# Patient Record
Sex: MALE | Race: ASIAN | NOT HISPANIC OR LATINO | Employment: OTHER | URBAN - METROPOLITAN AREA
[De-identification: names, ages, dates, MRNs, and addresses within clinical notes are randomized per-mention and may not be internally consistent; named-entity substitution may affect disease eponyms.]

---

## 2017-01-10 ENCOUNTER — OFFICE VISIT (OUTPATIENT)
Dept: INTERNAL MEDICINE | Facility: CLINIC | Age: 67
End: 2017-01-10

## 2017-01-10 VITALS
HEART RATE: 79 BPM | RESPIRATION RATE: 16 BRPM | BODY MASS INDEX: 25.7 KG/M2 | WEIGHT: 140.9 LBS | DIASTOLIC BLOOD PRESSURE: 76 MMHG | SYSTOLIC BLOOD PRESSURE: 134 MMHG

## 2017-01-10 DIAGNOSIS — I48.91 ATRIAL FIBRILLATION, UNSPECIFIED TYPE (H): ICD-10-CM

## 2017-01-10 DIAGNOSIS — Z11.59 NEED FOR HEPATITIS C SCREENING TEST: ICD-10-CM

## 2017-01-10 DIAGNOSIS — Z71.84 TRAVEL ADVICE ENCOUNTER: Primary | ICD-10-CM

## 2017-01-10 DIAGNOSIS — Z29.89 NEED FOR MALARIA PROPHYLAXIS: ICD-10-CM

## 2017-01-10 RX ORDER — DOXYCYCLINE HYCLATE 100 MG/1
100 TABLET, DELAYED RELEASE ORAL DAILY
Qty: 68 TABLET | Refills: 0 | Status: SHIPPED | OUTPATIENT
Start: 2017-01-10 | End: 2017-02-28

## 2017-01-10 RX ORDER — WARFARIN SODIUM 2 MG/1
TABLET ORAL
Qty: 100 TABLET | Refills: 3 | COMMUNITY
Start: 2017-01-10 | End: 2017-11-28 | Stop reason: DRUGHIGH

## 2017-01-10 RX ORDER — DOXYCYCLINE HYCLATE 100 MG/1
100 TABLET, DELAYED RELEASE ORAL DAILY
Qty: 50 TABLET | Refills: 0 | Status: SHIPPED | OUTPATIENT
Start: 2017-01-10 | End: 2017-01-10

## 2017-01-10 RX ORDER — AZITHROMYCIN 500 MG/1
1000 TABLET, FILM COATED ORAL ONCE
Qty: 2 TABLET | Refills: 0 | Status: SHIPPED | OUTPATIENT
Start: 2017-01-10 | End: 2017-01-10

## 2017-01-10 ASSESSMENT — PAIN SCALES - GENERAL: PAINLEVEL: NO PAIN (0)

## 2017-01-10 NOTE — MR AVS SNAPSHOT
After Visit Summary   1/10/2017    Ju Edwards    MRN: [id number]           Patient Information     Date Of Birth          1950        Visit Information        Provider Department      1/10/2017 1:55 PM Lilibeth Ramírez MD; [facility]        Today's Diagnoses     Travel advice encounter    -  1     Need for hepatitis C screening test         Need for malaria prophylaxis           Care Instructions    1 Received 3 vaccines in clinic today: Hepatitis A, Polio booster,Typhoid  2. Start taking Doxycyline 100mg daily (for malaria prophylaxis) today; will continue taking this every day through your whole trip for 7 days after you get back, take it until all the pills are gone no matter how you feel  3. I ordered you a one time pill of Azithromycin; take this if you develop severe diarrhea while on your trip  4. As we discussed in clinic, these antibiotics can affect your Warfarin dose. Starting today, take 1 pill of Warfarin every single day (do not take the 1/2 pill on Tues, Thurs, Sat, Sun like normal, just take 1 pill every day).   5. Go to your Warfarin clinic on Monday and get your INR checked before you leave                  Primary Care Center Medication Refill Request Information:  * Please contact your pharmacy regarding ANY request for medication refills.  ** [facility] Prescription Fax = [phone]  * Please allow 3 business days for routine medication refills.  * Please allow 5 business days for controlled substance medication refills.     Primary Care Center Test Result notification information:  *You will be notified with in 7-10 days of your appointment day regarding the results of your test.  If you are on MyChart you will be notified as soon as the provider has reviewed the results and signed off on them.          Follow-ups after your visit        Future tests that were ordered for you today     Open Future Orders         Priority Expected Expires Ordered    INR Routine 2017 1/10/2018 1/10/2017            Who to contact     Please call your clinic at [phone] to:    Ask questions about your health    Make or cancel appointments    Discuss your medicines    Learn about your test results    Speak to your doctor   If you have compliments or concerns about an experience at your clinic, or if you wish to file a complaint, please contact [facility] Patient Relations at [phone] or email us at [email or web address]         Additional Information About Your Visit        [employer] Information     [employer] is an electronic gateway that provides easy, online access to your medical records. With scenios, you can request a clinic appointment, read your test results, renew a prescription or communicate with your care team.     To sign up for scenios visit the website at [email or web address]   You will be asked to enter the access code listed below, as well as some personal information. Please follow the directions to create your username and password.     Your access code is: [email or web address]  Expires: 2017  6:30 AM     Your access code will  in 90 days. If you need help or a new code, please contact your [facility] or call [phone] for assistance.        Care EveryWhere ID     This is your Care EveryWhere ID. This could be used by other organizations to access your [place] medical records  [id number]        Your Vitals Were     Pulse Respirations                79 16           Blood Pressure from Last 3 Encounters:   01/10/17 134/76   16 142/78   16 155/84    Weight from Last 3 Encounters:   01/10/17 63.912 kg (140 lb 14.4 oz)   16 65.772 kg (145 lb)   16 63.05 kg (139 lb)              We Performed the Following     POLIOVIRUS VACC INACTIVATED SUBQ/IM     TYPHOID VACCINE, IM          Today's Medication Changes          These  changes are accurate as of: 1/10/17  3:52 PM.  If you have any questions, ask your nurse or doctor.               Start taking these medicines.        Dose/Directions    azithromycin 500 MG tablet   Commonly known as:  ZITHROMAX   Used for:  Travel advice encounter   Started by:  Lilibeth Ramírez MD        Dose:  1000 mg   Take 2 tablets (1,000 mg) by mouth once for 1 dose   Quantity:  2 tablet   Refills:  0       Doxycycline Hyclate 100 MG Tbec   Used for:  Need for malaria prophylaxis   Started by:  Lilibeth Ramírez MD        Dose:  100 mg   Take 100 mg by mouth daily   Quantity:  50 tablet   Refills:  0         These medicines have changed or have updated prescriptions.        Dose/Directions    warfarin 2 MG tablet   Commonly known as:  COUMADIN   This may have changed:  Another medication with the same name was removed. Continue taking this medication, and follow the directions you see here.   Used for:  Atrial fibrillation (H)   Changed by:  Rayo Christiansen MD        Take 1 tablet (2 mgs) on Mon, Wed, Fri and  1 1/2 tabs( 3 mgs)  all other days or as directed by the Anticoagulation Clinic   Quantity:  100 tablet   Refills:  3            Where to get your medicines      These medications were sent to [facility]/pharmacy #[id number] - [place], [place] - [address] [place] [address]     Phone:  [phone]    - azithromycin 500 MG tablet  - Doxycycline Hyclate 100 MG Tbec             Primary Care Provider Office Phone # Fax #    Rayo Christiansen [phone]        [facility] [address]  [facility] [address]        Thank you!     Thank you for choosing [facility]  for your care. Our goal is always to provide you with excellent care. Hearing back from our patients is one way we can continue to improve our services. Please take a few minutes to complete the written survey that you may receive in the mail after your  visit with us. Thank you!             Your Updated Medication List - Protect others around you: Learn how to safely use, store and throw away your medicines at www.disposemymeds.org.          This list is accurate as of: 1/10/17  3:52 PM.  Always use your most recent med list.                   Brand Name Dispense Instructions for use    atenolol 25 MG tablet    TENORMIN    45 tablet    Take 0.5 tablets (12.5 mg) by mouth daily       atorvastatin 80 MG tablet    LIPITOR    90 tablet    Take 1 tablet (80 mg) by mouth daily       azithromycin 500 MG tablet    ZITHROMAX    2 tablet    Take 2 tablets (1,000 mg) by mouth once for 1 dose       cetirizine 10 MG tablet    zyrTEC    90 tablet    Take 1 tablet (10 mg) by mouth every evening       citalopram 10 MG tablet    celeXA    180 tablet    Take 2 tablets (20 mg) by mouth daily       cycloSPORINE 0.05 % ophthalmic emulsion    RESTASIS    180 each    Place 1 drop into both eyes 2 times daily       Doxycycline Hyclate 100 MG Tbec     50 tablet    Take 100 mg by mouth daily       emollient cream     25 g    Apply topically as needed for other       flecainide acetate 150 MG Tabs     180 tablet    Take 1 tab (150mg) by mouth 2 times daily as needed.       ibuprofen 600 MG tablet    ADVIL/MOTRIN     Take 600 mg by mouth       lisinopril 10 MG tablet    PRINIVIL/ZESTRIL    100 tablet    Take 1 tablet (10 mg) by mouth daily       NAPROXEN PO      Take 375 mg by mouth 2 times daily (with meals)       ONDANSETRON PO      Take 4 mg by mouth every 8 hours as needed for nausea       oxyCODONE-acetaminophen 5-325 MG per tablet    PERCOCET     Take 1 tablet by mouth every 4 hours as needed for moderate to severe pain       pantoprazole 40 MG EC tablet    PROTONIX    90 tablet    Take 1 tablet (40 mg) by mouth daily       SENNA LAX PO      Take by mouth 2 times daily as needed       STOOL SOFTENER 100 MG capsule   Generic drug:  docusate sodium          vitamin D3 2000 UNITS Caps      90 capsule    Take 1 tablet by mouth daily       warfarin 2 MG tablet    COUMADIN    100 tablet    Take 1 tablet (2 mgs) on Mon, Wed, Fri and  1 1/2 tabs( 3 mgs)  all other days or as directed by the Anticoagulation Clinic

## 2017-01-10 NOTE — PATIENT INSTRUCTIONS
1 Received 3 vaccines in clinic today: Hepatitis A, Polio booster,Typhoid  2. Start taking Doxycyline 100mg daily (for malaria prophylaxis) today; will continue taking this every day through your whole trip for 7 days after you get back, take it until all the pills are gone no matter how you feel  3. I ordered you a one time pill of Azithromycin; take this if you develop severe diarrhea while on your trip  4. As we discussed in clinic, these antibiotics can affect your Warfarin dose. Starting today, take 1 pill of Warfarin every single day (do not take the 1/2 pill on Tues, Thurs, Sat, Sun like normal, just take 1 pill every day).   5. Go to your Warfarin clinic on Monday and get your INR checked before you leave                  Primary Care Center Medication Refill Request Information:  * Please contact your pharmacy regarding ANY request for medication refills.  ** [facility] Prescription Fax = [phone]  * Please allow 3 business days for routine medication refills.  * Please allow 5 business days for controlled substance medication refills.     Primary Care Center Test Result notification information:  *You will be notified with in 7-10 days of your appointment day regarding the results of your test.  If you are on MyChart you will be notified as soon as the provider has reviewed the results and signed off on them.

## 2017-01-10 NOTE — NURSING NOTE
Chief Complaint   Patient presents with     Travel Clinic     patient is leaving for [facility] on 01/17/2017 and returning on 02/21/2017     SHAISTA MALCOLM at 2:47 PM on 1/10/2017.

## 2017-01-10 NOTE — PROGRESS NOTES
Ju Edwards MRN# [id number]   YOB: 1950 Age: 66 year old     Date: January 10, 2017     Reason for visit: travel to [facility]    HPI:   Mr. Edwards is a 66 year old male with pmhx HTN, HLD, CVA with residual left sided weakness, Persistent AFib on Coumadin who is planning to travel to [facility] [facility] on 1/17 and presents for counseling/medication/vaccinations in anticipation of his trip.    The patient was originally born in [place] and immigrated to the [place] ~[place] ago.  He last visited [place] in 2013 for 3 weeks.  He is planning to go back from 1/17-2/21 to visit his 95 year old mother.  He states that he plans to fly into the [place] ([place]) and then travel by car to his mother's home, which is located in [place] (notably within an area at high risk for malaria).  This is a larger city. Is not planning on having any medical procedures while in [place]. Is traveling alone, his wife is planning to stay in [place].     Of note, his MMR, tdap, Influenza vaccinations are all up to date.  He has never been vaccinated for HBV. He has received 1 of 2 doses of HAV vaccine in 2013. Received typhoid vaccine in 2013. Has not received polio vaccine since childhood per his report.     Patient Active Problem List   Diagnosis     Hypertension     Hyperlipidemia     Stroke (H)     Atrial fibrillation (H)     Long-term (current) use of anticoagulants [Z79.01]       ROS: Complete 10 point review of systems negative unless mentioned in HPI    Exam:  /76 mmHg  Pulse 79  Resp 16  Wt 63.912 kg (140 lb 14.4 oz)  General: NAD, pleasant and cooperative with interview and exam  HEENT: NCAT, moist oral mucosa, oropharynx clear, anicteric sclera  Neck: no cervical lymphadenopathy or thyromegaly appreciated  CV: RRR, Normal S1, S2, no murmurs, rubs, thrills  Resp: non-labored respirations, CTAB, no rhonchi, crackles, wheezes appreciated  Abdomen: soft, nontender, nondistended, normoactive bs, no HSM  Extremities:  WWP, no LE edema bilaterally  Skin: Warm and dry, no jaundice, rash, or concerning lesions    Labs:   None recent    Imaging:   None recent    A&P:   Mr. Edwards is a 66 year old male with pmhx HTN, HLD, CVA with residual left sided weakness, Persistent AFib on Coumadin who is planning to travel to [facility] on 1/17 and presents for counseling/medication/vaccinations in anticipation of his trip.    # Travel Prophylaxis:  Vaccinations  -- Hepatitis A, IPV (polio), Typhoid vaccinations given   -- of note, pt ultimately also require HBV vaccine, but would only have time for one shot prior to leaving for [facility] which would not provide any immunity, pt was counseled regarding this and expressed understanding, will order HBV panel to assess immunity  -- MMR, Tdap, Influenza, up to date  -- Japanese Encephalitis immunization was considered, but after review of season, location of travel, and risk of JE as well as cost of vaccination, felt that JE immunization not indicated at this time    Malaria Prophylaxis:   -- Area that patient will be staying is endemic for malaria, chemoprophylaxis indicated. Cost is an issue for this patient  -- Doxycyline 100mg qdaily starting now, to be continued for 1 month following return (1/10-3/21)  -- Doxycyline can interact with Warfarin, plan to adjust patient's Warfarin dose as follows   = Decrease Warfarin to 2mg daily every day (as opposed to 2mg M, W, F; 3mg Tues/Thurs/Sat/Sun   = INR check 1/16 prior to departure   = INR check x2 while in [facility] [facility] if possible    Traveler's Diarrhea:  -- Patient provided with Azithromycin 1000mg x1 dose, to be utilized if he develops traveler's diarrhea    Return to clinic: as needed     Patient care plan discussed with attending physician, Dr. Dent, who agreed with above.    Lilibeth Ramírez MD  Internal Medicine, PGY-2    [facility] today for travel clinic and musculoskeletal problem.    Diagnoses and all orders for this visit:    Travel advice  encounter  -     Discontinue: hepatitis A-hepatitis B (TWINRIX) 720-20 ELU-MCG/ML injection; Inject 1 mL into the muscle once for 1 dose  -     POLIOVIRUS VACC INACTIVATED SUBQ/IM  -     TYPHOID VACCINE, IM  -     azithromycin (ZITHROMAX) 500 MG tablet; Take 2 tablets (1,000 mg) by mouth once for 1 dose  -     HEPA VACCINE ADULT IM    Need for hepatitis C screening test    Need for malaria prophylaxis  -     Discontinue: Doxycycline Hyclate 100 MG TBEC; Take 100 mg by mouth daily  -     INR; Future  -     Doxycycline Hyclate 100 MG TBEC; Take 100 mg by mouth daily    Atrial fibrillation, unspecified type (H)    Other orders  -     Cancel: **Hepatitis C Screen Reflex to HCV RNA Quant and Genotype (2 weeks); Future      Attending Addendum:  Patient seen and examined with resident in clinic today.  Pertinent portions of history and exam were independently verified by myself.  I agree with the exam and plan as outlined above with the following modifications: none.  All instructions explained to patient in detail with aid of .  Pt voiced understanding.  Danyelle Dent MD  Internal Medicine

## 2017-01-16 ENCOUNTER — ANTICOAGULATION THERAPY VISIT (OUTPATIENT)
Dept: ANTICOAGULATION | Facility: CLINIC | Age: 67
End: 2017-01-16

## 2017-01-16 DIAGNOSIS — Z79.01 LONG-TERM (CURRENT) USE OF ANTICOAGULANTS: Primary | ICD-10-CM

## 2017-01-16 DIAGNOSIS — Z29.89 NEED FOR MALARIA PROPHYLAXIS: ICD-10-CM

## 2017-01-16 DIAGNOSIS — I10 ESSENTIAL HYPERTENSION: Primary | ICD-10-CM

## 2017-01-16 DIAGNOSIS — I48.91 ATRIAL FIBRILLATION, UNSPECIFIED TYPE (H): ICD-10-CM

## 2017-01-16 LAB — INR PPP: 2.13 (ref 0.86–1.14)

## 2017-01-16 RX ORDER — ATENOLOL 25 MG/1
12.5 TABLET ORAL DAILY
Qty: 45 TABLET | Refills: 3 | Status: SHIPPED | OUTPATIENT
Start: 2017-01-16 | End: 2017-12-20

## 2017-01-16 NOTE — PROGRESS NOTES
ANTICOAGULATION FOLLOW-UP CLINIC VISIT    Patient Name:  Ju Edwards  Date:  1/16/2017  Contact Type:  Telephone    SUBJECTIVE:     Patient Findings     Positives No Problem Findings    Comments Leslie reports that Latanya will be traveling to [facility] and won't return until the week of 2/20.           OBJECTIVE    INR   Date Value Ref Range Status   01/16/2017 2.13* 0.86 - 1.14 Final       ASSESSMENT / PLAN  INR assessment THER    Recheck INR In: 5 WEEKS    INR Location Clinic      Anticoagulation Summary as of 1/16/2017     INR goal 2.0-3.0   Selected INR 2.13 (1/16/2017)   Maintenance plan 2 mg (2 mg x 1) on Mon, Wed, Fri; 3 mg (2 mg x 1.5) all other days   Full instructions 2 mg on Mon, Wed, Fri; 3 mg all other days   Weekly total 18 mg   No change documented Hien Curry, RN   Plan last modified Alice Carranza RN (6/2/2016)   Next INR check 2/13/2017   Priority INR   Target end date Indefinite    Indications   Long-term (current) use of anticoagulants [Z79.01] [Z79.01]  Atrial fibrillation (H) [I48.91]         Anticoagulation Episode Summary     INR check location Clinic Lab    Preferred lab     Send INR reminders to [facility]    Comments Speak witlauren Wife Lety [phone]      Anticoagulation Care Providers     Provider Role Specialty Phone number    Rayo Christiansen MD [facility] Internal Medicine [phone]            See the Encounter Report to view Anticoagulation Flowsheet and Dosing Calendar (Go to Encounters tab in chart review, and find the Anticoagulation Therapy Visit)    Spoke with Leslie.    Hien Curry, RN

## 2017-02-22 ENCOUNTER — ANTICOAGULATION THERAPY VISIT (OUTPATIENT)
Dept: ANTICOAGULATION | Facility: CLINIC | Age: 67
End: 2017-02-22

## 2017-02-22 DIAGNOSIS — I48.91 ATRIAL FIBRILLATION, UNSPECIFIED TYPE (H): ICD-10-CM

## 2017-02-22 DIAGNOSIS — Z79.01 LONG-TERM (CURRENT) USE OF ANTICOAGULANTS: ICD-10-CM

## 2017-02-22 LAB — INR PPP: 4.22 (ref 0.86–1.14)

## 2017-02-22 NOTE — PROGRESS NOTES
ANTICOAGULATION FOLLOW-UP CLINIC VISIT    Patient Name:  Ju Edwards  Date:  2/22/2017  Contact Type:  Telephone    SUBJECTIVE:     Patient Findings     Positives Nose bleeds    Comments Latanya just returned from [facility].  Lety reported that while he was there he had a lot of diarrhea.  He had a nose bleed earlier today that lasted about 10 mintues.  He also is just starting to get a nose bleed again while we are on the phone.  Instructed Lety to take him to the ER if they are unable to get bleeding to stop.  He already took warfarin dose today.           OBJECTIVE    INR   Date Value Ref Range Status   02/22/2017 4.22 (H) 0.86 - 1.14 Final       ASSESSMENT / PLAN  INR assessment SUPRA    Recheck INR In: 1 WEEK    INR Location Clinic      Anticoagulation Summary as of 2/22/2017     INR goal 2.0-3.0   Today's INR 4.22!   Maintenance plan 2 mg (2 mg x 1) on Mon, Wed, Fri; 3 mg (2 mg x 1.5) all other days   Full instructions 2/23: Hold; Otherwise 2 mg on Mon, Wed, Fri; 3 mg all other days   Weekly total 18 mg   Plan last modified Alice Carranza RN (6/2/2016)   Next INR check 3/1/2017   Priority INR   Target end date Indefinite    Indications   Long-term (current) use of anticoagulants [Z79.01] [Z79.01]  Atrial fibrillation (H) [I48.91]         Anticoagulation Episode Summary     INR check location Clinic Lab    Preferred lab     Send INR reminders to [facility]    Comments Speak witrh Wife Lety [phone]      Anticoagulation Care Providers     Provider Role Specialty Phone number    Rayo Christiansen MD [facility] Internal Medicine [phone]            See the Encounter Report to view Anticoagulation Flowsheet and Dosing Calendar (Go to Encounters tab in chart review, and find the Anticoagulation Therapy Visit)    Spoke with Lety.    Hien Curry, CONCEPCIÓN

## 2017-02-22 NOTE — MR AVS SNAPSHOT
Ju Edwards   2/22/2017   Anticoagulation Therapy Visit    Description:  66 year old male   Provider:  Hien Curry, RN   Department:  [facility]           INR as of 2/22/2017     Today's INR 4.22!      Anticoagulation Summary as of 2/22/2017     INR goal 2.0-3.0   Today's INR 4.22!   Full instructions 2/23: Hold; Otherwise 2 mg on Mon, Wed, Fri; 3 mg all other days   Next INR check 3/1/2017    Indications   Long-term (current) use of anticoagulants [Z79.01] [Z79.01]  Atrial fibrillation (H) [I48.91]         February 2017 Details    Sun Mon Tue Wed Thu Fri Sat        1               2               3               4                 5               6               7               8               9               10               11                 12               13               14               15               16               17               18                 19               20               21               22      2 mg   See details      23      Hold         24      2 mg         25      3 mg           26      3 mg         27      2 mg         28      3 mg              Date Details   02/22 This INR check               How to take your warfarin dose     To take:  2 mg Take 1 of the 2 mg tablets.    To take:  3 mg Take 1.5 of the 2 mg tablets.    Hold Do not take your warfarin dose. See the Details table to the right for additional instructions.                March 2017 Details    Sun Mon Tue Wed Thu Fri Sat        1            2               3               4                 5               6               7               8               9               10               11                 12               13               14               15               16               17               18                 19               20               21               22               23               24               25                 26               27               28                29               30               31                 Date Details   No additional details    Date of next INR:  3/1/2017         How to take your warfarin dose     To take:  2 mg Take 1 of the 2 mg tablets.

## 2017-02-28 ENCOUNTER — OFFICE VISIT (OUTPATIENT)
Dept: INTERNAL MEDICINE | Facility: CLINIC | Age: 67
End: 2017-02-28

## 2017-02-28 ENCOUNTER — ANTICOAGULATION THERAPY VISIT (OUTPATIENT)
Dept: ANTICOAGULATION | Facility: CLINIC | Age: 67
End: 2017-02-28

## 2017-02-28 VITALS
RESPIRATION RATE: 16 BRPM | BODY MASS INDEX: 25.05 KG/M2 | HEART RATE: 55 BPM | SYSTOLIC BLOOD PRESSURE: 140 MMHG | WEIGHT: 137.4 LBS | OXYGEN SATURATION: 97 % | DIASTOLIC BLOOD PRESSURE: 74 MMHG

## 2017-02-28 DIAGNOSIS — Z79.01 LONG-TERM (CURRENT) USE OF ANTICOAGULANTS: ICD-10-CM

## 2017-02-28 DIAGNOSIS — E78.2 MIXED HYPERLIPIDEMIA: ICD-10-CM

## 2017-02-28 DIAGNOSIS — F32.A DEPRESSION, UNSPECIFIED DEPRESSION TYPE: ICD-10-CM

## 2017-02-28 DIAGNOSIS — Z11.59 NEED FOR HEPATITIS C SCREENING TEST: ICD-10-CM

## 2017-02-28 DIAGNOSIS — I48.91 ATRIAL FIBRILLATION, UNSPECIFIED TYPE (H): ICD-10-CM

## 2017-02-28 DIAGNOSIS — K21.00 GASTROESOPHAGEAL REFLUX DISEASE WITH ESOPHAGITIS: ICD-10-CM

## 2017-02-28 DIAGNOSIS — Z11.59 NEED FOR HEPATITIS C SCREENING TEST: Primary | ICD-10-CM

## 2017-02-28 LAB
HBV SURFACE AB SERPL IA-ACNC: 232.41 M[IU]/ML
HBV SURFACE AG SERPL QL IA: NONREACTIVE
HCV AB SERPL QL IA: NORMAL
INR PPP: 1.42 (ref 0.86–1.14)

## 2017-02-28 PROCEDURE — 86704 HEP B CORE ANTIBODY TOTAL: CPT | Performed by: INTERNAL MEDICINE

## 2017-02-28 PROCEDURE — G0472 HEP C SCREEN HIGH RISK/OTHER: HCPCS | Performed by: NURSE PRACTITIONER

## 2017-02-28 PROCEDURE — 86803 HEPATITIS C AB TEST: CPT | Performed by: NURSE PRACTITIONER

## 2017-02-28 PROCEDURE — 87340 HEPATITIS B SURFACE AG IA: CPT | Performed by: INTERNAL MEDICINE

## 2017-02-28 PROCEDURE — 86706 HEP B SURFACE ANTIBODY: CPT | Performed by: INTERNAL MEDICINE

## 2017-02-28 RX ORDER — ATORVASTATIN CALCIUM 80 MG/1
80 TABLET, FILM COATED ORAL DAILY
Qty: 90 TABLET | Refills: 2 | Status: SHIPPED | OUTPATIENT
Start: 2017-02-28 | End: 2017-12-17

## 2017-02-28 RX ORDER — CITALOPRAM HYDROBROMIDE 10 MG/1
20 TABLET ORAL DAILY
Qty: 180 TABLET | Refills: 0 | Status: SHIPPED | OUTPATIENT
Start: 2017-02-28 | End: 2017-04-24

## 2017-02-28 RX ORDER — PANTOPRAZOLE SODIUM 40 MG/1
40 TABLET, DELAYED RELEASE ORAL DAILY
Qty: 90 TABLET | Refills: 2 | Status: SHIPPED | OUTPATIENT
Start: 2017-02-28 | End: 2017-12-17

## 2017-02-28 ASSESSMENT — PAIN SCALES - GENERAL: PAINLEVEL: NO PAIN (0)

## 2017-02-28 NOTE — PROGRESS NOTES
HPI: Ju Edwards is a 66 year old male who comes in with [facility]  for physical and to have forms completed for [facility].  He recently returned from a trip in [facility] where he travelled with a friend and friend's wife.  He was ill with traveler's diarrhea while there.  He has recovered from the diarrhea. His brother who he was visiting was killed in a car accident one week after he arrived for the visit. He stopped his doxycycline when he came home and did not take it for 30 days after leaving the malarial area.  He had a nosebleed which he could not control on 2/22/17.  He had an INR of > 4.  He was advised to hold his coumadin and has held it for 2 days.  He was instructed to re-check INR today.   He has forms from the [facility] which he attends after he had a CVA.     He feels fine at this time.     Patient Active Problem List   Diagnosis     Hypertension     Hyperlipidemia     Stroke (H)     Atrial fibrillation (H)     Long-term (current) use of anticoagulants [Z79.01]       He has a medical hx of  does not have any pertinent problems on file.    Current Outpatient Prescriptions   Medication Sig Dispense Refill     atenolol (TENORMIN) 25 MG tablet Take 0.5 tablets (12.5 mg) by mouth daily 45 tablet 3     Doxycycline Hyclate 100 MG TBEC Take 100 mg by mouth daily 68 tablet 0     warfarin (COUMADIN) 2 MG tablet Pt instructed to take 1 tab (2 mg) every day after starting malaria prophylaxis with doxycycline 100 tablet 3     citalopram (CELEXA) 10 MG tablet Take 2 tablets (20 mg) by mouth daily 180 tablet 0     pantoprazole (PROTONIX) 40 MG enteric coated tablet Take 1 tablet (40 mg) by mouth daily 90 tablet 2     Cholecalciferol (VITAMIN D3) 2000 UNITS CAPS Take 1 tablet by mouth daily 90 capsule 2     atorvastatin (LIPITOR) 80 MG tablet Take 1 tablet (80 mg) by mouth daily 90 tablet 2     flecainide acetate 150 MG TABS Take 1 tab (150mg) by mouth 2 times daily  as needed. 180 tablet 2     cycloSPORINE (RESTASIS) 0.05 % ophthalmic emulsion Place 1 drop into both eyes 2 times daily 180 each 3     lisinopril (PRINIVIL,ZESTRIL) 10 MG tablet Take 1 tablet (10 mg) by mouth daily 100 tablet 3     docusate sodium (STOOL SOFTENER) 100 MG capsule        ibuprofen (ADVIL,MOTRIN) 600 MG tablet Take 600 mg by mouth       cetirizine (ZYRTEC) 10 MG tablet Take 1 tablet (10 mg) by mouth every evening 90 tablet 3     NAPROXEN PO Take 375 mg by mouth 2 times daily (with meals)       oxyCODONE-acetaminophen (PERCOCET) 5-325 MG per tablet Take 1 tablet by mouth every 4 hours as needed for moderate to severe pain       emollient (VANICREAM) cream Apply topically as needed for other 25 g 3     ONDANSETRON PO Take 4 mg by mouth every 8 hours as needed for nausea       Sennosides (SENNA LAX PO) Take by mouth 2 times daily as needed           ALLERGIES: Review of patient's allergies indicates no known allergies.    PAST MEDICAL HX:   Past Medical History   Diagnosis Date     Atrial fibrillation (H)      paroxysmal     Delirium      associated with hospitalization for stroke     Depression      Gastric erosions 1/2014     associated with gi bleed     Hyperlipidemia      Hypertension      Stroke (H) 1/2014     ischemic CVA with hemorrhagic transformation       PAST SURGICAL HX:   Past Surgical History   Procedure Laterality Date     Cardiac surgery  2000     mitral valve repair     Repair atrial septal defect         IMMUNIZATION HX:   Immunization History   Administered Date(s) Administered     DTAP (<7y) 01/01/2001     Hepatitis A (Adult) 01/10/2017     Hepatitis A Vac Ped/Adol-2 Dose 02/21/2013     IPV 01/10/2017     Influenza (IIV3) 09/21/2011, 11/01/2013, 10/01/2015     Influenza Vaccine, 3 YRS +, IM (QUADRIVALENT W/PRESERVATIVES) 09/15/2016     Pneumococcal (PCV 13) 12/04/2014     Tdap (Adacel,Boostrix) 03/10/2010     Typhoid IM 02/21/2013, 01/10/2017       SOCIAL HX:   Social History      Social History Narrative       ROS:   CONSTITUTIONAL: no fatigue, no unexpected change in weight  SKIN: no worrisome rashes, no worrisome moles, no worrisome lesions  EYES: no acute vision problems or changes  ENT: no ear problems, no mouth problems, no throat problems  RESP: no significant cough, no shortness of breath  CV: no chest pain, no palpitations, no new or worsening peripheral edema  GI: no nausea, no vomiting, no constipation, no diarrhea  : no frequency, no dysuria, no hematuria  MS: no claudication, no myalgias, no joint aches  NEURO: no weakness, no dizziness, no syncope, no headaches  ENDOCRINE: no temperature intolerance, no skin/hair changes  HEME: nosebleeds.   PSYCHIATRIC: NEGATIVE for changes in mood or affect    OBJECTIVE:  /74  Pulse 55  Resp 16  Wt 62.3 kg (137 lb 6.4 oz)  SpO2 97%  BMI 25.05 kg/m2   Wt Readings from Last 1 Encounters:   02/28/17 62.3 kg (137 lb 6.4 oz)     Constitutional: no distress, comfortable, pleasant   Eyes: anicteric,conjunctiva pink.  Ears, Nose and Throat: tympanic membranes clear, nose clear and free of lesions, throat clear.   Neck: supple with full range of motion, no thyromegaly.   Cardiovascular: regular rate and rhythm, normal S1 and S2, no murmurs, rubs or gallops, peripheral pulses full and symmetric   Respiratory: clear to auscultation, no wheezes or crackles, normal breath sounds   Gastrointestinal: positive bowel sounds, nontender, no hepatosplenomegaly, no masses   Musculoskeletal: full range of motion, no edema   Skin: no concerning lesions, no jaundice, temp normal   Neurological:  normal gait,normal speech, no tremor   Psychological: appropriate mood   LYMPH: no  cervical, supraclavicular, infraclavicular nodes.    ASSESSMENT/PLAN:  Ju was seen today for physical.    Diagnoses and all orders for this visit:    Need for hepatitis C screening test  -     INR; Future  -     Hepatitis C Screen Reflex to HCV RNA Quant and Genotype  -      Hepatitis B Surface Antibody; Future  -     Hepatitis B surface antigen; Future  -     Hepatitis B core antibody; Future    Mixed hyperlipidemia  -     atorvastatin (LIPITOR) 80 MG tablet; Take 1 tablet (80 mg) by mouth daily    Depression, unspecified depression type  -     citalopram (CELEXA) 10 MG tablet; Take 2 tablets (20 mg) by mouth daily    Gastroesophageal reflux disease with esophagitis  -     pantoprazole (PROTONIX) 40 MG EC tablet; Take 1 tablet (40 mg) by mouth daily     RTC 3 months with Dr. Christiansen.     Total time spent 40 minutes. Forms were completed after reviewing records as he is new to me.   More than 50% of the time spent with Mr. Edwards on counseling / coordinating his care.  He will be screened for  Hepatitis B immunity which was discussed before his trip.     Maricarmen PEACOCK, CNP

## 2017-02-28 NOTE — PROGRESS NOTES
ANTICOAGULATION FOLLOW-UP CLINIC VISIT    Patient Name:  Ju Edwards  Date:  2/28/2017  Contact Type:  Telephone    SUBJECTIVE:     Patient Findings     Positives Intentional hold of therapy (held coumadin 2/23/17)    Comments Feeling better since getting home from [facility].  He doesn't have diarrhea anymore and he is eating better.           OBJECTIVE    INR   Date Value Ref Range Status   02/28/2017 1.42 (H) 0.86 - 1.14 Final       ASSESSMENT / PLAN  INR assessment SUB    Recheck INR In: 1 WEEK    INR Location Clinic      Anticoagulation Summary as of 2/28/2017     INR goal 2.0-3.0   Today's INR 1.42!   Maintenance plan 2 mg (2 mg x 1) on Mon, Wed, Fri; 3 mg (2 mg x 1.5) all other days   Full instructions 2/28: 4 mg; Otherwise 2 mg on Mon, Wed, Fri; 3 mg all other days   Weekly total 18 mg   Plan last modified Alice Carranza RN (6/2/2016)   Next INR check 3/7/2017   Priority INR   Target end date Indefinite    Indications   Long-term (current) use of anticoagulants [Z79.01] [Z79.01]  Atrial fibrillation (H) [I48.91]         Anticoagulation Episode Summary     INR check location Clinic Lab    Preferred lab     Send INR reminders to [facility]    Comments Speak witrh Wife Lety [phone]      Anticoagulation Care Providers     Provider Role Specialty Phone number    Rayo Christiansen MD [facility] Internal Medicine [phone]            See the Encounter Report to view Anticoagulation Flowsheet and Dosing Calendar (Go to Encounters tab in chart review, and find the Anticoagulation Therapy Visit)    Spoke with Lety.      Alice Fish RN

## 2017-02-28 NOTE — NURSING NOTE
"Chief Complaint   Patient presents with     Physical     pt states he is here for a general physical after overseas trip. \"Toenails were black but seem to be getting better\"       Rekha aRngel CMA at 1:28 PM on 2/28/2017  "

## 2017-02-28 NOTE — MR AVS SNAPSHOT
After Visit Summary   2/28/2017    Ju Edwards    MRN: [id number]           Patient Information     Date Of Birth          1950        Visit Information        Provider Department      2/28/2017 1:00 PM Sharee Lazo; Maricarmen Haines APRN CNP M [facility]        Today's Diagnoses     Need for hepatitis C screening test    -  1    Mixed hyperlipidemia        Depression, unspecified depression type        Gastroesophageal reflux disease with esophagitis          Care Instructions    Primary Care Center Phone Number [phone]  Primary Care Center Medication Refill Request Information:  * Please contact your pharmacy regarding ANY request for medication refills.  ** [facility] Prescription Fax = [phone]  * Please allow 3 business days for routine medication refills.  * Please allow 5 business days for controlled substance medication refills.     Primary Care Center Test Result notification information:  *You will be notified with in 7-10 days of your appointment day regarding the results of your test.  If you are on MyChart you will be notified as soon as the provider has reviewed the results and signed off on them.                Follow-ups after your visit        Follow-up notes from your care team     Return in about 3 months (around 5/28/2017).      Your next 10 appointments already scheduled     May 30, 2017  1:40 PM CDT   (Arrive by 1:25 PM)   Return Visit with AYUSH Merlos CNP [facility] ([facility])    [address] [place] [address] [id number] [phone]              Future tests that were ordered for you today     Open Future Orders        Priority Expected Expires Ordered    INR Routine 2/28/2017 2/28/2018 2/28/2017    Hepatitis B Surface Antibody Routine 2/28/2017 2/28/2018 2/28/2017    Hepatitis B surface antigen Routine 2/28/2017 2/28/2018 2/28/2017    Hepatitis B core antibody  Routine 2017            Who to contact     Please call your clinic at [phone] to:    Ask questions about your health    Make or cancel appointments    Discuss your medicines    Learn about your test results    Speak to your doctor   If you have compliments or concerns about an experience at your clinic, or if you wish to file a complaint, please contact [facility] Patient Relations at [phone] or email us at [email or web address]         Additional Information About Your Visit        [employer] Information     [employer] is an electronic gateway that provides easy, online access to your medical records. With [employer], you can request a clinic appointment, read your test results, renew a prescription or communicate with your care team.     To sign up for [employer] visit the website at [email or web address]   You will be asked to enter the access code listed below, as well as some personal information. Please follow the directions to create your username and password.     Your access code is: [email or web address]  Expires: 2017  6:31 AM     Your access code will  in 90 days. If you need help or a new code, please contact your [facility] Clinic or call [phone] for assistance.        Care EveryWhere ID     This is your Care EveryWhere ID. This could be used by other organizations to access your [facility] medical records  [id number]        Your Vitals Were     Pulse Respirations Pulse Oximetry BMI (Body Mass Index)          55 16 97% 25.05 kg/m2         Blood Pressure from Last 3 Encounters:   17 140/74   01/10/17 134/76   16 142/78    Weight from Last 3 Encounters:   17 62.3 kg (137 lb 6.4 oz)   01/10/17 63.9 kg (140 lb 14.4 oz)   16 65.8 kg (145 lb)              We Performed the Following     Hepatitis C Screen Reflex to HCV RNA Quant and Genotype          Where to get your medicines      These  medications were sent to [facility]/pharmacy #[id number] - [place], [place] - [address]  [address] [address]     Phone:  [phone]     atorvastatin 80 MG tablet    citalopram 10 MG tablet    pantoprazole 40 MG EC tablet          Primary Care Provider Office Phone # Fax #    Rayo Shawn Christiansen [phone]        [facility] [address]  [facility] [address]        Thank you!     Thank you for choosing [facility]  for your care. Our goal is always to provide you with excellent care. Hearing back from our patients is one way we can continue to improve our services. Please take a few minutes to complete the written survey that you may receive in the mail after your visit with us. Thank you!             Your Updated Medication List - Protect others around you: Learn how to safely use, store and throw away your medicines at www.disposemymeds.org.          This list is accurate as of: 2/28/17  2:21 PM.  Always use your most recent med list.                   Brand Name Dispense Instructions for use    atenolol 25 MG tablet    TENORMIN    45 tablet    Take 0.5 tablets (12.5 mg) by mouth daily       atorvastatin 80 MG tablet    LIPITOR    90 tablet    Take 1 tablet (80 mg) by mouth daily       cetirizine 10 MG tablet    zyrTEC    90 tablet    Take 1 tablet (10 mg) by mouth every evening       citalopram 10 MG tablet    celeXA    180 tablet    Take 2 tablets (20 mg) by mouth daily       cycloSPORINE 0.05 % ophthalmic emulsion    RESTASIS    180 each    Place 1 drop into both eyes 2 times daily       emollient cream     25 g    Apply topically as needed for other       flecainide acetate 150 MG Tabs     180 tablet    Take 1 tab (150mg) by mouth 2 times daily as needed.       ibuprofen 600 MG tablet    ADVIL/MOTRIN     Take 600 mg by mouth       lisinopril 10 MG tablet    PRINIVIL/ZESTRIL    100 tablet    Take 1 tablet (10 mg) by mouth daily        NAPROXEN PO      Take 375 mg by mouth 2 times daily (with meals)       ONDANSETRON PO      Take 4 mg by mouth every 8 hours as needed for nausea       oxyCODONE-acetaminophen 5-325 MG per tablet    PERCOCET     Take 1 tablet by mouth every 4 hours as needed for moderate to severe pain       pantoprazole 40 MG EC tablet    PROTONIX    90 tablet    Take 1 tablet (40 mg) by mouth daily       SENNA LAX PO      Take by mouth 2 times daily as needed       STOOL SOFTENER 100 MG capsule   Generic drug:  docusate sodium          vitamin D3 2000 UNITS Caps     90 capsule    Take 1 tablet by mouth daily       warfarin 2 MG tablet    COUMADIN    100 tablet    Pt instructed to take 1 tab (2 mg) every day after starting malaria prophylaxis with doxycycline

## 2017-02-28 NOTE — LETTER
Patient:  Ju Edwards  :   1950  MRN:     [id number]        Mr. Ju Edwards  [address]  [address]  [place] [address]        March 3, 2017    Dear Mr. Edwards,    Thank you for choosing the [facility] for your healthcare needs.  We appreciate the opportunity to serve you.    The following are your recent test results.     Results for orders placed or performed in visit on 17   Hepatitis C Screen Reflex to HCV RNA Quant and Genotype   Result Value Ref Range    Hepatitis C Antibody  NR     Nonreactive   Assay performance characteristics have not been established for newborns,   infants, and children         You do not have Hepatitis C.    Please contact us if you have any questions or concerns.  We look forward to serving your needs in the future.      Sincerely,    AYUSH Coleman CNP/ky

## 2017-02-28 NOTE — MR AVS SNAPSHOT
Ju Edwards   2/28/2017   Anticoagulation Therapy Visit    Description:  66 year old male   Provider:  Alice Fish, RN   Department:  [facility]           INR as of 2/28/2017     Today's INR 1.42!      Anticoagulation Summary as of 2/28/2017     INR goal 2.0-3.0   Today's INR 1.42!   Full instructions 2/28: 4 mg; Otherwise 2 mg on Mon, Wed, Fri; 3 mg all other days   Next INR check 3/7/2017    Indications   Long-term (current) use of anticoagulants [Z79.01] [Z79.01]  Atrial fibrillation (H) [I48.91]         February 2017 Details    Sun Mon Tue Wed Thu Fri Sat        1               2               3               4                 5               6               7               8               9               10               11                 12               13               14               15               16               17               18                 19               20               21               22               23               24               25                 26               27               28      4 mg   See details           Date Details   02/28 This INR check               How to take your warfarin dose     To take:  4 mg Take 2 of the 2 mg tablets.           March 2017 Details    Sun Mon Tue Wed Thu Fri Sat        1      2 mg         2      3 mg         3      2 mg         4      3 mg           5      3 mg         6      2 mg         7            8               9               10               11                 12               13               14               15               16               17               18                 19               20               21               22               23               24               25                 26               27               28               29               30               31                 Date Details   No additional details    Date of next INR:  3/7/2017         How to take your warfarin  dose     To take:  2 mg Take 1 of the 2 mg tablets.    To take:  3 mg Take 1.5 of the 2 mg tablets.

## 2017-02-28 NOTE — PATIENT INSTRUCTIONS
Primary Care Center Phone Number [phone]  Primary Care Center Medication Refill Request Information:  * Please contact your pharmacy regarding ANY request for medication refills.  ** [facility] Prescription Fax = [phone]  * Please allow 3 business days for routine medication refills.  * Please allow 5 business days for controlled substance medication refills.     Primary Care Center Test Result notification information:  *You will be notified with in 7-10 days of your appointment day regarding the results of your test.  If you are on MyChart you will be notified as soon as the provider has reviewed the results and signed off on them.

## 2017-03-01 LAB — HBV CORE AB SERPL QL IA: ABNORMAL

## 2017-03-27 ENCOUNTER — ANTICOAGULATION THERAPY VISIT (OUTPATIENT)
Dept: ANTICOAGULATION | Facility: CLINIC | Age: 67
End: 2017-03-27

## 2017-03-27 DIAGNOSIS — Z79.01 LONG-TERM (CURRENT) USE OF ANTICOAGULANTS: ICD-10-CM

## 2017-03-27 DIAGNOSIS — I48.91 ATRIAL FIBRILLATION, UNSPECIFIED TYPE (H): ICD-10-CM

## 2017-03-27 LAB — INR PPP: 2.63 (ref 0.86–1.14)

## 2017-03-27 NOTE — PROGRESS NOTES
ANTICOAGULATION FOLLOW-UP CLINIC VISIT    Patient Name:  Ju Edwards  Date:  3/27/2017  Contact Type:  Telephone    SUBJECTIVE:     Patient Findings     Positives No Problem Findings           OBJECTIVE    INR   Date Value Ref Range Status   03/27/2017 2.63 (H) 0.86 - 1.14 Final       ASSESSMENT / PLAN  INR assessment THER    Recheck INR In: 4 WEEKS    INR Location Clinic      Anticoagulation Summary as of 3/27/2017     INR goal 2.0-3.0   Today's INR 2.63   Maintenance plan 2 mg (2 mg x 1) on Mon, Wed, Fri; 3 mg (2 mg x 1.5) all other days   Full instructions 2 mg on Mon, Wed, Fri; 3 mg all other days   Weekly total 18 mg   No change documented Hien Curry, RN   Plan last modified Alice Carranza RN (6/2/2016)   Next INR check 4/24/2017   Priority INR   Target end date Indefinite    Indications   Long-term (current) use of anticoagulants [Z79.01] [Z79.01]  Atrial fibrillation (H) [I48.91]         Anticoagulation Episode Summary     INR check location Clinic Lab    Preferred lab     Send INR reminders to [facility]    Comments Speak witrh Wife Lety [phone]      Anticoagulation Care Providers     Provider Role Specialty Phone number    Rayo Christiansen MD [facility] Internal Medicine [phone]            See the Encounter Report to view Anticoagulation Flowsheet and Dosing Calendar (Go to Encounters tab in chart review, and find the Anticoagulation Therapy Visit)    Left message for patient with results and dosing recommendations. Asked patient to call back to report any missed doses, falls, signs and symptoms of bleeding or clotting, any changes in health, medication, or diet. Asked patient to call back with any questions or concerns.     Hien Curry, RN

## 2017-03-27 NOTE — MR AVS SNAPSHOT
Ju Edwards   3/27/2017   Anticoagulation Therapy Visit    Description:  66 year old male   Provider:  Hien Curry, RN   Department:  [facility]           INR as of 3/27/2017     Today's INR 2.63      Anticoagulation Summary as of 3/27/2017     INR goal 2.0-3.0   Today's INR 2.63   Full instructions 2 mg on Mon, Wed, Fri; 3 mg all other days   Next INR check 4/24/2017    Indications   Long-term (current) use of anticoagulants [Z79.01] [Z79.01]  Atrial fibrillation (H) [I48.91]         March 2017 Details    Sun Mon Tue Wed Thu Fri Sat        1               2               3               4                 5               6               7               8               9               10               11                 12               13               14               15               16               17               18                 19               20               21               22               23               24               25                 26               27      2 mg   See details      28      3 mg         29      2 mg         30      3 mg         31      2 mg           Date Details   03/27 This INR check               How to take your warfarin dose     To take:  2 mg Take 1 of the 2 mg tablets.    To take:  3 mg Take 1.5 of the 2 mg tablets.           April 2017 Details    Sun Mon Tue Wed Thu Fri Sat           1      3 mg           2      3 mg         3      2 mg         4      3 mg         5      2 mg         6      3 mg         7      2 mg         8      3 mg           9      3 mg         10      2 mg         11      3 mg         12      2 mg         13      3 mg         14      2 mg         15      3 mg           16      3 mg         17      2 mg         18      3 mg         19      2 mg         20      3 mg         21      2 mg         22      3 mg           23      3 mg         24            25               26               27               28                29 30                      Date Details   No additional details    Date of next INR:  4/24/2017         How to take your warfarin dose     To take:  2 mg Take 1 of the 2 mg tablets.    To take:  3 mg Take 1.5 of the 2 mg tablets.

## 2017-04-08 DIAGNOSIS — I48.91 ATRIAL FIBRILLATION, UNSPECIFIED TYPE (H): Primary | ICD-10-CM

## 2017-04-08 DIAGNOSIS — I63.039: ICD-10-CM

## 2017-04-08 DIAGNOSIS — Z00.00 ROUTINE HEALTH MAINTENANCE: ICD-10-CM

## 2017-04-08 RX ORDER — ACETAMINOPHEN 160 MG
1 TABLET,DISINTEGRATING ORAL DAILY
Qty: 90 CAPSULE | Refills: 2 | Status: SHIPPED | OUTPATIENT
Start: 2017-04-08 | End: 2018-02-26

## 2017-04-08 NOTE — TELEPHONE ENCOUNTER
Cholecalciferol (VITAMIN D3) 2000 UNITS CAPS  Last Written Prescription Date:  9/28/16  Last Fill Quantity: 90,   # refills: 2  Last Office Visit with G, P or [facility] prescribing provider: 2/28/17  Future Office visit:   5/30/17      flecainide acetate 150 MG TABS    Last Written Prescription Date:  8/11/16  Last Fill Quantity: 180,   # refills: 2    Routing refill request to provider for review/approval because:   flecainide acetate 150 MG TABS.  Not on SO protocol

## 2017-04-10 RX ORDER — FLECAINIDE ACETATE 150 MG/1
TABLET ORAL
Qty: 180 TABLET | Refills: 0 | Status: SHIPPED | OUTPATIENT
Start: 2017-04-10 | End: 2017-06-09

## 2017-04-24 DIAGNOSIS — F32.A DEPRESSION, UNSPECIFIED DEPRESSION TYPE: ICD-10-CM

## 2017-04-24 NOTE — TELEPHONE ENCOUNTER
citalopram (CELEXA) 10 MG tablet      Last Written Prescription Date:  2/28/2017  Last Fill Quantity: 180,   # refills: 0  Last Office Visit : 2/28/2017  Future Office visit:  5/30/2017    Routing to provider because:   1. Drug- Drug interaction warning:  Significance: Major   *Warning: Additive QT interval prolongation may occur during coadministration of citalopram and flecainide acetate. Coadministration of citalopram and flecainide acetate is not recommended.    2. Drug - Drug interaction warning: Significance: Moderate   *Warning: Toxic effects may be increased with concurrent administration of ibuprofen, naproxen, NAPROXEN PO and citalopram. The risk of upper gastrointestinal bleeding may be increased. Patients taking both drugs concurrently should be educated about the signs and symptoms of GI bleeding.

## 2017-05-01 RX ORDER — CITALOPRAM HYDROBROMIDE 10 MG/1
20 TABLET ORAL DAILY
Qty: 180 TABLET | Refills: 2 | Status: SHIPPED | OUTPATIENT
Start: 2017-05-01 | End: 2018-05-31

## 2017-05-10 ENCOUNTER — ANTICOAGULATION THERAPY VISIT (OUTPATIENT)
Dept: ANTICOAGULATION | Facility: CLINIC | Age: 67
End: 2017-05-10

## 2017-05-10 DIAGNOSIS — I48.91 ATRIAL FIBRILLATION, UNSPECIFIED TYPE (H): ICD-10-CM

## 2017-05-10 DIAGNOSIS — Z79.01 LONG-TERM (CURRENT) USE OF ANTICOAGULANTS: ICD-10-CM

## 2017-05-10 LAB — INR PPP: 2.65 (ref 0.86–1.14)

## 2017-05-10 NOTE — MR AVS SNAPSHOT
Ju Edwards   5/10/2017   Anticoagulation Therapy Visit    Description:  66 year old male   Provider:  Alice Carranza, RN   Department:  [facility]           INR as of 5/10/2017     Today's INR 2.65      Anticoagulation Summary as of 5/10/2017     INR goal 2.0-3.0   Today's INR 2.65   Full instructions 2 mg on Mon, Wed, Fri; 3 mg all other days   Next INR check 6/7/2017    Indications   Long-term (current) use of anticoagulants [Z79.01] [Z79.01]  Atrial fibrillation (H) [I48.91]         May 2017 Details    Sun Mon Tue Wed Thu Fri Sat      1               2               3               4               5               6                 7               8               9               10      2 mg   See details      11      3 mg         12      2 mg         13      3 mg           14      3 mg         15      2 mg         16      3 mg         17      2 mg         18      3 mg         19      2 mg         20      3 mg           21      3 mg         22      2 mg         23      3 mg         24      2 mg         25      3 mg         26      2 mg         27      3 mg           28      3 mg         29      2 mg         30      3 mg         31      2 mg             Date Details   05/10 This INR check               How to take your warfarin dose     To take:  2 mg Take 1 of the 2 mg tablets.    To take:  3 mg Take 1.5 of the 2 mg tablets.           June 2017 Details    Sun Mon Tue Wed Thu Fri Sat         1      3 mg         2      2 mg         3      3 mg           4      3 mg         5      2 mg         6      3 mg         7            8               9               10                 11               12               13               14               15               16               17                 18               19               20               21               22               23               24                 25               26               27               28               29                30                 Date Details   No additional details    Date of next INR:  6/7/2017         How to take your warfarin dose     To take:  2 mg Take 1 of the 2 mg tablets.    To take:  3 mg Take 1.5 of the 2 mg tablets.

## 2017-05-10 NOTE — PROGRESS NOTES
ANTICOAGULATION FOLLOW-UP CLINIC VISIT    Patient Name:  Ju Edwards  Date:  5/10/2017  Contact Type:  Telephone    SUBJECTIVE:        OBJECTIVE    INR   Date Value Ref Range Status   05/10/2017 2.65 (H) 0.86 - 1.14 Final       ASSESSMENT / PLAN  INR assessment THER    Recheck INR In: 4 WEEKS    INR Location Clinic      Anticoagulation Summary as of 5/10/2017     INR goal 2.0-3.0   Today's INR 2.65   Maintenance plan 2 mg (2 mg x 1) on Mon, Wed, Fri; 3 mg (2 mg x 1.5) all other days   Full instructions 2 mg on Mon, Wed, Fri; 3 mg all other days   Weekly total 18 mg   No change documented Alice Carranza RN   Plan last modified Alice Carranza RN (6/2/2016)   Next INR check 6/7/2017   Priority INR   Target end date Indefinite    Indications   Long-term (current) use of anticoagulants [Z79.01] [Z79.01]  Atrial fibrillation (H) [I48.91]         Anticoagulation Episode Summary     INR check location Clinic Lab    Preferred lab     Send INR reminders to [facility]    Comments Speak wit Wife Lety [phone]      Anticoagulation Care Providers     Provider Role Specialty Phone number    Rayo Christiansen MD Responsible Internal Medicine [phone]            See the Encounter Report to view Anticoagulation Flowsheet and Dosing Calendar (Go to Encounters tab in chart review, and find the Anticoagulation Therapy Visit)    Left message with results and dosing recommendations. Asked patient to call back to report any missed doses, falls, signs and symptoms of bleeding or clotting, or any changes to health or diet.     Alice Carranza RN

## 2017-06-09 DIAGNOSIS — Z00.00 ROUTINE HEALTH MAINTENANCE: ICD-10-CM

## 2017-06-09 RX ORDER — FLECAINIDE ACETATE 150 MG/1
TABLET ORAL
Qty: 180 TABLET | Refills: 0 | Status: SHIPPED | OUTPATIENT
Start: 2017-06-09 | End: 2017-08-16

## 2017-06-09 NOTE — TELEPHONE ENCOUNTER
flecainide acetate 150 MG TABS  Last Written Prescription Date:  4/10/17  Last Fill Quantity: 180,   # refills: 0  Last Office Visit with G, P or [facility] prescribing provider: 2/28/17  Future Office visit:   none    Routing refill request to provider for review/approval because:  flecainide acetate 150 MG TABS.. Not on SO Protocol

## 2017-06-20 ENCOUNTER — OFFICE VISIT (OUTPATIENT)
Dept: INTERNAL MEDICINE | Facility: CLINIC | Age: 67
End: 2017-06-20

## 2017-06-20 VITALS
OXYGEN SATURATION: 98 % | HEART RATE: 59 BPM | WEIGHT: 141 LBS | SYSTOLIC BLOOD PRESSURE: 163 MMHG | RESPIRATION RATE: 20 BRPM | DIASTOLIC BLOOD PRESSURE: 83 MMHG | BODY MASS INDEX: 25.71 KG/M2

## 2017-06-20 DIAGNOSIS — M79.10 MUSCLE PAIN: Primary | ICD-10-CM

## 2017-06-20 RX ORDER — NAPROXEN 500 MG/1
500 TABLET ORAL 2 TIMES DAILY WITH MEALS
Qty: 60 TABLET | Refills: 0 | Status: SHIPPED | OUTPATIENT
Start: 2017-06-20 | End: 2017-08-10

## 2017-06-20 RX ORDER — CYCLOBENZAPRINE HCL 5 MG
5 TABLET ORAL 3 TIMES DAILY PRN
Qty: 30 TABLET | Refills: 0 | Status: SHIPPED | OUTPATIENT
Start: 2017-06-20 | End: 2017-06-20

## 2017-06-20 ASSESSMENT — PAIN SCALES - GENERAL: PAINLEVEL: EXTREME PAIN (9)

## 2017-06-20 NOTE — PROGRESS NOTES
"                     PRIMARY CARE CENTER      Ju Edwards MRN# [id number]   YOB: 1950 Age: 66 year old     Date of Visit: 06/20/2017  Reason for Visit: Pain on right side under his arm for the last 7 days.    SUBJECTIVE:  History of Present Illness:   Ju Edwards is a 66 year old Tajik-speaking man with history of A.fib (on long-term anticoagulation), depression, HTN, HLD, and ischemic stroke (w/hemorrhagic conversion in 2014, w/residual left hemiparesis). He presents to clinic today for evaluation of pain on the right side of his chest under his arm. This has been present for about 7 days. He woke up with it last Tuesday morning. His wife thought he may have slept on it wrong, though he does not think that is the issue. It has gradually gotten worse. He describes the pain as a \"burning\" and stretching sensation. Seems to be localized to the muscles, though he describes it as initiating \"in the bones\". There is no swelling, or overlying skin changes, and there are no lumps/bumps. There is no pain at rest, but when he stretches, like bending over to put on socks or shoes, the pain is intense (8 or 9 out of 10). No weakness, numbness or tingling. The pain does not radiate. He has full mobility of the right arm. He is using tylenol and Tiger Balm for the pain, with limited improvement. Thought about going to the ER for this but felt like he would wait less here.    Review of Systems:   A four point review of systems was performed with the patient and was found to be negative or non-contributory with the exception of that noted in the HPI above.    PAST MEDICAL HISTORY:  Past Medical History:   Diagnosis Date     Atrial fibrillation (H)     paroxysmal     Delirium     associated with hospitalization for stroke     Depression      Gastric erosions 1/2014    associated with gi bleed     Hyperlipidemia      Hypertension      Stroke (H) 1/2014    ischemic CVA with hemorrhagic " transformation     Past Surgical History:   Procedure Laterality Date     CARDIAC SURGERY  2000    mitral valve repair     REPAIR ATRIAL SEPTAL DEFECT       Family History   Problem Relation Age of Onset     Eye Surgery Mother      Social History     Social History     Marital status:      Spouse name: N/A     Number of children: N/A     Years of education: N/A     Occupational History     Not on file.     Social History Main Topics     Smoking status: Never Smoker     Smokeless tobacco: Never Used     Alcohol use No     Drug use: No     Sexual activity: Yes     Partners: Female     Other Topics Concern     Special Diet No     Exercise Yes     rehab     Social History Narrative       CURRENT MEDICATIONS & ALLERGIES:  Current Outpatient Prescriptions   Medication Sig Dispense Refill     flecainide acetate 150 MG TABS Take 1 tab (150mg) by mouth 2 times daily as needed. 180 tablet 0     citalopram (CELEXA) 10 MG tablet Take 2 tablets (20 mg) by mouth daily 180 tablet 2     Cholecalciferol (VITAMIN D3) 2000 UNITS CAPS Take 1 tablet by mouth daily 90 capsule 2     atorvastatin (LIPITOR) 80 MG tablet Take 1 tablet (80 mg) by mouth daily 90 tablet 2     pantoprazole (PROTONIX) 40 MG EC tablet Take 1 tablet (40 mg) by mouth daily 90 tablet 2     atenolol (TENORMIN) 25 MG tablet Take 0.5 tablets (12.5 mg) by mouth daily 45 tablet 3     warfarin (COUMADIN) 2 MG tablet Pt instructed to take 1 tab (2 mg) every day after starting malaria prophylaxis with doxycycline 100 tablet 3     cycloSPORINE (RESTASIS) 0.05 % ophthalmic emulsion Place 1 drop into both eyes 2 times daily 180 each 3     lisinopril (PRINIVIL,ZESTRIL) 10 MG tablet Take 1 tablet (10 mg) by mouth daily 100 tablet 3     docusate sodium (STOOL SOFTENER) 100 MG capsule        ibuprofen (ADVIL,MOTRIN) 600 MG tablet Take 600 mg by mouth       cetirizine (ZYRTEC) 10 MG tablet Take 1 tablet (10 mg) by mouth every evening 90 tablet 3     NAPROXEN PO Take 375 mg by  mouth 2 times daily (with meals)       oxyCODONE-acetaminophen (PERCOCET) 5-325 MG per tablet Take 1 tablet by mouth every 4 hours as needed for moderate to severe pain       emollient (VANICREAM) cream Apply topically as needed for other 25 g 3     ONDANSETRON PO Take 4 mg by mouth every 8 hours as needed for nausea       Sennosides (SENNA LAX PO) Take by mouth 2 times daily as needed        No Known Allergies    OBJECTIVE:  /83 (BP Location: Left arm, Patient Position: Chair, Cuff Size: Adult Regular)  Pulse 59  Resp 20  Wt 64 kg (141 lb)  SpO2 98%  BMI 25.71 kg/m2     Physical Exam   Constitutional: He is oriented to person, place, and time and well-developed, well-nourished, and in no distress.   HENT:   Head: Normocephalic and atraumatic.   Nose: Nose normal.   Eyes: EOM are normal. No scleral icterus.   Neck: Normal range of motion. Neck supple. No thyromegaly present.   Cardiovascular: Normal rate, regular rhythm and normal heart sounds.    No murmur heard.  Pulmonary/Chest: Effort normal and breath sounds normal. No respiratory distress. He has no wheezes.   Abdominal: Soft. Bowel sounds are normal. He exhibits no distension. There is no tenderness.   Musculoskeletal: Normal range of motion. He exhibits no edema or tenderness.   Pain localized to an area on the right lateral chest under the right arm. Normal passive/active range of motion. Not reproduced with palpation, though is reproduced with stretching.    Lymphadenopathy:     He has no cervical adenopathy.   Neurological: He is alert and oriented to person, place, and time. He has normal reflexes. Gait normal.   Skin: Skin is warm and dry. No rash noted. No erythema.   Psychiatric: Mood and affect normal.   Vitals reviewed.      ASSESSMENT/PLAN:  Ju Ewdards is a 66 year old Yakut-speaking man with history of A.fib (on long-term anticoagulation), depression, HTN, HLD, and ischemic stroke (w/hemorrhagic conversion in 2014,  w/residual left hemiparesis). He presents to clinic today for evaluation of pain on the right side of his chest under his arm that has been bothersome for one week.    Acute musculoskeletal pain of the right chest wall.  Pain localized to the chest wall and possibly also right trapezius muscle. Suspect either muscle strain injury, or may have slept on it wrong last week. No benefit to imaging in this setting. Will have him continue acetaminophen, and add a short-course of NSAIDs with muscle relaxants at night to see if this helps. He has no history of GI bleeding or renal issues, though would not continue NSAIDs long-term in this patient. Follow-up if not improving in the next week or two.  -     naproxen (NAPROSYN) 500 MG tablet; Take 1 tablet (500 mg) by mouth 2 times daily (with meals)  -     cyclobenzaprine (FLEXERIL) 5 MG tablet; Take 1 tablet (5 mg) by mouth 3 times daily as needed for muscle spasms      Return to clinic as needed.      Plan was discussed with Dr. Jasso.      Molly Oshea MD/PhD  Internal Medicine Resident, PGY3  Mr Edwards was see by dr Oshea and then presented to me to review the diagnosis,symptoms and her plan of care , have reviewed the note and plan outlined and I agree.  Calvin Jasso MD

## 2017-06-20 NOTE — PATIENT INSTRUCTIONS
[facility] Medication Refill Request Information:  * Please contact your pharmacy regarding ANY request for medication refills.  ** [facility] Prescription Fax = [phone]  * Please allow 3 business days for routine medication refills.  * Please allow 5 business days for controlled substance medication refills.     [facility] Test Result notification information:  *You will be notified with in 7-10 days of your appointment day regarding the results of your test.  If you are on MyChart you will be notified as soon as the provider has reviewed the results and signed off on them.    [facility] [phone]

## 2017-06-20 NOTE — NURSING NOTE
Chief Complaint   Patient presents with     Pain     Pain under right arm -has had 7 days     Allie Mak LPN 8:27 AM on 6/20/2017    All instructions,information and questions were done with the assistance of an interpretor,.Allie Mak LPN 8:31 AM on 6/20/2017

## 2017-06-20 NOTE — MR AVS SNAPSHOT
After Visit Summary   6/20/2017    Ju Edwards    MRN: [id number]           Patient Information     Date Of Birth          1950        Visit Information        Provider Department      6/20/2017 8:00 AM Molly Oshea MD; [facility]        Today's Diagnoses     Muscle pain    -  1      Care Instructions    Primary Care Center Medication Refill Request Information:  * Please contact your pharmacy regarding ANY request for medication refills.  ** [facility] Prescription Fax = [phone]  * Please allow 3 business days for routine medication refills.  * Please allow 5 business days for controlled substance medication refills.     Primary Care Center Test Result notification information:  *You will be notified with in 7-10 days of your appointment day regarding the results of your test.  If you are on MyChart you will be notified as soon as the provider has reviewed the results and signed off on them.    [facility] [phone]           Follow-ups after your visit        Who to contact     Please call your clinic at [phone] to:    Ask questions about your health    Make or cancel appointments    Discuss your medicines    Learn about your test results    Speak to your doctor   If you have compliments or concerns about an experience at your clinic, or if you wish to file a complaint, please contact [facility] Patient Relations at [phone] or email us at [email or web address]         Additional Information About Your Visit        MyChart Information     [employer] is an electronic gateway that provides easy, online access to your medical records. With [employer], you can request a clinic appointment, read your test results, renew a prescription or communicate with your care team.     To sign up for [employer] visit the website at [email or web address]   You will be asked to enter the  access code listed below, as well as some personal information. Please follow the directions to create your username and password.     Your access code is: [email or web address]  Expires: 2017  6:31 AM     Your access code will  in 90 days. If you need help or a new code, please contact your [facility] or call [phone] for assistance.        Care EveryWhere ID     This is your Care EveryWhere ID. This could be used by other organizations to access your [place] medical records  [id number]        Your Vitals Were     Pulse Respirations Pulse Oximetry BMI (Body Mass Index)          59 20 98% 25.71 kg/m2         Blood Pressure from Last 3 Encounters:   17 163/83   17 140/74   01/10/17 134/76    Weight from Last 3 Encounters:   17 64 kg (141 lb)   17 62.3 kg (137 lb 6.4 oz)   01/10/17 63.9 kg (140 lb 14.4 oz)              Today, you had the following     No orders found for display         Today's Medication Changes          These changes are accurate as of: 17  8:59 AM.  If you have any questions, ask your nurse or doctor.               Start taking these medicines.        Dose/Directions    cyclobenzaprine 5 MG tablet   Commonly known as:  FLEXERIL   Used for:  Muscle pain   Started by:  Molly Oshea MD        Dose:  5 mg   Take 1 tablet (5 mg) by mouth 3 times daily as needed for muscle spasms   Quantity:  30 tablet   Refills:  0       naproxen 500 MG tablet   Commonly known as:  NAPROSYN   Used for:  Muscle pain   Started by:  Molly Oshea MD        Dose:  500 mg   Take 1 tablet (500 mg) by mouth 2 times daily (with meals)   Quantity:  60 tablet   Refills:  0            Where to get your medicines      These medications were sent to [facility]/pharmacy #[id number] [address] [address]     Phone:  [phone]     cyclobenzaprine 5 MG tablet    naproxen 500 MG tablet                 Primary Care Provider Office Phone # Fax #    Rayo Christiansen [phone]        [facility] [address]  [facility] [address]        Thank you!     Thank you for choosing [facility]  for your care. Our goal is always to provide you with excellent care. Hearing back from our patients is one way we can continue to improve our services. Please take a few minutes to complete the written survey that you may receive in the mail after your visit with us. Thank you!             Your Updated Medication List - Protect others around you: Learn how to safely use, store and throw away your medicines at www.disposemymeds.org.          This list is accurate as of: 6/20/17  8:59 AM.  Always use your most recent med list.                   Brand Name Dispense Instructions for use    atenolol 25 MG tablet    TENORMIN    45 tablet    Take 0.5 tablets (12.5 mg) by mouth daily       atorvastatin 80 MG tablet    LIPITOR    90 tablet    Take 1 tablet (80 mg) by mouth daily       cetirizine 10 MG tablet    zyrTEC    90 tablet    Take 1 tablet (10 mg) by mouth every evening       citalopram 10 MG tablet    celeXA    180 tablet    Take 2 tablets (20 mg) by mouth daily       cyclobenzaprine 5 MG tablet    FLEXERIL    30 tablet    Take 1 tablet (5 mg) by mouth 3 times daily as needed for muscle spasms       cycloSPORINE 0.05 % ophthalmic emulsion    RESTASIS    180 each    Place 1 drop into both eyes 2 times daily       emollient cream     25 g    Apply topically as needed for other       flecainide acetate 150 MG Tabs     180 tablet    Take 1 tab (150mg) by mouth 2 times daily as needed.       lisinopril 10 MG tablet    PRINIVIL/ZESTRIL    100 tablet    Take 1 tablet (10 mg) by mouth daily       naproxen 500 MG tablet    NAPROSYN    60 tablet    Take 1 tablet (500 mg) by mouth 2 times daily (with meals)       ONDANSETRON PO      Take 4 mg by mouth every 8 hours as needed for nausea        oxyCODONE-acetaminophen 5-325 MG per tablet    PERCOCET     Take 1 tablet by mouth every 4 hours as needed for moderate to severe pain       pantoprazole 40 MG EC tablet    PROTONIX    90 tablet    Take 1 tablet (40 mg) by mouth daily       SENNA LAX PO      Take by mouth 2 times daily as needed       STOOL SOFTENER 100 MG capsule   Generic drug:  docusate sodium          vitamin D3 2000 UNITS Caps     90 capsule    Take 1 tablet by mouth daily       warfarin 2 MG tablet    COUMADIN    100 tablet    Pt instructed to take 1 tab (2 mg) every day after starting malaria prophylaxis with doxycycline

## 2017-06-27 ENCOUNTER — ANTICOAGULATION THERAPY VISIT (OUTPATIENT)
Dept: ANTICOAGULATION | Facility: CLINIC | Age: 67
End: 2017-06-27

## 2017-06-27 DIAGNOSIS — I48.91 ATRIAL FIBRILLATION, UNSPECIFIED TYPE (H): ICD-10-CM

## 2017-06-27 DIAGNOSIS — Z79.01 LONG-TERM (CURRENT) USE OF ANTICOAGULANTS: ICD-10-CM

## 2017-06-27 LAB — INR PPP: 3.32 (ref 0.86–1.14)

## 2017-06-27 NOTE — PROGRESS NOTES
ANTICOAGULATION FOLLOW-UP CLINIC VISIT    Patient Name:  Ju Edwards  Date:  6/27/2017  Contact Type:  Telephone    SUBJECTIVE:     Patient Findings     Positives Change in medications (is taking naproxen for muscle pain--this can increase risk of bleeding)           OBJECTIVE    INR   Date Value Ref Range Status   06/27/2017 3.32 (H) 0.86 - 1.14 Final       ASSESSMENT / PLAN  INR assessment SUPRA    Recheck INR In: 3 DAYS    INR Location Clinic      Anticoagulation Summary as of 6/27/2017     INR goal 2.0-3.0   Today's INR 3.32!   Maintenance plan 2 mg (2 mg x 1) on Mon, Wed, Fri; 3 mg (2 mg x 1.5) all other days   Full instructions 6/27: 1 mg; 6/29: 2 mg; Otherwise 2 mg on Mon, Wed, Fri; 3 mg all other days   Weekly total 18 mg   Plan last modified Alice Carranza RN (6/2/2016)   Next INR check 6/30/2017   Priority INR   Target end date Indefinite    Indications   Long-term (current) use of anticoagulants [Z79.01] [Z79.01]  Atrial fibrillation (H) [I48.91]         Anticoagulation Episode Summary     INR check location Clinic Lab    Preferred lab     Send INR reminders to [facility]    Comments Speak witrh Wife Lety [phone]      Anticoagulation Care Providers     Provider Role Specialty Phone number    Rayo Christiansen MD [facility] Internal Medicine [phone]            See the Encounter Report to view Anticoagulation Flowsheet and Dosing Calendar (Go to Encounters tab in chart review, and find the Anticoagulation Therapy Visit)    Spoke with Lety.  Ju is taking naproxen for muscle pain.  Pain seems to be improving.  Will decrease warfarin dose and recheck INR 6/30/17.      Alice Fish, CONCEPCIÓN

## 2017-06-27 NOTE — MR AVS SNAPSHOT
Ju Edwards   6/27/2017   Anticoagulation Therapy Visit    Description:  66 year old male   Provider:  Alice Fish, RN   Department:  [facility]           INR as of 6/27/2017     Today's INR 3.32!      Anticoagulation Summary as of 6/27/2017     INR goal 2.0-3.0   Today's INR 3.32!   Full instructions 6/27: 1 mg; 6/29: 2 mg; Otherwise 2 mg on Mon, Wed, Fri; 3 mg all other days   Next INR check 6/30/2017    Indications   Long-term (current) use of anticoagulants [Z79.01] [Z79.01]  Atrial fibrillation (H) [I48.91]         June 2017 Details    Sun Mon Tue Wed Thu Fri Sat         1               2               3                 4               5               6               7               8               9               10                 11               12               13               14               15               16               17                 18               19               20               21               22               23               24                 25               26               27      1 mg   See details      28      2 mg         29      2 mg         30              Date Details   06/27 This INR check       Date of next INR:  6/30/2017         How to take your warfarin dose     To take:  1 mg Take 0.5 of a 2 mg tablet.    To take:  2 mg Take 1 of the 2 mg tablets.

## 2017-07-03 ENCOUNTER — ANTICOAGULATION THERAPY VISIT (OUTPATIENT)
Dept: ANTICOAGULATION | Facility: CLINIC | Age: 67
End: 2017-07-03

## 2017-07-03 DIAGNOSIS — I48.91 ATRIAL FIBRILLATION, UNSPECIFIED TYPE (H): ICD-10-CM

## 2017-07-03 DIAGNOSIS — Z79.01 LONG-TERM (CURRENT) USE OF ANTICOAGULANTS: ICD-10-CM

## 2017-07-03 LAB — INR PPP: 2.52 (ref 0.86–1.14)

## 2017-07-03 NOTE — MR AVS SNAPSHOT
Ju Edwards   7/3/2017   Anticoagulation Therapy Visit    Description:  66 year old male   Provider:  Hien Curry RN   Department:  [facility]           INR as of 7/3/2017     Today's INR 2.52      Anticoagulation Summary as of 7/3/2017     INR goal 2.0-3.0   Today's INR 2.52   Full instructions 2 mg on Mon, Wed, Fri; 3 mg all other days   Next INR check 7/31/2017    Indications   Long-term (current) use of anticoagulants [Z79.01] [Z79.01]  Atrial fibrillation (H) [I48.91]         July 2017 Details    Sun Mon Tue Wed Thu Fri Sat           1                 2               3      2 mg   See details      4      3 mg         5      2 mg         6      3 mg         7      2 mg         8      3 mg           9      3 mg         10      2 mg         11      3 mg         12      2 mg         13      3 mg         14      2 mg         15      3 mg           16      3 mg         17      2 mg         18      3 mg         19      2 mg         20      3 mg         21      2 mg         22      3 mg           23      3 mg         24      2 mg         25      3 mg         26      2 mg         27      3 mg         28      2 mg         29      3 mg           30      3 mg         31                  Date Details   07/03 This INR check       Date of next INR:  7/31/2017         How to take your warfarin dose     To take:  2 mg Take 1 of the 2 mg tablets.    To take:  3 mg Take 1.5 of the 2 mg tablets.

## 2017-07-03 NOTE — PROGRESS NOTES
ANTICOAGULATION FOLLOW-UP CLINIC VISIT    Patient Name:  Ju Edwards  Date:  7/3/2017  Contact Type:  Telephone    SUBJECTIVE:        OBJECTIVE    INR   Date Value Ref Range Status   07/03/2017 2.52 (H) 0.86 - 1.14 Final       ASSESSMENT / PLAN  INR assessment THER    Recheck INR In: 4 WEEKS    INR Location Clinic      Anticoagulation Summary as of 7/3/2017     INR goal 2.0-3.0   Today's INR 2.52   Maintenance plan 2 mg (2 mg x 1) on Mon, Wed, Fri; 3 mg (2 mg x 1.5) all other days   Full instructions 2 mg on Mon, Wed, Fri; 3 mg all other days   Weekly total 18 mg   No change documented Hien Curry RN   Plan last modified Alice Carranza RN (6/2/2016)   Next INR check 7/31/2017   Priority INR   Target end date Indefinite    Indications   Long-term (current) use of anticoagulants [Z79.01] [Z79.01]  Atrial fibrillation (H) [I48.91]         Anticoagulation Episode Summary     INR check location Clinic Lab    Preferred lab     Send INR reminders to [facility]    Comments Speak wit Wife Lety [phone]      Anticoagulation Care Providers     Provider Role Specialty Phone number    Rayo Christiansen MD [facility] Internal Medicine [phone]            See the Encounter Report to view Anticoagulation Flowsheet and Dosing Calendar (Go to Encounters tab in chart review, and find the Anticoagulation Therapy Visit)    Left message for patient with results and dosing recommendations. Asked patient to call back to report any missed doses, falls, signs and symptoms of bleeding or clotting, any changes in health, medication, or diet. Asked patient to call back with any questions or concerns.     Hien Curry, CONCEPCIÓN

## 2017-08-10 DIAGNOSIS — M79.10 MUSCLE PAIN: ICD-10-CM

## 2017-08-10 RX ORDER — NAPROXEN 500 MG/1
500 TABLET ORAL 2 TIMES DAILY WITH MEALS
Qty: 60 TABLET | Refills: 0 | Status: SHIPPED | OUTPATIENT
Start: 2017-08-10 | End: 2017-08-28

## 2017-08-10 NOTE — TELEPHONE ENCOUNTER
naproxen (NAPROSYN) 500 MG tablet  Last Written Prescription Date:  6/20/17  Last Fill Quantity: 60,   # refills: 0  Last Office Visit with G, P or [facility] prescribing provider: 6/20/17  Future Office visit:   8/22/17    BP Readings from Last 3 Encounters:   06/20/17 163/83   02/28/17 140/74   01/10/17 134/76     Creatinine   Date Value Ref Range Status   08/29/2016 1.00 0.66 - 1.25 mg/dL Final   ]      Routing refill request to provider for review/approval because:   naproxen (NAPROSYN) 500 MG tablet.     bp > 140/90    rf?

## 2017-08-16 ENCOUNTER — ANTICOAGULATION THERAPY VISIT (OUTPATIENT)
Dept: ANTICOAGULATION | Facility: CLINIC | Age: 67
End: 2017-08-16

## 2017-08-16 DIAGNOSIS — Z79.01 LONG-TERM (CURRENT) USE OF ANTICOAGULANTS: ICD-10-CM

## 2017-08-16 DIAGNOSIS — I48.91 ATRIAL FIBRILLATION, UNSPECIFIED TYPE (H): ICD-10-CM

## 2017-08-16 DIAGNOSIS — Z00.00 ROUTINE HEALTH MAINTENANCE: ICD-10-CM

## 2017-08-16 LAB — INR PPP: 3.13 (ref 0.86–1.14)

## 2017-08-16 NOTE — MR AVS SNAPSHOT
Ju Edwards   8/16/2017   Anticoagulation Therapy Visit    Description:  67 year old male   Provider:  Alexander Becerril, RN   Department:  [facility]           INR as of 8/16/2017     Today's INR 3.13!      Anticoagulation Summary as of 8/16/2017     INR goal 2.0-3.0   Today's INR 3.13!   Full instructions 2 mg on Mon, Wed, Fri; 3 mg all other days   Next INR check 8/30/2017    Indications   Long-term (current) use of anticoagulants [Z79.01] [Z79.01]  Atrial fibrillation (H) [I48.91]         August 2017 Details    Sun Mon Tue Wed Thu Fri Sat       1               2               3               4               5                 6               7               8               9               10               11               12                 13               14               15               16      2 mg   See details      17      3 mg         18      2 mg         19      3 mg           20      3 mg         21      2 mg         22      3 mg         23      2 mg         24      3 mg         25      2 mg         26      3 mg           27      3 mg         28      2 mg         29      3 mg         30            31                  Date Details   08/16 This INR check       Date of next INR:  8/30/2017         How to take your warfarin dose     To take:  2 mg Take 1 of the 2 mg tablets.    To take:  3 mg Take 1.5 of the 2 mg tablets.

## 2017-08-18 NOTE — TELEPHONE ENCOUNTER
flecainide acetate 150 MG      Last Written Prescription Date:  6/9/17  Last Fill Quantity: 180,   # refills: 0  Last Office Visit : 6/20/17  Future Office visit:  none  Routing refill request to provider for review/approval because:  Drug not on refill protocol or controlled substance

## 2017-08-21 RX ORDER — FLECAINIDE ACETATE 150 MG/1
150 TABLET ORAL 2 TIMES DAILY PRN
Qty: 180 TABLET | Refills: 0 | Status: SHIPPED | OUTPATIENT
Start: 2017-08-21 | End: 2017-11-24

## 2017-08-22 ENCOUNTER — OFFICE VISIT (OUTPATIENT)
Dept: INTERNAL MEDICINE | Facility: CLINIC | Age: 67
End: 2017-08-22

## 2017-08-22 VITALS
WEIGHT: 139.3 LBS | BODY MASS INDEX: 25.4 KG/M2 | DIASTOLIC BLOOD PRESSURE: 75 MMHG | HEART RATE: 58 BPM | SYSTOLIC BLOOD PRESSURE: 133 MMHG | RESPIRATION RATE: 16 BRPM

## 2017-08-22 DIAGNOSIS — R07.89 CHEST WALL PAIN: Primary | ICD-10-CM

## 2017-08-22 DIAGNOSIS — E78.5 HYPERLIPIDEMIA, UNSPECIFIED HYPERLIPIDEMIA TYPE: ICD-10-CM

## 2017-08-22 DIAGNOSIS — M94.0 COSTOCHONDRITIS: ICD-10-CM

## 2017-08-22 DIAGNOSIS — S22.31XA CLOSED FRACTURE OF ONE RIB OF RIGHT SIDE, INITIAL ENCOUNTER: ICD-10-CM

## 2017-08-22 DIAGNOSIS — R07.89 CHEST WALL PAIN: ICD-10-CM

## 2017-08-22 LAB
ALBUMIN SERPL-MCNC: 3.9 G/DL (ref 3.4–5)
ALP SERPL-CCNC: 90 U/L (ref 40–150)
ALT SERPL W P-5'-P-CCNC: 36 U/L (ref 0–70)
ANION GAP SERPL CALCULATED.3IONS-SCNC: 7 MMOL/L (ref 3–14)
AST SERPL W P-5'-P-CCNC: 25 U/L (ref 0–45)
BASOPHILS # BLD AUTO: 0 10E9/L (ref 0–0.2)
BASOPHILS NFR BLD AUTO: 0.7 %
BILIRUB SERPL-MCNC: 0.7 MG/DL (ref 0.2–1.3)
BUN SERPL-MCNC: 17 MG/DL (ref 7–30)
CALCIUM SERPL-MCNC: 8.8 MG/DL (ref 8.5–10.1)
CHLORIDE SERPL-SCNC: 104 MMOL/L (ref 94–109)
CO2 SERPL-SCNC: 27 MMOL/L (ref 20–32)
CREAT SERPL-MCNC: 0.93 MG/DL (ref 0.66–1.25)
DIFFERENTIAL METHOD BLD: NORMAL
EOSINOPHIL # BLD AUTO: 0 10E9/L (ref 0–0.7)
EOSINOPHIL NFR BLD AUTO: 0.5 %
ERYTHROCYTE [DISTWIDTH] IN BLOOD BY AUTOMATED COUNT: 13 % (ref 10–15)
ERYTHROCYTE [SEDIMENTATION RATE] IN BLOOD BY WESTERGREN METHOD: 9 MM/H (ref 0–20)
GFR SERPL CREATININE-BSD FRML MDRD: 81 ML/MIN/1.7M2
GLUCOSE SERPL-MCNC: 91 MG/DL (ref 70–99)
HCT VFR BLD AUTO: 42.1 % (ref 40–53)
HGB BLD-MCNC: 13.8 G/DL (ref 13.3–17.7)
IMM GRANULOCYTES # BLD: 0 10E9/L (ref 0–0.4)
IMM GRANULOCYTES NFR BLD: 0.2 %
LYMPHOCYTES # BLD AUTO: 1.7 10E9/L (ref 0.8–5.3)
LYMPHOCYTES NFR BLD AUTO: 28.6 %
MCH RBC QN AUTO: 29.1 PG (ref 26.5–33)
MCHC RBC AUTO-ENTMCNC: 32.8 G/DL (ref 31.5–36.5)
MCV RBC AUTO: 89 FL (ref 78–100)
MONOCYTES # BLD AUTO: 0.6 10E9/L (ref 0–1.3)
MONOCYTES NFR BLD AUTO: 10.7 %
NEUTROPHILS # BLD AUTO: 3.6 10E9/L (ref 1.6–8.3)
NEUTROPHILS NFR BLD AUTO: 59.3 %
NRBC # BLD AUTO: 0 10*3/UL
NRBC BLD AUTO-RTO: 0 /100
PLATELET # BLD AUTO: 210 10E9/L (ref 150–450)
POTASSIUM SERPL-SCNC: 3.7 MMOL/L (ref 3.4–5.3)
PROT SERPL-MCNC: 7.9 G/DL (ref 6.8–8.8)
RBC # BLD AUTO: 4.75 10E12/L (ref 4.4–5.9)
SODIUM SERPL-SCNC: 138 MMOL/L (ref 133–144)
WBC # BLD AUTO: 6 10E9/L (ref 4–11)

## 2017-08-22 ASSESSMENT — PAIN SCALES - GENERAL: PAINLEVEL: NO PAIN (0)

## 2017-08-22 NOTE — PATIENT INSTRUCTIONS
[facility] Medication Refill Request Information:  * Please contact your pharmacy regarding ANY request for medication refills.  ** [facility] Prescription Fax = [phone]  * Please allow 3 business days for routine medication refills.  * Please allow 5 business days for controlled substance medication refills.     [facility] Test Result notification information:  *You will be notified with in 7-10 days of your appointment day regarding the results of your test.  If you are on MyChart you will be notified as soon as the provider has reviewed the results and signed off on them.    [facility] [phone]

## 2017-08-22 NOTE — NURSING NOTE
Patient received Pneumonia 23 vaccine.  See immunization list for administration details.  Patient tolerated injection well.     SHAISTA MALCOLM at 8:56 AM on 8/22/2017.    EKG was completed and given to provider. SHAISTA MALCOLM at 8:56 AM on 8/22/2017.

## 2017-08-22 NOTE — LETTER
Patient:  Ju Edwards  :   1950  MRN:     [id number]        Mr. Ju Edwards  [address]  [address]  [place] [address]        2017    Dear Mr. Edwards,    Thank you for choosing the [facility] for your healthcare needs.  We appreciate the opportunity to serve you.    The following are your recent test results.     The radiologist felt your chest x-ray was abnormal and showed a fracture of the right 5th rib.  This is also contributing to your symptoms.  In light of this fracture as well as you additional rib fractures in the past we need to check your bone density.  You should call [phone] to schedule a dexa scan.   If you have any questions regarding this don't hesitate to contact us.     Results for orders placed or performed in visit on 17   XR Chest 2 Views    Narrative    XR CHEST 2 VW  2017 8:08 AM      HISTORY: Other chest pain    COMPARISON: 2016, correlation is made with same-day rib x-rays    FINDINGS: PA and lateral views of the chest upright. Intact median  sternotomy wires. The cardiomediastinal silhouette and pulmonary  vasculature are stable and within normal limits. There is no focal  airspace opacity, pleural effusion, or pneumothorax..  Right 5th rib fracture.  Rib fractures are better demonstrated on the  dedicated rib x-rays. Old left 7th and 8th rib fractures.      Impression    IMPRESSION: Clear lungs. Right 5th rib fracture.    I have personally reviewed the examination and initial interpretation  and I agree with the findings.    TALIA JUAN MD   EKG Performed in Clinic w/ Provider Reading Fee   Result Value Ref Range    Interpretation ECG Click View Image link to view waveform and result        Please contact your provider if you have any questions or concerns.  We look forward to serving your needs in the future.      Sincerely,    Dr. Christiansen/ruth

## 2017-08-22 NOTE — MR AVS SNAPSHOT
After Visit Summary   8/22/2017    Ju Edwards    MRN: [id number]           Patient Information     Date Of Birth          1950        Visit Information        Provider Department      8/22/2017 7:00 AM Naida Barajas; Rayo Christiansen MD [facility]        Today's Diagnoses     Chest wall pain    -  1    Hyperlipidemia, unspecified hyperlipidemia type        Costochondritis          Care Instructions    Primary [facility] Center Medication Refill Request Information:  * Please contact your pharmacy regarding ANY request for medication refills.  ** [facility] Prescription Fax = [phone]  * Please allow 3 business days for routine medication refills.  * Please allow 5 business days for controlled substance medication refills.     [facility] Test Result notification information:  *You will be notified with in 7-10 days of your appointment day regarding the results of your test.  If you are on MyChart you will be notified as soon as the provider has reviewed the results and signed off on them.    Primary Care Center [phone]             Follow-ups after your visit        Your next 10 appointments already scheduled     Aug 22, 2017  9:15 AM CDT   LAB with  LAB   [facility] ([facility])    [address] [place] [id number] [phone]           Patient must bring picture ID. Patient should be prepared to give a urine specimen  Please do not eat 10-12 hours before your appointment if you are coming in fasting for labs on lipids, cholesterol, or glucose (sugar). Pregnant women should follow their Care Team instructions. Water with medications is okay. Do not drink coffee or other fluids. If you have concerns about taking  your medications, please ask at office or if scheduling via mktg, send a message by clicking on Secure Messaging, Message Your Care Team.            Aug 22, 2017 10:30 AM CDT    (Arrive by 10:15 AM)   XR CHEST 2 VIEWS with [email or web address]   [facility] Xray ([facility])    [address] [id number] [phone]           Please bring a list of your current medicines to your exam. (Include vitamins, minerals and over-thecounter medicines.) Leave your valuables at home.  Tell your doctor if there is a chance you may be pregnant.  You do not need to do anything special for this exam.            Aug 22, 2017 10:45 AM CDT   (Arrive by 10:30 AM)   XR RIBS RIGHT 2 VIEWS with UCXR1   [facility] ([facility])    [address] [id number] [phone]           Please bring a list of your current medicines to your exam. (Include vitamins, minerals and over-thecounter medicines.) Leave your valuables at home.  Tell your doctor if there is a chance you may be pregnant.  You do not need to do anything special for this exam.              Who to contact     Please call your clinic at [phone] to:    Ask questions about your health    Make or cancel appointments    Discuss your medicines    Learn about your test results    Speak to your doctor   If you have compliments or concerns about an experience at your clinic, or if you wish to file a complaint, please contact [facility] Patient Relations at [phone] or email us at [email or web address]         Additional Information About Your Visit        Social Shopping Network Â®hart Information     GreenLancer is an electronic gateway that provides easy, online access to your medical records. With GreenLancer, you can request a clinic appointment, read your test results, renew a prescription or communicate with your care team.     To sign up for GreenLancer visit the website at [email or web address]   You will be asked to enter the access code listed below, as well as some personal information. Please  follow the directions to create your username and password.     Your access code is: [email or web address]  Expires: 2017  6:31 AM     Your access code will  in 90 days. If you need help or a new code, please contact your [facility] or call [phone] for assistance.        Care EveryWhere ID     This is your Care EveryWhere ID. This could be used by other organizations to access your [facility] medical records  [id number]        Your Vitals Were     Pulse Respirations BMI (Body Mass Index)             58 16 25.4 kg/m2          Blood Pressure from Last 3 Encounters:   17 133/75   17 163/83   17 140/74    Weight from Last 3 Encounters:   17 63.2 kg (139 lb 4.8 oz)   17 64 kg (141 lb)   17 62.3 kg (137 lb 6.4 oz)              We Performed the Following     EKG Performed in Clinic w/ Provider Reading Fee     Pneumococcal vaccine 23 valent PPSV23  (Pneumovax) [64798]     XR Chest 2 Views        Primary Care Provider Office Phone # Fax #    Rayo Christiansen [phone]       [address] [place] [address]        Equal Access to Services     MARK MENDES : Hadii saba ku hadasho Soomaali, waaxda luqadaha, qaybta kaalmada adeegyada, waxay joiein hayrahatn agnes maria. So [facility] [phone].    ATENCIÓN: Si habla español, tiene a carmona disposición servicios gratuitos de asistencia lingüística. ronit al [phone].    We comply with applicable federal civil rights laws and [place] laws. We do not discriminate on the basis of race, color, national origin, age, disability sex, sexual orientation or gender identity.            Thank you!     Thank you for choosing [facility]  for your care. Our goal is always to provide you with excellent care. Hearing back from our patients is one way we can continue to improve our services. Please take a few minutes to complete the written survey that you may  receive in the mail after your visit with us. Thank you!             Your Updated Medication List - Protect others around you: Learn how to safely use, store and throw away your medicines at www.disposemymeds.org.          This list is accurate as of: 8/22/17  9:11 AM.  Always use your most recent med list.                   Brand Name Dispense Instructions for use Diagnosis    atenolol 25 MG tablet    TENORMIN    45 tablet    Take 0.5 tablets (12.5 mg) by mouth daily    Essential hypertension       atorvastatin 80 MG tablet    LIPITOR    90 tablet    Take 1 tablet (80 mg) by mouth daily    Mixed hyperlipidemia       cetirizine 10 MG tablet    zyrTEC    90 tablet    Take 1 tablet (10 mg) by mouth every evening    Itching       citalopram 10 MG tablet    celeXA    180 tablet    Take 2 tablets (20 mg) by mouth daily    Depression, unspecified depression type       cycloSPORINE 0.05 % ophthalmic emulsion    RESTASIS    180 each    Place 1 drop into both eyes 2 times daily    Dry eye       emollient cream     25 g    Apply topically as needed for other    Itching       flecainide acetate 150 MG Tabs     180 tablet    Take 1 tablet (150 mg) by mouth 2 times daily as needed    Routine health maintenance       lisinopril 10 MG tablet    PRINIVIL/ZESTRIL    100 tablet    Take 1 tablet (10 mg) by mouth daily    Essential hypertension, hypertension with unspecified goal       naproxen 500 MG tablet    NAPROSYN    60 tablet    Take 1 tablet (500 mg) by mouth 2 times daily (with meals)    Muscle pain       ONDANSETRON PO      Take 4 mg by mouth every 8 hours as needed for nausea    Hypertension, Hyperlipidemia, Stroke (H)       oxyCODONE-acetaminophen 5-325 MG per tablet    PERCOCET     Take 1 tablet by mouth every 4 hours as needed for moderate to severe pain        pantoprazole 40 MG EC tablet    PROTONIX    90 tablet    Take 1 tablet (40 mg) by mouth daily    Gastroesophageal reflux disease with esophagitis       SENNA LAX  PO      Take by mouth 2 times daily as needed    Hypertension, Hyperlipidemia, Stroke (H), Atrial fibrillation (H)       STOOL SOFTENER 100 MG capsule   Generic drug:  docusate sodium           vitamin D3 2000 UNITS Caps     90 capsule    Take 1 tablet by mouth daily    Cerebrovascular accident (CVA) due to thrombosis of carotid artery (H)       warfarin 2 MG tablet    COUMADIN    100 tablet    Pt instructed to take 1 tab (2 mg) every day after starting malaria prophylaxis with doxycycline    Atrial fibrillation, unspecified type (H)

## 2017-08-22 NOTE — NURSING NOTE
Chief Complaint   Patient presents with     Establish Care     patient states he has been having right sided muscle pain     SHAISTA MALCOLM at 7:11 AM on 8/22/2017.

## 2017-08-22 NOTE — PROGRESS NOTES
HPI  -year-old presents today for evaluation of chest pain aided by a professional . The patient reports insidious onset of pain in the right chest about 2-1/2 months ago. There was no preceding injury or trauma. The pain is well localized to the anterior ribs and described as knifelike. It is aggravated by coughing but not a deep breath. It's also aggravated by flexion and abduction of the right shoulder. He's been seen in clinic previously given naproxen which she's been taking once a day with mild relief. Untreated the pain is described as 9 out of 10. With the naproxen it's been 2-3 out of 10. He's concerned about the cause of the pain in implications regarding possible heart or other serious condition. There is been no fever chills sweats exercise intolerance or nocturnal pain. Otherwise he's been feeling well.  Past Medical History:   Diagnosis Date     Atrial fibrillation (H)     paroxysmal     Delirium     associated with hospitalization for stroke     Depression      Gastric erosions 1/2014    associated with gi bleed     Hyperlipidemia      Hypertension      Stroke (H) 1/2014    ischemic CVA with hemorrhagic transformation     Past Surgical History:   Procedure Laterality Date     CARDIAC SURGERY  2000    mitral valve repair     REPAIR ATRIAL SEPTAL DEFECT       Family History   Problem Relation Age of Onset     Eye Surgery Mother      Social History     Social History     Marital status:      Spouse name: N/A     Number of children: N/A     Years of education: N/A     Social History Main Topics     Smoking status: Never Smoker     Smokeless tobacco: Never Used     Alcohol use No     Drug use: No     Sexual activity: Yes     Partners: Female     Other Topics Concern     Special Diet No     Exercise Yes     rehab     Social History Narrative     Complete review of symptoms negative except as noted above.    Exam:  /75  Pulse 58  Resp 16  Wt 63.2 kg (139 lb 4.8 oz)  BMI 25.4  kg/m2  139 lbs 4.8 oz  Physical Exam   The patient is alert, oriented with a clear sensorium.   Skin shows no lesions or rashes and good turgor.   Head is normocephalic and atraumatic.   Eyes show PERRLA with benign optic fundi.   Ears show normal TMs bilaterally.   Mouth shows clear oral mucosa.   Neck shows no nodes, thyromegaly or bruits.   Back is non tender.  Chest wall tender over the rt anterior costochondral junctions  Lungs are clear to percussion and auscultation.   Heart shows normal S1 and S2 without murmur or gallop.   Abdomen is soft and nontender without masses or organomegaly.   Extremities show no edema and no evidence of active synovitis.   Neurologic examination shows cranial nerves II-XII intact. Motor shows 5/5 strength. Reflexes are symmetric.   EKG reviewed with him showing RBBB unchanged from last year  CXR and rib detail reviewed with him and was normal  Results for ROSARIO RAI (MRN [id number]) as of 8/22/2017 09:17   Ref. Range 8/22/2017 08:15   Sodium Latest Ref Range: 133 - 144 mmol/L 138   Potassium Latest Ref Range: 3.4 - 5.3 mmol/L 3.7   Chloride Latest Ref Range: 94 - 109 mmol/L 104   Carbon Dioxide Latest Ref Range: 20 - 32 mmol/L 27   Urea Nitrogen Latest Ref Range: 7 - 30 mg/dL 17   Creatinine Latest Ref Range: 0.66 - 1.25 mg/dL 0.93   GFR Estimate Latest Ref Range: >60 mL/min/1.7m2 81   GFR Estimate If Black Latest Ref Range: >60 mL/min/1.7m2 >90   Calcium Latest Ref Range: 8.5 - 10.1 mg/dL 8.8   Anion Gap Latest Ref Range: 3 - 14 mmol/L 7   Albumin Latest Ref Range: 3.4 - 5.0 g/dL 3.9   Protein Total Latest Ref Range: 6.8 - 8.8 g/dL 7.9   Bilirubin Total Latest Ref Range: 0.2 - 1.3 mg/dL 0.7   Alkaline Phosphatase Latest Ref Range: 40 - 150 U/L 90   ALT Latest Ref Range: 0 - 70 U/L 36   AST Latest Ref Range: 0 - 45 U/L 25   Glucose Latest Ref Range: 70 - 99 mg/dL 91   WBC Latest Ref Range: 4.0 - 11.0 10e9/L 6.0   Hemoglobin Latest Ref Range: 13.3 - 17.7 g/dL 13.8    Hematocrit Latest Ref Range: 40.0 - 53.0 % 42.1   Platelet Count Latest Ref Range: 150 - 450 10e9/L 210   RBC Count Latest Ref Range: 4.4 - 5.9 10e12/L 4.75   MCV Latest Ref Range: 78 - 100 fl 89   MCH Latest Ref Range: 26.5 - 33.0 pg 29.1   MCHC Latest Ref Range: 31.5 - 36.5 g/dL 32.8   RDW Latest Ref Range: 10.0 - 15.0 % 13.0   Diff Method Unknown Automated Method   % Neutrophils Latest Units: % 59.3   % Lymphocytes Latest Units: % 28.6   % Monocytes Latest Units: % 10.7   % Eosinophils Latest Units: % 0.5     ASSESSMENT  1 costochondritis and rt 5th rib fracture  2 hyperlipidemia  3 hypertension well-controlled  4 history of atrial fibrillation on Coumadin  5 history stroke  6 status post mitral valve repair    Plan  We will have him use heat increase the naproxen to 500 b.i.d. anticipate another month until this resolves. He has increasing or different pain or discomfort he will follow up at that time.  Over 40 minutes spent with patient the majority in counseling and coordinating care.    Rayo Christiansen MD  General Internal Medicine  [facility]  [phone]

## 2017-08-23 LAB — INTERPRETATION ECG - MUSE: NORMAL

## 2017-08-28 DIAGNOSIS — M79.10 MUSCLE PAIN: ICD-10-CM

## 2017-08-28 RX ORDER — NAPROXEN 500 MG/1
500 TABLET ORAL 2 TIMES DAILY WITH MEALS
Qty: 60 TABLET | Refills: 0 | Status: SHIPPED | OUTPATIENT
Start: 2017-08-28 | End: 2018-02-01

## 2017-08-28 NOTE — TELEPHONE ENCOUNTER
Per pharmacy, they were [facility]  that [facility] should not be filled. Per Dr. Christiansen, it is OK for one month supply of medication. New Rx sent to pharmacy.    Sherry Garcia RN

## 2017-08-31 ENCOUNTER — ANTICOAGULATION THERAPY VISIT (OUTPATIENT)
Dept: ANTICOAGULATION | Facility: CLINIC | Age: 67
End: 2017-08-31

## 2017-08-31 DIAGNOSIS — I48.91 ATRIAL FIBRILLATION, UNSPECIFIED TYPE (H): ICD-10-CM

## 2017-08-31 DIAGNOSIS — Z79.01 LONG-TERM (CURRENT) USE OF ANTICOAGULANTS: ICD-10-CM

## 2017-08-31 LAB — INR PPP: 2.13 (ref 0.86–1.14)

## 2017-08-31 NOTE — MR AVS SNAPSHOT
Ju Edwards   8/31/2017   Anticoagulation Therapy Visit    Description:  67 year old male   Provider:  Hien Curry RN   Department:  [facility]           INR as of 8/31/2017     Today's INR 2.13      Anticoagulation Summary as of 8/31/2017     INR goal 2.0-3.0   Today's INR 2.13   Full instructions 2 mg on Mon, Wed, Fri; 3 mg all other days   Next INR check 9/28/2017    Indications   Long-term (current) use of anticoagulants [Z79.01] [Z79.01]  Atrial fibrillation (H) [I48.91]         August 2017 Details    Sun Mon Tue Wed Thu Fri Sat       1               2               3               4               5                 6               7               8               9               10               11               12                 13               14               15               16               17               18               19                 20               21               22               23               24               25               26                 27               28               29               30               31      3 mg   See details         Date Details   08/31 This INR check               How to take your warfarin dose     To take:  3 mg Take 1.5 of the 2 mg tablets.           September 2017 Details    Sun Mon Tue Wed Thu Fri Sat          1      2 mg         2      3 mg           3      3 mg         4      2 mg         5      3 mg         6      2 mg         7      3 mg         8      2 mg         9      3 mg           10      3 mg         11      2 mg         12      3 mg         13      2 mg         14      3 mg         15      2 mg         16      3 mg           17      3 mg         18      2 mg         19      3 mg         20      2 mg         21      3 mg         22      2 mg         23      3 mg           24      3 mg         25      2 mg         26      3 mg         27      2 mg         28            29               30                Date  Details   No additional details    Date of next INR:  9/28/2017         How to take your warfarin dose     To take:  2 mg Take 1 of the 2 mg tablets.    To take:  3 mg Take 1.5 of the 2 mg tablets.

## 2017-08-31 NOTE — PROGRESS NOTES
ANTICOAGULATION FOLLOW-UP CLINIC VISIT    Patient Name:  Ju Edwards  Date:  8/31/2017  Contact Type:  Telephone    SUBJECTIVE:        OBJECTIVE    INR   Date Value Ref Range Status   08/31/2017 2.13 (H) 0.86 - 1.14 Final       ASSESSMENT / PLAN  INR assessment THER    Recheck INR In: 4 WEEKS    INR Location Clinic      Anticoagulation Summary as of 8/31/2017     INR goal 2.0-3.0   Today's INR 2.13   Maintenance plan 2 mg (2 mg x 1) on Mon, Wed, Fri; 3 mg (2 mg x 1.5) all other days   Full instructions 2 mg on Mon, Wed, Fri; 3 mg all other days   Weekly total 18 mg   No change documented Hien Curry RN   Plan last modified Alice Carranza RN (6/2/2016)   Next INR check 9/28/2017   Priority INR   Target end date Indefinite    Indications   Long-term (current) use of anticoagulants [Z79.01] [Z79.01]  Atrial fibrillation (H) [I48.91]         Anticoagulation Episode Summary     INR check location Clinic Lab    Preferred lab     Send INR reminders to [facility]    Comments Speak wit Wife Lety [phone]      Anticoagulation Care Providers     Provider Role Specialty Phone number    Rayo Christiansen MD [facility] Internal Medicine [phone]            See the Encounter Report to view Anticoagulation Flowsheet and Dosing Calendar (Go to Encounters tab in chart review, and find the Anticoagulation Therapy Visit)    Left message for patient with results and dosing recommendations. Asked patient to call back to report any missed doses, falls, signs and symptoms of bleeding or clotting, any changes in health, medication, or diet. Asked patient to call back with any questions or concerns.     Hien Curry, CONCEPCI[employer]N

## 2017-09-05 ENCOUNTER — DOCUMENTATION ONLY (OUTPATIENT)
Dept: VASCULAR SURGERY | Facility: CLINIC | Age: 67
End: 2017-09-05

## 2017-09-05 DIAGNOSIS — Z13.6 ENCOUNTER FOR ABDOMINAL AORTIC ANEURYSM (AAA) SCREENING: Primary | ICD-10-CM

## 2017-09-20 RX ORDER — CITALOPRAM HYDROBROMIDE 20 MG/1
20 TABLET ORAL DAILY
Qty: 180 TABLET | Refills: 0 | OUTPATIENT
Start: 2017-09-20

## 2017-09-27 ENCOUNTER — ANTICOAGULATION THERAPY VISIT (OUTPATIENT)
Dept: ANTICOAGULATION | Facility: CLINIC | Age: 67
End: 2017-09-27

## 2017-09-27 DIAGNOSIS — I48.91 ATRIAL FIBRILLATION, UNSPECIFIED TYPE (H): ICD-10-CM

## 2017-09-27 DIAGNOSIS — Z79.01 LONG-TERM (CURRENT) USE OF ANTICOAGULANTS: ICD-10-CM

## 2017-09-27 LAB — INR PPP: 1.74 (ref 0.86–1.14)

## 2017-09-27 NOTE — MR AVS SNAPSHOT
Ju Edwards   9/27/2017   Anticoagulation Therapy Visit    Description:  67 year old male   Provider:  Hien Curry, RN   Department:  [facility]           INR as of 9/27/2017     Today's INR 1.74!      Anticoagulation Summary as of 9/27/2017     INR goal 2.0-3.0   Today's INR 1.74!   Full instructions 9/27: 3 mg; Otherwise 2 mg on Mon, Wed, Fri; 3 mg all other days   Next INR check 10/11/2017    Indications   Long-term (current) use of anticoagulants [Z79.01] [Z79.01]  Atrial fibrillation (H) [I48.91]         September 2017 Details    Sun Mon Tue Wed Thu Fri Sat          1               2                 3               4               5               6               7               8               9                 10               11               12               13               14               15               16                 17               18               19               20               21               22               23                 24               25               26               27      3 mg   See details      28      3 mg         29      2 mg         30      3 mg          Date Details   09/27 This INR check               How to take your warfarin dose     To take:  2 mg Take 1 of the 2 mg tablets.    To take:  3 mg Take 1.5 of the 2 mg tablets.           October 2017 Details    Sun Mon Tue Wed Thu Fri Sat     1      3 mg         2      2 mg         3      3 mg         4      2 mg         5      3 mg         6      2 mg         7      3 mg           8      3 mg         9      2 mg         10      3 mg         11            12               13               14                 15               16               17               18               19               20               21                 22               23               24               25               26               27               28                 29               30               31                     Date Details   No additional details    Date of next INR:  10/11/2017         How to take your warfarin dose     To take:  2 mg Take 1 of the 2 mg tablets.    To take:  3 mg Take 1.5 of the 2 mg tablets.

## 2017-09-27 NOTE — PROGRESS NOTES
ANTICOAGULATION FOLLOW-UP CLINIC VISIT    Patient Name:  Ju Edwards  Date:  9/27/2017  Contact Type:  Telephone    SUBJECTIVE:        OBJECTIVE    INR   Date Value Ref Range Status   09/27/2017 1.74 (H) 0.86 - 1.14 Final       ASSESSMENT / PLAN  INR assessment SUB    Recheck INR In: 2 WEEKS    INR Location Clinic      Anticoagulation Summary as of 9/27/2017     INR goal 2.0-3.0   Today's INR 1.74!   Maintenance plan 2 mg (2 mg x 1) on Mon, Wed, Fri; 3 mg (2 mg x 1.5) all other days   Full instructions 9/27: 3 mg; Otherwise 2 mg on Mon, Wed, Fri; 3 mg all other days   Weekly total 18 mg   Plan last modified Alice Carranza RN (6/2/2016)   Next INR check 10/11/2017   Priority INR   Target end date Indefinite    Indications   Long-term (current) use of anticoagulants [Z79.01] [Z79.01]  Atrial fibrillation (H) [I48.91]         Anticoagulation Episode Summary     INR check location Clinic Lab    Preferred lab     Send INR reminders to [facility]    Comments Speak [facility] Wife Lety [phone]      Anticoagulation Care Providers     Provider Role Specialty Phone number    Rajikinza Rayo Escobar MD [facility] Internal Medicine [phone]            See the Encounter Report to view Anticoagulation Flowsheet and Dosing Calendar (Go to Encounters tab in chart review, and find the Anticoagulation Therapy Visit)    Left message for patient with results and dosing recommendations. Asked patient to call back to report any missed doses, falls, signs and symptoms of bleeding or clotting, any changes in health, medication, or diet. Asked patient to call back with any questions or concerns.     Hien Curry RN

## 2017-10-05 RX ORDER — CITALOPRAM HYDROBROMIDE 20 MG/1
TABLET ORAL
Qty: 30 TABLET | Refills: 0 | OUTPATIENT
Start: 2017-10-05

## 2017-10-16 ENCOUNTER — ANTICOAGULATION THERAPY VISIT (OUTPATIENT)
Dept: ANTICOAGULATION | Facility: CLINIC | Age: 67
End: 2017-10-16

## 2017-10-16 DIAGNOSIS — I48.91 ATRIAL FIBRILLATION, UNSPECIFIED TYPE (H): ICD-10-CM

## 2017-10-16 DIAGNOSIS — Z79.01 LONG-TERM (CURRENT) USE OF ANTICOAGULANTS: ICD-10-CM

## 2017-10-16 LAB — INR PPP: 2.09 (ref 0.86–1.14)

## 2017-10-16 NOTE — PROGRESS NOTES
ANTICOAGULATION FOLLOW-UP CLINIC VISIT    Patient Name:  Ju Edwards  Date:  10/16/2017  Contact Type:  Telephone    SUBJECTIVE:        OBJECTIVE    INR   Date Value Ref Range Status   10/16/2017 2.09 (H) 0.86 - 1.14 Final       ASSESSMENT / PLAN  INR assessment THER    Recheck INR In: 3 WEEKS    INR Location Clinic      Anticoagulation Summary as of 10/16/2017     INR goal 2.0-3.0   Today's INR 2.09   Maintenance plan 2 mg (2 mg x 1) on Mon, Wed, Fri; 3 mg (2 mg x 1.5) all other days   Full instructions 2 mg on Mon, Wed, Fri; 3 mg all other days   Weekly total 18 mg   Plan last modified Alice Carranza RN (6/2/2016)   Next INR check 11/6/2017   Priority INR   Target end date Indefinite    Indications   Long-term (current) use of anticoagulants [Z79.01] [Z79.01]  Atrial fibrillation (H) [I48.91]         Anticoagulation Episode Summary     INR check location Clinic Lab    Preferred lab     Send INR reminders to [facility]    Comments Speak witrh Wife Lety [phone]      Anticoagulation Care Providers     Provider Role Specialty Phone number    Rayo Christiansen MD [facility] Internal Medicine [phone]            See the Encounter Report to view Anticoagulation Flowsheet and Dosing Calendar (Go to Encounters tab in chart review, and find the Anticoagulation Therapy Visit)    Left message for spouse Leslie with results and dosing recommendations. Asked Leslie to call back to report any missed doses, falls, signs and symptoms of bleeding or clotting, any changes in health, medication, or diet. Asked Leslie to call back with any questions or concerns.     Yanni Up RN

## 2017-10-16 NOTE — MR AVS SNAPSHOT
Ju Edwards   10/16/2017   Anticoagulation Therapy Visit    Description:  67 year old male   Provider:  Yanni Up, RN   Department:  [facility]           INR as of 10/16/2017     Today's INR 2.09      Anticoagulation Summary as of 10/16/2017     INR goal 2.0-3.0   Today's INR 2.09   Full instructions 2 mg on Mon, Wed, Fri; 3 mg all other days   Next INR check 11/6/2017    Indications   Long-term (current) use of anticoagulants [Z79.01] [Z79.01]  Atrial fibrillation (H) [I48.91]         October 2017 Details    Sun Mon Tue Wed Thu Fri Sat     1               2               3               4               5               6               7                 8               9               10               11               12               13               14                 15               16      2 mg   See details      17      3 mg         18      2 mg         19      3 mg         20      2 mg         21      3 mg           22      3 mg         23      2 mg         24      3 mg         25      2 mg         26      3 mg         27      2 mg         28      3 mg           29      3 mg         30      2 mg         31      3 mg              Date Details   10/16 This INR check               How to take your warfarin dose     To take:  2 mg Take 1 of the 2 mg tablets.    To take:  3 mg Take 1.5 of the 2 mg tablets.           November 2017 Details    Sun Mon Tue Wed Thu Fri Sat        1      2 mg         2      3 mg         3      2 mg         4      3 mg           5      3 mg         6            7               8               9               10               11                 12               13               14               15               16               17               18                 19               20               21               22               23               24               25                 26               27               28               29               30                   Date Details   No additional details    Date of next INR:  11/6/2017         How to take your warfarin dose     To take:  2 mg Take 1 of the 2 mg tablets.    To take:  3 mg Take 1.5 of the 2 mg tablets.

## 2017-11-06 DIAGNOSIS — I48.91 ATRIAL FIBRILLATION, UNSPECIFIED TYPE (H): ICD-10-CM

## 2017-11-06 DIAGNOSIS — Z79.01 LONG-TERM (CURRENT) USE OF ANTICOAGULANTS: ICD-10-CM

## 2017-11-06 LAB — INR PPP: 1.25 (ref 0.86–1.14)

## 2017-11-07 ENCOUNTER — ANTICOAGULATION THERAPY VISIT (OUTPATIENT)
Dept: ANTICOAGULATION | Facility: CLINIC | Age: 67
End: 2017-11-07

## 2017-11-07 DIAGNOSIS — Z79.01 LONG-TERM (CURRENT) USE OF ANTICOAGULANTS: ICD-10-CM

## 2017-11-07 DIAGNOSIS — I48.91 ATRIAL FIBRILLATION, UNSPECIFIED TYPE (H): ICD-10-CM

## 2017-11-07 NOTE — MR AVS SNAPSHOT
Ju Edwards   11/7/2017   Anticoagulation Therapy Visit    Description:  67 year old male   Provider:  Hien Curry RN   Department:  [facility]           INR as of 11/7/2017     Today's INR 1.25! (11/6/2017)      Anticoagulation Summary as of 11/7/2017     INR goal 2.0-3.0   Today's INR 1.25! (11/6/2017)   Full instructions 11/7: 4 mg; 11/8: 4 mg; 11/9: 4 mg; Otherwise 2 mg on Mon, Wed, Fri; 3 mg all other days   Next INR check 11/14/2017    Indications   Long-term (current) use of anticoagulants [Z79.01] [Z79.01]  Atrial fibrillation (H) [I48.91]         November 2017 Details    Sun Mon Tue Wed Thu Fri Sat        1               2               3               4                 5               6               7      4 mg   See details      8      4 mg         9      4 mg         10      2 mg         11      3 mg           12      3 mg         13      2 mg         14            15               16               17               18                 19               20               21               22               23               24               25                 26               27               28               29               30                  Date Details   11/07 This INR check       Date of next INR:  11/14/2017         How to take your warfarin dose     To take:  2 mg Take 1 of the 2 mg tablets.    To take:  3 mg Take 1.5 of the 2 mg tablets.    To take:  4 mg Take 2 of the 2 mg tablets.

## 2017-11-07 NOTE — PROGRESS NOTES
ANTICOAGULATION FOLLOW-UP CLINIC VISIT    Patient Name:  Ju Edwards  Date:  11/7/2017  Contact Type:  Telephone    SUBJECTIVE:     Patient Findings     Positives Missed doses    Comments Leslie reports that patient missed at least 1 dose but could have been more.           OBJECTIVE    INR   Date Value Ref Range Status   11/06/2017 1.25 (H) 0.86 - 1.14 Final       ASSESSMENT / PLAN  INR assessment SUB    Recheck INR In: 1 WEEK    INR Location Clinic      Anticoagulation Summary as of 11/7/2017     INR goal 2.0-3.0   Today's INR 1.25! (11/6/2017)   Maintenance plan 2 mg (2 mg x 1) on Mon, Wed, Fri; 3 mg (2 mg x 1.5) all other days   Full instructions 11/7: 4 mg; 11/8: 4 mg; 11/9: 4 mg; Otherwise 2 mg on Mon, Wed, Fri; 3 mg all other days   Weekly total 18 mg   Plan last modified Alice Carranza RN (6/2/2016)   Next INR check 11/14/2017   Priority INR   Target end date Indefinite    Indications   Long-term (current) use of anticoagulants [Z79.01] [Z79.01]  Atrial fibrillation (H) [I48.91]         Anticoagulation Episode Summary     INR check location Clinic Lab    Preferred lab     Send INR reminders to [facility]    Comments Speak witrh Wife Lety [phone]      Anticoagulation Care Providers     Provider Role Specialty Phone number    Rayo Christiansen MD [facility] Internal Medicine [phone]            See the Encounter Report to view Anticoagulation Flowsheet and Dosing Calendar (Go to Encounters tab in chart review, and find the Anticoagulation Therapy Visit)    Spoke with Leslie Curry, CONCE[employer]N

## 2017-11-24 DIAGNOSIS — I48.91 ATRIAL FIBRILLATION (H): ICD-10-CM

## 2017-11-24 DIAGNOSIS — Z00.00 ROUTINE HEALTH MAINTENANCE: ICD-10-CM

## 2017-11-28 DIAGNOSIS — I48.91 ATRIAL FIBRILLATION, UNSPECIFIED TYPE (H): ICD-10-CM

## 2017-11-28 RX ORDER — WARFARIN SODIUM 2 MG/1
TABLET ORAL
Qty: 100 TABLET | Refills: 1 | Status: SHIPPED | OUTPATIENT
Start: 2017-11-28 | End: 2018-04-13

## 2017-11-28 RX ORDER — WARFARIN SODIUM 2 MG/1
TABLET ORAL
Qty: 100 TABLET | Refills: 2 | OUTPATIENT
Start: 2017-11-28

## 2017-11-28 RX ORDER — WARFARIN SODIUM 2 MG/1
TABLET ORAL
Qty: 100 TABLET | Refills: 3 | OUTPATIENT
Start: 2017-11-28

## 2017-11-28 NOTE — TELEPHONE ENCOUNTER
flecainide acetate 150 MG TABS  Last Written Prescription Date:  8/21/17  Last Fill Quantity: 180,   # refills: 0  Last Office Visit : 8/22/17  Future Office visit:  No future appt  Lab Results   Component Value Date    CHOL 188 10/10/2016     Lab Results   Component Value Date    HDL 40 10/10/2016     Lab Results   Component Value Date     10/10/2016     Lab Results   Component Value Date    TRIG 203 10/10/2016     Lab Results   Component Value Date    CHOLHDLRATIO 5.2 04/02/2015         Routing refill request to clinic nurse for review/approval because:  Failed protocol, labs overdue

## 2017-11-29 RX ORDER — FLECAINIDE ACETATE 150 MG/1
TABLET ORAL
Qty: 180 TABLET | Refills: 3 | Status: SHIPPED | OUTPATIENT
Start: 2017-11-29 | End: 2019-01-07

## 2017-12-17 DIAGNOSIS — K21.00 GASTROESOPHAGEAL REFLUX DISEASE WITH ESOPHAGITIS: ICD-10-CM

## 2017-12-17 DIAGNOSIS — I10 ESSENTIAL HYPERTENSION: ICD-10-CM

## 2017-12-17 DIAGNOSIS — E78.2 MIXED HYPERLIPIDEMIA: ICD-10-CM

## 2017-12-20 RX ORDER — PANTOPRAZOLE SODIUM 40 MG/1
40 TABLET, DELAYED RELEASE ORAL DAILY
Qty: 90 TABLET | Refills: 2 | Status: SHIPPED | OUTPATIENT
Start: 2017-12-20 | End: 2018-09-17

## 2017-12-20 RX ORDER — ATENOLOL 25 MG/1
12.5 TABLET ORAL DAILY
Qty: 45 TABLET | Refills: 2 | Status: SHIPPED | OUTPATIENT
Start: 2017-12-20 | End: 2018-09-17

## 2017-12-20 RX ORDER — ATORVASTATIN CALCIUM 80 MG/1
80 TABLET, FILM COATED ORAL DAILY
Qty: 90 TABLET | Refills: 2 | Status: SHIPPED | OUTPATIENT
Start: 2017-12-20 | End: 2018-09-17

## 2017-12-20 NOTE — TELEPHONE ENCOUNTER
atorvastatin (LIPITOR) 80 MG tablet    Last Written Prescription Date:  2/28/17  Last Fill Quantity: 90,   # refills: 2  Last Office Visit : 8/22/17  Future Office visit:  None    Routing because: LDL past due

## 2018-01-08 ENCOUNTER — ANTICOAGULATION THERAPY VISIT (OUTPATIENT)
Dept: ANTICOAGULATION | Facility: CLINIC | Age: 68
End: 2018-01-08

## 2018-01-08 DIAGNOSIS — I48.91 ATRIAL FIBRILLATION (H): ICD-10-CM

## 2018-01-08 DIAGNOSIS — Z79.01 LONG-TERM (CURRENT) USE OF ANTICOAGULANTS: ICD-10-CM

## 2018-01-08 NOTE — PROGRESS NOTES
Spoke with patient's wife today. She said Ju has been taking care of her following her orthopedic surgery. Patient will be in this week for an INR

## 2018-01-08 NOTE — MR AVS SNAPSHOT
Ju Edwards   1/8/2018   Anticoagulation Therapy Visit    Description:  67 year old male   Provider:  Melvin Powell, [facility]   Department:  [facility]           INR as of 1/8/2018     Today's INR No new INR was available at the time of this encounter.      Anticoagulation Summary as of 1/8/2018     INR goal 2.0-3.0   Today's INR No new INR was available at the time of this encounter.   Full instructions 2 mg on Mon, Wed, Fri; 3 mg all other days   Next INR check 1/8/2018    Indications   Long-term (current) use of anticoagulants [Z79.01] [Z79.01]  Atrial fibrillation (H) [I48.91]         January 2018 Details    Sun Mon Tue Wed Thu Fri Sat      1               2               3               4               5               6                 7               8      See details      9               10               11               12               13                 14               15               16               17               18               19               20                 21               22               23               24               25               26               27                 28               29               30               31                   Date Details   01/08 This INR check       Date of next INR:  1/8/2018         How to take your warfarin dose     To take:  2 mg Take 1 of the 2 mg tablets.

## 2018-01-16 ENCOUNTER — ANTICOAGULATION THERAPY VISIT (OUTPATIENT)
Dept: ANTICOAGULATION | Facility: CLINIC | Age: 68
End: 2018-01-16

## 2018-01-16 DIAGNOSIS — I48.91 ATRIAL FIBRILLATION (H): ICD-10-CM

## 2018-01-16 DIAGNOSIS — Z79.01 LONG-TERM (CURRENT) USE OF ANTICOAGULANTS: ICD-10-CM

## 2018-01-16 NOTE — PROGRESS NOTES
Left message for patient that they have not been compliant with having the necessary lab work for their anticoagulation therapy. Patient  has been sent two letters stating that they have missed their lab appointments and that it is very important to have labs done to make sure that they are in their therapeutic range to minimize the risks of bleeding and clotting. I asked them to call us or come in for their lab work as soon as possible. Patient will be inactivated from our anticoagulation monitoring service if they do not respond within one week.  (1/23/18)

## 2018-01-16 NOTE — MR AVS SNAPSHOT
Ju Edwards   1/16/2018   Anticoagulation Therapy Visit    Description:  67 year old male   Provider:  Melvin Powell, [facility]   Department:  [facility]           INR as of 1/16/2018     Today's INR       Anticoagulation Summary as of 1/16/2018     INR goal 2.0-3.0   Today's INR    Next INR check     Indications   Long-term (current) use of anticoagulants [Z79.01] [Z79.01]  Atrial fibrillation (H) [I48.91]         January 2018 Details    Sun Mon Tue Wed Thu Fri Sat      1               2               3               4               5               6                 7               8      See details      9               10               11               12               13                 14               15               16               17               18               19               20                 21               22               23               24               25               26               27                 28               29               30               31                   Date Details   01/08 This INR check       Date of next INR:  1/8/2018         How to take your warfarin dose     To take:  2 mg Take 1 of the 2 mg tablets.

## 2018-01-17 ENCOUNTER — ANTICOAGULATION THERAPY VISIT (OUTPATIENT)
Dept: ANTICOAGULATION | Facility: CLINIC | Age: 68
End: 2018-01-17

## 2018-01-17 DIAGNOSIS — I48.91 ATRIAL FIBRILLATION, UNSPECIFIED TYPE (H): ICD-10-CM

## 2018-01-17 DIAGNOSIS — Z79.01 LONG-TERM (CURRENT) USE OF ANTICOAGULANTS: ICD-10-CM

## 2018-01-17 LAB — INR PPP: 1.62 (ref 0.86–1.14)

## 2018-01-17 NOTE — MR AVS SNAPSHOT
Ju Edwards   1/17/2018   Anticoagulation Therapy Visit    Description:  67 year old male   Provider:  Lilibeth Conti RN   Department:  [facility]           INR as of 1/17/2018     Today's INR 1.62!      Anticoagulation Summary as of 1/17/2018     INR goal 2.0-3.0   Today's INR 1.62!   Full instructions 1/17: 4 mg; 1/18: 4 mg; Otherwise 2 mg on Mon, Wed, Fri; 3 mg all other days   Next INR check 1/22/2018    Indications   Long-term (current) use of anticoagulants [Z79.01] [Z79.01]  Atrial fibrillation (H) [I48.91]         January 2018 Details    Sun Mon Tue Wed Thu Fri Sat      1               2               3               4               5               6                 7               8               9               10               11               12               13                 14               15               16               17      4 mg   See details      18      4 mg         19      2 mg         20      3 mg           21      3 mg         22            23               24               25               26               27                 28               29               30               31                   Date Details   01/17 This INR check       Date of next INR:  1/22/2018         How to take your warfarin dose     To take:  2 mg Take 1 of the 2 mg tablets.    To take:  3 mg Take 1.5 of the 2 mg tablets.    To take:  4 mg Take 2 of the 2 mg tablets.

## 2018-01-17 NOTE — PROGRESS NOTES
ANTICOAGULATION FOLLOW-UP CLINIC VISIT    Patient Name:  Ju Edwards  Date:  1/17/2018  Contact Type:  Telephone    SUBJECTIVE:        OBJECTIVE    INR   Date Value Ref Range Status   01/17/2018 1.62 (H) 0.86 - 1.14 Final       ASSESSMENT / PLAN  INR assessment SUB    Recheck INR In: 5 DAYS    INR Location Clinic      Anticoagulation Summary as of 1/17/2018     INR goal 2.0-3.0   Today's INR 1.62!   Maintenance plan 2 mg (2 mg x 1) on Mon, Wed, Fri; 3 mg (2 mg x 1.5) all other days   Full instructions 1/17: 4 mg; 1/18: 4 mg; Otherwise 2 mg on Mon, Wed, Fri; 3 mg all other days   Weekly total 18 mg   Plan last modified Alice Carranza RN (6/2/2016)   Next INR check 1/22/2018   Priority INR   Target end date Indefinite    Indications   Long-term (current) use of anticoagulants [Z79.01] [Z79.01]  Atrial fibrillation (H) [I48.91]         Anticoagulation Episode Summary     INR check location Clinic Lab    Preferred lab     Send INR reminders to [facility]    Comments Speak [facility] Wife Lety [phone]      Anticoagulation Care Providers     Provider Role Specialty Phone number    Rayo Christiansen MD [facility] Internal Medicine [phone]            See the Encounter Report to view Anticoagulation Flowsheet and Dosing Calendar (Go to Encounters tab in chart review, and find the Anticoagulation Therapy Visit)    Left message for patient with results and dosing recommendations. Asked patient to call back to report any missed doses, falls, signs and symptoms of bleeding or clotting, any changes in health, medication, or diet. Asked patient to call back with any questions or concerns.      Lilibeth Conti RN

## 2018-02-01 ENCOUNTER — RADIANT APPOINTMENT (OUTPATIENT)
Dept: GENERAL RADIOLOGY | Facility: CLINIC | Age: 68
End: 2018-02-01
Attending: INTERNAL MEDICINE
Payer: COMMERCIAL

## 2018-02-01 ENCOUNTER — OFFICE VISIT (OUTPATIENT)
Dept: INTERNAL MEDICINE | Facility: CLINIC | Age: 68
End: 2018-02-01
Payer: COMMERCIAL

## 2018-02-01 VITALS
DIASTOLIC BLOOD PRESSURE: 73 MMHG | HEART RATE: 56 BPM | BODY MASS INDEX: 26.58 KG/M2 | SYSTOLIC BLOOD PRESSURE: 152 MMHG | WEIGHT: 145.8 LBS

## 2018-02-01 DIAGNOSIS — R07.81 RIB PAIN ON LEFT SIDE: ICD-10-CM

## 2018-02-01 DIAGNOSIS — G47.52 DREAM ENACTMENT BEHAVIOR: ICD-10-CM

## 2018-02-01 DIAGNOSIS — R68.89 VIVID DREAM: ICD-10-CM

## 2018-02-01 DIAGNOSIS — M79.10 MUSCLE PAIN: ICD-10-CM

## 2018-02-01 DIAGNOSIS — Z23 NEED FOR PROPHYLACTIC VACCINATION AND INOCULATION AGAINST INFLUENZA: Primary | ICD-10-CM

## 2018-02-01 RX ORDER — NAPROXEN 500 MG/1
500 TABLET ORAL 2 TIMES DAILY WITH MEALS
Qty: 60 TABLET | Refills: 0 | Status: SHIPPED | OUTPATIENT
Start: 2018-02-01 | End: 2018-03-02

## 2018-02-01 ASSESSMENT — PAIN SCALES - GENERAL: PAINLEVEL: EXTREME PAIN (9)

## 2018-02-01 NOTE — PATIENT INSTRUCTIONS
[facility]: [phone]     [facility] Medication Refill Request Information:  * Please contact your pharmacy regarding ANY request for medication refills.  ** [facility] Prescription Fax = [phone]  * Please allow 3 business days for routine medication refills.  * Please allow 5 business days for controlled substance medication refills.     [facility] Test Result notification information:  *You will be notified with in 7-10 days of your appointment day regarding the results of your test.  If you are on MyChart you will be notified as soon as the provider has reviewed the results and signed off on them.

## 2018-02-01 NOTE — PROGRESS NOTES
Chief complaint:  Ju Edwards is a 67 year old male who presents for rib pain   Chief Complaint   Patient presents with     Rib Pain     Patient here for rib pain after falling from bed.        SUBJECTIVE:  Ju Edwards is a 67 year old male who presents for left rib pain after recent episodes of falling out of bed. He reports falling out of bed several weeks ago, and again more recently 3 days ago. Both times he was having very vivid dreams and acting them out, and woke up on the floor after having landed on his left side. He has had pain over his left side since then. Does not have any other injuries and no bruising. He does not think that he hit his head, denies any headaches. The pain is worse with movement, coughing, and especially sneezing. Has been trying to hold in his sneezes due to the pain. He has been taking naproxen 500 mg twice daily, which provides some relief. Has not tried anything else.     Reports that for a while now he has been having very vivid dreams and acting them out. Often has dreams of being attacked, and will act out fighting. He sleeps on a sofa bed and often finds that he kicks everything off of the coffee table. Aside from the 2 falls and the left rib pain, he does not think he has had any other serious injuries from this.     Medications and allergies were reviewed by me today.     SocHx:   History   Smoking Status     Never Smoker   Smokeless Tobacco     Never Used      Past Medical History:   Patient Active Problem List   Diagnosis     Hypertension     Hyperlipidemia     Stroke (H)     Atrial fibrillation (H)     Long-term (current) use of anticoagulants [Z79.01]     Medications:   Current Outpatient Prescriptions   Medication     naproxen (NAPROSYN) 500 MG tablet     atorvastatin (LIPITOR) 80 MG tablet     pantoprazole (PROTONIX) 40 MG EC tablet     atenolol (TENORMIN) 25 MG tablet     flecainide acetate 150 MG TABS     warfarin (COUMADIN) 2 MG tablet      citalopram (CELEXA) 10 MG tablet     Cholecalciferol (VITAMIN D3) 2000 UNITS CAPS     cycloSPORINE (RESTASIS) 0.05 % ophthalmic emulsion     lisinopril (PRINIVIL,ZESTRIL) 10 MG tablet     docusate sodium (STOOL SOFTENER) 100 MG capsule     cetirizine (ZYRTEC) 10 MG tablet     oxyCODONE-acetaminophen (PERCOCET) 5-325 MG per tablet     emollient (VANICREAM) cream     ONDANSETRON PO     Sennosides (SENNA LAX PO)     No current facility-administered medications for this visit.        Review Of Systems   5 point ROS completed and negative except noted above, including Gen, CV, Resp, GI, MS    PE:  /73  Pulse 56  Wt 66.1 kg (145 lb 12.8 oz)  BMI 26.58 kg/m2  Gen: no distress, comfortable, pleasant   Eyes: anicteric, normal extra-ocular movements; mild resolving ecchymosis over L maxilla  Cardiovascular: regular rate and rhythm, normal S1 and S2, no murmurs, rubs or gallops, peripheral pulses full and symmetric   Respiratory: clear to auscultation, no wheezes or crackles, normal breath sounds   Gastrointestinal: positive bowel sounds, nontender, nondistended  MSK: Tender to palpation over left mid-axillary line, no significant bruising to that region   Psychological: appropriate mood       ASSESSMENT/PLAN:  Ju Edwards is a 67 year old male who presents for left rib pain after recent episodes of falling out of bed.     Rib pain on left side, Muscle pain - Concern for rib fracture. Will get Rib XR today. He can continue to use naproxen as needed for pain relief, and explained to him that management will not necessarily change should he have fractures.   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 Given recent injuries due to dream enactment, recommend evaluation in sleep clinic.   - SLEEP EVALUATION & MANAGEMENT REFERRAL - ADULT -[facility] [place] / [facility]  [facility]  [phone] (Age 2 and up)    Need for prophylactic vaccination and inoculation against influenza  - FLU VACCINE, INCREASED ANTIGEN, PRESV FREE, AGE 65+ [13099]    Deepali Clemente, MS4     Scribe Disclosure:   I, Deepali Singh, MS4, am serving as a scribe; to document services personally performed by Deanne Miguel MD  - -based on data collection and the provider's statements to me.     Provider Disclosure:  I agree with above History, Review of Systems, Physical exam and Plan.  I have reviewed the content of the documentation and have edited it as needed. I have personally performed the services documented here and the documentation accurately represents those services and the decisions I have made.      Electronically signed by:  Deanne Miguel MD

## 2018-02-01 NOTE — NURSING NOTE
Chief Complaint   Patient presents with     Rib Pain     Patient here for rib pain after falling from bed.      Chapis Feliciano CMA at 12:41 PM on 2/1/2018.

## 2018-02-01 NOTE — MR AVS SNAPSHOT
After Visit Summary   2/1/2018    Ju Edwards    MRN: [id number]           Patient Information     Date Of Birth          1950        Visit Information        Provider Department      2/1/2018 12:45 PM Deanne Crump MD; [facility] [employer] [facility]        Today's Diagnoses     Need for prophylactic vaccination and inoculation against influenza    -  1    Rib pain on left side        Vivid dream        Dream enactment behavior        Muscle pain          Care Instructions    Primary Care Center: [phone]     Primary Care Center Medication Refill Request Information:  * Please contact your pharmacy regarding ANY request for medication refills.  ** [facility] Prescription Fax = [phone]  * Please allow 3 business days for routine medication refills.  * Please allow 5 business days for controlled substance medication refills.     Primary Care Center Test Result notification information:  *You will be notified with in 7-10 days of your appointment day regarding the results of your test.  If you are on MyChart you will be notified as soon as the provider has reviewed the results and signed off on them.            Follow-ups after your visit        Additional Services     SLEEP EVALUATION & MANAGEMENT REFERRAL - [facility]  [phone] (Age 2 and up)       Please be aware that coverage of these services is subject to the terms and limitations of your health insurance plan.  Call member services at your health plan with any benefit or coverage questions.      Please bring the following to your appointment:    >>   List of current medications   >>   This referral request   >>   Any documents/labs given to you for this referral                      Your next 10 appointments already scheduled     Feb 01, 2018  2:30 PM CST   (Arrive by 2:15 PM)   XR RIBS LEFT 2 VIEWS with UCXR1   [facility]  Xray ([facility])    [address]  1st Floor  [facility] [id number] [phone]           Please bring a list of your current medicines to your exam. (Include vitamins, minerals and over-thecounter medicines.) Leave your valuables at home.  Tell your doctor if there is a chance you may be pregnant.  You do not need to do anything special for this exam.            2018  1:00 PM CST   New Sleep Patient with AYUSH Keene CNP   [facility] [place], Sleep Study ([facility])    [address] [place] [id number] [phone]              Future tests that were ordered for you today     Open Future Orders        Priority Expected Expires Ordered    XR Ribs Left 2 Views Routine 2018            Who to contact     Please call your clinic at [phone] to:    Ask questions about your health    Make or cancel appointments    Discuss your medicines    Learn about your test results    Speak to your doctor   If you have compliments or concerns about an experience at your clinic, or if you wish to file a complaint, please contact [facility] Patient Relations at [phone] or email us at [email or web address]         Additional Information About Your Visit        [employer] Information     TripTouch is an electronic gateway that provides easy, online access to your medical records. With TripTouch, you can request a clinic appointment, read your test results, renew a prescription or communicate with your care team.     To sign up for [employer] visit the website at [email or web address]   You will be asked to enter the access code listed below, as well as some personal information. Please follow the directions to create your username and password.     Your access code is: [email or web address]  Expires: 2018 12:07 PM     Your access code will  in 90 days. If you  need help or a new code, please contact your [facility] or call [phone] for assistance.        Care EveryWhere ID     This is your Care EveryWhere ID. This could be used by other organizations to access your [place] medical records  [id number]        Your Vitals Were     Pulse BMI (Body Mass Index)                56 26.58 kg/m2           Blood Pressure from Last 3 Encounters:   02/01/18 152/73   08/22/17 133/75   06/20/17 163/83    Weight from Last 3 Encounters:   02/01/18 66.1 kg (145 lb 12.8 oz)   08/22/17 63.2 kg (139 lb 4.8 oz)   06/20/17 64 kg (141 lb)              We Performed the Following     FLU VACCINE, INCREASED ANTIGEN, PRESV FREE, AGE 65+ [45033]     SLEEP EVALUATION & MANAGEMENT REFERRAL - ADULT -[facility] / [facility]  [phone] (Age 2 and up)          Where to get your medicines      These medications were sent to [facility]/pharmacy #[id number] [address] [address]     Phone:  [phone]     naproxen 500 MG tablet          Primary Care Provider Office Phone # Fax #    Rayo Christiansen [phone]       [address] [place] [address]        Equal Access to Services     MARK MENDES AH: Hadii saba rubin hadasho Sojoann, waaxda luqadaha, qaybta kaalmada josh, anu maria. So [facility] [phone].    ATENCIÓN: Si habla español, tiene a carmona disposición servicios gratuitos de asistencia lingüística. George al [phone].    We comply with applicable federal civil rights laws and [place] laws. We do not discriminate on the basis of race, color, national origin, age, disability, sex, sexual orientation, or gender identity.            Thank you!     Thank you for choosing [facility]  for your care. Our goal is always to provide you with excellent care. Hearing back from our patients is one  way we can continue to improve our services. Please take a few minutes to complete the written survey that you may receive in the mail after your visit with us. Thank you!             Your Updated Medication List - Protect others around you: Learn how to safely use, store and throw away your medicines at www.disposemymeds.org.          This list is accurate as of 2/1/18  1:42 PM.  Always use your most recent med list.                   Brand Name Dispense Instructions for use Diagnosis    atenolol 25 MG tablet    TENORMIN    45 tablet    Take 0.5 tablets (12.5 mg) by mouth daily    Essential hypertension       atorvastatin 80 MG tablet    LIPITOR    90 tablet    Take 1 tablet (80 mg) by mouth daily    Mixed hyperlipidemia       cetirizine 10 MG tablet    zyrTEC    90 tablet    Take 1 tablet (10 mg) by mouth every evening    Itching       citalopram 10 MG tablet    celeXA    180 tablet    Take 2 tablets (20 mg) by mouth daily    Depression, unspecified depression type       cycloSPORINE 0.05 % ophthalmic emulsion    RESTASIS    180 each    Place 1 drop into both eyes 2 times daily    Dry eye       emollient cream     25 g    Apply topically as needed for other    Itching       flecainide acetate 150 MG Tabs     180 tablet    TAKE 1 TABLET (150 MG) BY MOUTH 2 TIMES DAILY AS NEEDED    Routine health maintenance       lisinopril 10 MG tablet    PRINIVIL/ZESTRIL    100 tablet    Take 1 tablet (10 mg) by mouth daily    Essential hypertension, hypertension with unspecified goal       naproxen 500 MG tablet    NAPROSYN    60 tablet    Take 1 tablet (500 mg) by mouth 2 times daily (with meals)    Muscle pain       ONDANSETRON PO      Take 4 mg by mouth every 8 hours as needed for nausea    Hypertension, Hyperlipidemia, Stroke (H)       oxyCODONE-acetaminophen 5-325 MG per tablet    PERCOCET     Take 1 tablet by mouth every 4 hours as needed for moderate to severe pain        pantoprazole 40 MG EC tablet    PROTONIX    90  tablet    Take 1 tablet (40 mg) by mouth daily    Gastroesophageal reflux disease with esophagitis       SENNA LAX PO      Take by mouth 2 times daily as needed    Hypertension, Hyperlipidemia, Stroke (H), Atrial fibrillation (H)       STOOL SOFTENER 100 MG capsule   Generic drug:  docusate sodium           vitamin D3 2000 UNITS Caps     90 capsule    Take 1 tablet by mouth daily    Cerebrovascular accident (CVA) due to thrombosis of carotid artery (H)       warfarin 2 MG tablet    COUMADIN    100 tablet    Take one to two tablets daily or as directed by the Coumadin clinic    Long-term (current) use of anticoagulants, Atrial fibrillation, unspecified type (H)

## 2018-02-20 ENCOUNTER — ANTICOAGULATION THERAPY VISIT (OUTPATIENT)
Dept: ANTICOAGULATION | Facility: CLINIC | Age: 68
End: 2018-02-20

## 2018-02-20 DIAGNOSIS — Z79.01 LONG-TERM (CURRENT) USE OF ANTICOAGULANTS: ICD-10-CM

## 2018-02-20 DIAGNOSIS — I48.91 ATRIAL FIBRILLATION (H): ICD-10-CM

## 2018-02-20 NOTE — PROGRESS NOTES
Left message for patient that they have not been compliant with having the necessary lab work for their anticoagulation therapy. Patient  has been sent two letters stating that they have missed their lab appointments and that it is very important to have labs done to make sure that they are in their therapeutic range to minimize the risks of bleeding and clotting. I asked them to call us or come in for their lab work as soon as possible. Patient will be inactivated from our anticoagulation monitoring service if they do not respond within one week. (2/27/18)

## 2018-02-20 NOTE — MR AVS SNAPSHOT
Ju Edwards   2/20/2018   Anticoagulation Therapy Visit    Description:  67 year old male   Provider:  Melvin Powell, [facility]   Department:  [facility]           INR as of 2/20/2018     Today's INR No new INR was available at the time of this encounter.      Anticoagulation Summary as of 2/20/2018     INR goal 2.0-3.0   Today's INR No new INR was available at the time of this encounter.   Full instructions 2 mg on Mon, Wed, Fri; 3 mg all other days   Next INR check 2/20/2018    Indications   Long-term (current) use of anticoagulants [Z79.01] [Z79.01]  Atrial fibrillation (H) [I48.91]         February 2018 Details    Sun Mon Tue Wed Thu Fri Sat         1               2               3                 4               5               6               7               8               9               10                 11               12               13               14               15               16               17                 18               19               20      See details      21               22               23               24                 25               26               27               28                   Date Details   02/20 This INR check       Date of next INR:  2/20/2018         How to take your warfarin dose     To take:  3 mg Take 1.5 of the 2 mg tablets.

## 2018-02-23 ENCOUNTER — ANTICOAGULATION THERAPY VISIT (OUTPATIENT)
Dept: ANTICOAGULATION | Facility: CLINIC | Age: 68
End: 2018-02-23

## 2018-02-23 DIAGNOSIS — Z79.01 LONG-TERM (CURRENT) USE OF ANTICOAGULANTS: ICD-10-CM

## 2018-02-23 DIAGNOSIS — I48.91 ATRIAL FIBRILLATION (H): ICD-10-CM

## 2018-02-23 LAB — INR PPP: 2.83 (ref 0.86–1.14)

## 2018-02-23 NOTE — MR AVS SNAPSHOT
Ju Edwards   2/23/2018   Anticoagulation Therapy Visit    Description:  67 year old male   Provider:  Hien Curry, RN   Department:  [facility]           INR as of 2/23/2018     Today's INR 2.83      Anticoagulation Summary as of 2/23/2018     INR goal 2.0-3.0   Today's INR 2.83   Full instructions 2 mg on Mon, Wed, Fri; 3 mg all other days   Next INR check 3/23/2018    Indications   Long-term (current) use of anticoagulants [Z79.01] [Z79.01]  Atrial fibrillation (H) [I48.91]         February 2018 Details    Sun Mon Tue Wed Thu Fri Sat         1               2               3                 4               5               6               7               8               9               10                 11               12               13               14               15               16               17                 18               19               20               21               22               23      2 mg   See details      24      3 mg           25      3 mg         26      2 mg         27      3 mg         28      2 mg             Date Details   02/23 This INR check               How to take your warfarin dose     To take:  2 mg Take 1 of the 2 mg tablets.    To take:  3 mg Take 1.5 of the 2 mg tablets.           March 2018 Details    Sun Mon Tue Wed Thu Fri Sat         1      3 mg         2      2 mg         3      3 mg           4      3 mg         5      2 mg         6      3 mg         7      2 mg         8      3 mg         9      2 mg         10      3 mg           11      3 mg         12      2 mg         13      3 mg         14      2 mg         15      3 mg         16      2 mg         17      3 mg           18      3 mg         19      2 mg         20      3 mg         21      2 mg         22      3 mg         23            24                 25               26               27               28               29               30               31                 Date Details   No additional details    Date of next INR:  3/23/2018         How to take your warfarin dose     To take:  2 mg Take 1 of the 2 mg tablets.    To take:  3 mg Take 1.5 of the 2 mg tablets.

## 2018-02-23 NOTE — PROGRESS NOTES
ANTICOAGULATION FOLLOW-UP CLINIC VISIT    Patient Name:  Ju Edwards  Date:  2/23/2018  Contact Type:  Telephone    SUBJECTIVE:     Patient Findings     Positives No Problem Findings           OBJECTIVE    INR   Date Value Ref Range Status   02/23/2018 2.83 (H) 0.86 - 1.14 Final       ASSESSMENT / PLAN  No question data found.  Anticoagulation Summary as of 2/23/2018     INR goal 2.0-3.0   Today's INR 2.83   Maintenance plan 2 mg (2 mg x 1) on Mon, Wed, Fri; 3 mg (2 mg x 1.5) all other days   Full instructions 2 mg on Mon, Wed, Fri; 3 mg all other days   Weekly total 18 mg   No change documented Hien Curry, RN   Plan last modified Alice Carranza RN (6/2/2016)   Next INR check 3/23/2018   Priority INR   Target end date Indefinite    Indications   Long-term (current) use of anticoagulants [Z79.01] [Z79.01]  Atrial fibrillation (H) [I48.91]         Anticoagulation Episode Summary     INR check location Clinic Lab    Preferred lab     Send INR reminders to [facility]    Comments Speak [facility] Wife Lety [phone]      Anticoagulation Care Providers     Provider Role Specialty Phone number    Rayo Christiansen MD [facility] Internal Medicine [phone]            See the Encounter Report to view Anticoagulation Flowsheet and Dosing Calendar (Go to Encounters tab in chart review, and find the Anticoagulation Therapy Visit)    Spoke with Leslie.    Hien Curry, RN

## 2018-02-26 DIAGNOSIS — I63.039: ICD-10-CM

## 2018-02-26 RX ORDER — ACETAMINOPHEN 160 MG
1 TABLET,DISINTEGRATING ORAL DAILY
Qty: 90 CAPSULE | Refills: 3 | Status: SHIPPED | OUTPATIENT
Start: 2018-02-26 | End: 2019-02-05

## 2018-03-02 DIAGNOSIS — M79.10 MUSCLE PAIN: ICD-10-CM

## 2018-03-05 RX ORDER — NAPROXEN 500 MG/1
500 TABLET ORAL 2 TIMES DAILY WITH MEALS
Qty: 180 TABLET | Refills: 3 | Status: SHIPPED | OUTPATIENT
Start: 2018-03-05 | End: 2018-05-29

## 2018-03-05 NOTE — TELEPHONE ENCOUNTER
naproxen (NAPROSYN) 500 MG tablet    Last Written Prescription Date:  2/1/18  Last Fill Quantity: 30,   # refills: 0  Last Office Visit : 2/1/18  Future Office visit:  none    Routing refill request to provider for review/approval because:  Failed protocol, BP and age > 64  *Pharmacy requesting 90 day refills

## 2018-03-13 DIAGNOSIS — Z79.01 LONG TERM CURRENT USE OF ANTICOAGULANT THERAPY: Primary | ICD-10-CM

## 2018-03-13 RX ORDER — WARFARIN SODIUM 2 MG/1
TABLET ORAL
Qty: 60 TABLET | Refills: 2 | Status: SHIPPED | OUTPATIENT
Start: 2018-03-13 | End: 2018-05-29

## 2018-03-23 ENCOUNTER — ANTICOAGULATION THERAPY VISIT (OUTPATIENT)
Dept: ANTICOAGULATION | Facility: CLINIC | Age: 68
End: 2018-03-23

## 2018-03-23 DIAGNOSIS — Z79.01 LONG-TERM (CURRENT) USE OF ANTICOAGULANTS: ICD-10-CM

## 2018-03-23 DIAGNOSIS — I48.91 ATRIAL FIBRILLATION, UNSPECIFIED TYPE (H): ICD-10-CM

## 2018-03-23 DIAGNOSIS — I48.91 ATRIAL FIBRILLATION (H): ICD-10-CM

## 2018-03-23 LAB — INR PPP: 2.67 (ref 0.86–1.14)

## 2018-03-23 NOTE — MR AVS SNAPSHOT
Ju Edwards   3/23/2018   Anticoagulation Therapy Visit    Description:  67 year old male   Provider:  Yanni Up, RN   Department:  [facility]           INR as of 3/23/2018     Today's INR 2.67      Anticoagulation Summary as of 3/23/2018     INR goal 2.0-3.0   Today's INR 2.67   Full instructions 2 mg on Mon, Wed, Fri; 3 mg all other days   Next INR check 4/20/2018    Indications   Long-term (current) use of anticoagulants [Z79.01] [Z79.01]  Atrial fibrillation (H) [I48.91]         March 2018 Details    Sun Mon Tue Wed Thu Fri Sat         1               2               3                 4               5               6               7               8               9               10                 11               12               13               14               15               16               17                 18               19               20               21               22               23      2 mg   See details      24      3 mg           25      3 mg         26      2 mg         27      3 mg         28      2 mg         29      3 mg         30      2 mg         31      3 mg          Date Details   03/23 This INR check               How to take your warfarin dose     To take:  2 mg Take 1 of the 2 mg tablets.    To take:  3 mg Take 1.5 of the 2 mg tablets.           April 2018 Details    Sun Mon Tue Wed Thu Fri Sat     1      3 mg         2      2 mg         3      3 mg         4      2 mg         5      3 mg         6      2 mg         7      3 mg           8      3 mg         9      2 mg         10      3 mg         11      2 mg         12      3 mg         13      2 mg         14      3 mg           15      3 mg         16      2 mg         17      3 mg         18      2 mg         19      3 mg         20            21                 22               23               24               25               26               27               28                 29                30                     Date Details   No additional details    Date of next INR:  4/20/2018         How to take your warfarin dose     To take:  2 mg Take 1 of the 2 mg tablets.    To take:  3 mg Take 1.5 of the 2 mg tablets.

## 2018-03-23 NOTE — PROGRESS NOTES
ANTICOAGULATION FOLLOW-UP CLINIC VISIT    Patient Name:  Ju Edwards  Date:  3/23/2018  Contact Type:  Telephone    SUBJECTIVE:     Patient Findings     Positives No Problem Findings           OBJECTIVE    INR   Date Value Ref Range Status   03/23/2018 2.67 (H) 0.86 - 1.14 Final       ASSESSMENT / PLAN  INR assessment THER    Recheck INR In: 4 WEEKS    INR Location Clinic      Anticoagulation Summary as of 3/23/2018     INR goal 2.0-3.0   Today's INR 2.67   Maintenance plan 2 mg (2 mg x 1) on Mon, Wed, Fri; 3 mg (2 mg x 1.5) all other days   Full instructions 2 mg on Mon, Wed, Fri; 3 mg all other days   Weekly total 18 mg   Plan last modified Alice Carranza RN (6/2/2016)   Next INR check 4/20/2018   Priority INR   Target end date Indefinite    Indications   Long-term (current) use of anticoagulants [Z79.01] [Z79.01]  Atrial fibrillation (H) [I48.91]         Anticoagulation Episode Summary     INR check location Clinic Lab    Preferred lab     Send INR reminders to [facility]    Comments Speak [facility] Wife Lety [phone]      Anticoagulation Care Providers     Provider Role Specialty Phone number    Kuldip Rayo Escobar MD [facility] Internal Medicine [phone]            See the Encounter Report to view Anticoagulation Flowsheet and Dosing Calendar (Go to Encounters tab in chart review, and find the Anticoagulation Therapy Visit)    Spoke with spouse Leslie. Gave them lab results and new warfarin recommendation.  No changes in health, medication, or diet. No missed doses, no falls. No signs or symptoms of bleed or clotting.     Yanni Up RN             Addendum 4/13/18 Sending refill of Warfarin 2mg tablets to [facility] in [place]. Yanni Up RN        4/26/18 ADDENDUM  Spoke to [facility] staff, [place].  Refilled prescription over the phone.  Received fax to check on prescription.  Filled 2mg tablets 180 count.    Alexander Becerril RN

## 2018-04-13 RX ORDER — WARFARIN SODIUM 2 MG/1
TABLET ORAL
Qty: 180 TABLET | Refills: 1 | Status: SHIPPED | OUTPATIENT
Start: 2018-04-13 | End: 2018-10-31

## 2018-04-18 ENCOUNTER — MEDICAL CORRESPONDENCE (OUTPATIENT)
Dept: HEALTH INFORMATION MANAGEMENT | Facility: CLINIC | Age: 68
End: 2018-04-18

## 2018-05-04 ENCOUNTER — ANTICOAGULATION THERAPY VISIT (OUTPATIENT)
Dept: ANTICOAGULATION | Facility: CLINIC | Age: 68
End: 2018-05-04

## 2018-05-04 DIAGNOSIS — Z79.01 LONG-TERM (CURRENT) USE OF ANTICOAGULANTS: ICD-10-CM

## 2018-05-04 DIAGNOSIS — I48.91 ATRIAL FIBRILLATION (H): ICD-10-CM

## 2018-05-04 LAB — INR PPP: 2.25 (ref 0.86–1.14)

## 2018-05-04 NOTE — MR AVS SNAPSHOT
Ju Edwards   5/4/2018   Anticoagulation Therapy Visit    Description:  67 year old male   Provider:  Alexander Becerril   Department:  [facility]           INR as of 5/4/2018     Today's INR 2.25      Anticoagulation Summary as of 5/4/2018     INR goal 2.0-3.0   Today's INR 2.25   Full instructions 2 mg on Mon, Wed, Fri; 3 mg all other days   Next INR check 5/25/2018    Indications   Long-term (current) use of anticoagulants [Z79.01] [Z79.01]  Atrial fibrillation (H) [I48.91]         May 2018 Details    Sun Mon Tue Wed Thu Fri Sat       1               2               3               4      2 mg   See details      5      3 mg           6      3 mg         7      2 mg         8      3 mg         9      2 mg         10      3 mg         11      2 mg         12      3 mg           13      3 mg         14      2 mg         15      3 mg         16      2 mg         17      3 mg         18      2 mg         19      3 mg           20      3 mg         21      2 mg         22      3 mg         23      2 mg         24      3 mg         25            26                 27               28               29               30               31                  Date Details   05/04 This INR check       Date of next INR:  5/25/2018         How to take your warfarin dose     To take:  2 mg Take 1 of the 2 mg tablets.    To take:  3 mg Take 1.5 of the 2 mg tablets.

## 2018-05-04 NOTE — PROGRESS NOTES
ANTICOAGULATION FOLLOW-UP CLINIC VISIT    Patient Name:  Ju Edwards  Date:  5/4/2018  Contact Type:  Telephone    SUBJECTIVE:     Patient Findings     Positives No Problem Findings    Comments Left message on Lety's voicemail.  Spoke to patient as well.  Did not make any adjustments to Coumadin dosing at this time.  May make a change at next INR appointment if patient continues to have a decrease in INR results.           OBJECTIVE    INR   Date Value Ref Range Status   05/04/2018 2.25 (H) 0.86 - 1.14 Final       ASSESSMENT / PLAN  INR assessment THER    Recheck INR In: 3 WEEKS    INR Location Clinic      Anticoagulation Summary as of 5/4/2018     INR goal 2.0-3.0   Today's INR 2.25   Maintenance plan 2 mg (2 mg x 1) on Mon, Wed, Fri; 3 mg (2 mg x 1.5) all other days   Full instructions 2 mg on Mon, Wed, Fri; 3 mg all other days   Weekly total 18 mg   No change documented Alexander Becerril RN   Plan last modified Alice Carranza RN (6/2/2016)   Next INR check 5/25/2018   Priority INR   Target end date Indefinite    Indications   Long-term (current) use of anticoagulants [Z79.01] [Z79.01]  Atrial fibrillation (H) [I48.91]         Anticoagulation Episode Summary     INR check location Clinic Lab    Preferred lab     Send INR reminders to [facility]    Comments Speak witlauren Wife Lety [phone]      Anticoagulation Care Providers     Provider Role Specialty Phone number    Rayo Christiansen MD [facility] Internal [facility] [phone]            See the Encounter Report to view Anticoagulation Flowsheet and Dosing Calendar (Go to Encounters tab in chart review, and find the Anticoagulation Therapy Visit)      Left message for patient with results and dosing recommendations on spouse's voicemail. Asked patient to call back to report any missed doses, falls, signs and symptoms of bleeding or clotting, any changes in health, medication, or diet. Asked patient to call back with  any questions or concerns.    Spoke with patient. Patient was unable to repeat dose and understanding of instructions.  Left message on Lety's cell.    Alexander Becerril RN

## 2018-05-23 ENCOUNTER — OFFICE VISIT (OUTPATIENT)
Dept: OPHTHALMOLOGY | Facility: CLINIC | Age: 68
End: 2018-05-23
Attending: OPHTHALMOLOGY
Payer: COMMERCIAL

## 2018-05-23 DIAGNOSIS — H02.889 MEIBOMIAN GLAND DISEASE, UNSPECIFIED LATERALITY: ICD-10-CM

## 2018-05-23 DIAGNOSIS — H04.123 INSUFFICIENCY OF TEAR FILM OF BOTH EYES: Primary | ICD-10-CM

## 2018-05-23 DIAGNOSIS — H40.002 GLAUCOMA SUSPECT OF LEFT EYE: ICD-10-CM

## 2018-05-23 PROCEDURE — 92015 DETERMINE REFRACTIVE STATE: CPT | Mod: ZF

## 2018-05-23 PROCEDURE — G0463 HOSPITAL OUTPT CLINIC VISIT: HCPCS | Mod: ZF

## 2018-05-23 PROCEDURE — T1013 SIGN LANG/ORAL INTERPRETER: HCPCS | Mod: U3,ZF

## 2018-05-23 PROCEDURE — 92133 CPTRZD OPH DX IMG PST SGM ON: CPT | Mod: ZF | Performed by: INTERNAL MEDICINE

## 2018-05-23 RX ORDER — MINERAL OIL, PETROLATUM 425; 573 MG/G; MG/G
1 OINTMENT OPHTHALMIC AT BEDTIME
Qty: 1 TUBE | Refills: 11 | Status: SHIPPED | OUTPATIENT
Start: 2018-05-23 | End: 2020-09-30

## 2018-05-23 RX ORDER — LATANOPROST 50 UG/ML
1 SOLUTION/ DROPS OPHTHALMIC AT BEDTIME
Qty: 1 BOTTLE | Refills: 3 | Status: SHIPPED | OUTPATIENT
Start: 2018-05-23 | End: 2018-07-31

## 2018-05-23 ASSESSMENT — REFRACTION_MANIFEST
OS_SPHERE: -0.50
OD_SPHERE: -0.50
OS_CYLINDER: +0.75
OD_AXIS: 170
OS_AXIS: 180
OD_ADD: +2.50
OS_ADD: +2.50
OD_CYLINDER: +1.00

## 2018-05-23 ASSESSMENT — TONOMETRY
IOP_METHOD: TONOPEN
OD_IOP_MMHG: 18
OD_IOP_MMHG: 16
OS_IOP_MMHG: 26
OS_IOP_MMHG: 28
OS_IOP_MMHG: 28
IOP_METHOD: APPLANATION
OD_IOP_MMHG: 16
IOP_METHOD: APPLANATION

## 2018-05-23 ASSESSMENT — VISUAL ACUITY
CORRECTION_TYPE: GLASSES
OS_PH_CC: 20/30
OS_CC+: -3
METHOD: SNELLEN - LINEAR
OD_CC+: -3
OD_CC: 20/25
OS_CC: 20/30

## 2018-05-23 ASSESSMENT — REFRACTION_WEARINGRX
OS_SPHERE: -0.50
SPECS_TYPE: BIFOCAL
OD_CYLINDER: +0.50
OS_ADD: +2.50
OS_CYLINDER: +0.50
OS_AXIS: 097
OD_AXIS: 016
OD_ADD: +2.50
OD_SPHERE: -0.75

## 2018-05-23 ASSESSMENT — CONF VISUAL FIELD
OS_NORMAL: 1
OD_NORMAL: 1

## 2018-05-23 ASSESSMENT — CUP TO DISC RATIO
OD_RATIO: 0.3
OS_RATIO: 0.5

## 2018-05-23 ASSESSMENT — SLIT LAMP EXAM - LIDS
COMMENTS: MILD MGD
COMMENTS: MILD MGD

## 2018-05-23 NOTE — MR AVS SNAPSHOT
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      Your next 10 appointments already scheduled     May 29, 2018  9:35 AM CDT   (Arrive by 9:20 AM)   PHYSICAL with Joe Mcpherson MD   [facility] ([facility])    [address] [id number] [phone]            Aug 20, 2018  9:30 AM CDT   NEW GENERAL with Janae Mcnamara MD   [facility] ([facility])    [address] [id number] [phone]              Who to contact     Please call your clinic at [phone] to:    Ask questions about your health    Make or cancel appointments    Discuss your medicines    Learn about your test results    Speak to your doctor            Additional Information About Your Visit        MyChart Information     [employer] is an electronic gateway that provides easy, online access to your medical records. With [employer], you can request a clinic appointment, read your test results, renew a prescription or communicate with your care team.     To sign up for [employer] visit the website at [email or web address]   You will be asked to enter the access code listed below, as well as some personal information. Please follow the directions to create your username and password.     Your access code is: [email or web address]  Expires: 8/21/2018  6:31 AM      Your access code will  in 90 days. If you need help or a new code, please contact your [facility] or call [phone] for assistance.        Care EveryWhere ID     This is your Care EveryWhere ID. This could be used by other organizations to access your [place] medical records  [id number]         Blood Pressure from Last 3 Encounters:   18 152/73   17 133/75   17 163/83    Weight from Last 3 Encounters:   18 66.1 kg (145 lb 12.8 oz)   17 63.2 kg (139 lb 4.8 oz)   17 64 kg (141 lb)              We Performed the Following     OCT Optic Nerve RNFL Spectralis OU (both eyes)          Today's Medication Changes          These changes are accurate as of 18  4:19 PM.  If you have any questions, ask your nurse or doctor.               Start taking these medicines.        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 Oint   Used for:  Insufficiency of tear film of both eyes   Started by:  Aviva Carbone MD        Dose:  1 each   Apply 3.5 g to eye At Bedtime   Quantity:  1 Tube   Refills:  11            Where to get your medicines      These medications were sent to [facility]/pharmacy #[id number] [address] [address]     Phone:  [phone]     latanoprost 0.005 % ophthalmic solution    REFRESH P.M. Oint                Primary Care Provider Office Phone # Fax #    Rayo Christiansen [phone]       [address] [place] [address]        Equal Access to Services     MARK MENDES AH: Sabra Luo, waaxda luqadaha, qaybta kaalmada agnesyaolegario, anu maria. So [facility]  [phone].    ATENCIÓN: Si florentin mitchell, tiene a carmona disposición servicios gratuitos de asistencia lingüística. George valdez [phone].    We comply with applicable federal civil rights laws and [place] laws. We do not discriminate on the basis of race, color, national origin, age, disability, sex, sexual orientation, or gender identity.            Thank you!     Thank you for choosing [facility]  for your care. Our goal is always to provide you with excellent care. Hearing back from our patients is one way we can continue to improve our services. Please take a few minutes to complete the written survey that you may receive in the mail after your visit with us. Thank you!             Your Updated Medication List - Protect others around you: Learn how to safely use, store and throw away your medicines at www.disposemymeds.org.          This list is accurate as of 5/23/18  4:19 PM.  Always use your most recent med list.                   Brand Name Dispense Instructions for use Diagnosis    atenolol 25 MG tablet    TENORMIN    45 tablet    Take 0.5 tablets (12.5 mg) by mouth daily    Essential hypertension       atorvastatin 80 MG tablet    LIPITOR    90 tablet    Take 1 tablet (80 mg) by mouth daily    Mixed hyperlipidemia       cetirizine 10 MG tablet    zyrTEC    90 tablet    Take 1 tablet (10 mg) by mouth every evening    Itching       citalopram 10 MG tablet    celeXA    180 tablet    Take 2 tablets (20 mg) by mouth daily    Depression, unspecified depression type       cycloSPORINE 0.05 % ophthalmic emulsion    RESTASIS    180 each    Place 1 drop into both eyes 2 times daily    Dry eye       emollient cream     25 g    Apply topically as needed for other    Itching       flecainide acetate 150 MG Tabs     180 tablet    TAKE 1 TABLET (150 MG) BY MOUTH 2 TIMES DAILY AS NEEDED    Routine health maintenance       latanoprost 0.005 % ophthalmic solution    XALATAN    1 Bottle    Place 1 drop into both eyes At  Bedtime    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       lisinopril 10 MG tablet    PRINIVIL/ZESTRIL    100 tablet    Take 1 tablet (10 mg) by mouth daily    Essential hypertension, hypertension with unspecified goal       naproxen 500 MG tablet    NAPROSYN    180 tablet    Take 1 tablet (500 mg) by mouth 2 times daily (with meals)    Muscle pain       ONDANSETRON PO      Take 4 mg by mouth every 8 hours as needed for nausea    Hypertension, Hyperlipidemia, Stroke (H)       oxyCODONE-acetaminophen 5-325 MG per tablet    PERCOCET     Take 1 tablet by mouth every 4 hours as needed for moderate to severe pain        pantoprazole 40 MG EC tablet    PROTONIX    90 tablet    Take 1 tablet (40 mg) by mouth daily    Gastroesophageal reflux disease with esophagitis       REFRESH P.M. Oint     1 Tube    Apply 3.5 g to eye At Bedtime    Insufficiency of tear film of both eyes       SENNA LAX PO      Take by mouth 2 times daily as needed    Hypertension, Hyperlipidemia, Stroke (H), Atrial fibrillation (H)       STOOL SOFTENER 100 MG capsule   Generic drug:  docusate sodium           vitamin D3 2000 units Caps     90 capsule    Take 1 tablet by mouth daily    Cerebrovascular accident (CVA) due to thrombosis of carotid artery (H)       * warfarin 2 MG tablet    COUMADIN    60 tablet    Take 1-2 tablets daily or as directed by coumadin clinic.    Long term current use of anticoagulant therapy       * warfarin 2 MG tablet    COUMADIN    180 tablet    Take one to two tablets daily or as directed by the Coumadin clinic    Long-term (current) use of anticoagulants, Atrial fibrillation, unspecified type (H)       * Notice:  This list has 2 medication(s) that are the same as other medications prescribed for you. Read the directions carefully, and ask your doctor or other care provider to review them with you.

## 2018-05-23 NOTE — PROGRESS NOTES
HPI: patient is a 67 y.o Male who presents today for cee. Patient reports that for the last three months both eyes are itching frequently. He additionally notes epiphora with intermittent redness. He feels that he is rubbing his eyes frequently. He denies seasonal allergies. He denies eye pain.  Vision has been good and stable per patient.       1. Insufficiency of tear film of both eyes    2. Meibomian gland disease, unspecified laterality    3. 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   - Intraocular pressure increased in left eye   - Start latanoprost at bedtime both eyes   - OCT retinal nerve fiber layer both eyes on the way out       Patient disposition:   Return in about 3 months (around 8/23/2018) for Follow Up 3 months with visual field .          Aviva Carbone MD  Ophthalmology PGY3     Teaching statement:  Complete documentation of historical and exam elements from today's encounter can be found in the full encounter summary report (not reduplicated in this progress note). I personally obtained the chief complaint(s) and history of present illness.  I confirmed and edited as necessary the review of systems, past medical/surgical history, family history, social history, and examination findings as documented by others; and I examined the patient myself. I personally reviewed the relevant tests, images, and reports as documented above.     I formulated and edited as necessary the assessment and plan and discussed the findings and management plan with the patient and family.    Janae Mcnamara MD  Comprehensive Ophthalmology & Ocular Pathology  Department of Ophthalmology and Visual Neurosciences  [email or web address]  Pager [phone]

## 2018-05-23 NOTE — NURSING NOTE
Chief Complaints and History of Present Illnesses   Patient presents with     Follow Up For     itching both eyes     HPI    Affected eye(s):  Both   Symptoms:     No floaters   No flashes   No redness   No tearing         Do you have eye pain now?:  No      Comments:  Pt states vision is the same as last visit. No eye pain today. Frequent itching in both eyes, no relief with AT's.    Yaima CAMPBELL May 23, 2018 2:51 PM

## 2018-05-29 ENCOUNTER — ANTICOAGULATION THERAPY VISIT (OUTPATIENT)
Dept: ANTICOAGULATION | Facility: CLINIC | Age: 68
End: 2018-05-29

## 2018-05-29 ENCOUNTER — OFFICE VISIT (OUTPATIENT)
Dept: INTERNAL MEDICINE | Facility: CLINIC | Age: 68
End: 2018-05-29
Payer: COMMERCIAL

## 2018-05-29 VITALS
WEIGHT: 143.2 LBS | SYSTOLIC BLOOD PRESSURE: 138 MMHG | BODY MASS INDEX: 26.11 KG/M2 | HEART RATE: 62 BPM | RESPIRATION RATE: 12 BRPM | DIASTOLIC BLOOD PRESSURE: 77 MMHG

## 2018-05-29 DIAGNOSIS — Z79.01 LONG-TERM (CURRENT) USE OF ANTICOAGULANTS: ICD-10-CM

## 2018-05-29 DIAGNOSIS — I48.91 ATRIAL FIBRILLATION (H): ICD-10-CM

## 2018-05-29 DIAGNOSIS — R04.0 EPISTAXIS: ICD-10-CM

## 2018-05-29 DIAGNOSIS — K64.8 INTERNAL HEMORRHOIDS WITHOUT COMPLICATION: ICD-10-CM

## 2018-05-29 DIAGNOSIS — I48.0 PAROXYSMAL ATRIAL FIBRILLATION (H): ICD-10-CM

## 2018-05-29 DIAGNOSIS — Z13.220 SCREENING CHOLESTEROL LEVEL: ICD-10-CM

## 2018-05-29 DIAGNOSIS — Z13.1 SCREENING FOR DIABETES MELLITUS: ICD-10-CM

## 2018-05-29 DIAGNOSIS — K62.5 RECTAL BLEEDING: ICD-10-CM

## 2018-05-29 DIAGNOSIS — Z00.00 ROUTINE GENERAL MEDICAL EXAMINATION AT A HEALTH CARE FACILITY: Primary | ICD-10-CM

## 2018-05-29 LAB
ANION GAP SERPL CALCULATED.3IONS-SCNC: 6 MMOL/L (ref 3–14)
BUN SERPL-MCNC: 15 MG/DL (ref 7–30)
CALCIUM SERPL-MCNC: 8.8 MG/DL (ref 8.5–10.1)
CHLORIDE SERPL-SCNC: 106 MMOL/L (ref 94–109)
CHOLEST SERPL-MCNC: 191 MG/DL
CO2 SERPL-SCNC: 26 MMOL/L (ref 20–32)
CREAT SERPL-MCNC: 0.97 MG/DL (ref 0.66–1.25)
ERYTHROCYTE [DISTWIDTH] IN BLOOD BY AUTOMATED COUNT: 12.9 % (ref 10–15)
GFR SERPL CREATININE-BSD FRML MDRD: 77 ML/MIN/1.7M2
GLUCOSE SERPL-MCNC: 94 MG/DL (ref 70–99)
HBA1C MFR BLD: 6.1 % (ref 0–5.6)
HCT VFR BLD AUTO: 41 % (ref 40–53)
HDLC SERPL-MCNC: 36 MG/DL
HGB BLD-MCNC: 13.5 G/DL (ref 13.3–17.7)
INR PPP: 2.06 (ref 0.86–1.14)
LDLC SERPL CALC-MCNC: 106 MG/DL
MCH RBC QN AUTO: 28.7 PG (ref 26.5–33)
MCHC RBC AUTO-ENTMCNC: 32.9 G/DL (ref 31.5–36.5)
MCV RBC AUTO: 87 FL (ref 78–100)
NONHDLC SERPL-MCNC: 155 MG/DL
PLATELET # BLD AUTO: 198 10E9/L (ref 150–450)
POTASSIUM SERPL-SCNC: 4 MMOL/L (ref 3.4–5.3)
RBC # BLD AUTO: 4.7 10E12/L (ref 4.4–5.9)
SODIUM SERPL-SCNC: 138 MMOL/L (ref 133–144)
TRIGL SERPL-MCNC: 247 MG/DL
WBC # BLD AUTO: 4.4 10E9/L (ref 4–11)

## 2018-05-29 RX ORDER — ECHINACEA PURPUREA EXTRACT 125 MG
1 TABLET ORAL DAILY
Qty: 15 ML | Refills: 0 | Status: SHIPPED | OUTPATIENT
Start: 2018-05-29 | End: 2018-07-07

## 2018-05-29 ASSESSMENT — PAIN SCALES - GENERAL: PAINLEVEL: NO PAIN (0)

## 2018-05-29 ASSESSMENT — ENCOUNTER SYMPTOMS
BRUISES/BLEEDS EASILY: 0
SWOLLEN GLANDS: 0

## 2018-05-29 NOTE — LETTER
Patient:  Ju Edwards  :   1950  MRN:     [id number]        Mr. Ju Edwards  [address]  [address]  [place] [address]        May 29, 2018    Dear Mr. Edwards,    Thank you for choosing the [facility] for your healthcare needs.  We appreciate the opportunity to serve you.    The following are your recent test results.     Your labs do not have any significant abnormalities. Your diabetes screening shows you are pre-diabetic, I would recommend that you try a low carb diet and if you need assistance with dietary recommendations - we can recommend you see a dietician. Your cholesterol is mildly elevated - I would recommend a low cholesterol and low fat diet at this time, no medications are necessary. Your blood levels are normal and you do not have an anemia - please follow up with us if you have any worsening bleeding symptoms - as discussed they are likely related to your hemorrhoids currently.      Your basic metabolic panel results:  Lab Results       Component                Value               Date                       NA                       138                 2018              (Sodium/Salt)  Lab Results       Component                Value               Date                       POTASSIUM                4.0                 2018            Lab Results       Component                Value               Date                       GLC                      94                  2018              (Glucose/Blood Sugar/Diabetic Screen)  Lab Results       Component                Value               Date                       CARLINE                      8.8                 2018              (Calcium)  Lab Results       Component                Value               Date                       CR                       0.97                2018              (Kidney Function)       Thank you for choosing the  [facility] as your health care provider. Please don't hesitate to call with any questions or concerns [phone].    Results for orders placed or performed in visit on 05/29/18   CBC with platelets   Result Value Ref Range    WBC 4.4 4.0 - 11.0 10e9/L    RBC Count 4.70 4.4 - 5.9 10e12/L    Hemoglobin 13.5 13.3 - 17.7 g/dL    Hematocrit 41.0 40.0 - 53.0 %    MCV 87 78 - 100 fl    MCH 28.7 26.5 - 33.0 pg    MCHC 32.9 31.5 - 36.5 g/dL    RDW 12.9 10.0 - 15.0 %    Platelet Count 198 150 - 450 10e9/L   Basic metabolic panel   Result Value Ref Range    Sodium 138 133 - 144 mmol/L    Potassium 4.0 3.4 - 5.3 mmol/L    Chloride 106 94 - 109 mmol/L    Carbon Dioxide 26 20 - 32 mmol/L    Anion Gap 6 3 - 14 mmol/L    Glucose 94 70 - 99 mg/dL    Urea Nitrogen 15 7 - 30 mg/dL    Creatinine 0.97 0.66 - 1.25 mg/dL    GFR Estimate 77 >60 mL/min/1.7m2    GFR Estimate If Black >90 >60 mL/min/1.7m2    Calcium 8.8 8.5 - 10.1 mg/dL   Lipid panel reflex to direct LDL Fasting   Result Value Ref Range    Cholesterol 191 <200 mg/dL    Triglycerides 247 (H) <150 mg/dL    HDL Cholesterol 36 (L) >39 mg/dL    LDL Cholesterol Calculated 106 (H) <100 mg/dL    Non HDL Cholesterol 155 (H) <130 mg/dL   Hemoglobin A1c   Result Value Ref Range    Hemoglobin A1C 6.1 (H) 0 - 5.6 %         Thank you for choosing the [facility] as your health care provider. Please don't hesitate to call with any questions or concerns [phone].    Sincerely,    Joe Mcpherson

## 2018-05-29 NOTE — PROGRESS NOTES
[facility]   Primary Care Clinic      Resident Clinic Note        Visit Date: May 29, 2018    Ju Edwards  MRN: [id number]  YOB: 1950    Chief Complaint: physical, nose bleed, rectal bleeding    HPI:  Ju Edwards is a 67 year old male who comes in for a routine health maintenance visit and to discuss the below.      Nose bleeds - intermittent, quickly controlled with pressures, typically a few drops of blood. Occurs a few times a month, but denies any significant bleed. No trauma noted. He does not use any current nasal medications.      Rectal bleeding - evaluated in the past, colonoscopy in our system in 2016 for the same issue - noted to have internal hemorrhoids, would like to make sure he does not need any other work up. Continues to have intermittent spotting of blood, a few times a month. Does think this is with spicy food, but unsure exact trigger as he has it intermittently. Denies any acid reflux symptoms or any melena.  Otherwise is tolerating his diet well without any difficulty.      He otherwise feels well. The interview was conducted in the presence of an .      Past medical, surgical, family history were reviewed in the chart.      Past Medical History:   Diagnosis Date     Atrial fibrillation (H)     paroxysmal     Delirium     associated with hospitalization for stroke     Depression      Gastric erosions 1/2014    associated with gi bleed     Hyperlipidemia      Hypertension      Stroke (H) 1/2014    ischemic CVA with hemorrhagic transformation     Past Surgical History:   Procedure Laterality Date     CARDIAC SURGERY  2000    mitral valve repair     REPAIR ATRIAL SEPTAL DEFECT       Family History   Problem Relation Age of Onset     Eye Surgery Mother      Glaucoma No family hx of      Macular Degeneration No family hx of      Social History     Social History     Marital status:      Spouse name: N/A     Number of  children: N/A     Years of education: N/A     Occupational History     Not on file.     Social History Main Topics     Smoking status: Never Smoker     Smokeless tobacco: Never Used     Alcohol use No     Drug use: No     Sexual activity: Yes     Partners: Female     Other Topics Concern     Special Diet No     Exercise Yes     rehab     Social History Narrative     Current Outpatient Prescriptions   Medication Sig Dispense Refill     Artificial Tear Ointment (REFRESH P.M.) OINT Apply 3.5 g to eye At Bedtime 1 Tube 11     atenolol (TENORMIN) 25 MG tablet Take 0.5 tablets (12.5 mg) by mouth daily 45 tablet 2     atorvastatin (LIPITOR) 80 MG tablet Take 1 tablet (80 mg) by mouth daily 90 tablet 2     cetirizine (ZYRTEC) 10 MG tablet Take 1 tablet (10 mg) by mouth every evening 90 tablet 3     Cholecalciferol (VITAMIN D3) 2000 UNITS CAPS Take 1 tablet by mouth daily 90 capsule 3     cycloSPORINE (RESTASIS) 0.05 % ophthalmic emulsion Place 1 drop into both eyes 2 times daily 180 each 3     docusate sodium (STOOL SOFTENER) 100 MG capsule        emollient (VANICREAM) cream Apply topically as needed for other 25 g 3     flecainide acetate 150 MG TABS TAKE 1 TABLET (150 MG) BY MOUTH 2 TIMES DAILY AS NEEDED 180 tablet 3     latanoprost (XALATAN) 0.005 % ophthalmic solution Place 1 drop into both eyes At Bedtime 1 Bottle 3     lisinopril (PRINIVIL,ZESTRIL) 10 MG tablet Take 1 tablet (10 mg) by mouth daily 100 tablet 3     ONDANSETRON PO Take 4 mg by mouth every 8 hours as needed for nausea       oxyCODONE-acetaminophen (PERCOCET) 5-325 MG per tablet Take 1 tablet by mouth every 4 hours as needed for moderate to severe pain       pantoprazole (PROTONIX) 40 MG EC tablet Take 1 tablet (40 mg) by mouth daily 90 tablet 2     Sennosides (SENNA LAX PO) Take by mouth 2 times daily as needed       sodium chloride (OCEAN) 0.65 % nasal spray Spray 1 spray in nostril daily 15 mL 0     warfarin (COUMADIN) 2 MG tablet Take one to two  tablets daily or as directed by the Coumadin clinic 180 tablet 1     citalopram (CELEXA) 10 MG tablet Take 2 tablets (20 mg) by mouth daily 180 tablet 0     No Known Allergies  ROS:  10 point ROS was reviewed and negative other than stated in HPI    Medications and Allergies were reviewed in epic.       Vitals:  /77  Pulse 62  Resp 12  Wt 65 kg (143 lb 3.2 oz)  BMI 26.11 kg/m2    Vitals:    05/29/18 0953   Weight: 65 kg (143 lb 3.2 oz)       Physical Exam:  General: NAD  HEENT: Oral mucosa moist and non-erythematous, PERRLA, EOM intact  CV: RRR, normal S1S2, no m/c/r   Resp: Clear to auscultation bilaterally, no wheezes or crackles  Abd: Soft, non-tender, BS+, no masses appreciated  Extremities: WWP, no pedal edema  Neuro: AAOx3, no lateralizing symptoms or focal neurologic deficits  Psych: Normal mood and affect.      Colonoscopy: Internal hemorrhoid noted in 2016, recommend follow up in 10 years per report.      Assessment:    Routine Physical  Health Maintenance:  Blood pressure check: Within normal limits.   Immunizations: Up to date.  Flu vaccine in the fall  - Routine health maintenance completed including Hemoglobin Ac1, lipids, and basic labs.        Ju Edwards is a 67 year old with past medical history of pAFIB on anticoagulation (warfarin). Mentions intermittent mild rectal bleeding - likely related to hemorrhoids as noted in 2016 colonoscopy and intermittent nose bleeds. Advised that we know the likely etiology of his symptoms. In the absence of any change including abdominal pain, melena, it is unlikely a colonoscopy would show any changes. But this was offered to him as an intervention. Pt does indicate a link with spicy food - which is unlikely with hemorrhoids.  He prefers to wait and watch for symptoms following our discussion. Additionally mentioned risks of anticoagulation and that this needs to be monitored in the future.      Plan:  - Continue to monitor symptoms,  recommend repeat colonoscopy if they worsen (pt agreeable with the plan).    - Continue Warfarin (benefits and risks discussed).    - CBC and BMP are unremarkable with no signs of worsening anemia to suggest significant blood loss.    - Nasal normal saline to keep mucosa moist.    - Return to clinic with any worsening symptoms.     Follow-up:  In 2-3 months for a follow up.      Patient discussed with Dr. Schmitz.      Joe Mcpherson MD,   Internal Medicine PGY-3  [phone]    Teaching Physician Note:    While the patient was in clinic, I reviewed the patient's medical history and results, the resident's findings on physical examination, and the patient's diagnosis and treatment plan with the resident.  I agree with the information documented with the following exceptions: none.    Eula Schmitz M.D.  Internal Medicine  Primary Care Center   pager [phone]

## 2018-05-29 NOTE — PATIENT INSTRUCTIONS
[facility]: [phone]     [facility] Medication Refill Request Information:  * Please contact your pharmacy regarding ANY request for medication refills.  ** [facility] Prescription Fax = [phone]  * Please allow 3 business days for routine medication refills.  * Please allow 5 business days for controlled substance medication refills.     [facility] Test Result notification information:  *You will be notified with in 7-10 days of your appointment day regarding the results of your test.  If you are on MyChart you will be notified as soon as the provider has reviewed the results and signed off on them.

## 2018-05-29 NOTE — NURSING NOTE
Chief Complaint   Patient presents with     Physical     patient state he is here for a physical     SHAISTA MALCOLM at 9:52 AM on 5/29/2018.

## 2018-05-29 NOTE — MR AVS SNAPSHOT
Toshiaut Jerry   5/29/2018   Anticoagulation Therapy Visit    Description:  67 year old male   Provider:  Yanni Up, RN   Department:  [facility]           INR as of 5/29/2018     Today's INR 2.06      Anticoagulation Summary as of 5/29/2018     INR goal 2.0-3.0   Today's INR 2.06   Full warfarin instructions 2 mg on Mon, Wed, Fri; 3 mg all other days   Next INR check 6/26/2018    Indications   Long-term (current) use of anticoagulants [Z79.01] [Z79.01]  Atrial fibrillation (H) [I48.91]         May 2018 Details    Sun Mon Tue Wed Thu Fri Sat       1               2               3               4               5                 6               7               8               9               10               11               12                 13               14               15               16               17               18               19                 20               21               22               23               24               25               26                 27               28               29      3 mg   See details      30      2 mg         31      3 mg            Date Details   05/29 This INR check               How to take your warfarin dose     To take:  2 mg Take 1 of the 2 mg tablets.    To take:  3 mg Take 1.5 of the 2 mg tablets.           June 2018 Details    Sun Mon Tue Wed Thu Fri Sat          1      2 mg         2      3 mg           3      3 mg         4      2 mg         5      3 mg         6      2 mg         7      3 mg         8      2 mg         9      3 mg           10      3 mg         11      2 mg         12      3 mg         13      2 mg         14      3 mg         15      2 mg         16      3 mg           17      3 mg         18      2 mg         19      3 mg         20      2 mg         21      3 mg         22      2 mg         23      3 mg           24      3 mg         25      2 mg         26            27               28                29 30                Date Details   No additional details    Date of next INR:  6/26/2018         How to take your warfarin dose     To take:  2 mg Take 1 of the 2 mg tablets.    To take:  3 mg Take 1.5 of the 2 mg tablets.

## 2018-05-29 NOTE — PROGRESS NOTES
Dear Ju    I am writing this letter to inform you of your recent lab results.  Your labs do not have any significant abnormalities. Your diabetes screening shows you are pre-diabetic, I would recommend that you try a low carb diet and if you need assistance with dietary recommendations - we can recommend you see a dietician. Your cholesterol is mildly elevated - I would recommend a low cholesterol and low fat diet at this time, no medications are necessary. Your blood levels are normal and you do not have an anemia - please follow up with us if you have any worsening bleeding symptoms - as discussed they are likely related to your hemorrhoids currently.      Your basic metabolic panel results:  Lab Results       Component                Value               Date                       NA                       138                 05/29/2018              (Sodium/Salt)  Lab Results       Component                Value               Date                       POTASSIUM                4.0                 05/29/2018            Lab Results       Component                Value               Date                       GLC                      94                  05/29/2018              (Glucose/Blood Sugar/Diabetic Screen)  Lab Results       Component                Value               Date                       CARLINE                      8.8                 05/29/2018              (Calcium)  Lab Results       Component                Value               Date                       CR                       0.97                05/29/2018              (Kidney Function)       Thank you for choosing the [facility] as your health care provider. Please don't hesitate to call with any questions or concerns [phone].  Sincerely,    Joe Mcpherson

## 2018-05-29 NOTE — PROGRESS NOTES
ANTICOAGULATION FOLLOW-UP CLINIC VISIT    Patient Name:  Ju Edwards  Date:  5/29/2018  Contact Type:  Telephone    SUBJECTIVE:     Patient Findings     Positives No Problem Findings           OBJECTIVE    INR   Date Value Ref Range Status   05/29/2018 2.06 (H) 0.86 - 1.14 Final       ASSESSMENT / PLAN  INR assessment THER    Recheck INR In: 4 WEEKS    INR Location Clinic      Anticoagulation Summary as of 5/29/2018     INR goal 2.0-3.0   Today's INR 2.06   Warfarin maintenance plan 2 mg (2 mg x 1) on Mon, Wed, Fri; 3 mg (2 mg x 1.5) all other days   Full warfarin instructions 2 mg on Mon, Wed, Fri; 3 mg all other days   Weekly warfarin total 18 mg   Plan last modified Alice Carranza RN (6/2/2016)   Next INR check 6/26/2018   Priority INR   Target end date Indefinite    Indications   Long-term (current) use of anticoagulants [Z79.01] [Z79.01]  Atrial fibrillation (H) [I48.91]         Anticoagulation Episode Summary     INR check location Clinic Lab    Preferred lab     Send INR reminders to [facility]    Comments Speak [facility] Wife Lety [phone]  Pronounced [facility]      Anticoagulation Care Providers     Provider Role Specialty Phone number    Rayo Christiansen MD [facility] Internal Medicine [phone]            See the Encounter Report to view Anticoagulation Flowsheet and Dosing Calendar (Go to Encounters tab in chart review, and find the Anticoagulation Therapy Visit)    Spoke with spouse Leslie. Gave them lab results and new warfarin recommendation.  No changes in health, medication, or diet. No missed doses, no falls. No signs or symptoms of bleed or clotting.     Yanni Up, RN

## 2018-05-29 NOTE — MR AVS SNAPSHOT
After Visit Summary   5/29/2018    Ju Edwards    MRN: [id number]           Patient Information     Date Of Birth          1950        Visit Information        Provider Department      5/29/2018 9:35 AM Sharee Lazo; Joe Mcpherson MD [facility]        Today's Diagnoses     Paroxysmal atrial fibrillation (H)    -  1    Rectal bleeding        Internal hemorrhoids without complication        Epistaxis        Screening cholesterol level        Screening for diabetes mellitus          Care Instructions    Primary Care Center: [phone]     Primary Care Center Medication Refill Request Information:  * Please contact your pharmacy regarding ANY request for medication refills.  ** [facility] Prescription Fax = [phone]  * Please allow 3 business days for routine medication refills.  * Please allow 5 business days for controlled substance medication refills.     [facility] Center Test Result notification information:  *You will be notified with in 7-10 days of your appointment day regarding the results of your test.  If you are on MyChart you will be notified as soon as the provider has reviewed the results and signed off on them.            Follow-ups after your visit        Additional Services     INR CLINIC REFERRAL                 Your next 10 appointments already scheduled     May 29, 2018 11:15 AM CDT   LAB with  LAB   [facility] ([facility])    [address] [place] [address] [phone]           Please do not eat 10-12 hours before your appointment if you are coming in fasting for labs on lipids, cholesterol, or glucose (sugar). This does not apply to pregnant women. Water, hot tea and black coffee (with nothing added) are okay. Do not drink other fluids, diet soda or chew gum.            Aug 20, 2018  9:30 AM CDT   NEW GENERAL with Janae Mcnamara MD   [facility] Clinic ([facility])    Sherwin Yancey  [address]  [address] [address]  [facility] [address]   [phone]              Who to contact     Please call your clinic at [phone] to:    Ask questions about your health    Make or cancel appointments    Discuss your medicines    Learn about your test results    Speak to your doctor            Additional Information About Your Visit        MyChart Information     [employer] is an electronic gateway that provides easy, online access to your medical records. With [email or web address], you can request a clinic appointment, read your test results, renew a prescription or communicate with your care team.     To sign up for [employer] visit the website at [email or web address]   You will be asked to enter the access code listed below, as well as some personal information. Please follow the directions to create your username and password.     Your access code is: [email or web address]  Expires: 2018  6:31 AM     Your access code will  in 90 days. If you need help or a new code, please contact your [facility] or call [phone] for assistance.        Care EveryWhere ID     This is your Care EveryWhere ID. This could be used by other organizations to access your [place] medical records  [id number]        Your Vitals Were     Pulse Respirations BMI (Body Mass Index)             62 12 26.11 kg/m2          Blood Pressure from Last 3 Encounters:   18 138/77   18 152/73   17 133/75    Weight from Last 3 Encounters:   18 65 kg (143 lb 3.2 oz)   18 66.1 kg (145 lb 12.8 oz)   17 63.2 kg (139 lb 4.8 oz)              We Performed the Following     Basic metabolic panel     CBC with platelets     Hemoglobin A1c     INR CLINIC REFERRAL     Lipid panel reflex to direct LDL Fasting          Today's Medication Changes          These changes are accurate as of 18 10:34 AM.  If you have any questions, ask your nurse or doctor.               Start  taking these medicines.        Dose/Directions    sodium chloride 0.65 % nasal spray   Commonly known as:  OCEAN   Used for:  Epistaxis   Started by:  Joe Mcpherson MD        Dose:  1 spray   Spray 1 spray in nostril daily   Quantity:  15 mL   Refills:  0         These medicines have changed or have updated prescriptions.        Dose/Directions    warfarin 2 MG tablet   Commonly known as:  COUMADIN   This may have changed:  Another medication with the same name was removed. Continue taking this medication, and follow the directions you see here.   Used for:  Long-term (current) use of anticoagulants, Atrial fibrillation, unspecified type (H)   Changed by:  Joe Mcpherson MD        Take one to two tablets daily or as directed by the Coumadin clinic   Quantity:  180 tablet   Refills:  1            Where to get your medicines      These medications were sent to [facility]/pharmacy #[id number] [address] [address]     Phone:  [phone]     sodium chloride 0.65 % nasal spray                Primary Care Provider Office Phone # Fax #    Rayo Christiansen [phone]       [address] [place] [address]        Equal Access to Services     [facility]: Hadii saba rubin hadretao Sojoann, waaxda luqadaha, qaybta kaalmada aderonaldyaolegario, anu mcfarlane . So [facility] [phone].    ATENCIÓN: Si habla español, tiene a carmona disposición servicios gratuitos de asistencia lingüística. Llame al [phone].    We comply with applicable federal civil rights laws and [place] laws. We do not discriminate on the basis of race, color, national origin, age, disability, sex, sexual orientation, or gender identity.            Thank you!     Thank you for choosing [facility]  for your care. Our goal is always to provide you with excellent care. Hearing back from our patients is one way we can continue to improve  our services. Please take a few minutes to complete the written survey that you may receive in the mail after your visit with us. Thank you!             Your Updated Medication List - Protect others around you: Learn how to safely use, store and throw away your medicines at www.disposemymeds.org.          This list is accurate as of 5/29/18 10:34 AM.  Always use your most recent med list.                   Brand Name Dispense Instructions for use Diagnosis    atenolol 25 MG tablet    TENORMIN    45 tablet    Take 0.5 tablets (12.5 mg) by mouth daily    Essential hypertension       atorvastatin 80 MG tablet    LIPITOR    90 tablet    Take 1 tablet (80 mg) by mouth daily    Mixed hyperlipidemia       cetirizine 10 MG tablet    zyrTEC    90 tablet    Take 1 tablet (10 mg) by mouth every evening    Itching       citalopram 10 MG tablet    celeXA    180 tablet    Take 2 tablets (20 mg) by mouth daily    Depression, unspecified depression type       cycloSPORINE 0.05 % ophthalmic emulsion    RESTASIS    180 each    Place 1 drop into both eyes 2 times daily    Dry eye       emollient cream     25 g    Apply topically as needed for other    Itching       flecainide acetate 150 MG Tabs     180 tablet    TAKE 1 TABLET (150 MG) BY MOUTH 2 TIMES DAILY AS NEEDED    Routine health maintenance       latanoprost 0.005 % ophthalmic solution    XALATAN    1 Bottle    Place 1 drop into both eyes At Bedtime    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       lisinopril 10 MG tablet    PRINIVIL/ZESTRIL    100 tablet    Take 1 tablet (10 mg) by mouth daily    Essential hypertension, hypertension with unspecified goal       ONDANSETRON PO      Take 4 mg by mouth every 8 hours as needed for nausea    Hypertension, Hyperlipidemia, Stroke (H)       oxyCODONE-acetaminophen 5-325 MG per tablet    PERCOCET     Take 1 tablet by mouth every 4 hours as needed for moderate to severe pain        pantoprazole 40 MG EC tablet    PROTONIX    90 tablet    Take 1  tablet (40 mg) by mouth daily    Gastroesophageal reflux disease with esophagitis       REFRESH P.M. Oint     1 Tube    Apply 3.5 g to eye At Bedtime    Insufficiency of tear film of both eyes       SENNA LAX PO      Take by mouth 2 times daily as needed    Hypertension, Hyperlipidemia, Stroke (H), Atrial fibrillation (H)       sodium chloride 0.65 % nasal spray    OCEAN    15 mL    Spray 1 spray in nostril daily    Epistaxis       STOOL SOFTENER 100 MG capsule   Generic drug:  docusate sodium           vitamin D3 2000 units Caps     90 capsule    Take 1 tablet by mouth daily    Cerebrovascular accident (CVA) due to thrombosis of carotid artery (H)       warfarin 2 MG tablet    COUMADIN    180 tablet    Take one to two tablets daily or as directed by the Coumadin clinic    Long-term (current) use of anticoagulants, Atrial fibrillation, unspecified type (H)

## 2018-05-31 ENCOUNTER — TELEPHONE (OUTPATIENT)
Dept: INTERNAL MEDICINE | Facility: CLINIC | Age: 68
End: 2018-05-31

## 2018-05-31 DIAGNOSIS — F32.A DEPRESSION, UNSPECIFIED DEPRESSION TYPE: ICD-10-CM

## 2018-05-31 NOTE — TELEPHONE ENCOUNTER
celexa     Last Written Prescription Date:  5/1/17  Last Fill Quantity: 180  # refills: 2  Last Office Visit : 5/29/18  Future Office visit:  No future appt    Routing refill request to nurse for review/approval because:  Failed protocol criteria

## 2018-05-31 NOTE — TELEPHONE ENCOUNTER
[facility] Call Center    Phone Message    May a detailed message be left on voicemail: no    Reason for Call: Medication Refill Request    Has the patient contacted the pharmacy for the refill? Yes   Name of medication being requested: citalopram (CELEXA) 10 MG tablet  Provider who prescribed the medication: [facility]  Pharmacy: [facility]/PHARMACY #[id number] [address]    Date medication is needed: Pharmacy calling they said they sent a request a couple of days ago.       Action Taken: Message routed to:  Other: [facility] Med Refills Team

## 2018-06-02 RX ORDER — CITALOPRAM HYDROBROMIDE 10 MG/1
20 TABLET ORAL DAILY
Qty: 180 TABLET | Refills: 0 | Status: SHIPPED | OUTPATIENT
Start: 2018-06-02 | End: 2018-08-14

## 2018-06-05 ENCOUNTER — TELEPHONE (OUTPATIENT)
Dept: INTERNAL MEDICINE | Facility: CLINIC | Age: 68
End: 2018-06-05

## 2018-06-05 ENCOUNTER — TELEPHONE (OUTPATIENT)
Dept: CALL CENTER | Age: 68
End: 2018-06-05

## 2018-06-05 NOTE — TELEPHONE ENCOUNTER
[facility]: Nurse Triage Note  SITUATION/BACKGROUND                                                      Ju Edwards is a 67 year old male who calls with rectal bleeding / hemorrhoids.  Was recently seen by Dr. Christiansen 5/29.  Spoke to patient thru .  He reports hemorrhoids for many years, had surgery in the past and symptoms improved for 4-5 years, now troublesome for a couple of years this time .    Description:  History of hemorrhoids, for many years. States he does not think they are outside.    Onset/duration:  years  Precip. factors:  Stool not soft.  Is on coumadin.  Associated symptoms:  Bleeding --dripping after bowel movement.  Uses a paper towel to clean up.  Denies bleeding between bowel movements.  Improves/worsens symptoms:  Is on senna and docusate.  Is watching his diet.  Pain scale (0-10)  Not assessed    MEDICATIONS:   Taking medication(s) as prescribed? Yes  Taking over the counter medication(s?) Yes  Any barriers to taking medication(s) as prescribed?  No  Medication(s) improving/managing symptoms?  somewhat    Allergies: No Known Allergies    ASSESSMENT      Needs colorectal evaluation. Will send information to PCP.  Check to see if colorectal referral appropriate for Nancy Clemons.    RECOMMENDATION/PLAN                                                      RECOMMENDED DISPOSITION:  Home care advice - tub baths, keep area clean. Over the counter medications as needed.  Increase fluids and fiber to keep stool soft.  Will see if Colorectal referral appropriate.  Will comply with recommendation: Yes.  Patient requests we call his wife Leslie back with advice . He reports she does not need an  [phone].    If further questions/concerns or if symptoms do not improve, worsen or new symptoms develop, call your PCP or [phone] to talk with the Resident on call, as soon as possible.    Guideline used: Rectal bleeding page  510  Telephone Triage Protocols for Nurses, Fifth Edition, Ana Paula Schwartz RN

## 2018-06-05 NOTE — TELEPHONE ENCOUNTER
[facility] Call Center    Phone Message    May a detailed message be left on voicemail: yes    Reason for Call: Medication Question or concern regarding medication   Prescription Clarification  Name of Medication: flecainide acetate 150 MG TABS and citalopram (CELEXA) 10 MG tablet  Prescribing Provider: Dr. Christiansen and Dr. Hernandez    Pharmacy: [facility]/PHARMACY #[id number] [address]   What on the order needs clarification? They got a drug interaction warning. Please call them back.     Action Taken: Message routed to:  [facility]

## 2018-06-19 ENCOUNTER — ANTICOAGULATION THERAPY VISIT (OUTPATIENT)
Dept: ANTICOAGULATION | Facility: CLINIC | Age: 68
End: 2018-06-19

## 2018-06-19 ENCOUNTER — OFFICE VISIT (OUTPATIENT)
Dept: INTERNAL MEDICINE | Facility: CLINIC | Age: 68
End: 2018-06-19
Payer: COMMERCIAL

## 2018-06-19 ENCOUNTER — CARE COORDINATION (OUTPATIENT)
Dept: SURGERY | Facility: CLINIC | Age: 68
End: 2018-06-19

## 2018-06-19 ENCOUNTER — TELEPHONE (OUTPATIENT)
Dept: INTERNAL MEDICINE | Facility: CLINIC | Age: 68
End: 2018-06-19

## 2018-06-19 ENCOUNTER — TELEPHONE (OUTPATIENT)
Dept: SURGERY | Facility: CLINIC | Age: 68
End: 2018-06-19

## 2018-06-19 VITALS
RESPIRATION RATE: 26 BRPM | TEMPERATURE: 98 F | WEIGHT: 141.8 LBS | SYSTOLIC BLOOD PRESSURE: 142 MMHG | DIASTOLIC BLOOD PRESSURE: 75 MMHG | HEART RATE: 56 BPM | OXYGEN SATURATION: 96 % | BODY MASS INDEX: 25.85 KG/M2

## 2018-06-19 DIAGNOSIS — I48.91 ATRIAL FIBRILLATION (H): ICD-10-CM

## 2018-06-19 DIAGNOSIS — K92.2 LGI BLEED: Primary | ICD-10-CM

## 2018-06-19 DIAGNOSIS — Z79.01 LONG-TERM (CURRENT) USE OF ANTICOAGULANTS: ICD-10-CM

## 2018-06-19 DIAGNOSIS — K62.5 RECTAL BLEEDING: ICD-10-CM

## 2018-06-19 DIAGNOSIS — K92.2 LGI BLEED: ICD-10-CM

## 2018-06-19 LAB
BASOPHILS # BLD AUTO: 0 10E9/L (ref 0–0.2)
BASOPHILS NFR BLD AUTO: 0.7 %
DIFFERENTIAL METHOD BLD: NORMAL
EOSINOPHIL # BLD AUTO: 0.1 10E9/L (ref 0–0.7)
EOSINOPHIL NFR BLD AUTO: 0.9 %
ERYTHROCYTE [DISTWIDTH] IN BLOOD BY AUTOMATED COUNT: 13.2 % (ref 10–15)
HCT VFR BLD AUTO: 43.4 % (ref 40–53)
HGB BLD-MCNC: 14.4 G/DL (ref 13.3–17.7)
IMM GRANULOCYTES # BLD: 0 10E9/L (ref 0–0.4)
IMM GRANULOCYTES NFR BLD: 0.4 %
INR PPP: 1.82 (ref 0.86–1.14)
LYMPHOCYTES # BLD AUTO: 1.6 10E9/L (ref 0.8–5.3)
LYMPHOCYTES NFR BLD AUTO: 28.9 %
MCH RBC QN AUTO: 28.9 PG (ref 26.5–33)
MCHC RBC AUTO-ENTMCNC: 33.2 G/DL (ref 31.5–36.5)
MCV RBC AUTO: 87 FL (ref 78–100)
MONOCYTES # BLD AUTO: 0.7 10E9/L (ref 0–1.3)
MONOCYTES NFR BLD AUTO: 12.7 %
NEUTROPHILS # BLD AUTO: 3 10E9/L (ref 1.6–8.3)
NEUTROPHILS NFR BLD AUTO: 56.4 %
NRBC # BLD AUTO: 0 10*3/UL
NRBC BLD AUTO-RTO: 0 /100
PLATELET # BLD AUTO: 218 10E9/L (ref 150–450)
RBC # BLD AUTO: 4.98 10E12/L (ref 4.4–5.9)
WBC # BLD AUTO: 5.4 10E9/L (ref 4–11)

## 2018-06-19 ASSESSMENT — PAIN SCALES - GENERAL: PAINLEVEL: NO PAIN (0)

## 2018-06-19 NOTE — TELEPHONE ENCOUNTER
[facility] Call [facility]    Phone Message    May a detailed message be left on voicemail: yes    Reason for Call: Other: call back     Action Taken: Message routed to:  Other: call back

## 2018-06-19 NOTE — MR AVS SNAPSHOT
Ju Edwards   6/19/2018   Anticoagulation Therapy Visit    Description:  67 year old male   Provider:  Yanni Up, RN   Department:  [facility]           INR as of 6/19/2018     Today's INR 1.82!      Anticoagulation Summary as of 6/19/2018     INR goal 2.0-3.0   Today's INR 1.82!   Full warfarin instructions 6/19: 4 mg; Otherwise 2 mg on Mon, Wed, Fri; 3 mg all other days   Next INR check 6/26/2018    Indications   Long-term (current) use of anticoagulants [Z79.01] [Z79.01]  Atrial fibrillation (H) [I48.91]         June 2018 Details    Sun Mon Tue Wed Thu Fri Sat          1               2                 3               4               5               6               7               8               9                 10               11               12               13               14               15               16                 17               18               19      4 mg   See details      20      2 mg         21      3 mg         22      2 mg         23      3 mg           24      3 mg         25      2 mg         26            27               28               29               30                Date Details   06/19 This INR check       Date of next INR:  6/26/2018         How to take your warfarin dose     To take:  2 mg Take 1 of the 2 mg tablets.    To take:  3 mg Take 1.5 of the 2 mg tablets.    To take:  4 mg Take 2 of the 2 mg tablets.

## 2018-06-19 NOTE — TELEPHONE ENCOUNTER
[facility] Call Center    Phone Message    May a detailed message be left on voicemail: yes    Reason for Call: Symptoms or Concerns     If patient has red-flag symptoms, warm transfer to triage line    Current symptom or concern: painful Hemorrhoids    Symptoms have been present for:  1/2 day(s)    Has patient previously been seen for this? No    By : n/a    Date: n/a    Are there any new or worsening symptoms? Yes:       Action Taken: Message routed to:  [facility] Colon and rectal

## 2018-06-19 NOTE — PROGRESS NOTES
"[facility] [place]   Lacey Oshea MD  06/19/2018      Chief Complaint:   Hemorrhoids and Numbness       History of Present Illness:   Ju Edwards is an anticoagulated 67 year old male with a history of paroxysmal atrial fibrillation and gastric erosions  who [facility]  for evaluation of anal bleeding.    Hemorrhoids: The patient presents today with \"hemorrhoid\" concerns. Ten years ago, he notes having surgery to \"cut it off\". He reports anal bleeding occurs once or twice a week when he was defecating but it can also occur randomly while he is sitting as well, noting he has to sit on blankets for the random bleeding. This has been going on for several months.he also describes a mass that occasionally excludes from the inner area that is associated with bleeding. He colonoscopy in 2016 that showed no external hemorrhoids mass lesions and a small internal hemorrhoids.    He is currently not taking any hemorrhoidal medication but is taking warfarin for atrial fibrillation prevention of a recurrent stroke. He reports going to a different clinic where they discussed medication to treat this, but was not prescribed. He has been having normal bowel movements without constipation or diarrhea. He reports pain with bowel movements after eating spicy food so he tries to avoid this. He reports that sometimes when he goes to the bathroom there is just bleeding and no stool. He does not have to strain hard to have bowel movements.     Dizziness: The patient notes having intermittent muscle spasms on the right side of his head. During his visit, he reportsed experiencing one of these spasms.He typically has blurry vision and dizziness associated with this. He describes the dizziness as feeling like he is falling down and it is not presyncopal. He denies any nausea or vomiting. He notes having a numbness feeling in his ears but no hearing loss. He does not have tinnitus.He is " currently taking an antihistamine for seasonal allergies.      Review of Systems:   Pertinent items are noted in HPI, remainder of complete ROS is negative.      Active Medications:       Artificial Tear Ointment (REFRESH P.M.) OINT, Apply 3.5 g to eye At Bedtime, Disp: 1 Tube, Rfl: 11     atenolol (TENORMIN) 25 MG tablet, Take 0.5 tablets (12.5 mg) by mouth daily, Disp: 45 tablet, Rfl: 2     atorvastatin (LIPITOR) 80 MG tablet, Take 1 tablet (80 mg) by mouth daily, Disp: 90 tablet, Rfl: 2     cetirizine (ZYRTEC) 10 MG tablet, Take 1 tablet (10 mg) by mouth every evening, Disp: 90 tablet, Rfl: 3     Cholecalciferol (VITAMIN D3) 2000 UNITS CAPS, Take 1 tablet by mouth daily, Disp: 90 capsule, Rfl: 3     citalopram (CELEXA) 10 MG tablet, Take 2 tablets (20 mg) by mouth daily, Disp: 180 tablet, Rfl: 0     cycloSPORINE (RESTASIS) 0.05 % ophthalmic emulsion, Place 1 drop into both eyes 2 times daily, Disp: 180 each, Rfl: 3     docusate sodium (STOOL SOFTENER) 100 MG capsule, , Disp: , Rfl:      emollient (VANICREAM) cream, Apply topically as needed for other, Disp: 25 g, Rfl: 3     flecainide acetate 150 MG TABS, TAKE 1 TABLET (150 MG) BY MOUTH 2 TIMES DAILY AS NEEDED, Disp: 180 tablet, Rfl: 3     latanoprost (XALATAN) 0.005 % ophthalmic solution, Place 1 drop into both eyes At Bedtime, Disp: 1 Bottle, Rfl: 3     lisinopril (PRINIVIL,ZESTRIL) 10 MG tablet, Take 1 tablet (10 mg) by mouth daily, Disp: 100 tablet, Rfl: 3     ONDANSETRON PO, Take 4 mg by mouth every 8 hours as needed for nausea, Disp: , Rfl:      oxyCODONE-acetaminophen (PERCOCET) 5-325 MG per tablet, Take 1 tablet by mouth every 4 hours as needed for moderate to severe pain, Disp: , Rfl:      pantoprazole (PROTONIX) 40 MG EC tablet, Take 1 tablet (40 mg) by mouth daily, Disp: 90 tablet, Rfl: 2     Sennosides (SENNA LAX PO), Take by mouth 2 times daily as needed, Disp: , Rfl:      sodium chloride (OCEAN) 0.65 % nasal spray, Spray 1 spray in nostril daily,  Disp: 15 mL, Rfl: 0     warfarin (COUMADIN) 2 MG tablet, Take one to two tablets daily or as directed by the Coumadin clinic, Disp: 180 tablet, Rfl: 1      Allergies:   Review of patient's allergies indicates no known allergies.      Past Medical History:  Atrial fibrillation, paroxysmal  Delirium  Depression  Gastric erosions  Hyperlipidemia  Hypertension  Stroke       Past Surgical History:  Mitral valve repair  Repair atrial septal defect    Family History:   Eye surgery        Social History:   The patient is , a [id number], and does not consume alcohol.      Physical Exam:   /75 (BP Location: Right arm, Patient Position: Chair, Cuff Size: Adult Regular)  Pulse 56  Temp 98  F (36.7  C) (Oral)  Resp 26  Wt 64.3 kg (141 lb 12.8 oz)  SpO2 96%  BMI 25.85 kg/m2   Constitutional: Healthy, alert, no distress and cooperative  Head: Normocephalic. No masses, lesions, tenderness or abnormalities  Eyes: PERRL, no conjunctival injection  ENT: no nystagmus.Left TM normal without fluid or infection, throat normal without erythema or exudate, no cervical lymphadenopathy, RT TM retracted and clear fluid with bubbles filled the middle ear  Cardiovascular:  irreg irreg rhythm, S1,S2 no murmurs, clicks, rubs, or gallops. No pretibial edema.    Gastrointestinal: BS NA. Soft, nontender, no hepatosplenomegaly or mass.   Rectal: perianal area-no bleeding, no fissure, 1 mm tag at 5 o'clock, no ulceration, no masses, no lesions, not tender, no ulceration, no bleeding, no internal or external hemorrhoids.  Neurologic: Cranial nerves II through XII are intact.  Romberg test is negative.  No pronator drift.  Psychiatric: Mentation appears normal and affect normal         Assessment and Plan:  1.  Perianal discomfort and LGI bleeding  The physical exam is pretty unremarkable today and there are no bleeding hemorrhoids, just a small external hemorrhoidal tag. His history of recurrent bleeding with pain suggest rectal  prolapse. As noted, he did negative colonoscopy two years ago.    Due to ongoing anal bleeding, the patient was referred to Gastroenterology for scheduling of a colonoscopy to further evaluate.   - GASTROENTEROLOGY ADULT REF PROCEDURE ONLY [facility] [phone]  - CBC with platelets differential      2.  Mild dizziness. Most likely this is mild vertigo associated with allergy induced right serous otitis media. I recommend that he take his or attack once a day and we monitor the symptoms. There's no evidence of the cardiac arrhythmia or hearing loss.    Follow-up: Patient should follow up in clinic on a p.r.n. basis.           Scribe Disclosure:   I, Joanne Li, am serving as a scribe to document services personally performed by aLcey Oshea MD at this visit, based upon the provider's statements to me. All documentation has been reviewed by the aforementioned provider prior to being entered into the official medical record.     Portions of this medical record were completed by a scribe. UPON MY REVIEW AND AUTHENTICATION BY ELECTRONIC SIGNATURE, this confirms (a) I performed the applicable clinical services, and (b) the record is accurate.     Lacey Oshea

## 2018-06-19 NOTE — PROGRESS NOTES
Patient and wife were called back after a message was sent from the call center stating the patient was having severe hemorrhoid pain that has been going on for half of a day. The direct phone number for the patient to call back was left with a request to callback at their earliest convenience.

## 2018-06-19 NOTE — NURSING NOTE
Chief Complaint   Patient presents with     Hemorrhoids     Patient is here for hemorrhoids and rectum issues     Numbness     Also here for left side head numbness and blurr vision         Clint Barajas CMA (AAMA) at 3:08 PM on 6/19/2018

## 2018-06-19 NOTE — MR AVS SNAPSHOT
After Visit Summary   6/19/2018    Ju Edwards    MRN: [id number]           Patient Information     Date Of Birth          1950        Visit Information        Provider Department      6/19/2018 2:30 PM Lacey Oshea MD; [facility]        Today's Diagnoses     LGI bleed    -  1       Follow-ups after your visit        Additional Services     GASTROENTEROLOGY ADULT REF PROCEDURE ONLY [facility] [phone]       Last Lab Result: Creatinine (mg/dL)       Date                     Value                 05/29/2018               0.97             ----------  Body mass index is 25.85 kg/(m^2).     Needed:  Yes  Language:  Garth    Patient will be contacted to schedule procedure.     Please be aware that coverage of these services is subject to the terms and limitations of your health insurance plan.  Call member services at your health plan with any benefit or coverage questions.  Any procedures must be performed at a [facility] facility OR coordinated by your clinic's referral office.    Please bring the following with you to your appointment:    (1) Any X-Rays, CTs or MRIs which have been performed.  Contact the facility where they were done to arrange for  prior to your scheduled appointment.    (2) List of current medications   (3) This referral request   (4) Any documents/labs given to you for this referral                  Your next 10 appointments already scheduled     Jul 12, 2018  9:00 AM CDT   (Arrive by 8:45 AM)   New Patient Visit with AYUSH Malave [facility] ([facility])    [address]  [address] [facility] [address]   [phone]            Aug 20, 2018  9:30 AM CDT   NEW GENERAL with Janae Mcnamara MD   Eye Clinic ([facility])    [address]  [address]   [phone]              Future tests that were ordered for you today     Open Future Orders        Priority Expected Expires Ordered    CBC with platelets differential Routine 2018 7/3/2018 2018            Who to contact     Please call your clinic at [phone] to:    Ask questions about your health    Make or cancel appointments    Discuss your medicines    Learn about your test results    Speak to your doctor            Additional Information About Your Visit        [facility] Information     [facility] is an electronic gateway that provides easy, online access to your medical records. With [facility], you can request a clinic appointment, read your test results, renew a prescription or communicate with your care team.     To sign up for [facility] visit the website at [email or web address]   You will be asked to enter the access code listed below, as well as some personal information. Please follow the directions to create your username and password.     Your access code is: [email or web address]  Expires: 2018  6:31 AM     Your access code will  in 90 days. If you need help or a new code, please contact your [facility] or call [phone] for assistance.        Care EveryWhere ID     This is your Care EveryWhere ID. This could be used by other organizations to access your [facility] medical records  [id number]        Your Vitals Were     Pulse Temperature Respirations Pulse Oximetry BMI (Body Mass Index)       56 98  F (36.7  C) (Oral) 26 96% 25.85 kg/m2        Blood Pressure from Last 3 Encounters:   18 142/75   18 138/77   18 152/73    Weight from Last 3 Encounters:   18 64.3 kg (141 lb 12.8 oz)   18 65 kg (143 lb 3.2 oz)   18 66.1 kg (145 lb 12.8 oz)              We Performed the Following     GASTROENTEROLOGY ADULT REF PROCEDURE ONLY [facility] [phone]        Primary Care Provider Office Phone # Fax #     Rayo Christiansen [phone]       [address] [place] [address]  [facility] [address]        Equal Access to Services     MARK MENDES : Hadii aad ku hadretachloé Natashajoann, albinoolegario gonzálesfranchescaha, dulce katheo moreno, anu joieluigi patterson denys maria. So [facility] [phone].    ATENCIÓN: Si habla español, tiene a carmona disposición servicios gratuitos de asistencia lingüística. Llame al [phone].    We comply with applicable federal civil rights laws and [place] laws. We do not discriminate on the basis of race, color, national origin, age, disability, sex, sexual orientation, or gender identity.            Thank you!     Thank you for choosing [facility]  for your care. Our goal is always to provide you with excellent care. Hearing back from our patients is one way we can continue to improve our services. Please take a few minutes to complete the written survey that you may receive in the mail after your visit with us. Thank you!             Your Updated Medication List - Protect others around you: Learn how to safely use, store and throw away your medicines at www.disposemymeds.org.          This list is accurate as of 6/19/18  3:47 PM.  Always use your most recent med list.                   Brand Name Dispense Instructions for use Diagnosis    atenolol 25 MG tablet    TENORMIN    45 tablet    Take 0.5 tablets (12.5 mg) by mouth daily    Essential hypertension       atorvastatin 80 MG tablet    LIPITOR    90 tablet    Take 1 tablet (80 mg) by mouth daily    Mixed hyperlipidemia       cetirizine 10 MG tablet    zyrTEC    90 tablet    Take 1 tablet (10 mg) by mouth every evening    Itching       citalopram 10 MG tablet    celeXA    180 tablet    Take 2 tablets (20 mg) by mouth daily    Depression, unspecified depression type       cycloSPORINE 0.05 % ophthalmic emulsion    RESTASIS    180 each    Place 1 drop into both eyes 2 times daily    Dry eye       emollient cream      25 g    Apply topically as needed for other    Itching       flecainide acetate 150 MG Tabs     180 tablet    TAKE 1 TABLET (150 MG) BY MOUTH 2 TIMES DAILY AS NEEDED    Routine health maintenance       latanoprost 0.005 % ophthalmic solution    XALATAN    1 Bottle    Place 1 drop into both eyes At Bedtime    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       lisinopril 10 MG tablet    PRINIVIL/ZESTRIL    100 tablet    Take 1 tablet (10 mg) by mouth daily    Essential hypertension, hypertension with unspecified goal       ONDANSETRON PO      Take 4 mg by mouth every 8 hours as needed for nausea    Hypertension, Hyperlipidemia, Stroke (H)       oxyCODONE-acetaminophen 5-325 MG per tablet    PERCOCET     Take 1 tablet by mouth every 4 hours as needed for moderate to severe pain        pantoprazole 40 MG EC tablet    PROTONIX    90 tablet    Take 1 tablet (40 mg) by mouth daily    Gastroesophageal reflux disease with esophagitis       REFRESH P.M. Oint     1 Tube    Apply 3.5 g to eye At Bedtime    Insufficiency of tear film of both eyes       SENNA LAX PO      Take by mouth 2 times daily as needed    Hypertension, Hyperlipidemia, Stroke (H), Atrial fibrillation (H)       sodium chloride 0.65 % nasal spray    OCEAN    15 mL    Spray 1 spray in nostril daily    Epistaxis       STOOL SOFTENER 100 MG capsule   Generic drug:  docusate sodium           vitamin D3 2000 units Caps     90 capsule    Take 1 tablet by mouth daily    Cerebrovascular accident (CVA) due to thrombosis of carotid artery (H)       warfarin 2 MG tablet    COUMADIN    180 tablet    Take one to two tablets daily or as directed by the Coumadin clinic    Long-term (current) use of anticoagulants, Atrial fibrillation, unspecified type (H)

## 2018-06-19 NOTE — PROGRESS NOTES
ANTICOAGULATION FOLLOW-UP CLINIC VISIT    Patient Name:  Ju Edwards  Date:  6/19/2018  Contact Type:  Telephone    SUBJECTIVE:        OBJECTIVE    INR   Date Value Ref Range Status   06/19/2018 1.82 (H) 0.86 - 1.14 Final       ASSESSMENT / PLAN  INR assessment SUB    Recheck INR In: 1 WEEK    INR Location Clinic      Anticoagulation Summary as of 6/19/2018     INR goal 2.0-3.0   Today's INR 1.82!   Warfarin maintenance plan 2 mg (2 mg x 1) on Mon, Wed, Fri; 3 mg (2 mg x 1.5) all other days   Full warfarin instructions 6/19: 4 mg; Otherwise 2 mg on Mon, Wed, Fri; 3 mg all other days   Weekly warfarin total 18 mg   Plan last modified Alice Carranza RN (6/2/2016)   Next INR check 6/26/2018   Priority INR   Target end date Indefinite    Indications   Long-term (current) use of anticoagulants [Z79.01] [Z79.01]  Atrial fibrillation (H) [I48.91]         Anticoagulation Episode Summary     INR check location Clinic Lab    Preferred lab     Send INR reminders to [facility]    Comments Speak witrh Wife Lety [phone]  Pronounced [facility]      Anticoagulation Care Providers     Provider Role Specialty Phone number    Rayo Christiansen MD [facility] [phone]            See the Encounter Report to view Anticoagulation Flowsheet and Dosing Calendar (Go to Encounters tab in chart review, and find the Anticoagulation Therapy Visit)    Left message for spouse Leslie with results and dosing recommendations. Asked Leslie to call back to report any missed doses, falls, signs and symptoms of bleeding or clotting, any changes in health, medication, or diet. Asked Leslie to call back with any questions or concerns.     Yanni Up RN

## 2018-06-20 ENCOUNTER — TELEPHONE (OUTPATIENT)
Dept: GASTROENTEROLOGY | Facility: CLINIC | Age: 68
End: 2018-06-20

## 2018-06-20 PROBLEM — K62.5 RECTAL BLEEDING: Status: ACTIVE | Noted: 2018-06-20

## 2018-06-21 ENCOUNTER — TELEPHONE (OUTPATIENT)
Dept: GASTROENTEROLOGY | Facility: OUTPATIENT CENTER | Age: 68
End: 2018-06-21

## 2018-06-25 DIAGNOSIS — F32.A DEPRESSION, UNSPECIFIED DEPRESSION TYPE: ICD-10-CM

## 2018-06-25 RX ORDER — CITALOPRAM HYDROBROMIDE 10 MG/1
20 TABLET ORAL DAILY
Qty: 180 TABLET | Refills: 0 | OUTPATIENT
Start: 2018-06-25

## 2018-06-25 RX ORDER — CITALOPRAM HYDROBROMIDE 10 MG/1
20 TABLET ORAL DAILY
Qty: 180 TABLET | Refills: 0 | Status: CANCELLED | OUTPATIENT
Start: 2018-06-25

## 2018-06-25 NOTE — TELEPHONE ENCOUNTER
Health Call Center    Phone Message    May a detailed message be left on voicemail: yes    Reason for Call: Medication Question or concern regarding medication   Prescription Clarification  Name of Medication: citalopram (CELEXA) 10 MG tablet  Prescribing Provider: Dr. Dent   Pharmacy: [facility]/PHARMACY #[id number] [address]   What on the order needs clarification? REFILL REQUEST MESSAGE WAS SENT ON THE 19TH, NO ACTION TAKEN. PLEASE RESPOND AS PT IS VERY FRUSTRATED AND IS REQUESTING CALL BACK REGARDING REFILL REQUEST.          Action Taken: Message routed to:  [facility] ([facility] Med refill team

## 2018-06-25 NOTE — TELEPHONE ENCOUNTER
[facility]    Phone Message    May a detailed message be left on voicemail: yes    Reason for Call: Medication Question or concern regarding medication   Prescription Clarification  Name of Medication: citalopram (CELEXA) 10 mg tablet  Prescribing Provider: Dr. Dent   Pharmacy: [facility]/PHARMACY #[id number] [place] [id number] [facility]   What on the order needs clarification? Please regard last message - PT's wife called requesting where this prescription is in process of getting refilled.          Action Taken: Message routed to:  [facility] PCC

## 2018-06-26 ENCOUNTER — TELEPHONE (OUTPATIENT)
Dept: GASTROENTEROLOGY | Facility: OUTPATIENT CENTER | Age: 68
End: 2018-06-26

## 2018-06-28 ENCOUNTER — DOCUMENTATION ONLY (OUTPATIENT)
Dept: GASTROENTEROLOGY | Facility: OUTPATIENT CENTER | Age: 68
End: 2018-06-28
Payer: COMMERCIAL

## 2018-06-29 ENCOUNTER — ANTICOAGULATION THERAPY VISIT (OUTPATIENT)
Dept: ANTICOAGULATION | Facility: CLINIC | Age: 68
End: 2018-06-29

## 2018-06-29 DIAGNOSIS — Z79.01 LONG-TERM (CURRENT) USE OF ANTICOAGULANTS: ICD-10-CM

## 2018-06-29 DIAGNOSIS — I48.91 ATRIAL FIBRILLATION (H): ICD-10-CM

## 2018-06-29 LAB — INR PPP: 1.61 (ref 0.86–1.14)

## 2018-06-29 NOTE — MR AVS SNAPSHOT
Ju Edwards   6/29/2018   Anticoagulation Therapy Visit    Description:  67 year old male   Provider:  Alice Fish, RN   Department:  [facility]           INR as of 6/29/2018     Today's INR 1.61!      Anticoagulation Summary as of 6/29/2018     INR goal 2.0-3.0   Today's INR 1.61!   Full warfarin instructions 6/29: 4 mg; 6/30: 4 mg; Otherwise 2 mg on Mon, Wed, Fri; 3 mg all other days   Next INR check 7/5/2018    Indications   Long-term (current) use of anticoagulants [Z79.01] [Z79.01]  Atrial fibrillation (H) [I48.91]         June 2018 Details    Sun Mon Tue Wed Thu Fri Sat          1               2                 3               4               5               6               7               8               9                 10               11               12               13               14               15               16                 17               18               19               20               21               22               23                 24               25               26               27               28               29      4 mg   See details      30      4 mg          Date Details   06/29 This INR check               How to take your warfarin dose     To take:  4 mg Take 2 of the 2 mg tablets.           July 2018 Details    Sun Mon Tue Wed Thu Fri Sat     1      3 mg         2      2 mg         3      3 mg         4      2 mg         5            6               7                 8               9               10               11               12               13               14                 15               16               17               18               19               20               21                 22               23               24               25               26               27               28                 29               30               31                    Date Details   No additional details    Date of next INR:   7/5/2018         How to take your warfarin dose     To take:  2 mg Take 1 of the 2 mg tablets.    To take:  3 mg Take 1.5 of the 2 mg tablets.

## 2018-06-29 NOTE — PROGRESS NOTES
ANTICOAGULATION FOLLOW-UP CLINIC VISIT    Patient Name:  Ju Edwards  Date:  6/29/2018  Contact Type:  Telephone    SUBJECTIVE:     Patient Findings     Comments Left message on Lety's phone with warfarin recommendations.  On message stated if Ju is holding warfarin for something, to call the [facility].    Per Epic, he is having some issues with rectal bleeding and has been referred to GI.           OBJECTIVE    INR   Date Value Ref Range Status   06/29/2018 1.61 (H) 0.86 - 1.14 Final       ASSESSMENT / PLAN  INR assessment SUB    Recheck INR In: 6 DAYS    INR Location Clinic      Anticoagulation Summary as of 6/29/2018     INR goal 2.0-3.0   Today's INR 1.61!   Warfarin maintenance plan 2 mg (2 mg x 1) on Mon, Wed, Fri; 3 mg (2 mg x 1.5) all other days   Full warfarin instructions 6/29: 4 mg; 6/30: 4 mg; Otherwise 2 mg on Mon, Wed, Fri; 3 mg all other days   Weekly warfarin total 18 mg   Plan last modified Alice Carranza, RN (6/2/2016)   Next INR check 7/5/2018   Priority INR   Target end date Indefinite    Indications   Long-term (current) use of anticoagulants [Z79.01] [Z79.01]  Atrial fibrillation (H) [I48.91]         Anticoagulation Episode Summary     INR check location Clinic Lab    Preferred lab     Send INR reminders to [facility]    Comments Speak witrh Wife Lety [phone]  Pronounced [facility]      Anticoagulation Care Providers     Provider Role Specialty Phone number    Rayo Christiansen MD [facility] Internal Medicine [phone]            See the Encounter Report to view Anticoagulation Flowsheet and Dosing Calendar (Go to Encounters tab in chart review, and find the Anticoagulation Therapy Visit)    Left message for patient with results and dosing recommendations. Asked patient to call back to report any missed doses, falls, signs and symptoms of bleeding or clotting, any changes in health, medication, or diet. Asked patient to call back with any  questions or concerns.  Message left on Lety's phone.    Alice Fish RN

## 2018-07-07 DIAGNOSIS — R04.0 EPISTAXIS: ICD-10-CM

## 2018-07-09 RX ORDER — ECHINACEA PURPUREA EXTRACT 125 MG
TABLET ORAL
Qty: 15 ML | Refills: 0 | Status: SHIPPED | OUTPATIENT
Start: 2018-07-09 | End: 2018-08-16

## 2018-07-23 ENCOUNTER — TRANSFERRED RECORDS (OUTPATIENT)
Dept: HEALTH INFORMATION MANAGEMENT | Facility: CLINIC | Age: 68
End: 2018-07-23

## 2018-07-31 DIAGNOSIS — H40.002 GLAUCOMA SUSPECT OF LEFT EYE: ICD-10-CM

## 2018-07-31 RX ORDER — LATANOPROST 50 UG/ML
1 SOLUTION/ DROPS OPHTHALMIC AT BEDTIME
Qty: 2.5 ML | Refills: 3 | Status: SHIPPED | OUTPATIENT
Start: 2018-07-31 | End: 2018-11-09

## 2018-07-31 NOTE — TELEPHONE ENCOUNTER
latanoprost (XALATAN) 0.005 % ophthalmic solution  Last Written Prescription Date:  5/23/18  Last Fill Quantity: 1 bottle,   # refills: 3  Last Office Visit : 5/23/18  Future Office visit: 8/20/18    - Start latanoprost at bedtime both eyes

## 2018-08-14 ENCOUNTER — ANTICOAGULATION THERAPY VISIT (OUTPATIENT)
Dept: ANTICOAGULATION | Facility: CLINIC | Age: 68
End: 2018-08-14

## 2018-08-14 DIAGNOSIS — I48.91 ATRIAL FIBRILLATION (H): ICD-10-CM

## 2018-08-14 DIAGNOSIS — Z79.01 LONG-TERM (CURRENT) USE OF ANTICOAGULANTS: ICD-10-CM

## 2018-08-14 DIAGNOSIS — F32.A DEPRESSION, UNSPECIFIED DEPRESSION TYPE: ICD-10-CM

## 2018-08-14 NOTE — PROGRESS NOTES
Left message for patient's wife Lety that they have not been compliant with having the necessary lab work for their anticoagulation therapy. Patient  has been sent two letters stating that they have missed their lab appointments and that it is very important to have labs done to make sure that they are in their therapeutic range to minimize the risks of bleeding and clotting. I asked them to call us or come in for their lab work as soon as possible. Patient will be inactivated from our anticoagulation monitoring service if they do not respond within one week.  (8/21/18)

## 2018-08-14 NOTE — MR AVS SNAPSHOT
Ju Edwards   8/14/2018   Anticoagulation Therapy Visit    Description:  68 year old male   Provider:  Melvin Powell, [facility]   Department:  [facility]           INR as of 8/14/2018     Today's INR No new INR was available at the time of this encounter.      Anticoagulation Summary as of 8/14/2018     INR goal 2.0-3.0   Today's INR No new INR was available at the time of this encounter.   Full warfarin instructions 2 mg on Mon, Wed, Fri; 3 mg all other days   Next INR check 8/14/2018    Indications   Long-term (current) use of anticoagulants [Z79.01] [Z79.01]  Atrial fibrillation (H) [I48.91]         August 2018 Details    Sun Mon Tue Wed Thu Fri Sat        1               2               3               4                 5               6               7               8               9               10               11                 12               13               14      See details      15               16               17               18                 19               20               21               22               23               24               25                 26               27               28               29               30               31                 Date Details   08/14 This INR check       Date of next INR:  8/14/2018         How to take your warfarin dose     To take:  3 mg Take 1.5 of the 2 mg tablets.

## 2018-08-15 RX ORDER — CITALOPRAM HYDROBROMIDE 10 MG/1
20 TABLET ORAL DAILY
Qty: 180 TABLET | Refills: 0 | Status: SHIPPED | OUTPATIENT
Start: 2018-08-15 | End: 2020-09-09

## 2018-08-15 NOTE — TELEPHONE ENCOUNTER
Last Clinic Visit: 6/19/2018  [facility]  PHQ-9 overdue - 90 refill sent to pharmacy and FYI sent to clinic RN.

## 2018-08-16 DIAGNOSIS — R04.0 EPISTAXIS: ICD-10-CM

## 2018-08-16 DIAGNOSIS — H40.002 GLAUCOMA SUSPECT OF LEFT EYE: Primary | ICD-10-CM

## 2018-08-17 ENCOUNTER — ANTICOAGULATION THERAPY VISIT (OUTPATIENT)
Dept: ANTICOAGULATION | Facility: CLINIC | Age: 68
End: 2018-08-17

## 2018-08-17 DIAGNOSIS — Z79.01 LONG-TERM (CURRENT) USE OF ANTICOAGULANTS: ICD-10-CM

## 2018-08-17 DIAGNOSIS — I48.91 ATRIAL FIBRILLATION (H): ICD-10-CM

## 2018-08-17 LAB — INR PPP: 1.93 (ref 0.86–1.14)

## 2018-08-17 RX ORDER — ECHINACEA PURPUREA EXTRACT 125 MG
TABLET ORAL
Qty: 15 ML | Refills: 0 | Status: SHIPPED | OUTPATIENT
Start: 2018-08-17 | End: 2018-09-19

## 2018-08-17 NOTE — MR AVS SNAPSHOT
Ju Edwards   8/17/2018   Anticoagulation Therapy Visit    Description:  68 year old male   Provider:  Melvin Powell, [facility]   Department:  [facility]           INR as of 8/17/2018     Today's INR 1.93!      Anticoagulation Summary as of 8/17/2018     INR goal 2.0-3.0   Today's INR 1.93!   Full warfarin instructions 2 mg on Mon, Wed, Fri; 3 mg all other days   Next INR check 8/24/2018    Indications   Long-term (current) use of anticoagulants [Z79.01] [Z79.01]  Atrial fibrillation (H) [I48.91]         August 2018 Details    Sun Mon Tue Wed Thu Fri Sat        1               2               3               4                 5               6               7               8               9               10               11                 12               13               14               15               16               17      2 mg   See details      18      3 mg           19      3 mg         20      2 mg         21      3 mg         22      2 mg         23      3 mg         24            25                 26               27               28               29               30               31                 Date Details   08/17 This INR check       Date of next INR:  8/24/2018         How to take your warfarin dose     To take:  2 mg Take 1 of the 2 mg tablets.    To take:  3 mg Take 1.5 of the 2 mg tablets.

## 2018-08-17 NOTE — PROGRESS NOTES
ANTICOAGULATION FOLLOW-UP CLINIC VISIT    Patient Name:  Ju Edwards  Date:  8/17/2018  Contact Type:  Telephone    SUBJECTIVE:        OBJECTIVE    INR   Date Value Ref Range Status   08/17/2018 1.93 (H) 0.86 - 1.14 Final       ASSESSMENT / PLAN  INR assessment THER    Recheck INR In: 1 WEEK    INR Location Clinic      Anticoagulation Summary as of 8/17/2018     INR goal 2.0-3.0   Today's INR 1.93!   Warfarin maintenance plan 2 mg (2 mg x 1) on Mon, Wed, Fri; 3 mg (2 mg x 1.5) all other days   Full warfarin instructions 2 mg on Mon, Wed, Fri; 3 mg all other days   Weekly warfarin total 18 mg   Plan last modified Alice Carranza, RN (6/2/2016)   Next INR check 8/24/2018   Priority INR   Target end date Indefinite    Indications   Long-term (current) use of anticoagulants [Z79.01] [Z79.01]  Atrial fibrillation (H) [I48.91]         Anticoagulation Episode Summary     INR check location Clinic Lab    Preferred lab     Send INR reminders to [facility]    Comments Speak witrh Brando Davidson [phone]  [facility]      Anticoagulation Care Providers     Provider Role Specialty Phone number    Rayo Christiansen MD [facility] Internal Medicine [phone]            See the Encounter Report to view Anticoagulation Flowsheet and Dosing Calendar (Go to Encounters tab in chart review, and find the Anticoagulation Therapy Visit)    Left message with Brando Davidson for patient with results and dosing recommendations. Asked patient to call back to report any missed doses, falls, signs and symptoms of bleeding or clotting, any changes in health, medication, or diet. Asked patient to call back with any questions or concerns.    Melvin Powell [facility]

## 2018-08-17 NOTE — TELEPHONE ENCOUNTER
SALINE NOSE SPRAY 0.65 % nasal spray  INSTILL 1 SPRAY IN NOSTRIL DAILY  Last Written Prescription Date:  7-9-18  Last Fill Quantity: 15,   # refills: 0  Last Office Visit : 6-19-18  Future Office visit:  none    Routing refill request to provider for review/approval because:  Drug not on the FM, P or [facility] refill protocol.  Increase quantity dispensed.

## 2018-09-14 ENCOUNTER — ANTICOAGULATION THERAPY VISIT (OUTPATIENT)
Dept: ANTICOAGULATION | Facility: CLINIC | Age: 68
End: 2018-09-14

## 2018-09-14 DIAGNOSIS — Z79.01 LONG-TERM (CURRENT) USE OF ANTICOAGULANTS: ICD-10-CM

## 2018-09-14 DIAGNOSIS — I48.91 ATRIAL FIBRILLATION (H): ICD-10-CM

## 2018-09-14 LAB — INR PPP: 2.28 (ref 0.86–1.14)

## 2018-09-14 NOTE — MR AVS SNAPSHOT
Ju Edwards   9/14/2018   Anticoagulation Therapy Visit    Description:  68 year old male   Provider:  Lilibeth Conti, RN   Department:  [facility]           INR as of 9/14/2018     Today's INR 2.28      Anticoagulation Summary as of 9/14/2018     INR goal 2.0-3.0   Today's INR 2.28   Full warfarin instructions 2 mg on Mon, Wed, Fri; 3 mg all other days   Next INR check 10/5/2018    Indications   Long-term (current) use of anticoagulants [Z79.01] [Z79.01]  Atrial fibrillation (H) [I48.91]         September 2018 Details    Sun Mon Tue Wed Thu Fri Sat           1                 2               3               4               5               6               7               8                 9               10               11               12               13               14      2 mg   See details      15      3 mg           16      3 mg         17      2 mg         18      3 mg         19      2 mg         20      3 mg         21      2 mg         22      3 mg           23      3 mg         24      2 mg         25      3 mg         26      2 mg         27      3 mg         28      2 mg         29      3 mg           30      3 mg                Date Details   09/14 This INR check               How to take your warfarin dose     To take:  2 mg Take 1 of the 2 mg tablets.    To take:  3 mg Take 1.5 of the 2 mg tablets.           October 2018 Details    Sun Mon Tue Wed Thu Fri Sat      1      2 mg         2      3 mg         3      2 mg         4      3 mg         5            6                 7               8               9               10               11               12               13                 14               15               16               17               18               19               20                 21               22               23               24               25               26               27                 28               29               30                31                   Date Details   No additional details    Date of next INR:  10/5/2018         How to take your warfarin dose     To take:  2 mg Take 1 of the 2 mg tablets.    To take:  3 mg Take 1.5 of the 2 mg tablets.

## 2018-09-14 NOTE — PROGRESS NOTES
ANTICOAGULATION FOLLOW-UP CLINIC VISIT    Patient Name:  Ju Edwards  Date:  9/14/2018  Contact Type:  Telephone    SUBJECTIVE:        OBJECTIVE    INR   Date Value Ref Range Status   09/14/2018 2.28 (H) 0.86 - 1.14 Final       ASSESSMENT / PLAN  INR assessment THER    Recheck INR In: 3 WEEKS    INR Location Clinic      Anticoagulation Summary as of 9/14/2018     INR goal 2.0-3.0   Today's INR 2.28   Warfarin maintenance plan 2 mg (2 mg x 1) on Mon, Wed, Fri; 3 mg (2 mg x 1.5) all other days   Full warfarin instructions 2 mg on Mon, Wed, Fri; 3 mg all other days   Weekly warfarin total 18 mg   Plan last modified Alice Carranza RN (6/2/2016)   Next INR check 10/5/2018   Priority INR   Target end date Indefinite    Indications   Long-term (current) use of anticoagulants [Z79.01] [Z79.01]  Atrial fibrillation (H) [I48.91]         Anticoagulation Episode Summary     INR check location Clinic Lab    Preferred lab     Send INR reminders to [facility]    Comments Speak wit Wife Lety [phone]  [facility]      Anticoagulation Care Providers     Provider Role Specialty Phone number    Rayo Christiansen MD [facility] Internal Medicine [phone]            See the Encounter Report to view Anticoagulation Flowsheet and Dosing Calendar (Go to Encounters tab in chart review, and find the Anticoagulation Therapy Visit)    Left message for patient with results and dosing recommendations. Asked patient to call back to report any missed doses, falls, signs and symptoms of bleeding or clotting, any changes in health, medication, or diet. Asked patient to call back with any questions or concerns.      Lilibeth Conti RN

## 2018-09-17 DIAGNOSIS — I10 ESSENTIAL HYPERTENSION: ICD-10-CM

## 2018-09-17 DIAGNOSIS — E78.2 MIXED HYPERLIPIDEMIA: ICD-10-CM

## 2018-09-17 DIAGNOSIS — K21.00 GASTROESOPHAGEAL REFLUX DISEASE WITH ESOPHAGITIS: ICD-10-CM

## 2018-09-17 RX ORDER — ATENOLOL 25 MG/1
12.5 TABLET ORAL DAILY
Qty: 45 TABLET | Refills: 2 | Status: SHIPPED | OUTPATIENT
Start: 2018-09-17 | End: 2019-04-30

## 2018-09-17 RX ORDER — ATORVASTATIN CALCIUM 80 MG/1
80 TABLET, FILM COATED ORAL DAILY
Qty: 90 TABLET | Refills: 2 | Status: SHIPPED | OUTPATIENT
Start: 2018-09-17 | End: 2019-04-30

## 2018-09-17 RX ORDER — PANTOPRAZOLE SODIUM 40 MG/1
40 TABLET, DELAYED RELEASE ORAL DAILY
Qty: 90 TABLET | Refills: 2 | Status: SHIPPED | OUTPATIENT
Start: 2018-09-17 | End: 2019-06-08

## 2018-09-19 DIAGNOSIS — R04.0 EPISTAXIS: ICD-10-CM

## 2018-09-20 ENCOUNTER — ANTICOAGULATION THERAPY VISIT (OUTPATIENT)
Dept: ANTICOAGULATION | Facility: CLINIC | Age: 68
End: 2018-09-20

## 2018-09-20 DIAGNOSIS — Z79.01 LONG-TERM (CURRENT) USE OF ANTICOAGULANTS: ICD-10-CM

## 2018-09-20 DIAGNOSIS — I48.91 ATRIAL FIBRILLATION (H): ICD-10-CM

## 2018-09-20 LAB — INR PPP: 1.67 (ref 0.86–1.14)

## 2018-09-20 NOTE — MR AVS SNAPSHOT
Ju Edwards   9/20/2018   Anticoagulation Therapy Visit    Description:  68 year old male   Provider:  Hien Curry RN   Department:  [facility]           INR as of 9/20/2018     Today's INR 1.67!      Anticoagulation Summary as of 9/20/2018     INR goal 2.0-3.0   Today's INR 1.67!   Full warfarin instructions 9/20: 4 mg; Otherwise 2 mg on Mon, Wed, Fri; 3 mg all other days   Next INR check 9/27/2018    Indications   Long-term (current) use of anticoagulants [Z79.01] [Z79.01]  Atrial fibrillation (H) [I48.91]         September 2018 Details    Sun Mon Tue Wed Thu Fri Sat           1                 2               3               4               5               6               7               8                 9               10               11               12               13               14               15                 16               17               18               19               20      4 mg   See details      21      2 mg         22      3 mg           23      3 mg         24      2 mg         25      3 mg         26      2 mg         27            28               29                 30                      Date Details   09/20 This INR check       Date of next INR:  9/27/2018         How to take your warfarin dose     To take:  2 mg Take 1 of the 2 mg tablets.    To take:  3 mg Take 1.5 of the 2 mg tablets.    To take:  4 mg Take 2 of the 2 mg tablets.

## 2018-09-20 NOTE — PROGRESS NOTES
ANTICOAGULATION FOLLOW-UP CLINIC VISIT    Patient Name:  Ju Edwards  Date:  9/20/2018  Contact Type:  Telephone    SUBJECTIVE:     Patient Findings     Positives Unexplained INR or factor level change           OBJECTIVE    INR   Date Value Ref Range Status   09/20/2018 1.67 (H) 0.86 - 1.14 Final       ASSESSMENT / PLAN  No question data found.  Anticoagulation Summary as of 9/20/2018     INR goal 2.0-3.0   Today's INR 1.67!   Warfarin maintenance plan 2 mg (2 mg x 1) on Mon, Wed, Fri; 3 mg (2 mg x 1.5) all other days   Full warfarin instructions 9/20: 4 mg; Otherwise 2 mg on Mon, Wed, Fri; 3 mg all other days   Weekly warfarin total 18 mg   Plan last modified Alice Carranza RN (6/2/2016)   Next INR check 9/27/2018   Priority INR   Target end date Indefinite    Indications   Long-term (current) use of anticoagulants [Z79.01] [Z79.01]  Atrial fibrillation (H) [I48.91]         Anticoagulation Episode Summary     INR check location Clinic Lab    Preferred lab     Send INR reminders to [facility]    Comments Speak wit Wife Lety [phone]  [facility]      Anticoagulation Care Providers     Provider Role Specialty Phone number    Rayo Christiansen MD [facility] Internal Medicine [phone]            See the Encounter Report to view Anticoagulation Flowsheet and Dosing Calendar (Go to Encounters tab in chart review, and find the Anticoagulation Therapy Visit)    Spoke with Leslie.     Hien Curry RN

## 2018-09-21 NOTE — TELEPHONE ENCOUNTER
[facility] SALINE NASAL SPRAY 0.65 % nasal spray      Last Written Prescription Date:  8-17-18  Last Fill Quantity: 15 ml,   # refills: 0  Last Office Visit : 6-19-18  Future Office visit:  9-26-18    Routing refill request to provider for review/approval because:  Drug not on the FM, P or [facility] refill protocol. Increase quantity.

## 2018-09-24 RX ORDER — SODIUM CHLORIDE 0.65 %
AEROSOL, SPRAY (ML) NASAL
Qty: 15 ML | Refills: 3 | Status: SHIPPED | OUTPATIENT
Start: 2018-09-24 | End: 2019-05-25

## 2018-09-26 ENCOUNTER — OFFICE VISIT (OUTPATIENT)
Dept: INTERNAL MEDICINE | Facility: CLINIC | Age: 68
End: 2018-09-26
Payer: COMMERCIAL

## 2018-09-26 VITALS
HEART RATE: 59 BPM | SYSTOLIC BLOOD PRESSURE: 139 MMHG | DIASTOLIC BLOOD PRESSURE: 75 MMHG | WEIGHT: 142.2 LBS | BODY MASS INDEX: 25.93 KG/M2

## 2018-09-26 DIAGNOSIS — F41.9 ANXIETY: ICD-10-CM

## 2018-09-26 DIAGNOSIS — R41.3 MEMORY LOSS: Primary | ICD-10-CM

## 2018-09-26 DIAGNOSIS — Z79.01 LONG-TERM (CURRENT) USE OF ANTICOAGULANTS: ICD-10-CM

## 2018-09-26 DIAGNOSIS — I48.91 ATRIAL FIBRILLATION (H): ICD-10-CM

## 2018-09-26 DIAGNOSIS — I63.9 CEREBROVASCULAR ACCIDENT (CVA), UNSPECIFIED MECHANISM (H): ICD-10-CM

## 2018-09-26 DIAGNOSIS — E78.5 HYPERLIPIDEMIA, UNSPECIFIED HYPERLIPIDEMIA TYPE: ICD-10-CM

## 2018-09-26 DIAGNOSIS — I10 ESSENTIAL HYPERTENSION: ICD-10-CM

## 2018-09-26 DIAGNOSIS — R41.3 MEMORY LOSS: ICD-10-CM

## 2018-09-26 LAB
INR PPP: 1.92 (ref 0.86–1.14)
TSH SERPL DL<=0.005 MIU/L-ACNC: 0.59 MU/L (ref 0.4–4)
VIT B12 SERPL-MCNC: 449 PG/ML (ref 193–986)

## 2018-09-26 RX ORDER — NAPROXEN 500 MG/1
TABLET ORAL
COMMUNITY
Start: 2018-08-26 | End: 2019-02-19

## 2018-09-26 ASSESSMENT — PAIN SCALES - GENERAL: PAINLEVEL: NO PAIN (0)

## 2018-09-26 NOTE — LETTER
[facility]  [address]  4th [address]  [place] [address]          September 26, 2018    RE:  Ju Edwards                                                                                                                                                       [address]  [address]  [place] [address]            To whom it may concern:    Ju Edwards is under my professional care for    Memory loss  Cerebrovascular accident (CVA), unspecified mechanism (H)  Essential hypertension  Hyperlipidemia, unspecified hyperlipidemia type  Anxiety     He  Is under evaluation for memory loss and related medical issues.      Sincerely,          Rayo Christiansen MD

## 2018-09-26 NOTE — MR AVS SNAPSHOT
After Visit Summary   9/26/2018    Ju Edwards    MRN: [id number]           Patient Information     Date Of Birth          1950        Visit Information        Provider Department      9/26/2018 5:30 PM Rayo Christiansen MD; [facility]        Today's Diagnoses     Memory loss    -  1    Cerebrovascular accident (CVA), unspecified mechanism (H)        Essential hypertension        Hyperlipidemia, unspecified hyperlipidemia type        Anxiety          Care Instructions    Primary Care Center Medication Refill Request Information:  * Please contact your pharmacy regarding ANY request for medication refills.  ** [facility] Prescription Fax = [phone]  * Please allow 3 business days for routine medication refills.  * Please allow 5 business days for controlled substance medication refills.     Primary Care Center Test Result notification information:  *You will be notified with in 7-10 days of your appointment day regarding the results of your test.  If you are on MyChart you will be notified as soon as the provider has reviewed the results and signed off on them.    Primary Care Center: [phone]             Follow-ups after your visit        Follow-up notes from your care team     Return in about 2 months (around 11/26/2018).      Your next 10 appointments already scheduled     Sep 26, 2018  7:00 PM CDT   LAB with  LAB   [facility] ([facility])    [address] [place] [address] [phone]           Please do not eat 10-12 hours before your appointment if you are coming in fasting for labs on lipids, cholesterol, or glucose (sugar). This does not apply to pregnant women. Water, hot tea and black coffee (with nothing added) are okay. Do not drink other fluids, diet soda or chew gum.            Sep 27, 2018 12:20 PM CDT   CT HEAD W/O CONTRAST with UCCT1    [facility] ([facility])    [address]  [address] [facility] [id number] [phone]           How do I prepare for my exam? (Food and drink instructions) No Food and Drink Restrictions.  How do I prepare for my exam? (Other instructions) You do not need to do anything special to prepare for this exam. For a sinus scan: Use your nose spray (nasal decongestant spray) as directed.  What should I wear: Please wear loose clothing, such as a sweat suit or jogging clothes. Avoid snaps, zippers and other metal. We may ask you to undress and put on a hospital gown.  How long does the exam take: Most scans take less than 20 minutes.  What should I bring: Please bring any scans or X-rays taken at other hospitals, if similar tests were done. Also bring a list of your medicines, including vitamins, minerals and over-the-counter drugs. It is safest to leave personal items at home.  Do I need a : No  is needed.  What do I need to tell my doctor? Be sure to tell your doctor: * If you have any allergies. * If there s any chance you are pregnant. * If you are breastfeeding.  What should I do after the exam: No restrictions, You may resume normal activities.  What is this test: A CT (computed tomography) scan is a series of pictures that allows us to look inside your body. The scanner creates images of the body in cross sections, much like slices of bread. This helps us see any problems more clearly.  Who should I call with questions: If you have any questions, please call the Imaging Department where you will have your exam. Directions, parking instructions, and other information is available on our website, [email or web address].            Nov 27, 2018 10:20 AM CST   (Arrive by 10:05 AM)   Return Visit with Rayo Christiansen MD   [facility] ([facility])    [address] [place] [address]  [address] [facility] [id number] [phone]               Future tests that were ordered for you today     Open Future Orders        Priority Expected Expires Ordered    TSH with free T4 reflex Routine  9/26/2019 9/26/2018    Vitamin B12 Routine  9/26/2019 9/26/2018    Methylmalonic acid Routine  9/26/2019 9/26/2018    CT Head w/o contrast Routine  9/26/2019 9/26/2018            Who to contact     Please call your clinic at [phone] to:    Ask questions about your health    Make or cancel appointments    Discuss your medicines    Learn about your test results    Speak to your doctor            Additional Information About Your Visit        Care EveryWhere ID     This is your Care EveryWhere ID. This could be used by other organizations to access your [place] medical records  [id number]        Your Vitals Were     Pulse BMI (Body Mass Index)                59 25.93 kg/m2           Blood Pressure from Last 3 Encounters:   09/26/18 139/75   06/19/18 142/75   05/29/18 138/77    Weight from Last 3 Encounters:   09/26/18 64.5 kg (142 lb 3.2 oz)   06/19/18 64.3 kg (141 lb 12.8 oz)   05/29/18 65 kg (143 lb 3.2 oz)              We Performed the Following     RPR W/REFLEX TITER & CONFIRM        Primary Care Provider Office Phone # Fax #    Rayo Christiansen [phone]       [address] [place] [address]        Equal Access to Services     MARK MENDES AH: Hadii saba ku hadasho Sojoann, waaxda luqadaha, qaybta kaalmada josh, anu maria. So [facility] [phone].    ATENCIÓN: Si habla español, tiene a carmona disposición servicios gratuitos de asistencia lingüística. Llame al [phone].    We comply with applicable federal civil rights laws and [place] laws. We do not discriminate on the basis of race, color, national origin, age, disability, sex, sexual orientation, or gender identity.            Thank you!     Thank you for choosing [facility]  for your care. Our goal is always  to provide you with excellent care. Hearing back from our patients is one way we can continue to improve our services. Please take a few minutes to complete the written survey that you may receive in the mail after your visit with us. Thank you!             Your Updated Medication List - Protect others around you: Learn how to safely use, store and throw away your medicines at www.disposemymeds.org.          This list is accurate as of 9/26/18  6:26 PM.  Always use your most recent med list.                   Brand Name Dispense Instructions for use Diagnosis    atenolol 25 MG tablet    TENORMIN    45 tablet    Take 0.5 tablets (12.5 mg) by mouth daily    Essential hypertension       atorvastatin 80 MG tablet    LIPITOR    90 tablet    Take 1 tablet (80 mg) by mouth daily    Mixed hyperlipidemia       cetirizine 10 MG tablet    zyrTEC    90 tablet    Take 1 tablet (10 mg) by mouth every evening    Itching       citalopram 10 MG tablet    celeXA    180 tablet    Take 2 tablets (20 mg) by mouth daily    Depression, unspecified depression type       [facility]S EYE LUBRICANT NIGHTTIME Oint       Memory loss       [facility] SALINE NASAL SPRAY 0.65 % nasal spray   Generic drug:  sodium chloride     15 mL    INSTILL 1 SPRAY IN NOSTRIL DAILY    Epistaxis       cycloSPORINE 0.05 % ophthalmic emulsion    RESTASIS    180 each    Place 1 drop into both eyes 2 times daily    Dry eye       emollient cream     25 g    Apply topically as needed for other    Itching       flecainide acetate 150 MG Tabs     180 tablet    TAKE 1 TABLET (150 MG) BY MOUTH 2 TIMES DAILY AS NEEDED    Routine health maintenance       latanoprost 0.005 % ophthalmic solution    XALATAN    2.5 mL    Place 1 drop into both eyes At Bedtime    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       lisinopril 10 MG tablet    PRINIVIL/ZESTRIL    100 tablet    Take 1 tablet (10 mg) by mouth daily    Essential hypertension, hypertension with unspecified goal       naproxen 500 MG tablet    NAPROSYN       Memory loss       ONDANSETRON PO      Take 4 mg by mouth every 8 hours as needed for nausea    Hypertension, Hyperlipidemia, Stroke (H)       oxyCODONE-acetaminophen 5-325 MG per tablet    PERCOCET     Take 1 tablet by mouth every 4 hours as needed for moderate to severe pain        pantoprazole 40 MG EC tablet    PROTONIX    90 tablet    Take 1 tablet (40 mg) by mouth daily    Gastroesophageal reflux disease with esophagitis       REFRESH P.M. Oint     1 Tube    Apply 3.5 g to eye At Bedtime    Insufficiency of tear film of both eyes       SENNA LAX PO      Take by mouth 2 times daily as needed    Hypertension, Hyperlipidemia, Stroke (H), Atrial fibrillation (H)       STOOL SOFTENER 100 MG capsule   Generic drug:  docusate sodium           vitamin D3 2000 units Caps     90 capsule    Take 1 tablet by mouth daily    Cerebrovascular accident (CVA) due to thrombosis of carotid artery (H)       warfarin 2 MG tablet    COUMADIN    180 tablet    Take one to two tablets daily or as directed by the Coumadin clinic    Long-term (current) use of anticoagulants, Atrial fibrillation, unspecified type (H)

## 2018-09-26 NOTE — PROGRESS NOTES
HPI  68-year-old seen today with the aid of an  for memory concerns.  He admits that he is been under a lot of stress recently he has been stressed by his living situation.  This is a problem and that he was requested to muscle his dog he forgot to muscle the dog and now the dog is been taken away.  This is aggravated his stress and he has noted ongoing issue with forgetfulness.  At times he forgets his wallet his keys he forgets when he is put on his pants even or what he is put in his pockets.  He has had no associated confusion or disorientation although on at least one occasion he had to concentrate and think about how to find his way home.  Otherwise he is not had any focal neurologic symptoms other than some occasional numbness in the arms which is vague and ill described.  He has had no headache he has had no head trauma.  He is hoping to relocate to help reduce his stress level.  Otherwise he has been doing reasonably well he is not had any exertional symptoms he has not had any chest pain.  He has been stable from a neurologic standpoint other than the memory.  Past Medical History:   Diagnosis Date     Atrial fibrillation (H)     paroxysmal     Delirium     associated with hospitalization for stroke     Depression      Gastric erosions 1/2014    associated with gi bleed     Hyperlipidemia      Hypertension      Stroke (H) 1/2014    ischemic CVA with hemorrhagic transformation     Past Surgical History:   Procedure Laterality Date     CARDIAC SURGERY  2000    mitral valve repair     REPAIR ATRIAL SEPTAL DEFECT       Family History   Problem Relation Age of Onset     Eye Surgery Mother      Glaucoma No family hx of      Macular Degeneration No family hx of      Social History     Social History     Marital status:      Spouse name: N/A     Number of children: N/A     Years of education: N/A     Social History Main Topics     Smoking status: Never Smoker     Smokeless tobacco: Never Used      Alcohol use No     Drug use: No     Sexual activity: Yes     Partners: Female     Other Topics Concern     Special Diet No     Exercise Yes     rehab     Social History Narrative       Exam:  /75 (BP Location: Right arm, Patient Position: Sitting, Cuff Size: Adult Regular)  Pulse 59  Wt 64.5 kg (142 lb 3.2 oz)  BMI 25.93 kg/m2  142 lbs 3.2 oz  Physical Exam   The patient is alert, oriented with a clear sensorium.   Skin shows no lesions or rashes and good turgor.   Head is normocephalic and atraumatic.   Eyes show PERRLA with poorly seen optic fundi.   Ears show normal TMs bilaterally.   Mouth shows clear oral mucosa.   Neck shows no nodes, thyromegaly or bruits.   Back is non tender.  Lungs are clear to percussion and auscultation.   Heart shows normal S1 and S2 without murmur or gallop.   Extremities show no edema and no evidence of active synovitis.   Neurologic examination shows cranial nerves II-XII intact. Motor shows 5/5 strength. Reflexes are symmetric. Cerebellar testing shows normal tandem gait.  Romberg negative.    ASSESSMENT  1 stress and anxiety  2 memory concerns possibly related to above  3 hyperlipidemia  4 hypertension marginal control  5 remote history CVA    Plan  We are going to investigate his memory loss with B12 thyroid RPR and a CT scan.  I gave him a note for his  that he has been evaluated for memory loss as well will ask him to follow-up for reassessment in a month or 2    This note was completed using Dragon voice recognition software.  Although reviewed after completion, some word and grammatical errors may occur.    Rayo Christiansen MD  General Internal Medicine  [facility]  [phone]

## 2018-09-26 NOTE — NURSING NOTE
Chief Complaint   Patient presents with     Memory Loss     f/u, letter for  about memory loss       Musa Singh, EMT 5:51 PM on 9/26/2018.

## 2018-09-26 NOTE — PATIENT INSTRUCTIONS
[facility] Medication Refill Request Information:  * Please contact your pharmacy regarding ANY request for medication refills.  ** [facility] Prescription Fax = [phone]  * Please allow 3 business days for routine medication refills.  * Please allow 5 business days for controlled substance medication refills.     [facility] Test Result notification information:  *You will be notified with in 7-10 days of your appointment day regarding the results of your test.  If you are on MyChart you will be notified as soon as the provider has reviewed the results and signed off on them.    [facility]: [phone]

## 2018-09-27 ENCOUNTER — ANTICOAGULATION THERAPY VISIT (OUTPATIENT)
Dept: ANTICOAGULATION | Facility: CLINIC | Age: 68
End: 2018-09-27

## 2018-09-27 ENCOUNTER — RADIANT APPOINTMENT (OUTPATIENT)
Dept: CT IMAGING | Facility: CLINIC | Age: 68
End: 2018-09-27
Attending: INTERNAL MEDICINE
Payer: COMMERCIAL

## 2018-09-27 DIAGNOSIS — R41.3 MEMORY LOSS: ICD-10-CM

## 2018-09-27 DIAGNOSIS — I48.91 ATRIAL FIBRILLATION (H): ICD-10-CM

## 2018-09-27 DIAGNOSIS — Z79.01 LONG-TERM (CURRENT) USE OF ANTICOAGULANTS: ICD-10-CM

## 2018-09-27 NOTE — PROGRESS NOTES
ANTICOAGULATION FOLLOW-UP CLINIC VISIT    Patient Name:  Ju Edwards  Date:  9/27/2018  Contact Type:  Telephone    SUBJECTIVE:     Patient Findings     Comments Left message for patient to have the next INR drawn in one week.  He is scheduled for a CT scan today.  Patient has been having issues with memory.  Will monitor patient closely.           OBJECTIVE    INR   Date Value Ref Range Status   09/26/2018 1.92 (H) 0.86 - 1.14 Final       ASSESSMENT / PLAN  INR assessment THER    Recheck INR In: 1 WEEK    INR Location Clinic      Anticoagulation Summary as of 9/27/2018     INR goal 2.0-3.0   Today's INR 1.92! (9/26/2018)   Warfarin maintenance plan 2 mg (2 mg x 1) on Mon, Wed, Fri; 3 mg (2 mg x 1.5) all other days   Full warfarin instructions 2 mg on Mon, Wed, Fri; 3 mg all other days   Weekly warfarin total 18 mg   No change documented Alexander Becerril RN   Plan last modified Alice Carranza RN (6/2/2016)   Next INR check 10/3/2018   Priority INR   Target end date Indefinite    Indications   Long-term (current) use of anticoagulants [Z79.01] [Z79.01]  Atrial fibrillation (H) [I48.91]         Anticoagulation Episode Summary     INR check location Clinic Lab    Preferred lab     Send INR reminders to [facility]    Comments Speak witrh Wife Lety [phone]  Pronounced Chance-A-Mout      Anticoagulation Care Providers     Provider Role Specialty Phone number    Rayo Christiansen MD [facility] Internal Medicine [phone]            See the Encounter Report to view Anticoagulation Flowsheet and Dosing Calendar (Go to Encounters tab in chart review, and find the Anticoagulation Therapy Visit)    Left message for patient with results and dosing recommendations on wife's voicemail. Asked patient to call back to report any missed doses, falls, signs and symptoms of bleeding or clotting, any changes in health, medication, or diet. Asked patient to call back with any questions or  concerns.  Did not adjust Coumadin dose.      Alexander Becerril, RN

## 2018-09-27 NOTE — MR AVS SNAPSHOT
Ju Edwards   9/27/2018   Anticoagulation Therapy Visit    Description:  68 year old male   Provider:  Alexander Becerril, RN   Department:  [facility]           INR as of 9/27/2018     Today's INR 1.92! (9/26/2018)      Anticoagulation Summary as of 9/27/2018     INR goal 2.0-3.0   Today's INR 1.92! (9/26/2018)   Full warfarin instructions 2 mg on Mon, Wed, Fri; 3 mg all other days   Next INR check 10/3/2018    Indications   Long-term (current) use of anticoagulants [Z79.01] [Z79.01]  Atrial fibrillation (H) [I48.91]         September 2018 Details    Sun Mon Tue Wed Thu Fri Sat           1                 2               3               4               5               6               7               8                 9               10               11               12               13               14               15                 16               17               18               19               20               21               22                 23               24               25               26               27      3 mg   See details      28      2 mg         29      3 mg           30      3 mg                Date Details   09/27 This INR check               How to take your warfarin dose     To take:  2 mg Take 1 of the 2 mg tablets.    To take:  3 mg Take 1.5 of the 2 mg tablets.           October 2018 Details    Sun Mon Tue Wed Thu Fri Sat      1      2 mg         2      3 mg         3            4               5               6                 7               8               9               10               11               12               13                 14               15               16               17               18               19               20                 21               22               23               24               25               26               27                 28               29               30               31                   Date  Details   No additional details    Date of next INR:  10/3/2018         How to take your warfarin dose     To take:  2 mg Take 1 of the 2 mg tablets.    To take:  3 mg Take 1.5 of the 2 mg tablets.

## 2018-09-28 LAB
RPR SER QL: NONREACTIVE
T PALLIDUM AB SER QL: REACTIVE

## 2018-09-29 DIAGNOSIS — I10 ESSENTIAL HYPERTENSION: ICD-10-CM

## 2018-09-29 LAB
METHYLMALONATE SERPL-SCNC: 0.14 UMOL/L (ref 0–0.4)
RPR SER QL: NONREACTIVE
T PALLIDUM AB SER QL AGGL: REACTIVE

## 2018-10-01 RX ORDER — ATENOLOL 25 MG/1
TABLET ORAL
Qty: 45 TABLET | Refills: 2 | OUTPATIENT
Start: 2018-10-01

## 2018-10-15 ENCOUNTER — RECORDS - HEALTHEAST (OUTPATIENT)
Dept: ADMINISTRATIVE | Facility: OTHER | Age: 68
End: 2018-10-15

## 2018-10-19 ENCOUNTER — HOSPITAL ENCOUNTER (OUTPATIENT)
Dept: MRI IMAGING | Facility: CLINIC | Age: 68
Discharge: HOME | End: 2018-10-19
Attending: PSYCHIATRY & NEUROLOGY

## 2018-10-19 DIAGNOSIS — R41.89 COGNITIVE DECLINE: ICD-10-CM

## 2018-10-19 LAB
CREAT BLD-MCNC: 0.9 MG/DL
CREAT BLD-MCNC: 0.9 MG/DL (ref 0.7–1.3)
POC GFR AMER AF HE - HISTORICAL: >60 ML/MIN/1.73M2
POC GFR NON AMER AF HE - HISTORICAL: >60 ML/MIN/1.73M2

## 2018-10-31 DIAGNOSIS — Z79.01 LONG TERM CURRENT USE OF ANTICOAGULANT THERAPY: ICD-10-CM

## 2018-10-31 DIAGNOSIS — I48.91 ATRIAL FIBRILLATION, UNSPECIFIED TYPE (H): ICD-10-CM

## 2018-10-31 RX ORDER — WARFARIN SODIUM 2 MG/1
TABLET ORAL
Qty: 140 TABLET | Refills: 1 | Status: SHIPPED | OUTPATIENT
Start: 2018-10-31 | End: 2019-03-28

## 2018-11-07 ENCOUNTER — ANTICOAGULATION THERAPY VISIT (OUTPATIENT)
Dept: ANTICOAGULATION | Facility: CLINIC | Age: 68
End: 2018-11-07

## 2018-11-07 DIAGNOSIS — I48.91 ATRIAL FIBRILLATION (H): ICD-10-CM

## 2018-11-07 NOTE — PROGRESS NOTES
Message is left reminding patient to have INR done within the next week or he will be inactivated from [facility].

## 2018-11-07 NOTE — MR AVS SNAPSHOT
Ju Edwards   11/7/2018   Anticoagulation Therapy Visit    Description:  68 year old male   Provider:  Hien Curry RN   Department:  [facility]           INR as of 11/7/2018     Today's INR       Anticoagulation Summary as of 11/7/2018     INR goal 2.0-3.0   Today's INR    Next INR check     Indications   Long-term (current) use of anticoagulants [Z79.01] [Z79.01]  Atrial fibrillation (H) [I48.91]         September 2018 Details    Sun Mon Tue Wed Thu Fri Sat           1                 2               3               4               5               6               7               8                 9               10               11               12               13               14               15                 16               17               18               19               20               21               22                 23               24               25               26               27      3 mg   See details      28      2 mg         29      3 mg           30      3 mg                Date Details   09/27 This INR check               How to take your warfarin dose     To take:  2 mg Take 1 of the 2 mg tablets.    To take:  3 mg Take 1.5 of the 2 mg tablets.           October 2018 Details    Sun Mon Tue Wed Thu Fri Sat      1      2 mg         2      3 mg         3            4               5               6                 7               8               9               10               11               12               13                 14               15               16               17               18               19               20                 21               22               23               24               25               26               27                 28               29               30               31                   Date Details   No additional details    Date of next INR:  10/3/2018         How to take your warfarin dose     To take:   2 mg Take 1 of the 2 mg tablets.    To take:  3 mg Take 1.5 of the 2 mg tablets.

## 2018-11-09 DIAGNOSIS — H40.002 GLAUCOMA SUSPECT OF LEFT EYE: ICD-10-CM

## 2018-11-09 RX ORDER — LATANOPROST 50 UG/ML
SOLUTION/ DROPS OPHTHALMIC
Qty: 2.5 ML | Refills: 3 | Status: SHIPPED | OUTPATIENT
Start: 2018-11-09 | End: 2019-02-26

## 2018-11-09 NOTE — TELEPHONE ENCOUNTER
latanoprost (XALATAN) 0.005 % ophthalmic solution  Last Clinic Visit: 5/23/2018 [facility]  - Start latanoprost at bedtime both eyes

## 2018-11-15 ENCOUNTER — ANTICOAGULATION THERAPY VISIT (OUTPATIENT)
Dept: ANTICOAGULATION | Facility: CLINIC | Age: 68
End: 2018-11-15

## 2018-11-15 DIAGNOSIS — I48.91 ATRIAL FIBRILLATION (H): ICD-10-CM

## 2018-11-15 NOTE — MR AVS SNAPSHOT
Ju Edwards   11/15/2018   Anticoagulation Therapy Visit    Description:  68 year old male   Provider:  [facility]   Department:  [facility]           INR as of 11/15/2018     Today's INR       Anticoagulation Summary as of 11/15/2018     INR goal 2.0-3.0   Today's INR    Full warfarin instructions 2 mg on Mon, Wed, Fri; 3 mg all other days   Next INR check     Indications   Long-term (current) use of anticoagulants [Z79.01] [Z79.01]  Atrial fibrillation (H) [I48.91]         Anticoagulation Episode Summary     Resolved date 11/15/2018    Resolved reason Noncompliance

## 2018-11-15 NOTE — PROGRESS NOTES
Patient is being inactivated from the [facility]tico[facility] Monitoring Service. They have been non compliant in having the necessary lab work done for their anticoagulation therapy and have not responded to our letters or telephone calls.

## 2018-12-24 ENCOUNTER — OFFICE VISIT (OUTPATIENT)
Dept: INTERNAL MEDICINE | Facility: CLINIC | Age: 68
End: 2018-12-24
Payer: COMMERCIAL

## 2018-12-24 VITALS — DIASTOLIC BLOOD PRESSURE: 65 MMHG | SYSTOLIC BLOOD PRESSURE: 148 MMHG | HEART RATE: 81 BPM | OXYGEN SATURATION: 97 %

## 2018-12-24 DIAGNOSIS — Z79.01 LONG TERM CURRENT USE OF ANTICOAGULANT THERAPY: ICD-10-CM

## 2018-12-24 DIAGNOSIS — R05.9 COUGH: ICD-10-CM

## 2018-12-24 DIAGNOSIS — I48.91 ATRIAL FIBRILLATION, UNSPECIFIED TYPE (H): ICD-10-CM

## 2018-12-24 DIAGNOSIS — M79.89 LEFT LEG SWELLING: ICD-10-CM

## 2018-12-24 DIAGNOSIS — Z79.01 LONG TERM CURRENT USE OF ANTICOAGULANT THERAPY: Primary | ICD-10-CM

## 2018-12-24 LAB — INR PPP: 1.18 (ref 0.86–1.14)

## 2018-12-24 RX ORDER — GUAIFENESIN AND DEXTROMETHORPHAN HYDROBROMIDE 600; 30 MG/1; MG/1
1 TABLET, EXTENDED RELEASE ORAL EVERY 12 HOURS
Qty: 20 TABLET | Refills: 0 | Status: SHIPPED | OUTPATIENT
Start: 2018-12-24 | End: 2019-01-03

## 2018-12-24 NOTE — PATIENT INSTRUCTIONS
[facility] Phone Number: [phone]   [facility] Medication Refill Request Information:  * Please contact your pharmacy regarding ANY request for medication refills.  ** [facility] Prescription Fax = [phone]  * Please allow 3 business days for routine medication refills.  * Please allow 5 business days for controlled substance medication refills.     [facility] Test Result notification information:  *You will be notified with in 7-10 days of your appointment day regarding the results of your test.  If you are on MyChart you will be notified as soon as the provider has reviewed the results and signed off on them.

## 2018-12-24 NOTE — PROGRESS NOTES
"ASSESSMENT/PLAN:  Leg swelling - I suspect that he had a DVT on the left calf after traveling to [facility].  He did not get diagnostics or treatment there, and it probably got better on its own over the 2 months since it started.  He does not have breathing difficulty and O2 sat is normal so I do not think that it traveled to the lungs.  I will get a LE ultrasound to document resolution but this is not urgent since he is nearly better now.    Cold and cough - I think this is minor and likely viral.  I will give him Mucinex DM.    A.fib - check INR today per his long term anticoagulation plan    Suman Andrews MD, FACP      Chief complaint:  Ju Edwards is a 68 year old male presents for   Chief Complaint   Patient presents with     Leg Swelling     Returned from [facility] Dec 20th, ever since then pt c/o of leg swelling on left leg. Pt also feels sick and feels it is getting worse.         SUBJECTIVE:  Pt is the  of a patient of mine so he saw me when his regular physician was not available.  It was done via an .    He went to [facility] on 10/24 and while there his left leg became swollen (about a week after arriving).  He was there because his parents .  There was redness there and his calf was very large (like \"it was going to burst\").  He went to a doctor while there x 2 times.  He had some blood tests there but it was normal and they him it was arthritis.  He returned on  and the leg is still swollen but is getting better.  He then mentioned that he decided to come in because he was getting a cold also.    He also has a fever and runny nose.  He has not taken a temperature and just feels warm.  He has a headache and a cough.  (I mentioned that I have not heard him coughing and then he started to cough forcefully.)  He has started Tylenol and Advil and Vicks for the cough.  Today the cough is slowing down.  He says he has a runny nose but I did not see him sniff or wipe his nose the entire " time.  Last night he had a lot of running nose.    Medications and allergies were reviewed by me today.       Patient Active Problem List    Diagnosis Date Noted     Rectal bleeding 06/20/2018     Priority: Medium     Long-term (current) use of anticoagulants [Z79.01] 06/02/2016     Priority: Medium     Hypertension 04/29/2014     Priority: Medium     Hyperlipidemia 04/29/2014     Priority: Medium     Stroke (H) 04/29/2014     Priority: Medium     Atrial fibrillation (H) 04/29/2014     Priority: Medium       PHYSICAL EXAM:  /65   Pulse 81   SpO2 97%   Constitutional: no distress, comfortable, pleasant   Ears, Nose and Throat: tympanic membranes clear, nose with clear mucous and free of lesions, throat clear  Respiratory: clear to auscultation, no wheezes or crackles, normal breath sounds   Musculoskeletal: 1+ edema on the left and none on the right, there is no pain to palpation of the legs, no warmth or redness of the calf on the left  Skin: left foot is a bit red

## 2018-12-26 ENCOUNTER — ANTICOAGULATION THERAPY VISIT (OUTPATIENT)
Dept: ANTICOAGULATION | Facility: CLINIC | Age: 68
End: 2018-12-26

## 2018-12-26 DIAGNOSIS — Z79.01 LONG TERM CURRENT USE OF ANTICOAGULANT THERAPY: ICD-10-CM

## 2018-12-26 DIAGNOSIS — I48.91 ATRIAL FIBRILLATION (H): ICD-10-CM

## 2018-12-26 NOTE — PROGRESS NOTES
Spoke with patient's wife Lety today. Ju was in [place] 10/24/18 to 12/20/20/18 due to the death of his Mother.  See Dr Andrews's visit in [facility]  Lety said that he was taking his medications while he was in [facility]. I asked her to increase the warfarin dose for 2 days. Next INR will be on Friday 12/28/18.

## 2018-12-28 ENCOUNTER — ANCILLARY PROCEDURE (OUTPATIENT)
Dept: ULTRASOUND IMAGING | Facility: CLINIC | Age: 68
End: 2018-12-28
Attending: INTERNAL MEDICINE
Payer: COMMERCIAL

## 2018-12-28 ENCOUNTER — TELEPHONE (OUTPATIENT)
Dept: INTERNAL MEDICINE | Facility: CLINIC | Age: 68
End: 2018-12-28

## 2018-12-28 DIAGNOSIS — M79.89 LEFT LEG SWELLING: ICD-10-CM

## 2018-12-28 NOTE — TELEPHONE ENCOUNTER
[facility] Call Center    Phone Message    May a detailed message be left on voicemail: yes    Reason for Call: Other:  requesting letter stating patient has memory issues.  Patient's wife has a  service animal (dog) and they live in subsidized housing and are at risk of losing the dog because patient forgot to muzzle the dog due to his memory issues.  Please call  to discuss.      Action Taken: Message routed to:  [facility] ([facility]): ismael

## 2018-12-28 NOTE — TELEPHONE ENCOUNTER
Left message that we would need a release on file to release information to .    SHAISTA MALCOLM at 3:14 PM on 12/28/2018.

## 2019-01-04 ENCOUNTER — TELEPHONE (OUTPATIENT)
Dept: INTERNAL MEDICINE | Facility: CLINIC | Age: 69
End: 2019-01-04

## 2019-01-04 NOTE — TELEPHONE ENCOUNTER
I called Mr. Edwards to discuss ultrasound results. Lety (wife) answered and results were discussed with her per patient preference. No blood clot, swelling should decrease over time. Lety stated that she does believe swelling has improved, patient does continue to take warfarin, follow INR clinic recommendations per usual. No further questions at this time.    Sherry Garcia RN

## 2019-01-07 DIAGNOSIS — Z00.00 ROUTINE HEALTH MAINTENANCE: ICD-10-CM

## 2019-01-07 NOTE — TELEPHONE ENCOUNTER
flecainide acetate 150 MG TABS      Last Written Prescription Date:  11/29/17  Last Fill Quantity: 180,   # refills: 3  Last Office Visit : 12/24/18  Future Office visit:  none    Routing refill request to provider for review/approval because:  Per protocol: Medication needs approval from authorizing provider

## 2019-01-11 RX ORDER — FLECAINIDE ACETATE 150 MG/1
150 TABLET ORAL 2 TIMES DAILY
Qty: 180 TABLET | Refills: 3 | Status: SHIPPED | OUTPATIENT
Start: 2019-01-11 | End: 2019-12-15

## 2019-01-15 ENCOUNTER — RECORDS - HEALTHEAST (OUTPATIENT)
Dept: LAB | Facility: HOSPITAL | Age: 69
End: 2019-01-15

## 2019-01-15 ENCOUNTER — RECORDS - HEALTHEAST (OUTPATIENT)
Dept: ADMINISTRATIVE | Facility: OTHER | Age: 69
End: 2019-01-15

## 2019-01-15 LAB
FOLATE SERPL-MCNC: 5.6 NG/ML
VIT B12 SERPL-MCNC: 517 PG/ML (ref 213–816)

## 2019-01-16 LAB
METHYLMALONATE SERPL-SCNC: 0.12 UMOL/L (ref 0–0.4)
RPR (REFLEX ORDER ONLY): NORMAL
T PALLIDUM AB SER QL: ABNORMAL

## 2019-01-18 LAB — T PALLIDUM AB SER QL AGGL: REACTIVE

## 2019-02-04 ENCOUNTER — RECORDS - HEALTHEAST (OUTPATIENT)
Dept: ADMINISTRATIVE | Facility: OTHER | Age: 69
End: 2019-02-04

## 2019-02-04 ENCOUNTER — ANTICOAGULATION THERAPY VISIT (OUTPATIENT)
Dept: ANTICOAGULATION | Facility: CLINIC | Age: 69
End: 2019-02-04

## 2019-02-04 DIAGNOSIS — Z79.01 LONG TERM CURRENT USE OF ANTICOAGULANT THERAPY: ICD-10-CM

## 2019-02-04 DIAGNOSIS — I48.91 ATRIAL FIBRILLATION (H): ICD-10-CM

## 2019-02-04 LAB — INR PPP: 3.14 (ref 0.86–1.14)

## 2019-02-04 NOTE — PROGRESS NOTES
ANTICOAGULATION FOLLOW-UP CLINIC VISIT    Patient Name:  Ju Edwards  Date:  2/4/2019  Contact Type:  Telephone    SUBJECTIVE:     Patient Findings     Positives:   Unexplained INR or factor level change           OBJECTIVE    INR   Date Value Ref Range Status   02/04/2019 3.14 (H) 0.86 - 1.14 Final       ASSESSMENT / PLAN  INR assessment THER    Recheck INR In: 1 WEEK    INR Location Clinic      Anticoagulation Summary  As of 2/4/2019    INR goal:   2.0-3.0   TTR:   50.5 % (2.6 y)   INR used for dosing:   3.14! (2/4/2019)   Warfarin maintenance plan:   2 mg (2 mg x 1) every Mon, Wed, Fri; 3 mg (2 mg x 1.5) all other days   Full warfarin instructions:   2/5: 2 mg; Otherwise 2 mg every Mon, Wed, Fri; 3 mg all other days   Weekly warfarin total:   18 mg   Plan last modified:   Alice Carranza RN (6/2/2016)   Next INR check:   2/11/2019   Priority:   INR   Target end date:   Indefinite    Indications    Long term current use of anticoagulant therapy [Z79.01]  Atrial fibrillation (H) [I48.91]             Anticoagulation Episode Summary     INR check location:   Clinic Lab    Preferred lab:       [facility]    Comments:   Speak witrh Wife Lety [phone]  Pronounced Chance-AAna  Pt takes Warfarin dose at 4:30 PM      Anticoagulation Care Providers     Provider Role Specialty Phone number    Rayo Christiansen MD [facility] Internal Medicine [phone]            See the Encounter Report to view Anticoagulation Flowsheet and Dosing Calendar (Go to Encounters tab in chart review, and find the Anticoagulation Therapy Visit)    Spoke with spouse Leslie. Gave them lab results and new warfarin recommendation.  No changes in health, medication, or diet. No missed doses, no falls. No signs or symptoms of bleed or clotting.     Yanni Up, RN

## 2019-02-05 ENCOUNTER — COMMUNICATION - HEALTHEAST (OUTPATIENT)
Dept: INFECTIOUS DISEASES | Facility: CLINIC | Age: 69
End: 2019-02-05

## 2019-02-05 DIAGNOSIS — I63.039: ICD-10-CM

## 2019-02-05 RX ORDER — ACETAMINOPHEN 160 MG
1 TABLET,DISINTEGRATING ORAL DAILY
Qty: 90 CAPSULE | Refills: 3 | Status: SHIPPED | OUTPATIENT
Start: 2019-02-05 | End: 2020-03-03

## 2019-02-19 DIAGNOSIS — R41.3 MEMORY LOSS: ICD-10-CM

## 2019-02-19 RX ORDER — NAPROXEN 500 MG/1
500 TABLET ORAL 2 TIMES DAILY WITH MEALS
Qty: 180 TABLET | Refills: 1 | Status: SHIPPED | OUTPATIENT
Start: 2019-02-19 | End: 2022-01-19

## 2019-02-19 NOTE — TELEPHONE ENCOUNTER
Dr. Christiansen    Pt is requesting refill of Naproxen.  Pt is on coumadin. Rx is pended for your review. Thanks.    Suzi

## 2019-02-19 NOTE — TELEPHONE ENCOUNTER
naproxen (NAPROSYN) 500 MG tablet (Historical Med - 90 day request):      HISTORICAL ONLY   Last Office Visit : 12-24-18  Future Office visit:  NONE    Routing refill request to provider for review/approval because:  Drug not active on patient's medication list.  Labs overdue - clinic notified      Kathleen M Doege RN

## 2019-02-26 ENCOUNTER — TRANSFERRED RECORDS (OUTPATIENT)
Dept: HEALTH INFORMATION MANAGEMENT | Facility: CLINIC | Age: 69
End: 2019-02-26

## 2019-02-26 ENCOUNTER — OFFICE VISIT - HEALTHEAST (OUTPATIENT)
Dept: INFECTIOUS DISEASES | Facility: CLINIC | Age: 69
End: 2019-02-26

## 2019-02-26 DIAGNOSIS — Z86.19 HISTORY OF SYPHILIS: ICD-10-CM

## 2019-02-26 DIAGNOSIS — H40.002 GLAUCOMA SUSPECT OF LEFT EYE: ICD-10-CM

## 2019-02-26 RX ORDER — LATANOPROST 50 UG/ML
SOLUTION/ DROPS OPHTHALMIC
Qty: 2.5 ML | Refills: 0 | Status: SHIPPED | OUTPATIENT
Start: 2019-02-26 | End: 2019-03-19

## 2019-02-26 ASSESSMENT — MIFFLIN-ST. JEOR: SCORE: 1309.46

## 2019-02-26 NOTE — TELEPHONE ENCOUNTER
Last Clinic Visit: 5-23-18 [facility]  Next Clinic Visit 3-5-19  Last Clinic Note:  - Start latanoprost at bedtime both eyes

## 2019-02-26 NOTE — PROGRESS NOTES
ANTICOAGULATION FOLLOW-UP CLINIC VISIT    Patient Name:  Ju Edwards  Date:  8/16/2017  Contact Type:  Telephone    SUBJECTIVE:     Patient Findings     Positives No Problem Findings           OBJECTIVE    INR   Date Value Ref Range Status   08/16/2017 3.13 (H) 0.86 - 1.14 Final       ASSESSMENT / PLAN  INR assessment THER    Recheck INR In: 2 WEEKS    INR Location Clinic      Anticoagulation Summary as of 8/16/2017     INR goal 2.0-3.0   Today's INR 3.13!   Maintenance plan 2 mg (2 mg x 1) on Mon, Wed, Fri; 3 mg (2 mg x 1.5) all other days   Full instructions 2 mg on Mon, Wed, Fri; 3 mg all other days   Weekly total 18 mg   No change documented Alexander Becerril RN   Plan last modified Alice Carranza RN (6/2/2016)   Next INR check 8/30/2017   Priority INR   Target end date Indefinite    Indications   Long-term (current) use of anticoagulants [Z79.01] [Z79.01]  Atrial fibrillation (H) [I48.91]         Anticoagulation Episode Summary     INR check location Clinic Lab    Preferred lab     Send INR reminders to [facility]    Comments Speak witrh Wife Lety [phone]      Anticoagulation Care Providers     Provider Role Specialty Phone number    Rayo Christiansen MD [facility] Internal Medicine [phone]            See the Encounter Report to view Anticoagulation Flowsheet and Dosing Calendar (Go to Encounters tab in chart review, and find the Anticoagulation Therapy Visit)    Spoke with Lety. Gave them their lab results and new warfarin recommendation.  No changes in health, medication, or diet. No missed doses, no falls. No signs or symptoms of bleed or clotting. Recheck in two weeks.    Alexander Becerril, RN               
Medications

## 2019-02-28 ENCOUNTER — OFFICE VISIT (OUTPATIENT)
Dept: INTERNAL MEDICINE | Facility: CLINIC | Age: 69
End: 2019-02-28
Payer: COMMERCIAL

## 2019-02-28 VITALS — OXYGEN SATURATION: 97 % | DIASTOLIC BLOOD PRESSURE: 83 MMHG | SYSTOLIC BLOOD PRESSURE: 158 MMHG | HEART RATE: 57 BPM

## 2019-02-28 DIAGNOSIS — Z86.73 H/O: STROKE: Primary | ICD-10-CM

## 2019-02-28 ASSESSMENT — PAIN SCALES - GENERAL: PAINLEVEL: NO PAIN (0)

## 2019-02-28 NOTE — LETTER
[facility]   [address], [place] [address]  Phone: [phone]     & Vehicle Services   Evaluation  [address]  [place] [address]    Ju Edwards  [address]  [address]  [place] [address]  2/28/2019           I have been asked to furnish you with a statement as to Ju Edwards's physical and mental condition in regards to his ability to safely operate a motor vehicle. He has been driving for the past year and has had no accidents. His neurologic exam is grossly normal without severe evidence of visual field deficit and his 3 word recall is good. He also has an eye exam upcoming on 03/05 to ensure his sight is appropriate and for visual field testing.     Juis safe to operate a motor vehicle at this time.      Sincerely,      Shannen Cannon MD

## 2019-02-28 NOTE — PATIENT INSTRUCTIONS
[facility] Medication Refill Request Information:  * Please contact your pharmacy regarding ANY request for medication refills.  ** [facility] Prescription Fax = [phone]  * Please allow 3 business days for routine medication refills.  * Please allow 5 business days for controlled substance medication refills.     [facility] Test Result notification information:  *You will be notified with in 7-10 days of your appointment day regarding the results of your test.  If you are on MyChart you will be notified as soon as the provider has reviewed the results and signed off on them.    [facility]: [phone]            [facility]         Internal Medicine Resident                   Continuity Clinic    Who We Are    Resident Continuity Clinic is a part of the [facility] Primary Care Clinic.  Resident physicians see patients independently and establish a relationship with them over the course of their three-year residency program.  As with the Primary Care Clinic, our Resident Continuity Clinic models a group practice.  If your doctor is not available, you will be able to see another resident physician.  At the end of a resident s training, patients will be transitioned to a new resident physician for ongoing care.     We treat patients with a wide array of medical needs from routine physicals, to acute illnesses, to diabetes and blood pressure management, to complex medical illness.  What is a Resident Physician?    Resident physicians hold medical degrees and are doctors. They are training to become specialists in Internal Medicine. They work under the supervision of board-certified faculty physicians.  Expectations for Your Care    We strive to provide accessible, quality care at all times.    In order to provide this care, it is best to see your primary care resident doctor consistently rather switching between providers.  In the event you do see another physician, you should  schedule a follow-up visit with your usual primary care doctor.    If you are transitioning your care from another clinic, it is helpful to have your records available for your doctor to review.    We do not prescribe controlled substances, such as ADD medications or narcotic pain medications, on your first visit.  We will review your health records and concerns prior to devising a treatment plan with you in order to provide the best care.      Clinic Services     Extended clinic hours; patient  to help navigate your visit;  parking; laboratory and imaging services with evening and weekend hours    Multiple medical and surgical specialties in one building    Complementary services, including Nutrition, Integrative Medicine, Pharmacy consultations, Mental and Behavioral Health, Sports Medicine and Physical Therapy    Thank You    We would like to thank you for choosing the [facility] Internal Medicine Resident Continuity Clinic for your primary care. You are making a priceless contribution to the training of the next generation of health care practitioners.     Contact us at [phone] for appointments or questions.    Resident Clinic Hours are Tuesdays and Thursdays, 7:30am-5:00pm    Residents   Jonny Calloway MD   (Male)   Thuy Velasquez MD  (Female)  Rekha Arndt MD   (Female)   Samreen Rainey MD   (Female)   Chidi Roldan MD  (Male)   Buzz Olivarez MD  (Male)   Deepa Minor MD    (Female)   Yazan Arvizu MD  (Male)   Jimmy Acuña MD  (Male)    Sarah Anne MD  (Female)   Jimmie Baptiste MD  (Male)   Joi Ramsay MD  (Female)   Christiano Almaraz MD    (Female)   Sachin Paredes MD  (Male)   Jaden Coyne MD  (Male)   Buzz Baird MD (Male)   Ian Lovelace MD  (Male)   Mary Ann Dias MD (Female)    Dione Funk MD (Female)   Jenna Tucker MD  (Male)   Robyn Cannon MD    (Female)   Daija Padilla MD  (Female)    Supervising Physicians   Eula  Nadir, MD Deanne Miguel, MD Kelechi Holley, MD Rayo Christiansen, MD Danyelle Dent, MD Damaris Bains, MD Calvin Jasso, MD Maria Luisa Molina, MD Lacey Oshea MD

## 2019-02-28 NOTE — PROGRESS NOTES
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    Chart review history:  Patient was last seen in primary care clinic by Dr. Andrews on 12/24/18.  At that time, the patient had just returned from [facility], and complained of leg swelling.  It was suspected that he perhaps had a DVT in his left calf after traveling to [facility]; a lower extremity ultrasound was obtained which showed no evidence of DVT.  In addition, he had a cough which was likely viral.  Since this visit, the patient has had a number of refill requests.  In addition, they had called the clinic as the patient forgot to muscle their service dog and were at risk of losing housing; the family had hoped that aphysician could call the patient's  to discuss.  The patient is also seen infectious disease at [facility], will follow the patient for history of syphilis at age of 20.  Ultimately, it was thought that his syphilis was fully treated as, he had a negative RPR titer.  He is also discussed with ophthalmology who refilled latanoprost for glaucoma.    Today the patient wishes to discuss:  1) Driving evaluation   He wishes to obtain a letter for driving to send to the DM[employer] given his hx of stroke.     Medications and allergies reviewed by me today.   Atenolol 12.5 mg daily, atorvastatin 80 mg daily, cholecalciferol 2000 units daily, citalopram 20 mg daily, cyclosporine 1 drop twice daily to bilateral eyes, flecainide 150 mg twice daily, lisinopril 10 mg daily, naproxen 500 mg twice daily with meals.    ROS:   Constitutional, neuro, ENT, endocrine, pulmonary,  cardiac, gastrointestinal, genitourinary, musculoskeletal, integument and psychiatric systems are negative, except as otherwise noted.    OBJECTIVE:    /83   Pulse 57   SpO2 97%    Wt Readings from Last 1 Encounters:   09/26/18 64.5 kg (142 lb 3.2 oz)       GENERAL APPEARANCE: healthy, alert and no distress     EYES: EOMI,  PERRL     HENT: ear canals and TM's normal and nose and mouth without ulcers or lesions     NECK: no adenopathy, no asymmetry, masses, or scars and thyroid normal to palpation     RESP: lungs clear to auscultation - no rales, rhonchi or wheezes     CV: regular rates and rhythm, normal S1 S2, no S3 or S4 and no murmur, click or rub     ABDOMEN:  deferred     MS: extremities normal- no gross deformities noted, no evidence of inflammation in joints, FROM in all extremities.     NEURO: Normal strength and tone, sensory exam grossly normal, mentation intact, speech normal, Romberg negative and rapid alternating movements normal. 3 word recall good. CN 1-9 intact without deficit (tongue midline, shoulder shrug symmetric, face is symmetric with good C spine strength)  Gait is WNL without wide gait   Modified get up and go normal.     PSYCH: mentation appears normal. 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    1) Driving evaluation  His neurologic exam is normal, he has no accidents or falls. His 3 word recall is appropriate (no formal cognitive testing on file and ca be considered ezequiel ater date) however  currently he appears to have minimal cognitive impairment on my exam.   - Letter provided for DMV -  OK to drive based on my examination currently.  - Ophtho exam pending (appt scheduled on 3/5/19) - stressed importance of attending this.    Pt should return to clinic for f/u PRN    Robyn Cannon MD  Feb 28, 2019    Pt was discussed with .   While the patient was in clinic, I reviewed the pertinent medical history and results.  I discussed the current findings on physical examination, as well as the patient s diagnosis and treatment plan with the resident and agree with the information as documented with the following exceptions: none.    Lacey Oshea MD

## 2019-02-28 NOTE — NURSING NOTE
Chief Complaint   Patient presents with     Forms     Pt needs a letter stating he is okay to drive still.     Melia Alvarez LPN at 9:29 AM on 2/28/2019.

## 2019-03-05 ENCOUNTER — ANTICOAGULATION THERAPY VISIT (OUTPATIENT)
Dept: ANTICOAGULATION | Facility: CLINIC | Age: 69
End: 2019-03-05

## 2019-03-05 ENCOUNTER — OFFICE VISIT (OUTPATIENT)
Dept: OPHTHALMOLOGY | Facility: CLINIC | Age: 69
End: 2019-03-05
Attending: OPHTHALMOLOGY
Payer: COMMERCIAL

## 2019-03-05 DIAGNOSIS — Z79.01 LONG TERM CURRENT USE OF ANTICOAGULANT THERAPY: ICD-10-CM

## 2019-03-05 DIAGNOSIS — H40.2222: Primary | ICD-10-CM

## 2019-03-05 DIAGNOSIS — H02.889 MEIBOMIAN GLAND DISEASE, UNSPECIFIED LATERALITY: ICD-10-CM

## 2019-03-05 DIAGNOSIS — I48.91 ATRIAL FIBRILLATION (H): ICD-10-CM

## 2019-03-05 DIAGNOSIS — H11.033 DOUBLE PTERYGIUM OF BOTH EYES: ICD-10-CM

## 2019-03-05 DIAGNOSIS — H40.2211: ICD-10-CM

## 2019-03-05 DIAGNOSIS — H04.123 BILATERAL DRY EYES: ICD-10-CM

## 2019-03-05 LAB — INR PPP: 1.8 (ref 0.86–1.14)

## 2019-03-05 PROCEDURE — T1013 SIGN LANG/ORAL INTERPRETER: HCPCS | Mod: U3,ZF

## 2019-03-05 PROCEDURE — 76514 ECHO EXAM OF EYE THICKNESS: CPT | Mod: ZF,LT | Performed by: OPHTHALMOLOGY

## 2019-03-05 PROCEDURE — 92133 CPTRZD OPH DX IMG PST SGM ON: CPT | Mod: ZF | Performed by: OPHTHALMOLOGY

## 2019-03-05 PROCEDURE — 92020 GONIOSCOPY: CPT | Mod: ZF,59 | Performed by: OPHTHALMOLOGY

## 2019-03-05 PROCEDURE — 92015 DETERMINE REFRACTIVE STATE: CPT | Mod: ZF

## 2019-03-05 PROCEDURE — G0463 HOSPITAL OUTPT CLINIC VISIT: HCPCS | Mod: ZF

## 2019-03-05 PROCEDURE — 92083 EXTENDED VISUAL FIELD XM: CPT | Mod: ZF | Performed by: OPHTHALMOLOGY

## 2019-03-05 RX ORDER — BRIMONIDINE TARTRATE 1.5 MG/ML
1 SOLUTION/ DROPS OPHTHALMIC 3 TIMES DAILY
Qty: 1 BOTTLE | Refills: 11 | Status: SHIPPED | OUTPATIENT
Start: 2019-03-05 | End: 2020-05-21

## 2019-03-05 RX ORDER — DORZOLAMIDE HYDROCHLORIDE AND TIMOLOL MALEATE 20; 5 MG/ML; MG/ML
1 SOLUTION/ DROPS OPHTHALMIC 2 TIMES DAILY
Qty: 1 BOTTLE | Refills: 11 | Status: SHIPPED | OUTPATIENT
Start: 2019-03-05 | End: 2020-05-21

## 2019-03-05 ASSESSMENT — VISUAL ACUITY
OD_PH_SC: 20/30
OD_SC+: +1
OS_SC: 20/30
METHOD: SNELLEN - LINEAR
OS_PH_SC+: +2
OD_SC: 20/40
OS_PH_SC: 20/30

## 2019-03-05 ASSESSMENT — CONF VISUAL FIELD
OD_NORMAL: 1
OS_NORMAL: 1
METHOD: COUNTING FINGERS

## 2019-03-05 ASSESSMENT — REFRACTION_WEARINGRX
OD_SPHERE: -0.75
SPECS_TYPE: BIFOCAL
OD_CYLINDER: +0.50
OS_SPHERE: -0.50
OS_CYLINDER: +0.50
OS_AXIS: 097
OD_AXIS: 016
OD_ADD: +2.50
OS_ADD: +2.50

## 2019-03-05 ASSESSMENT — REFRACTION_MANIFEST
OD_AXIS: 020
OS_ADD: +2.50
OS_SPHERE: -0.50
OD_ADD: +2.50
OS_AXIS: 165
OD_SPHERE: -0.75
OD_CYLINDER: +1.00
OS_CYLINDER: +0.75

## 2019-03-05 ASSESSMENT — GONIOSCOPY
OS_INFERIOR: BARE TM
OD_NASAL: BARE TM
OS_TEMPORAL: APPOSITIONAL
OD_SUPERIOR: BARE TM
OD_INFERIOR: BARE TM
OS_NASAL: APPOSITIONAL
OD_TEMPORAL: BARE TM
OS_SUPERIOR: APPOSITIONAL

## 2019-03-05 ASSESSMENT — PACHYMETRY
OS_CT(UM): 594
OD_CT(UM): 589

## 2019-03-05 ASSESSMENT — TONOMETRY
OS_IOP_MMHG: 38
OD_IOP_MMHG: 16
IOP_METHOD: APPLANATION

## 2019-03-05 ASSESSMENT — SLIT LAMP EXAM - LIDS
COMMENTS: MILD MGD
COMMENTS: MILD MGD

## 2019-03-05 ASSESSMENT — CUP TO DISC RATIO
OD_RATIO: 0.3
OS_RATIO: 0.7

## 2019-03-05 NOTE — PROGRESS NOTES
Chief Complaint(s) and History of Present Illness(es)     Glaucoma Suspect Follow Up     Associated symptoms: Negative for flashes, floaters, eye pain and redness                Comments     Pt with  today.  Pt states that he got new glasses after last visit and did not see well   with glasses. Pt having difficulty reading up close. Pt did not bring   glasses today.  Dryness and itching in both eyes, limited relief with eye drops.    Yaima CAMPBELL March 5, 2019 7:57 AM              Review of systems for the eyes was negative other than the pertinent positives/negatives listed in the HPI.      Assessment & Plan      Ju Edwards is a 68 year old male with the following diagnoses:   1. Chronic narrow angle glaucoma, left, moderate stage    2. Chronic narrow angle glaucoma, right, mild stage    3. Bilateral dry eyes    4. Meibomian gland disease, unspecified laterality    5. Double pterygium of both eyes         Started on latanoprost in 2018 in both eyes (Dr. Carbone - glaucoma suspicion)  Lost to follow up until today   Reports good compliance with drops every night  Denies vision changes at this time.  Mild glare and intermittent foreign body sensation     Intraocular pressure significantly elevated left eye.  Improved right eye   Occludable angle without acute block in the left eye at this time.  High risk of angle closure both eyes   Worsening OCT Nerve fiber layer left eye   Initial visual field today with arcuate defect right eye, nonspecific left eye     Discussed.  Recommend laser peripheral iridotomy (LPI) after improving left eye intraocular pressure next week  Start Cosopt twice a day left eye   Start Alphagan three times a day left eye   Continue latanoprost at bedtime both eyes   Continue restasis twice a day   Artificial tears as needed   Warm compresses twice a day and as needed       Patient disposition:   Return in about 1 week (around 3/12/2019) for VT only, LPI left .           Attending Physician Attestation:  Complete documentation of historical and exam elements from today's encounter can be found in the full encounter summary report (not reduplicated in this progress note).  I personally obtained the chief complaint(s) and history of present illness.  I confirmed and edited as necessary the review of systems, past medical/surgical history, family history, social history, and examination findings as documented by others; and I examined the patient myself.  I personally reviewed the relevant tests, images, and reports as documented above.  I formulated and edited as necessary the assessment and plan and discussed the findings and management plan with the patient and family. . - Stanford Welsh MD

## 2019-03-05 NOTE — PROGRESS NOTES
ANTICOAGULATION FOLLOW-UP CLINIC VISIT    Patient Name:  Ju Edwards  Date:  3/5/2019  Contact Type:  Telephone    SUBJECTIVE:        OBJECTIVE    INR   Date Value Ref Range Status   2019 1.80 (H) 0.86 - 1.14 Final       ASSESSMENT / PLAN  INR assessment SUB    Recheck INR In: 1 WEEK    INR Location Clinic      Anticoagulation Summary  As of 3/5/2019    INR goal:   2.0-3.0   TTR:   51.2 % (2.7 y)   INR used for dosin.80! (3/5/2019)   Warfarin maintenance plan:   2 mg (2 mg x 1) every Mon, Wed, Fri; 3 mg (2 mg x 1.5) all other days   Full warfarin instructions:   3/5: 4 mg; Otherwise 2 mg every Mon, Wed, Fri; 3 mg all other days   Weekly warfarin total:   18 mg   Plan last modified:   Alice Carranza RN (2016)   Next INR check:   3/12/2019   Priority:   INR   Target end date:   Indefinite    Indications    Long term current use of anticoagulant therapy [Z79.01]  Atrial fibrillation (H) [I48.91]             Anticoagulation Episode Summary     INR check location:   Clinic Lab    Preferred lab:       Send INR reminders to:   [facility]    Comments:   Speak witrh Wife Lety [phone]  Pronounced Chance-AAna  Pt takes Warfarin dose at 4:30 PM      Anticoagulation Care Providers     Provider Role Specialty Phone number    Rayo Christiansen MD [facility] Internal Medicine [phone]            See the Encounter Report to view Anticoagulation Flowsheet and Dosing Calendar (Go to Encounters tab in chart review, and find the Anticoagulation Therapy Visit)    Left message for patient with results and dosing recommendations. Asked patient to call back to report any missed doses, falls, signs and symptoms of bleeding or clotting, any changes in health, medication, or diet. Asked patient to call back with any questions or concerns.      Alice Fish RN

## 2019-03-05 NOTE — NURSING NOTE
Chief Complaints and History of Present Illnesses   Patient presents with     Glaucoma Suspect Follow Up     Chief Complaint(s) and History of Present Illness(es)     Glaucoma Suspect Follow Up     Associated symptoms: Negative for flashes, floaters, eye pain and redness              Comments     Pt with  today.  Pt states that he got new glasses after last visit and did not see well with glasses. Pt having difficulty reading up close. Pt did not bring glasses today.  Dryness and itching in both eyes, limited relief with eye drops.    Yaima CAMPBELL March 5, 2019 7:57 AM

## 2019-03-19 DIAGNOSIS — H40.002 GLAUCOMA SUSPECT OF LEFT EYE: ICD-10-CM

## 2019-03-20 RX ORDER — LATANOPROST 50 UG/ML
SOLUTION/ DROPS OPHTHALMIC
Qty: 2.5 ML | Refills: 3 | Status: SHIPPED | OUTPATIENT
Start: 2019-03-20 | End: 2020-01-13

## 2019-03-20 NOTE — TELEPHONE ENCOUNTER
Last Clinic Visit: 3/5/2019 Eye Clinic  Last Clinic Note: Dr. Welsh  Continue latanoprost at bedtime both eyes

## 2019-03-25 ENCOUNTER — TRANSFERRED RECORDS (OUTPATIENT)
Dept: HEALTH INFORMATION MANAGEMENT | Facility: CLINIC | Age: 69
End: 2019-03-25

## 2019-03-28 DIAGNOSIS — Z79.01 LONG TERM CURRENT USE OF ANTICOAGULANT THERAPY: ICD-10-CM

## 2019-03-28 DIAGNOSIS — I48.91 ATRIAL FIBRILLATION, UNSPECIFIED TYPE (H): ICD-10-CM

## 2019-03-28 RX ORDER — WARFARIN SODIUM 2 MG/1
TABLET ORAL
Qty: 140 TABLET | Refills: 1 | Status: SHIPPED | OUTPATIENT
Start: 2019-03-28 | End: 2019-10-10

## 2019-04-02 ENCOUNTER — ANTICOAGULATION THERAPY VISIT (OUTPATIENT)
Dept: ANTICOAGULATION | Facility: CLINIC | Age: 69
End: 2019-04-02

## 2019-04-02 DIAGNOSIS — Z79.01 LONG TERM CURRENT USE OF ANTICOAGULANT THERAPY: ICD-10-CM

## 2019-04-02 DIAGNOSIS — I48.91 ATRIAL FIBRILLATION (H): ICD-10-CM

## 2019-04-02 LAB — INR PPP: 2.54 (ref 0.86–1.14)

## 2019-04-02 NOTE — PROGRESS NOTES
"ANTICOAGULATION FOLLOW-UP CLINIC VISIT    Patient Name:  Ju Edwards  Date:  2019  Contact Type:  Telephone    SUBJECTIVE:     Patient Findings     Comments:   Spouse charis couldn't think of any reason why last INR of 1.80 and says \"as she can think of pt is taking all his Warfarin.\"           OBJECTIVE    INR   Date Value Ref Range Status   2019 2.54 (H) 0.86 - 1.14 Final       ASSESSMENT / PLAN  INR assessment THER    Recheck INR In: 4 WEEKS    INR Location Clinic      Anticoagulation Summary  As of 2019    INR goal:   2.0-3.0   TTR:   51.8 % (2.8 y)   INR used for dosin.54 (2019)   Warfarin maintenance plan:   2 mg (2 mg x 1) every Mon, Wed, Fri; 3 mg (2 mg x 1.5) all other days   Full warfarin instructions:   2 mg every Mon, Wed, Fri; 3 mg all other days   Weekly warfarin total:   18 mg   Plan last modified:   Alice Carranza RN (2016)   Next INR check:   2019   Priority:   INR   Target end date:   Indefinite    Indications    Long term current use of anticoagulant therapy [Z79.01]  Atrial fibrillation (H) [I48.91]             Anticoagulation Episode Summary     INR check location:   Clinic Lab    Preferred lab:       Send INR reminders to:   [facility]    Comments:   Speak witrh Wife Lety [phone]  Pronounced Chance-A-Mout  Pt takes Warfarin dose at 4:30 PM      Anticoagulation Care Providers     Provider Role Specialty Phone number    Rayo Christiansen MD [facility] Internal Medicine [phone]            See the Encounter Report to view Anticoagulation Flowsheet and Dosing Calendar (Go to Encounters tab in chart review, and find the Anticoagulation Therapy Visit)    Spoke with spouse Charis. Gave them lab results and new warfarin recommendation.  No changes in health, medication, or diet. No missed doses, no falls. No signs or symptoms of bleed or clotting.     Yanni Up RN                 "

## 2019-04-30 ENCOUNTER — ANTICOAGULATION THERAPY VISIT (OUTPATIENT)
Dept: ANTICOAGULATION | Facility: CLINIC | Age: 69
End: 2019-04-30

## 2019-04-30 ENCOUNTER — OFFICE VISIT (OUTPATIENT)
Dept: INTERNAL MEDICINE | Facility: CLINIC | Age: 69
End: 2019-04-30
Payer: COMMERCIAL

## 2019-04-30 VITALS
HEART RATE: 56 BPM | HEIGHT: 63 IN | RESPIRATION RATE: 16 BRPM | SYSTOLIC BLOOD PRESSURE: 157 MMHG | BODY MASS INDEX: 26.03 KG/M2 | WEIGHT: 146.9 LBS | DIASTOLIC BLOOD PRESSURE: 77 MMHG

## 2019-04-30 DIAGNOSIS — Z79.01 LONG TERM CURRENT USE OF ANTICOAGULANT THERAPY: ICD-10-CM

## 2019-04-30 DIAGNOSIS — I10 ESSENTIAL HYPERTENSION: ICD-10-CM

## 2019-04-30 DIAGNOSIS — R10.84 ABDOMINAL PAIN, GENERALIZED: ICD-10-CM

## 2019-04-30 DIAGNOSIS — I48.91 ATRIAL FIBRILLATION (H): ICD-10-CM

## 2019-04-30 DIAGNOSIS — E78.2 MIXED HYPERLIPIDEMIA: ICD-10-CM

## 2019-04-30 DIAGNOSIS — R19.7 DIARRHEA, UNSPECIFIED TYPE: ICD-10-CM

## 2019-04-30 DIAGNOSIS — E78.2 MIXED HYPERLIPIDEMIA: Primary | ICD-10-CM

## 2019-04-30 DIAGNOSIS — I87.2 VENOUS (PERIPHERAL) INSUFFICIENCY: ICD-10-CM

## 2019-04-30 DIAGNOSIS — I10 BENIGN ESSENTIAL HYPERTENSION: ICD-10-CM

## 2019-04-30 DIAGNOSIS — R73.02 IGT (IMPAIRED GLUCOSE TOLERANCE): ICD-10-CM

## 2019-04-30 DIAGNOSIS — I48.91 ATRIAL FIBRILLATION, UNSPECIFIED TYPE (H): ICD-10-CM

## 2019-04-30 LAB
ALBUMIN SERPL-MCNC: 4.1 G/DL (ref 3.4–5)
ALP SERPL-CCNC: 90 U/L (ref 40–150)
ALT SERPL W P-5'-P-CCNC: 28 U/L (ref 0–70)
ANION GAP SERPL CALCULATED.3IONS-SCNC: 5 MMOL/L (ref 3–14)
AST SERPL W P-5'-P-CCNC: 20 U/L (ref 0–45)
BASOPHILS # BLD AUTO: 0 10E9/L (ref 0–0.2)
BASOPHILS NFR BLD AUTO: 0.7 %
BILIRUB DIRECT SERPL-MCNC: <0.1 MG/DL (ref 0–0.2)
BILIRUB SERPL-MCNC: 0.5 MG/DL (ref 0.2–1.3)
BUN SERPL-MCNC: 19 MG/DL (ref 7–30)
CALCIUM SERPL-MCNC: 9.3 MG/DL (ref 8.5–10.1)
CHLORIDE SERPL-SCNC: 105 MMOL/L (ref 94–109)
CHOLEST SERPL-MCNC: 209 MG/DL
CO2 SERPL-SCNC: 28 MMOL/L (ref 20–32)
CREAT SERPL-MCNC: 1.01 MG/DL (ref 0.66–1.25)
DIFFERENTIAL METHOD BLD: NORMAL
EOSINOPHIL # BLD AUTO: 0.1 10E9/L (ref 0–0.7)
EOSINOPHIL NFR BLD AUTO: 1 %
ERYTHROCYTE [DISTWIDTH] IN BLOOD BY AUTOMATED COUNT: 13.3 % (ref 10–15)
GFR SERPL CREATININE-BSD FRML MDRD: 77 ML/MIN/{1.73_M2}
GLUCOSE SERPL-MCNC: 95 MG/DL (ref 70–99)
HBA1C MFR BLD: 6.5 % (ref 0–5.6)
HCT VFR BLD AUTO: 41.3 % (ref 40–53)
HDLC SERPL-MCNC: 35 MG/DL
HGB BLD-MCNC: 13.5 G/DL (ref 13.3–17.7)
IMM GRANULOCYTES # BLD: 0 10E9/L (ref 0–0.4)
IMM GRANULOCYTES NFR BLD: 0.2 %
INR PPP: 2.58 (ref 0.86–1.14)
LDLC SERPL CALC-MCNC: 118 MG/DL
LYMPHOCYTES # BLD AUTO: 1.5 10E9/L (ref 0.8–5.3)
LYMPHOCYTES NFR BLD AUTO: 26 %
MCH RBC QN AUTO: 28.4 PG (ref 26.5–33)
MCHC RBC AUTO-ENTMCNC: 32.7 G/DL (ref 31.5–36.5)
MCV RBC AUTO: 87 FL (ref 78–100)
MONOCYTES # BLD AUTO: 0.5 10E9/L (ref 0–1.3)
MONOCYTES NFR BLD AUTO: 8.3 %
NEUTROPHILS # BLD AUTO: 3.8 10E9/L (ref 1.6–8.3)
NEUTROPHILS NFR BLD AUTO: 63.8 %
NONHDLC SERPL-MCNC: 174 MG/DL
NRBC # BLD AUTO: 0 10*3/UL
NRBC BLD AUTO-RTO: 0 /100
PLATELET # BLD AUTO: 209 10E9/L (ref 150–450)
POTASSIUM SERPL-SCNC: 4.2 MMOL/L (ref 3.4–5.3)
PROT SERPL-MCNC: 8.1 G/DL (ref 6.8–8.8)
RBC # BLD AUTO: 4.75 10E12/L (ref 4.4–5.9)
SODIUM SERPL-SCNC: 138 MMOL/L (ref 133–144)
TRIGL SERPL-MCNC: 278 MG/DL
WBC # BLD AUTO: 5.9 10E9/L (ref 4–11)

## 2019-04-30 RX ORDER — ATORVASTATIN CALCIUM 80 MG/1
80 TABLET, FILM COATED ORAL DAILY
Qty: 100 TABLET | Refills: 3 | Status: SHIPPED | OUTPATIENT
Start: 2019-04-30 | End: 2020-05-08

## 2019-04-30 RX ORDER — LISINOPRIL 20 MG/1
20 TABLET ORAL DAILY
Qty: 100 TABLET | Refills: 3 | Status: SHIPPED | OUTPATIENT
Start: 2019-04-30 | End: 2020-04-22

## 2019-04-30 RX ORDER — AMLODIPINE BESYLATE 10 MG/1
10 TABLET ORAL DAILY
Qty: 100 TABLET | Refills: 3 | Status: SHIPPED | OUTPATIENT
Start: 2019-04-30 | End: 2020-04-22

## 2019-04-30 RX ORDER — ATENOLOL 25 MG/1
12.5 TABLET ORAL DAILY
Qty: 45 TABLET | Refills: 3 | Status: SHIPPED | OUTPATIENT
Start: 2019-04-30 | End: 2020-04-23

## 2019-04-30 RX ORDER — AMLODIPINE BESYLATE 10 MG/1
10 TABLET ORAL
COMMUNITY
Start: 2014-08-13 | End: 2019-04-30

## 2019-04-30 RX ORDER — NITROGLYCERIN 0.4 MG/1
0.4 TABLET SUBLINGUAL EVERY 5 MIN PRN
COMMUNITY
Start: 2019-04-25

## 2019-04-30 ASSESSMENT — MIFFLIN-ST. JEOR: SCORE: 1341.46

## 2019-04-30 ASSESSMENT — PAIN SCALES - GENERAL: PAINLEVEL: NO PAIN (0)

## 2019-04-30 NOTE — PROGRESS NOTES
HPI  68-year-old seen today with the aid of .  He is here for physical examination he still concerned about swelling he experienced in his legs on his trip to [facility].  States that his leg swelled on the plane trip on the way over remains swollen while he was there he concedes weather was very hot.  The swelling resolved after his return to the [place].  Is not any pain redness or discomfort in the legs at all.  He is been evaluated for this with an ultrasound which showed no evidence of DVT.  He does not use support stockings in the past but has not tried elevation for this.  Is apparently given a medication allows which was ineffective.  He also expresses concern about possible parasites uses a community shower and noticed what he thought was worms in the bottom of the shower was not certain if this came from him more from somebody else.  While in [facility] he did have a brief period of diarrhea but this has subsided and is not had any further diarrhea or frequent stools since returning to this country.  He has however had some generalized mild abdominal pain.  Is not related to meals eating or bowel movements and is intermittent and nonspecific in his description.  Is a little unclear on his medications but does concede to be taking his blood pressure medication regularly.  He has not had any recent blood pressure checks at home at all.  Past Medical History:   Diagnosis Date     Atrial fibrillation (H)     paroxysmal     Delirium     associated with hospitalization for stroke     Depression      Gastric erosions 1/2014    associated with gi bleed     Hyperlipidemia      Hypertension      Stroke (H) 1/2014    ischemic CVA with hemorrhagic transformation     Past Surgical History:   Procedure Laterality Date     CARDIAC SURGERY  2000    mitral valve repair     REPAIR ATRIAL SEPTAL DEFECT       Family History   Problem Relation Age of Onset     Eye Surgery Mother      Glaucoma No family hx of      Macular  "Degeneration No family hx of      Social History     Socioeconomic History     Marital status:      Spouse name: None     Number of children: None     Years of education: None     Highest education level: None   Occupational History     None   Social Needs     Financial resource strain: None     Food insecurity:     Worry: None     Inability: None     Transportation needs:     Medical: None     Non-medical: None   Tobacco Use     Smoking status: Never Smoker     Smokeless tobacco: Never Used   Substance and Sexual Activity     Alcohol use: No     Drug use: No     Sexual activity: Yes     Partners: Female   Lifestyle     Physical activity:     Days per week: None     Minutes per session: None     Stress: None   Relationships     Social connections:     Talks on phone: None     Gets together: None     Attends Shinto service: None     Active member of club or organization: None     Attends meetings of clubs or organizations: None     Relationship status: None     Intimate partner violence:     Fear of current or ex partner: None     Emotionally abused: None     Physically abused: None     Forced sexual activity: None   Other Topics Concern      Service Not Asked     Blood Transfusions Not Asked     Caffeine Concern Not Asked     Occupational Exposure Not Asked     Hobby Hazards Not Asked     Sleep Concern Not Asked     Stress Concern Not Asked     Weight Concern Not Asked     Special Diet No     Back Care Not Asked     Exercise Yes     Comment: rehab     Bike Helmet Not Asked     Seat Belt Not Asked     Self-Exams Not Asked   Social History Narrative     None       Exam:  /77 (BP Location: Right arm, Patient Position: Sitting, Cuff Size: Adult Regular)   Pulse 56   Resp 16   Ht 1.6 m (5' 3\")   Wt 66.6 kg (146 lb 14.4 oz)   BMI 26.02 kg/m    146 lbs 14.4 oz  Physical Exam   The patient is alert, oriented with a clear sensorium.   Skin shows no lesions or rashes and good turgor.   Head is " normocephalic and atraumatic.   Eyes show PERRLA with poorly seen optic fundi.   Ears show normal TMs bilaterally.   Mouth shows clear oral mucosa.   Neck shows no nodes, thyromegaly or bruits.   Back is non tender.  Lungs are clear to percussion and auscultation.   Heart shows normal S1 and S2 without murmur or gallop.   Abdomen is soft and nontender without masses or organomegaly.   Genitalia show normal testes. No evidence of inguinal hernia.  Rectal shows small smooth prostate without nodules or masses.  Extremities show no edema and no evidence of active synovitis.   Neurologic examination shows cranial nerves II-XII intact. Motor shows 5/5 strength. Reflexes are symmetric. Cerebellar testing shows normal tandem gait.  Romberg negative.    ASSESSMENT  1 venous insufficiency resolved  2 atrial fibrillation intermittent  3 hypertension inadequately controlled  4 impaired glucose tolerance needs reassessment  5 hyperlipidemia/metabolic syndrome on atorvastatin  6 concern for possible parasitic infection    Plan  Number to have him get his labs today we will check his stool for O&P and use compression stockings when he travels on extended plane flights and while in [place] we will increase his lisinopril to 20 mg daily and continue on the amlodipine and atenolol as well.  Plan to have him reassess in another month with blood pressure check  Over 40 minutes spent with patient the majority in counseling and coordinating care.    This note was completed using Dragon voice recognition software.  Although reviewed after completion, some word and grammatical errors may occur.    Rayo Christiansen MD  General Internal Medicine  Primary Care Center  [phone]

## 2019-04-30 NOTE — NURSING NOTE
Chief Complaint   Patient presents with     Physical     pt here for physical     Lipids     pt would like cholesterol checked       Eileen Arango CMA at 9:28 AM on 4/30/2019.

## 2019-04-30 NOTE — PATIENT INSTRUCTIONS
[facility] Medication Refill Request Information:  * Please contact your pharmacy regarding ANY request for medication refills.  ** [facility] Prescription Fax = [phone]  * Please allow 3 business days for routine medication refills.  * Please allow 5 business days for controlled substance medication refills.     [facility] Test Result notification information:  *You will be notified with in 7-10 days of your appointment day regarding the results of your test.  If you are on MyChart you will be notified as soon as the provider has reviewed the results and signed off on them.    [facility]: [phone]

## 2019-04-30 NOTE — PROGRESS NOTES
ANTICOAGULATION FOLLOW-UP CLINIC VISIT    Patient Name:  Ju Edwards  Date:  2019  Contact Type:  Telephone    SUBJECTIVE:     Patient Findings     Comments:   Left message on Lety's voicemail.           OBJECTIVE    INR   Date Value Ref Range Status   2019 2.58 (H) 0.86 - 1.14 Final       ASSESSMENT / PLAN  INR assessment THER    Recheck INR In: 4 WEEKS    INR Location Clinic      Anticoagulation Summary  As of 2019    INR goal:   2.0-3.0   TTR:   53.0 % (2.9 y)   INR used for dosin.58 (2019)   Warfarin maintenance plan:   2 mg (2 mg x 1) every Mon, Wed, Fri; 3 mg (2 mg x 1.5) all other days   Full warfarin instructions:   2 mg every Mon, Wed, Fri; 3 mg all other days   Weekly warfarin total:   18 mg   No change documented:   Alexander Becerril RN   Plan last modified:   Alice Carranza RN (2016)   Next INR check:   2019   Priority:   INR   Target end date:   Indefinite    Indications    Long term current use of anticoagulant therapy [Z79.01]  Atrial fibrillation (H) [I48.91]             Anticoagulation Episode Summary     INR check location:   Clinic Lab    Preferred lab:       Send INR reminders to:   [facility]    Comments:   Speak witrh Wife Lety [phone]  Pronounced Chance-A-Mout  Pt takes Warfarin dose at 4:30 PM      Anticoagulation Care Providers     Provider Role Specialty Phone number    Rayo Christiansen MD [facility] Internal Medicine [phone]            See the Encounter Report to view Anticoagulation Flowsheet and Dosing Calendar (Go to Encounters tab in chart review, and find the Anticoagulation Therapy Visit)    Left message for patient with results and dosing recommendations. Asked patient to call back to report any missed doses, falls, signs and symptoms of bleeding or clotting, any changes in health, medication, or diet. Asked patient to call back with any questions or concerns.    Alexander Becerril, RN

## 2019-05-03 DIAGNOSIS — R19.7 DIARRHEA, UNSPECIFIED TYPE: ICD-10-CM

## 2019-05-03 DIAGNOSIS — R10.84 ABDOMINAL PAIN, GENERALIZED: ICD-10-CM

## 2019-05-06 LAB
O+P STL MICRO: NORMAL
O+P STL MICRO: NORMAL
SPECIMEN SOURCE: NORMAL

## 2019-05-25 DIAGNOSIS — R04.0 EPISTAXIS: ICD-10-CM

## 2019-05-28 NOTE — TELEPHONE ENCOUNTER
[facility] SALINE NASAL SPRAY 0.65 % nasal spray      Last Written Prescription Date:  9/24/18  Last Fill Quantity: 15ml,   # refills: 3  Last Office Visit : 4/30/19  Future Office visit:  none    Med to pharmacy.

## 2019-06-08 DIAGNOSIS — K21.00 GASTROESOPHAGEAL REFLUX DISEASE WITH ESOPHAGITIS: ICD-10-CM

## 2019-06-10 ENCOUNTER — ANTICOAGULATION THERAPY VISIT (OUTPATIENT)
Dept: ANTICOAGULATION | Facility: CLINIC | Age: 69
End: 2019-06-10

## 2019-06-10 DIAGNOSIS — I48.91 ATRIAL FIBRILLATION (H): ICD-10-CM

## 2019-06-10 DIAGNOSIS — Z79.01 LONG TERM CURRENT USE OF ANTICOAGULANT THERAPY: ICD-10-CM

## 2019-06-10 LAB — INR PPP: 2.09 (ref 0.86–1.14)

## 2019-06-10 RX ORDER — PANTOPRAZOLE SODIUM 40 MG/1
TABLET, DELAYED RELEASE ORAL
Qty: 90 TABLET | Refills: 2 | Status: SHIPPED | OUTPATIENT
Start: 2019-06-10 | End: 2020-02-27

## 2019-06-10 NOTE — PROGRESS NOTES
ANTICOAGULATION FOLLOW-UP CLINIC VISIT    Patient Name:  Ju Edwards  Date:  6/10/2019  Contact Type:  Telephone    SUBJECTIVE:  Patient Findings     Comments:   Spouse reports no changes in health, diet, medications.        Clinical Outcomes     Comments:   Spouse reports no changes in health, diet, medications.           OBJECTIVE    INR   Date Value Ref Range Status   06/10/2019 2.09 (H) 0.86 - 1.14 Final       ASSESSMENT / PLAN  INR assessment THER    Recheck INR In: 4 WEEKS    INR Location Clinic      Anticoagulation Summary  As of 6/10/2019    INR goal:   2.0-3.0   TTR:   54.8 % (3 y)   INR used for dosin.09 (6/10/2019)   Warfarin maintenance plan:   2 mg (2 mg x 1) every Mon, Wed, Fri; 3 mg (2 mg x 1.5) all other days   Full warfarin instructions:   2 mg every Mon, Wed, Fri; 3 mg all other days   Weekly warfarin total:   18 mg   No change documented:   Alice Fish RN   Plan last modified:   Alice Carranza RN (2016)   Next INR check:   2019   Priority:   INR   Target end date:   Indefinite    Indications    Long term current use of anticoagulant therapy [Z79.01]  Atrial fibrillation (H) [I48.91]             Anticoagulation Episode Summary     INR check location:   Clinic Lab    Preferred lab:       Send INR reminders to:   [facility]    Comments:   Speak witrh Wife Lety [phone]  Pronounced Chance-A-Mout  Pt takes Warfarin dose at 4:30 PM      Anticoagulation Care Providers     Provider Role Specialty Phone number    Rayo Christiansen MD [facility] Internal Medicine [phone]            See the Encounter Report to view Anticoagulation Flowsheet and Dosing Calendar (Go to Encounters tab in chart review, and find the Anticoagulation Therapy Visit)    Spoke with spouse, Lety.    Alice Fish, RN

## 2019-06-13 ENCOUNTER — TRANSFERRED RECORDS (OUTPATIENT)
Dept: HEALTH INFORMATION MANAGEMENT | Facility: CLINIC | Age: 69
End: 2019-06-13

## 2019-07-25 ENCOUNTER — TRANSFERRED RECORDS (OUTPATIENT)
Dept: HEALTH INFORMATION MANAGEMENT | Facility: CLINIC | Age: 69
End: 2019-07-25

## 2019-08-18 DIAGNOSIS — R04.0 EPISTAXIS: ICD-10-CM

## 2019-08-19 NOTE — TELEPHONE ENCOUNTER
[facility] SALINE NOSE SPRAY 0.65 % nasal spray      Last Written Prescription Date:  5/28/19  Last Fill Quantity: 15ml,   # refills: 3  Last Office Visit : 4/30/19  Future Office visit:  none    Routing refill request to provider for review/approval because:  Diagnosis?

## 2019-08-20 RX ORDER — ECHINACEA PURPUREA EXTRACT 125 MG
1 TABLET ORAL DAILY
Qty: 15 ML | Refills: 1 | Status: SHIPPED | OUTPATIENT
Start: 2019-08-20 | End: 2020-10-06

## 2019-08-21 ENCOUNTER — ANTICOAGULATION THERAPY VISIT (OUTPATIENT)
Dept: ANTICOAGULATION | Facility: CLINIC | Age: 69
End: 2019-08-21

## 2019-08-21 DIAGNOSIS — Z79.01 LONG TERM CURRENT USE OF ANTICOAGULANT THERAPY: ICD-10-CM

## 2019-08-21 DIAGNOSIS — I48.91 ATRIAL FIBRILLATION (H): ICD-10-CM

## 2019-08-21 LAB — INR PPP: 2.31 (ref 0.86–1.14)

## 2019-10-01 DIAGNOSIS — R04.0 EPISTAXIS: ICD-10-CM

## 2019-10-04 RX ORDER — SODIUM CHLORIDE 0.65 %
AEROSOL, SPRAY (ML) NASAL
Qty: 88 ML | Refills: 3 | Status: SHIPPED | OUTPATIENT
Start: 2019-10-04 | End: 2020-03-06

## 2019-10-10 DIAGNOSIS — I48.91 ATRIAL FIBRILLATION, UNSPECIFIED TYPE (H): ICD-10-CM

## 2019-10-10 DIAGNOSIS — Z79.01 LONG TERM CURRENT USE OF ANTICOAGULANT THERAPY: ICD-10-CM

## 2019-10-10 RX ORDER — WARFARIN SODIUM 2 MG/1
TABLET ORAL
Qty: 120 TABLET | Refills: 0 | Status: SHIPPED | OUTPATIENT
Start: 2019-10-10 | End: 2020-01-06

## 2019-10-31 ENCOUNTER — OFFICE VISIT (OUTPATIENT)
Dept: INTERNAL MEDICINE | Facility: CLINIC | Age: 69
End: 2019-10-31

## 2019-10-31 ENCOUNTER — ANTICOAGULATION THERAPY VISIT (OUTPATIENT)
Dept: ANTICOAGULATION | Facility: CLINIC | Age: 69
End: 2019-10-31

## 2019-10-31 VITALS
SYSTOLIC BLOOD PRESSURE: 138 MMHG | DIASTOLIC BLOOD PRESSURE: 62 MMHG | WEIGHT: 145.2 LBS | OXYGEN SATURATION: 98 % | BODY MASS INDEX: 25.72 KG/M2 | HEART RATE: 61 BPM

## 2019-10-31 DIAGNOSIS — Z79.01 LONG TERM CURRENT USE OF ANTICOAGULANT THERAPY: ICD-10-CM

## 2019-10-31 DIAGNOSIS — R04.0 EPISTAXIS: Primary | ICD-10-CM

## 2019-10-31 DIAGNOSIS — K62.5 RECTAL BLEEDING: ICD-10-CM

## 2019-10-31 DIAGNOSIS — I48.91 ATRIAL FIBRILLATION (H): ICD-10-CM

## 2019-10-31 DIAGNOSIS — R04.0 EPISTAXIS: ICD-10-CM

## 2019-10-31 LAB
ERYTHROCYTE [DISTWIDTH] IN BLOOD BY AUTOMATED COUNT: 12.7 % (ref 10–15)
HCT VFR BLD AUTO: 40.9 % (ref 40–53)
HGB BLD-MCNC: 13.2 G/DL (ref 13.3–17.7)
INR PPP: 2.26 (ref 0.86–1.14)
MCH RBC QN AUTO: 28.3 PG (ref 26.5–33)
MCHC RBC AUTO-ENTMCNC: 32.3 G/DL (ref 31.5–36.5)
MCV RBC AUTO: 88 FL (ref 78–100)
PLATELET # BLD AUTO: 248 10E9/L (ref 150–450)
RBC # BLD AUTO: 4.67 10E12/L (ref 4.4–5.9)
WBC # BLD AUTO: 5.6 10E9/L (ref 4–11)

## 2019-10-31 ASSESSMENT — PAIN SCALES - GENERAL: PAINLEVEL: NO PAIN (0)

## 2019-10-31 ASSESSMENT — PATIENT HEALTH QUESTIONNAIRE - PHQ9: SUM OF ALL RESPONSES TO PHQ QUESTIONS 1-9: 1

## 2019-10-31 NOTE — LETTER
Patient:  Ju Edwards  :   1950  MRN:     [id number]        Mr. Ju Edwards  [address]  [address]  [place] [address]        2019    Dear Mr. Edwards,    Thank you for choosing the [facility] for your healthcare needs.  We appreciate the opportunity to serve you.    The following are your recent test results.           Your labs showed that your INR is within normal range for you being on warfarin for your atrial fibrillation. Your labs also showed that your hemoglobin was slightly lower than normal but is close to the same as it was 6 months ago and not in a concerning range.          Results for orders placed or performed in visit on 10/31/19   CBC with platelets     Status: Abnormal   Result Value Ref Range    WBC 5.6 4.0 - 11.0 10e9/L    RBC Count 4.67 4.4 - 5.9 10e12/L    Hemoglobin 13.2 (L) 13.3 - 17.7 g/dL    Hematocrit 40.9 40.0 - 53.0 %    MCV 88 78 - 100 fl    MCH 28.3 26.5 - 33.0 pg    MCHC 32.3 31.5 - 36.5 g/dL    RDW 12.7 10.0 - 15.0 %    Platelet Count 248 150 - 450 10e9/L   INR     Status: Abnormal   Result Value Ref Range    INR 2.26 (H) 0.86 - 1.14             Please contact your provider if you have any questions or concerns.  We look forward to serving your needs in the future.      Sincerely,  Dr. Kilgore

## 2019-10-31 NOTE — NURSING NOTE
Chief Complaint   Patient presents with     Rectal Problem     Pt reports blood in stool any time he eats spicy food; Pt reports this has been happening for over 20 years      BONLILA Lewis at 8:19 AM sign on 10/31/2019

## 2019-10-31 NOTE — PROGRESS NOTES
"                     PRIMARY CARE CENTER       SUBJECTIVE:  Ju Edwards is a 69 year old male with a PMHx of HTN, Afib, HLD, who comes in for rectal bleeding while being on warfarin.     Pt reports he is here today because he has been finding blood in his underwear and drops of blood from his rectum when he stands up.   Reports he has had this type of bleeding on and off for greater than 15 years and was told it was from hemorrhoids however they were removed 15 years ago and he continues to have these episodes. He has been seeing doctors for this previously and was sent for colonoscopy several years ago and was told that there was nothing concerning found.   Reports he returns today for this chronic problem because he states previously he would find dried blood in underwear 1-2 times monthly but 3 days ago it has changed to dripping blood that he notices whether he is having BMs or not. He reports he had this dripping of blood for three days in a row intermittently and resolved this morning spontaneously. Denies that his stool is bloody at all and reports the blood is separate from the stool and that his stool has not been having any blood.    He has had episodes of dripping blood from his rectum like this two times before 2010 and 2016 at which times he saw a physician and had a colonoscopy that he remembers being told \"had no cancer\".    Denies feeling light headed, weak, short of breath or chest pain, no loss of consciousness   Denies hematuria but reports he has been having epistaxis also for several years. States that he is able to get the bleeding to stop at home by pinching bridge of his nose and bleeding will cease after 2-3 minutes      Care everywhere showing he presented to ED last month for epistaxis however he had just had a trauma to the face earlier that day resulting in some nasal bone fractures . Clamp was placed on his nose at that time and bleeding stopped in the ED     Of note: He " has been on warfarin since 2013 he reports he was placed on this since he was found to have a stroke and have afib     He does have history of some epistaxis since childhood but not severe. He denies any family history of bleeding episodes or bleeding disorders.     Medications and allergies reviewed by me today.     ROS:   Constitutional, neuro, ENT, endocrine, pulmonary, cardiac, gastrointestinal, genitourinary, musculoskeletal, integument and psychiatric systems are negative, except as otherwise noted.    OBJECTIVE:    /62 (BP Location: Right arm, Patient Position: Sitting, Cuff Size: Adult Regular)   Pulse 61   Wt 65.9 kg (145 lb 3.2 oz)   SpO2 98%   BMI 25.72 kg/m     Wt Readings from Last 1 Encounters:   04/30/19 66.6 kg (146 lb 14.4 oz)       GENERAL APPEARANCE: healthy, alert and no distress     EYES: EOMI,  PERRL     HENT:  nose and mouth without ulcers or lesions     RESP: lungs clear to auscultation - no rales, rhonchi or wheezes     CV: regular rates and rhythm, normal S1 S2 no murmur     ABDOMEN:  soft, nontender, no HSM or masses      MS: extremities normal- no gross deformities noted, FROM in all extremities.     SKIN: no suspicious lesions or rashes     NEURO: Normal strength and tone, sensory exam grossly normal, mentation intact and speech normal     LYMPHATICS: No cervical adenopathy     ASSESSMENT/PLAN:    Ju was seen today for rectal problem.    Diagnoses and all orders for this visit:    Epistaxis  -     INR; Future  -     CBC with platelets; Future    Rectal bleeding  -     INR; Future  -     CBC with platelets; Future      Symptoms are most consistent with diverticular bleed or AVM exacerbated by use of warfarin. Pt was counseled on these possibilities. Had a colonoscopy for this prior and was told he had no cancer. No reports from this found in our system but I can see he had a colonoscopy performed in 2016. Pt counseled to return for repeat colonoscopy in 2026 unless  symptoms change or worsen. Will have close follow up with Dr Christiansen and labs today.     Pt should return to clinic for f/u with Dr Christiansen in 1 month     Mirit Mohsen Yacoup, MD  Oct 31, 2019    Pt was seen and examined by me; I agree with the detailed A/P documentation above    Maria Luisa Padilla MD

## 2019-10-31 NOTE — PROGRESS NOTES
ANTICOAGULATION FOLLOW-UP CLINIC VISIT    Patient Name:  Ju Edwards  Date:  10/31/2019  Contact Type:  Telephone    SUBJECTIVE:         OBJECTIVE    INR   Date Value Ref Range Status   10/31/2019 2.26 (H) 0.86 - 1.14 Final       ASSESSMENT / PLAN  No question data found.  Anticoagulation Summary  As of 10/31/2019    INR goal:   2.0-3.0   TTR:   60.0 % (3.4 y)   INR used for dosin.26 (10/31/2019)   Warfarin maintenance plan:   2 mg (2 mg x 1) every Mon, Wed, Fri; 3 mg (2 mg x 1.5) all other days   Full warfarin instructions:   2 mg every Mon, Wed, Fri; 3 mg all other days   Weekly warfarin total:   18 mg   No change documented:   Hien Curry RN   Plan last modified:   Alice Carranza RN (2016)   Next INR check:   2019   Priority:   INR   Target end date:   Indefinite    Indications    Long term current use of anticoagulant therapy [Z79.01]  Atrial fibrillation (H) [I48.91]             Anticoagulation Episode Summary     INR check location:   Clinic Lab    Preferred lab:       Send INR reminders to:   [facility] CLINIC    Comments:   Speak witrh Wife Lety [phone]  Pronounced Chance-A-Juan  Pt takes Warfarin dose at 4:30 PM      Anticoagulation Care Providers     Provider Role Specialty Phone number    Rajikinza Rayo Escobar MD [facility] Internal Medicine [phone]            See the Encounter Report to view Anticoagulation Flowsheet and Dosing Calendar (Go to Encounters tab in chart review, and find the Anticoagulation Therapy Visit)    Left message for patient with results and dosing recommendations. Asked patient to call back to report any missed doses, falls, signs and symptoms of bleeding or clotting, any changes in health, medication, or diet. Asked patient to call back with any questions or concerns.     Hien Curry, RN

## 2019-10-31 NOTE — PATIENT INSTRUCTIONS
[facility] Medication Refill Request Information:  * Please contact your pharmacy regarding ANY request for medication refills.  ** [facility] Prescription Fax = [phone]  * Please allow 3 business days for routine medication refills.  * Please allow 5 business days for controlled substance medication refills.     [facility] Test Result notification information:  *You will be notified with in 7-10 days of your appointment day regarding the results of your test.  If you are on MyChart you will be notified as soon as the provider has reviewed the results and signed off on them.    [facility]: [phone]          [facility]         Internal Medicine Resident                   Continuity Clinic    Who We Are    Resident Continuity Clinic is a part of the [facility].  Resident physicians see patients independently and establish a relationship with them over the course of their three-year residency program.  As with the Primary Care Clinic, our Resident Continuity Clinic models a group practice.  If your doctor is not available, you will be able to see another resident physician.  At the end of a resident s training, patients will be transitioned to a new resident physician for ongoing care.     We treat patients with a wide array of medical needs from routine physicals, to acute illnesses, to diabetes and blood pressure management, to complex medical illness.  What is a Resident Physician?    Resident physicians hold medical degrees and are doctors. They are training to become specialists in Internal Medicine. They work under the supervision of board-certified faculty physicians.  Expectations for Your Care    We strive to provide accessible, quality care at all times.    In order to provide this care, it is best to see your primary care resident doctor consistently rather switching between providers.  In the event you do see another physician, you should schedule  a follow-up visit with your usual primary care doctor.    If you are transitioning your care from another clinic, it is helpful to have your records available for your doctor to review.    We do not prescribe controlled substances, such as ADD medications or narcotic pain medications, on your first visit.  We will review your health records and concerns prior to devising a treatment plan with you in order to provide the best care.      Clinic Services     Extended clinic hours; patient  to help navigate your visit;  parking; laboratory and imaging services with evening and weekend hours    Multiple medical and surgical specialties in one building    Complementary services, including Nutrition, Integrative Medicine, Pharmacy consultations, Mental and Behavioral Health, Sports Medicine and Physical Therapy    Thank You    We would like to thank you for choosing the [facility] Internal Medicine Resident Continuity Clinic for your primary care. You are making a priceless contribution to the training of the next generation of health care practitioners.     Contact us at [phone] for appointments or questions.    Resident Clinic Hours are Tuesdays and Thursdays, 7:30am-5:00pm    Residents   Dion Tinoco MD  (Male)   Jonny Calloway MD   (Male)   Thuy Velasquez MD  (Female)  Rekha Arndt MD   (Female)   Samreen Rainey MD   (Female)    Deepa Minor MD    (Female)   Yazan Arvizu MD  (Male)   Jimmy Acuña MD  (Male)    Sarah Anne MD  (Female)   Joi Ramsay MD  (Female)   Christiano Almaraz MD    (Female)   Sachin Paredes MD  (Male)   Jaden Conye MD  (Male)   Booker Olivares MD  (Male)   REDDY Lerma MD   (Male)   Ian Lovelace MD  (Male)    Dione Funk MD (Female)   Sb Hollingsworth MD  (Male)   Nancy Lau MD  (Male)   Jenna Tucker MD  (Male)   Robyn Cannon MD    (Female)   David Kilgore MD  (Female)     Supervising Physicians   MD Waylon Smith,  MD Deanne Miguel, MD Kelechi Holley, MD Rayo Christiansen, MD Danyelle Dent, MD Damaris Bains, MD Calvin Jasso, MD Maria Luisa Molina MD

## 2019-11-06 NOTE — RESULT ENCOUNTER NOTE
Your labs showed that your INR is within normal range for you being on warfarin for your atrial fibrillation. Your labs also showed that your hemoglobin was slightly lower than normal but is close to the same as it was 6 months ago and not in a concerning range.

## 2019-12-15 DIAGNOSIS — Z00.00 ROUTINE HEALTH MAINTENANCE: ICD-10-CM

## 2019-12-17 RX ORDER — FLECAINIDE ACETATE 150 MG/1
150 TABLET ORAL 2 TIMES DAILY
Qty: 180 TABLET | Refills: 1 | Status: SHIPPED | OUTPATIENT
Start: 2019-12-17 | End: 2020-06-12

## 2019-12-17 NOTE — TELEPHONE ENCOUNTER
FLECAINIDE ACETATE 150 MG TAB    Last Written Prescription Date:  1/11/2019  Last Fill Quantity: 180,   # refills: 3  Last Office Visit : 10/31/2019  Future Office visit:  None    Routing refill request to provider for review/approval because:  Per Protocol; Medication needs approval from authorizing provider  Referring to Provider for review.    Geno Adan RN  Central Triage Red Flags/Med Refills

## 2020-01-06 DIAGNOSIS — Z79.01 LONG TERM CURRENT USE OF ANTICOAGULANT THERAPY: ICD-10-CM

## 2020-01-06 DIAGNOSIS — I48.91 ATRIAL FIBRILLATION, UNSPECIFIED TYPE (H): ICD-10-CM

## 2020-01-06 RX ORDER — WARFARIN SODIUM 2 MG/1
TABLET ORAL
Qty: 90 TABLET | Refills: 0 | Status: SHIPPED | OUTPATIENT
Start: 2020-01-06 | End: 2020-03-09

## 2020-01-13 DIAGNOSIS — H40.002 GLAUCOMA SUSPECT OF LEFT EYE: ICD-10-CM

## 2020-01-13 NOTE — TELEPHONE ENCOUNTER
Medication:   latanoprost (XALATAN) 0.005 % ophthalmic solution       Requested directions: PLACE 1 DROP INTO BOTH EYES AT BEDTIME.  Current directions on the medication list: Same    Last Written Prescription Date:  3/20/19  Last Fill Quantity: 2.5 ml,   # refills: 3    Last Office Visit: 3/5/19 with recommended 1 week follow up, he cancelled this appointment  Future Office visit: None scheduled    Attending Provider: Blaise  Last Clinic Note: Continue latanoprost at bedtime both eyes    Routing refill request to provider for review/approval because:    Requires provider review  Protocol states for recommended follow up in 1 week or less, route to provider

## 2020-01-14 RX ORDER — LATANOPROST 50 UG/ML
SOLUTION/ DROPS OPHTHALMIC
Qty: 2.5 ML | Refills: 0 | Status: SHIPPED | OUTPATIENT
Start: 2020-01-14 | End: 2020-03-03

## 2020-01-15 ENCOUNTER — ANTICOAGULATION THERAPY VISIT (OUTPATIENT)
Dept: ANTICOAGULATION | Facility: CLINIC | Age: 70
End: 2020-01-15

## 2020-01-15 ENCOUNTER — OFFICE VISIT (OUTPATIENT)
Dept: INTERNAL MEDICINE | Facility: CLINIC | Age: 70
End: 2020-01-15
Payer: COMMERCIAL

## 2020-01-15 VITALS — DIASTOLIC BLOOD PRESSURE: 76 MMHG | OXYGEN SATURATION: 94 % | SYSTOLIC BLOOD PRESSURE: 144 MMHG | HEART RATE: 62 BPM

## 2020-01-15 DIAGNOSIS — I48.91 ATRIAL FIBRILLATION, UNSPECIFIED TYPE (H): Primary | ICD-10-CM

## 2020-01-15 DIAGNOSIS — E78.2 MIXED HYPERLIPIDEMIA: ICD-10-CM

## 2020-01-15 DIAGNOSIS — I48.91 ATRIAL FIBRILLATION, UNSPECIFIED TYPE (H): ICD-10-CM

## 2020-01-15 DIAGNOSIS — Z79.01 LONG TERM CURRENT USE OF ANTICOAGULANT THERAPY: ICD-10-CM

## 2020-01-15 LAB — INR PPP: 2.69 (ref 0.86–1.14)

## 2020-01-15 ASSESSMENT — PAIN SCALES - GENERAL: PAINLEVEL: NO PAIN (0)

## 2020-01-15 NOTE — PATIENT INSTRUCTIONS
[facility] Medication Refill Request Information:  * Please contact your pharmacy regarding ANY request for medication refills.  ** [facility] Prescription Fax = [phone]  * Please allow 3 business days for routine medication refills.  * Please allow 5 business days for controlled substance medication refills.     [facility] Test Result notification information:  *You will be notified with in 7-10 days of your appointment day regarding the results of your test.  If you are on MyChart you will be notified as soon as the provider has reviewed the results and signed off on them.    [facility]: [phone]

## 2020-01-15 NOTE — PROGRESS NOTES
"Internal Medicine [facility] Progress Note      Ju Edwards MRN# [id number]   Age: 69 year old YOB: 1950      Date of Service (when I saw the patient): January 15, 2020    Assessment & Plan   Ju Edwards is a 69 year old male with PMHx of HTN, atrial fibrillation, rectal bleeding, stroke, and hyperlipidemia who presents for INR check.    Atrial fibrillation  Patient is overdue for INR check. Plan to check INR today and dose warfarin accordingly. No active bleeding or symptoms causing immediate concern.    HTN  BP elevated today. Given he had a stressful morning and has not yet taken his medication, may be justified.  - Encouraged healthy diet and exercise along with medication compliance  - Instructed to log BP daily at home. Will reevaluate at physical in 3 months    Rashid Mae    Primary Care Physician   Rayo Christiansen    Chief Complaint   INR Check    History is obtained from the patient with the help of ipad     History of Present Illness   Ju Edwards is a 69 year old male with PMHx of HTN, atrial fibrillation, rectal bleeding, stroke, and hyperlipidemia who presents for INR check.    Has been taking warfarin as prescribed. No melena or bloody stool. Occasional \"drop\" of bright red blood in past with spicy food but stopped eating spicy food and this has resolved. Bruises when he bumps into things hard but generally no easy bruising or rashes. Says he is extra careful not to bump into things. Occasionally endorses racing/irregular heart beat but says this is normal for him given a fib.     Says he is taking BP meds as prescribed but not yet this morning. 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 Has BP machine at home but does not use it to check.     Past Medical History    I have reviewed this patient's medical history and updated it with pertinent information if needed.   Past Medical " History:   Diagnosis Date     Atrial fibrillation (H)     paroxysmal     Delirium     associated with hospitalization for stroke     Depression      Gastric erosions 1/2014    associated with gi bleed     Hyperlipidemia      Hypertension      Stroke (H) 1/2014    ischemic CVA with hemorrhagic transformation       Past Surgical History   I have reviewed this patient's surgical history and updated it with pertinent information if needed.  Past Surgical History:   Procedure Laterality Date     CARDIAC SURGERY  2000    mitral valve repair     REPAIR ATRIAL SEPTAL DEFECT         Medications   Current Outpatient Medications   Medication     amLODIPine (NORVASC) 10 MG tablet     Artificial Tear Ointment (REFRESH P.M.) OINT     atenolol (TENORMIN) 25 MG tablet     atorvastatin (LIPITOR) 80 MG tablet     brimonidine (ALPHAGAN-P) 0.15 % ophthalmic solution     cetirizine (ZYRTEC) 10 MG tablet     Cholecalciferol (VITAMIN D3) 2000 units CAPS     citalopram (CELEXA) 10 MG tablet     COMPRESSION STOCKINGS     [facility]S SALINE NASAL SPRAY 0.65 % nasal spray     cycloSPORINE (RESTASIS) 0.05 % ophthalmic emulsion     docusate sodium (STOOL SOFTENER) 100 MG capsule     dorzolamide-timolol (COSOPT) 2-0.5 % ophthalmic solution     emollient (VANICREAM) cream     flecainide (TAMBOCOR) 150 MG tablet     latanoprost (XALATAN) 0.005 % ophthalmic solution     lisinopril (PRINIVIL/ZESTRIL) 20 MG tablet     naproxen (NAPROSYN) 500 MG tablet     nitroGLYcerin (NITROSTAT) 0.4 MG sublingual tablet     ONDANSETRON PO     oxyCODONE-acetaminophen (PERCOCET) 5-325 MG per tablet     pantoprazole (PROTONIX) 40 MG EC tablet     Sennosides (SENNA LAX PO)     sodium chloride ([facility]S SALINE NASAL SPRAY) 0.65 % nasal spray     warfarin ANTICOAGULANT (COUMADIN) 2 MG tablet     White Petrolatum-Mineral Oil (CVS EYE LUBRICANT NIGHTTIME) OINT     No current facility-administered medications for this visit.      Allergies   No Known Allergies    Social History   I have  updated and reviewed the following Social History Narrative:   Social History     Social History Narrative     Not on file        Family History   Family history reviewed with patient and is noncontributory.    Review of Systems   The 5 point Review of Systems is negative other than noted in the HPI or here.     Physical Exam   Vital Signs with Ranges  Pulse:  [62] 62  BP: (144)/(76) 144/76  SpO2:  [94 %] 94 %  0 lbs 0 oz    Physical Examination:    General:  Conversant, generally healthy appearing, no acute distress  Head: atraumatic  C/V:  Regular rate and rhythm, no murmurs, rubs or gallops.  No JVD, no carotid bruits. Radial and DP pulses 2+, equal and regular.  Neuro: Alert and oriented   M/S:   No joint deformities noted.  No joint swelling.  Skin:   Normal temperature., turgor and texture. No rashes or jaundice on exposed skin surfaces. One well healed lesion on left wrist.  Extremities:  No peripheral edema, no digital cyanosis  Psych:  Alert and oriented. Appropriate affect.  Not psychomotor slowed.  No signs of anxiety or agitation.    Data   No results found for this or any previous visit (from the past 24 hour(s)).    Patient staffed with attending Dr. Kuldip Mae, MS4  January 15, 2020    The medical student acted as scribe and the encounter documented above was completely performed by myself.  Supervising Doctor Rayo Christiansen MD

## 2020-01-15 NOTE — PROGRESS NOTES
ANTICOAGULATION FOLLOW-UP CLINIC VISIT    Patient Name:  Ju Edwards  Date:  1/15/2020  Contact Type:  Telephone    SUBJECTIVE:  Patient Findings     Comments:   Spoke to patient's spouse.  Reactivated patient.  Placed on master list.  Provider seeing patient today reminded him to get in for INR draws.          Clinical Outcomes     Comments:   Spoke to patient's spouse.  Reactivated patient.  Placed on master list.  Provider seeing patient today reminded him to get in for INR draws.             OBJECTIVE    INR   Date Value Ref Range Status   01/15/2020 2.69 (H) 0.86 - 1.14 Final       ASSESSMENT / PLAN  INR assessment THER    Recheck INR In: 4 WEEKS    INR Location Clinic      Anticoagulation Summary  As of 1/15/2020    INR goal:   2.0-3.0   TTR:   95.1 % (1 y)   INR used for dosin.69 (1/15/2020)   Warfarin maintenance plan:   2 mg (2 mg x 1) every Mon, Wed, Fri; 3 mg (2 mg x 1.5) all other days   Full warfarin instructions:   2 mg every Mon, Wed, Fri; 3 mg all other days   Weekly warfarin total:   18 mg   No change documented:   Alexander Becerril RN   Plan last modified:   Alice Carranza RN (2016)   Next INR check:   2020   Priority:   INR   Target end date:   Indefinite    Indications    Long term current use of anticoagulant therapy [Z79.01]  Atrial fibrillation (H) [I48.91]             Anticoagulation Episode Summary     INR check location:   Clinic Lab    Preferred lab:       Send INR reminders to:   [facility]    Comments:   Speak witrh Wife Lety [phone]  Pronounced Chance-A-Mout  Pt takes Warfarin dose at 4:30 PM      Anticoagulation Care Providers     Provider Role Specialty Phone number    Rayo Christiansen MD [facility] Internal Medicine [phone]            See the Encounter Report to view Anticoagulation Flowsheet and Dosing Calendar (Go to Encounters tab in chart review, and find the Anticoagulation Therapy Visit)    INR/CFX/F2 RESULT:INR  result is 2.69    ASSESSMENT:No Problems found. No Changes in Health, Medications, or diet. No Signs of bruising, bleeding or clotting.    Gaps in INR results, patient was inactivated from master list, episode was not resolved.    DOSING ADJUSTMENT: Continue current maintenance dose    NEXT INR/FACTOR X OR FACTOR II: 2/13    PROTOCOL FOLLOWED:goal 2-3    Alexander Becerril, RN

## 2020-01-15 NOTE — NURSING NOTE
Chief Complaint   Patient presents with     Medication Follow-up     Pt is here to follow up on medication.      Melia Alvarez LPN at 10:34 AM on 1/15/2020.

## 2020-02-11 ENCOUNTER — MEDICAL CORRESPONDENCE (OUTPATIENT)
Dept: HEALTH INFORMATION MANAGEMENT | Facility: CLINIC | Age: 70
End: 2020-02-11

## 2020-02-13 ENCOUNTER — NURSE TRIAGE (OUTPATIENT)
Dept: NURSING | Facility: CLINIC | Age: 70
End: 2020-02-13

## 2020-02-13 ENCOUNTER — TRANSFERRED RECORDS (OUTPATIENT)
Dept: HEALTH INFORMATION MANAGEMENT | Facility: CLINIC | Age: 70
End: 2020-02-13

## 2020-02-13 NOTE — TELEPHONE ENCOUNTER
"Service contacted: [facility]/ [facility] Kuldip on 1/15/20  Today's concern: wife is caller  \" Passed out, blacked out at work today at 4 PM\".   He was found by friend lying in parking lot, seen on security camera.  Doesn't remember details of event.  Now he is home and\" his eyes are red and he's really crabby\". Negative visual changes.  He checks BP at home but not today.  No evident head injury, frostbite, current confusion or bleeding noted by wife.   Advised ED immediately for unwitnessed fall, change in LOC and unknown timeframe today, patient on warfarin.      PMH: PMHx of HTN, atrial fibrillation, rectal bleeding, stroke, and hyperlipidemia, chronic narrow angle glaucoma     Results for ROSARIO RAI (MRN [id number]) as of 2/13/2020 17:32   Ref. Range 6/10/2019 12:47 8/21/2019 09:57 10/31/2019 09:40 1/15/2020 11:23   INR Latest Ref Range: 0.86 - 1.14  2.09 (H) 2.31 (H) 2.26 (H) 2.69 (H)      Medication of concern:Coumadin    Plan: 911/ ED now.patient usually goes to [facility] in [facility]. FYI to PCP    MYChart:no     Follow up/ next appt:4/6/20       Reason for Disposition    Fainted > 15 minutes ago and still looks pale (pale skin, pallor)    History of heart problems or congestive heart failure    Additional Information    Negative: Still unconscious    Negative: Difficult to awaken or acting confused (e.g., disoriented, slurred speech)    Negative: Difficulty breathing    Negative: Bleeding (e.g., vomiting blood, rectal bleeding or tarry stools, severe vaginal bleeding)    Protocols used: GAURRUST-N-WJ      "

## 2020-02-17 ENCOUNTER — OFFICE VISIT - HEALTHEAST (OUTPATIENT)
Dept: CARDIOLOGY | Facility: CLINIC | Age: 70
End: 2020-02-17

## 2020-02-17 DIAGNOSIS — R55 SYNCOPE, UNSPECIFIED SYNCOPE TYPE: ICD-10-CM

## 2020-02-17 ASSESSMENT — MIFFLIN-ST. JEOR: SCORE: 1324.66

## 2020-02-19 ENCOUNTER — ANTICOAGULATION THERAPY VISIT (OUTPATIENT)
Dept: ANTICOAGULATION | Facility: CLINIC | Age: 70
End: 2020-02-19

## 2020-02-19 DIAGNOSIS — I48.91 ATRIAL FIBRILLATION, UNSPECIFIED TYPE (H): ICD-10-CM

## 2020-02-19 DIAGNOSIS — Z79.01 LONG TERM CURRENT USE OF ANTICOAGULANT THERAPY: ICD-10-CM

## 2020-02-19 LAB — INR PPP: 2.91 (ref 0.86–1.14)

## 2020-02-19 NOTE — PROGRESS NOTES
ANTICOAGULATION FOLLOW-UP CLINIC VISIT    Patient Name:  Ju Edwards  Date:  2020  Contact Type:  Telephone    SUBJECTIVE:  Patient Findings     Comments:   Spoke with spouse, Lety.  She reports Ju has not had any changes in health, diet, medications.  He was seen in the ER 2020 for syncope and has appointments on 2020 for this.  She states he is feeling better.        Clinical Outcomes     Comments:   Spoke with spouse, Lety.  She reports Ju has not had any changes in health, diet, medications.  He was seen in the ER 2020 for syncope and has appointments on 2020 for this.  She states he is feeling better.           OBJECTIVE    INR   Date Value Ref Range Status   2020 2.91 (H) 0.86 - 1.14 Final       ASSESSMENT / PLAN  INR assessment THER    Recheck INR In: 4 WEEKS    INR Location Clinic      Anticoagulation Summary  As of 2020    INR goal:   2.0-3.0   TTR:   96.8 % (1 y)   INR used for dosin.91 (2020)   Warfarin maintenance plan:   2 mg (2 mg x 1) every Mon, Wed, Fri; 3 mg (2 mg x 1.5) all other days   Full warfarin instructions:   2 mg every Mon, Wed, Fri; 3 mg all other days   Weekly warfarin total:   18 mg   No change documented:   Alice Fish RN   Plan last modified:   Alice Carranza, RN (2016)   Next INR check:   3/18/2020   Priority:   Maintenance   Target end date:   Indefinite    Indications    Long term current use of anticoagulant therapy [Z79.01]  Atrial fibrillation (H) [I48.91]             Anticoagulation Episode Summary     INR check location:   Clinic Lab    Preferred lab:       Send INR reminders to:   [facility]    Comments:   Speak witrh Wife Lety [phone]  Pronounced Chance-A-Juan  Pt takes Warfarin dose at 4:30 PM      Anticoagulation Care Providers     Provider Role Specialty Phone number    Rayo Christiansen MD [facility] Internal Medicine [phone]            See the  Encounter Report to view Anticoagulation Flowsheet and Dosing Calendar (Go to Encounters tab in chart review, and find the Anticoagulation Therapy Visit)    Spoke with spouse, Lety.      Alice Fish RN

## 2020-02-21 ENCOUNTER — HOSPITAL ENCOUNTER (OUTPATIENT)
Dept: CARDIOLOGY | Facility: CLINIC | Age: 70
Discharge: HOME | End: 2020-02-21
Attending: INTERNAL MEDICINE

## 2020-02-21 ENCOUNTER — TRANSFERRED RECORDS (OUTPATIENT)
Dept: HEALTH INFORMATION MANAGEMENT | Facility: CLINIC | Age: 70
End: 2020-02-21

## 2020-02-21 ENCOUNTER — HOSPITAL ENCOUNTER (OUTPATIENT)
Dept: ULTRASOUND IMAGING | Facility: CLINIC | Age: 70
Discharge: HOME | End: 2020-02-21
Attending: INTERNAL MEDICINE

## 2020-02-21 DIAGNOSIS — R55 SYNCOPE, UNSPECIFIED SYNCOPE TYPE: ICD-10-CM

## 2020-02-21 LAB
AORTIC ROOT: 3.4 CM
AORTIC VALVE MEAN VELOCITY: 97.7 CM/S
ASCENDING AORTA: 3 CM
AV DIMENSIONLESS INDEX VTI: 0.8
AV MEAN GRADIENT: 5 MMHG
AV PEAK GRADIENT: 9.5 MMHG
AV VALVE AREA: 2.3 CM2
AV VELOCITY RATIO: 0.8
BSA FOR ECHO PROCEDURE: 1.71 M2
CV BLOOD PRESSURE: ABNORMAL MMHG
CV ECHO HEIGHT: 63 IN
CV ECHO WEIGHT: 145 LBS
DOP CALC AO PEAK VEL: 154 CM/S
DOP CALC AO VTI: 35.1 CM
DOP CALC LVOT AREA: 2.83 CM2
DOP CALC LVOT DIAMETER: 1.9 CM
DOP CALC LVOT PEAK VEL: 122 CM/S
DOP CALC LVOT STROKE VOLUME: 82.2 CM3
DOP CALCLVOT PEAK VEL VTI: 29 CM
EJECTION FRACTION: 68 % (ref 55–75)
FRACTIONAL SHORTENING: 20.9 % (ref 28–44)
INTERVENTRICULAR SEPTUM IN END DIASTOLE: 1.3 CM (ref 0.6–1)
IVS/PW RATIO: 1.1
LA AREA 1: 21.8 CM2
LA AREA 2: 21 CM2
LEFT ATRIUM LENGTH: 6.33 CM
LEFT ATRIUM SIZE: 4 CM
LEFT ATRIUM TO AORTIC ROOT RATIO: 1.18 NO UNITS
LEFT ATRIUM VOLUME INDEX: 35.9 ML/M2
LEFT ATRIUM VOLUME: 61.5 ML
LEFT VENTRICLE CARDIAC INDEX: 2.5 L/MIN/M2
LEFT VENTRICLE CARDIAC OUTPUT: 4.2 L/MIN
LEFT VENTRICLE DIASTOLIC VOLUME INDEX: 36.3 CM3/M2 (ref 34–74)
LEFT VENTRICLE DIASTOLIC VOLUME: 62 CM3 (ref 62–150)
LEFT VENTRICLE HEART RATE: 51 BPM
LEFT VENTRICLE MASS INDEX: 114.7 G/M2
LEFT VENTRICLE SYSTOLIC VOLUME INDEX: 11.7 CM3/M2 (ref 11–31)
LEFT VENTRICLE SYSTOLIC VOLUME: 20 CM3 (ref 21–61)
LEFT VENTRICULAR INTERNAL DIMENSION IN DIASTOLE: 4.3 CM (ref 4.2–5.8)
LEFT VENTRICULAR INTERNAL DIMENSION IN SYSTOLE: 3.4 CM (ref 2.5–4)
LEFT VENTRICULAR MASS: 196.1 G
LEFT VENTRICULAR OUTFLOW TRACT MEAN GRADIENT: 3 MMHG
LEFT VENTRICULAR OUTFLOW TRACT MEAN VELOCITY: 74.9 CM/S
LEFT VENTRICULAR OUTFLOW TRACT PEAK GRADIENT: 6 MMHG
LEFT VENTRICULAR POSTERIOR WALL IN END DIASTOLE: 1.2 CM (ref 0.6–1)
LV STROKE VOLUME INDEX: 48.1 ML/M2
MITRAL VALVE E/A RATIO: 0.9
MV AVERAGE E/E' RATIO: 10.8 CM/S
MV DECELERATION TIME: 268 MS
MV E'TISSUE VEL-LAT: 10.1 CM/S
MV E'TISSUE VEL-MED: 5 CM/S
MV LATERAL E/E' RATIO: 8.1
MV MEDIAL E/E' RATIO: 16.4
MV PEAK A VELOCITY: 95.3 CM/S
MV PEAK E VELOCITY: 81.9 CM/S
NUC REST DIASTOLIC VOLUME INDEX: 2326.4 LBS
NUC REST SYSTOLIC VOLUME INDEX: 63 IN
PR MAX PG: 6 MMHG
PR PEAK VELOCITY: 119 CM/S
TRICUSPID REGURGITATION PEAK PRESSURE GRADIENT: 10.5 MMHG
TRICUSPID VALVE ANULAR PLANE SYSTOLIC EXCURSION: 1.4 CM
TRICUSPID VALVE PEAK REGURGITANT VELOCITY: 162 CM/S

## 2020-02-21 ASSESSMENT — MIFFLIN-ST. JEOR: SCORE: 1324.66

## 2020-02-25 ENCOUNTER — TRANSFERRED RECORDS (OUTPATIENT)
Dept: HEALTH INFORMATION MANAGEMENT | Facility: CLINIC | Age: 70
End: 2020-02-25

## 2020-02-25 ENCOUNTER — COMMUNICATION - HEALTHEAST (OUTPATIENT)
Dept: CARDIOLOGY | Facility: CLINIC | Age: 70
End: 2020-02-25

## 2020-02-25 ASSESSMENT — MIFFLIN-ST. JEOR
SCORE: 1310.14
SCORE: 1309.24

## 2020-02-26 ENCOUNTER — SURGERY - HEALTHEAST (OUTPATIENT)
Dept: CARDIOLOGY | Facility: CLINIC | Age: 70
End: 2020-02-26

## 2020-02-26 ENCOUNTER — TRANSFERRED RECORDS (OUTPATIENT)
Dept: HEALTH INFORMATION MANAGEMENT | Facility: CLINIC | Age: 70
End: 2020-02-26

## 2020-02-27 DIAGNOSIS — K21.00 GASTROESOPHAGEAL REFLUX DISEASE WITH ESOPHAGITIS: ICD-10-CM

## 2020-02-27 RX ORDER — PANTOPRAZOLE SODIUM 40 MG/1
TABLET, DELAYED RELEASE ORAL
Qty: 90 TABLET | Refills: 3 | Status: SHIPPED | OUTPATIENT
Start: 2020-02-27 | End: 2021-03-30

## 2020-03-02 DIAGNOSIS — I63.039: ICD-10-CM

## 2020-03-03 ENCOUNTER — OFFICE VISIT (OUTPATIENT)
Dept: FAMILY MEDICINE | Facility: CLINIC | Age: 70
End: 2020-03-03
Payer: COMMERCIAL

## 2020-03-03 VITALS
HEIGHT: 63 IN | TEMPERATURE: 98.4 F | RESPIRATION RATE: 16 BRPM | WEIGHT: 145.2 LBS | SYSTOLIC BLOOD PRESSURE: 118 MMHG | DIASTOLIC BLOOD PRESSURE: 68 MMHG | OXYGEN SATURATION: 98 % | BODY MASS INDEX: 25.73 KG/M2 | HEART RATE: 58 BPM

## 2020-03-03 DIAGNOSIS — K62.5 RECTAL BLEEDING: ICD-10-CM

## 2020-03-03 DIAGNOSIS — Z23 ENCOUNTER FOR IMMUNIZATION: ICD-10-CM

## 2020-03-03 DIAGNOSIS — Z00.00 HEALTH CARE MAINTENANCE: Primary | ICD-10-CM

## 2020-03-03 DIAGNOSIS — H40.002 GLAUCOMA SUSPECT OF LEFT EYE: ICD-10-CM

## 2020-03-03 DIAGNOSIS — R05.9 COUGH: ICD-10-CM

## 2020-03-03 DIAGNOSIS — Z09 HOSPITAL DISCHARGE FOLLOW-UP: ICD-10-CM

## 2020-03-03 ASSESSMENT — PAIN SCALES - GENERAL: PAINLEVEL: MILD PAIN (2)

## 2020-03-03 ASSESSMENT — MIFFLIN-ST. JEOR: SCORE: 1328.75

## 2020-03-03 NOTE — PROGRESS NOTES
[facility]  Primary Care Center   Marco Antonio Chen MD  03/03/2020      Chief Complaint:   Hospital F/U     History of Present Illness:   Ju Edwards is a 69 year old male with a history of stroke, atrial fibrillation, hypertension, and hyperlipidemia who presents for follow up of a hospital visit. He was accompanied by his wife and an .    Hospital visit: 2/25/2020 He presented to the ED following a 5 second sinus pause during his sleep detected by his heart monitor.  The following day he had a pacemaker placed. Since being discharged he has been doing well. The insertion site is itchy but he is unsure if there is any pus or swelling as a bandage is covering it.    Bowel: Since being discharged he has also been experiencing runny/soft stools and rectal bleeding associated with spicy food. He has switched to eating a dry, bland diet which somewhat helped. He noted the blood is bright red and that he is currently on warfarin. He denied any abdominal pain. Notably, he has been experiencing rectal bleeding for 20 years on and off. He has had hemorrhoid surgery in the past.    Cough: During the middle of the night he has been experiencing a dry cough for two months now since switching to lisinopril. However he does not believe the cough is too bad. He denied any heartburn.     Review of Systems:   Pertinent items are noted in HPI or as in patient entered ROS below, remainder of complete ROS is negative.     Active Medications:      amLODIPine (NORVASC) 10 MG tablet, Take 1 tablet (10 mg) by mouth daily (Patient taking differently: Take 5 mg by mouth daily ), Disp: 100 tablet, Rfl: 3     Artificial Tear Ointment (REFRESH P.M.) OINT, Apply 3.5 g to eye At Bedtime, Disp: 1 Tube, Rfl: 11     atenolol (TENORMIN) 25 MG tablet, Take 0.5 tablets (12.5 mg) by mouth daily (Patient taking differently: Take 50 mg by mouth daily ), Disp: 45 tablet, Rfl: 3     atorvastatin (LIPITOR) 80 MG tablet, Take 1 tablet  (80 mg) by mouth daily, Disp: 100 tablet, Rfl: 3     brimonidine (ALPHAGAN-P) 0.15 % ophthalmic solution, Place 1 drop Into the left eye 3 times daily, Disp: 1 Bottle, Rfl: 11     cetirizine (ZYRTEC) 10 MG tablet, Take 1 tablet (10 mg) by mouth every evening, Disp: 90 tablet, Rfl: 3     Cholecalciferol (VITAMIN D3) 2000 units CAPS, Take 1 tablet by mouth daily, Disp: 90 capsule, Rfl: 3     citalopram (CELEXA) 10 MG tablet, Take 2 tablets (20 mg) by mouth daily, Disp: 180 tablet, Rfl: 0     CVS SALINE NASAL SPRAY 0.65 % nasal spray, INSTILL 1 SPRAY IN NOSTRIL DAILY, Disp: 88 mL, Rfl: 3     cycloSPORINE (RESTASIS) 0.05 % ophthalmic emulsion, Place 1 drop into both eyes 2 times daily, Disp: 180 each, Rfl: 3     docusate sodium (STOOL SOFTENER) 100 MG capsule, , Disp: , Rfl:      dorzolamide-timolol (COSOPT) 2-0.5 % ophthalmic solution, Place 1 drop Into the left eye 2 times daily, Disp: 1 Bottle, Rfl: 11     emollient (VANICREAM) cream, Apply topically as needed for other, Disp: 25 g, Rfl: 3     flecainide (TAMBOCOR) 150 MG tablet, Take 1 tablet (150 mg) by mouth 2 times daily, Disp: 180 tablet, Rfl: 1     latanoprost (XALATAN) 0.005 % ophthalmic solution, PLACE 1 DROP INTO BOTH EYES AT BEDTIME. For additional refills, please schedule a follow-up appointment with Dr Welsh at [phone]., Disp: 2.5 mL, Rfl: 0     lisinopril (PRINIVIL/ZESTRIL) 20 MG tablet, Take 1 tablet (20 mg) by mouth daily, Disp: 100 tablet, Rfl: 3     naproxen (NAPROSYN) 500 MG tablet, Take 1 tablet (500 mg) by mouth 2 times daily (with meals), Disp: 180 tablet, Rfl: 1     nitroGLYcerin (NITROSTAT) 0.4 MG sublingual tablet, Place 0.4 mg under the tongue, Disp: , Rfl:      ONDANSETRON PO, Take 4 mg by mouth every 8 hours as needed for nausea, Disp: , Rfl:      oxyCODONE-acetaminophen (PERCOCET) 5-325 MG per tablet, Take 1 tablet by mouth every 4 hours as needed for moderate to severe pain, Disp: , Rfl:      pantoprazole (PROTONIX) 40 MG EC tablet,  "TAKE 1 TABLET BY MOUTH EVERY DAY, Disp: 90 tablet, Rfl: 3     Sennosides (SENNA LAX PO), Take by mouth 2 times daily as needed, Disp: , Rfl:      sodium chloride (CVS SALINE NASAL SPRAY) 0.65 % nasal spray, Spray 1 spray in nostril daily, Disp: 15 mL, Rfl: 1     warfarin ANTICOAGULANT (COUMADIN) 2 MG tablet, TAKE 1 TAB ON MON, WED, FRI. TAKE 1 AND ONE-HALF TABS ON ALL OTHER DAYS OR AS DIRECTED, Disp: 90 tablet, Rfl: 0     White Petrolatum-Mineral Oil (CVS EYE LUBRICANT NIGHTTIME) OINT, , Disp: , Rfl:       Allergies:   Patient has no known allergies.      Past Medical History:  Rectal bleeding  Atrial fibrillation  Stroke (ischemic CVA with hemorrhagic transformation)  Gastric erosions  Depression  delirium  Hyperlipidemia  hypertension     Past Surgical History:  Mitral valve repair  Atrial septal defect repair    Family History:   Eye surgery - mother      Social History:   This patient was accompanied by: his wife and an .  Smoking status: never  Smokeless tobacco: never  Alcohol use: no  Drug use: no     Physical Exam:   /68 (BP Location: Right arm, Patient Position: Sitting, Cuff Size: Adult Regular)   Pulse 58   Temp 98.4  F (36.9  C) (Oral)   Resp 16   Ht 1.6 m (5' 3\")   Wt 65.9 kg (145 lb 3.2 oz)   SpO2 98%   BMI 25.72 kg/m     Constitutional: Alert. In no distress.  Head: Normocephalic.   ENT: Mucous membranes are moist.   Cardiovascular: irregular rate and rhythm. No murmurs, clicks, gallops, or rub.  Respiratory: Clear to auscultation bilaterally, no wheezes or crackles.  Skin: Pacemaker wound inspected. Wound is intact, no sign of infection. His area around the womb has some mild bruising plus staining from iodine.   Musculoskeletal: No gross deformities noted.   Psychiatric: Mentation appears normal. Normal affect.        Assessment and Plan:  Health care maintenance  Could discuss shingles vaccine next visit. He sees his usual doctor in a month.  - TDAP VACCINE (BOOSTRIX)  - " Pneumococcal vaccine 13 valent PCV13 IM (Prevnar) [75187]    Encounter for immunization   - Pneumococcal vaccine 13 valent PCV13 IM (Prevnar) [00229]    Rectal bleeding  He has had this for many years. He is on blood thinners. He has hemorrhoids. I note he is a bit anemic for quite some time. He describes a fair amount of bleeding. I will ask for an opinion.  - COLORECTAL SURGERY REFERRAL     Cough  Mild and mainly at night. Normal chest xray last week. He is on lisinopril. We did discuss that we could try switching it, he is ok seeing how it goes for now. As it occurs when he lies down, it is also possible it's reflux. He can discuss this next month with his regular doctor.     Scribe Disclosure:  I, Bhupendra Mojica, am serving as a scribe to document services personally performed by Marco Antonio Chen MD at this visit, based upon the provider's statements to me. All documentation has been reviewed by the aforementioned provider prior to being entered into the official medical record.     Portions of this medical record were completed by a scribe. UPON MY REVIEW AND AUTHENTICATION BY ELECTRONIC SIGNATURE, this confirms (a) I performed the applicable clinical services, and (b) the record is accurate.   Marco Antonio Chen MD MD

## 2020-03-03 NOTE — TELEPHONE ENCOUNTER
[facility] VITAMIN D3 2,000 UNIT SFGL    Last Written Prescription Date:  2/5/2019  Last Fill Quantity: 90,   # refills: 3  Last Office Visit : 3/3/2020  Future Office visit:  4/6/2020  90 Tabs, 3 Refills sent to pharmacy 3/3/2020.    Geno Adan RN  Central Triage Red Flags/Med Refills

## 2020-03-03 NOTE — TELEPHONE ENCOUNTER
RECORDS RECEIVED FROM: Internal, external   DATE RECEIVED: 3.20.20   NOTES STATUS DETAILS   OFFICE NOTE from referring provider  Internal 3.3.2020 Dr. Chen, [facility]   OFFICE NOTE from other specialist   Internal/exteranl 10.31.19 Dr. Kilgore, [facility]  6.19.18 Dr. Oshea, [facility]  5.29.18 Dr. Mcpherson, [facility]  6.15.16 Luis Antonio Aleman, [facility]     DISCHARGE SUMMARY from hospital  N/A    DISCHARGE REPORT from the ER Care Everywhere 2.1.16 Dr. Lynch, [facility] ED   OPERATIVE REPORT  N/A    MEDICATION LIST Internal    LABS     PFC TESTING N/A    ANAL PAP N/A    BIOPSIES/PATHOLOGY RELATED TO DIAGNOSIS N/A    DIAGNOSTIC PROCEDURES     COLONOSCOPY Received 8.25.2016 [facility]  5.13.16 endoscopy center   UPPER ENDOSCOPY (EGD) N/A    FLEX SIGMOIDOSCOPY  N/A    ERCP N/A    IMAGING (DISC & REPORT)      CT  N/A    MRI N/A    XRAY N/A    ULTRASOUND (ENDOANAL/ENDORECTAL) N/An

## 2020-03-03 NOTE — PATIENT INSTRUCTIONS
[facility] Medication Refill Request Information:  * Please contact your pharmacy regarding ANY request for medication refills.  ** [facility] Pres[facility] Fax = [phone]  * Please allow 3 business days for routine medication refills.  * Please allow 5 business days for controlled substance medication refills.     [facility] Test Result notification information:  *You will be notified with in 7-10 days of your appointment day regarding the results of your test.  If you are on MyChart you will be notified as soon as the provider has reviewed the results and signed off on them.    [facility]: [phone]

## 2020-03-03 NOTE — TELEPHONE ENCOUNTER
Medication: LATANOPROST 0.005% EYE DROPS    Requested directions: PLACE 1 DROP INTO BOTH EYES AT BEDTIME  Current directions on the medication list: Same    Last Written Prescription Date:  1/14/20  Last Fill Quantity:  2.5 ml,   # refills: 0    Last Office Visit: 3/5/19 with recommended 1 week followup  Future Office visit: None    Attending Provider: Blaise  Last Clinic Note: Continue latanoprost at bedtime both eyes  Return in about 1 week (around 3/12/2019) for VT only, LPI left    Routing refill request to provider for review/approval because:  Protocol reflects if follow up requested 1 week, refill needs to be routed to provider for review.    Routing to front eye desk to contact patient, message was on refill last time to schedule follow up as it is again this time.  May not read English?  Requires provider review

## 2020-03-03 NOTE — NURSING NOTE
Chief Complaint   Patient presents with     Hospital F/U     Pt comes in for a hospital follow up.         BONILLA Cook on 3/3/2020 at 10:34 AM

## 2020-03-04 ENCOUNTER — TRANSFERRED RECORDS (OUTPATIENT)
Dept: HEALTH INFORMATION MANAGEMENT | Facility: CLINIC | Age: 70
End: 2020-03-04

## 2020-03-04 ENCOUNTER — AMBULATORY - HEALTHEAST (OUTPATIENT)
Dept: CARDIOLOGY | Facility: CLINIC | Age: 70
End: 2020-03-04

## 2020-03-04 DIAGNOSIS — I45.9 STOKES-ADAMS SYNCOPE: ICD-10-CM

## 2020-03-04 DIAGNOSIS — I45.5 SINUS PAUSE: ICD-10-CM

## 2020-03-04 DIAGNOSIS — Z95.0 CARDIAC PACEMAKER IN SITU: ICD-10-CM

## 2020-03-04 LAB
HCC DEVICE COMMENTS: NORMAL
HCC DEVICE IMPLANTING PROVIDER: NORMAL
HCC DEVICE MANUFACTURE: NORMAL
HCC DEVICE MODEL: NORMAL
HCC DEVICE SERIAL NUMBER: NORMAL
HCC DEVICE TYPE: NORMAL

## 2020-03-05 DIAGNOSIS — R04.0 EPISTAXIS: ICD-10-CM

## 2020-03-05 RX ORDER — LATANOPROST 50 UG/ML
SOLUTION/ DROPS OPHTHALMIC
Qty: 2.5 ML | Refills: 0 | Status: SHIPPED | OUTPATIENT
Start: 2020-03-05 | End: 2020-04-30

## 2020-03-05 NOTE — TELEPHONE ENCOUNTER
Will provide 1 bottle, but pt needs an appointment for additional refills    Howie Guy MD  Ophthalmology Resident, PGY-2

## 2020-03-06 RX ORDER — SODIUM CHLORIDE 0.65 %
AEROSOL, SPRAY (ML) NASAL
Qty: 88 ML | Refills: 3 | Status: SHIPPED | OUTPATIENT
Start: 2020-03-06 | End: 2020-08-20

## 2020-03-06 NOTE — TELEPHONE ENCOUNTER
CVS SALINE NASAL SPRAY 0.65 % nasal spray      Last Written Prescription Date:  10/4/19  Last Fill Quantity: 88 ml,   # refills: 3  Last Office Visit : 3/3/20  Future Office visit:  4/6/20    Refill to pharmacy.

## 2020-03-09 DIAGNOSIS — I48.91 ATRIAL FIBRILLATION, UNSPECIFIED TYPE (H): ICD-10-CM

## 2020-03-09 DIAGNOSIS — Z79.01 LONG TERM CURRENT USE OF ANTICOAGULANT THERAPY: ICD-10-CM

## 2020-03-09 RX ORDER — WARFARIN SODIUM 2 MG/1
TABLET ORAL
Qty: 45 TABLET | Refills: 1 | Status: SHIPPED | OUTPATIENT
Start: 2020-03-09 | End: 2020-05-13

## 2020-03-16 ENCOUNTER — TELEPHONE (OUTPATIENT)
Dept: SURGERY | Facility: CLINIC | Age: 70
End: 2020-03-16

## 2020-03-20 ENCOUNTER — PRE VISIT (OUTPATIENT)
Dept: SURGERY | Facility: CLINIC | Age: 70
End: 2020-03-20

## 2020-03-20 ENCOUNTER — TELEPHONE (OUTPATIENT)
Dept: SURGERY | Facility: CLINIC | Age: 70
End: 2020-03-20

## 2020-03-20 NOTE — TELEPHONE ENCOUNTER
Called Pt with a [facility]  to see if he needs to be seen by Dr. Mckeon today or wants to cancel, per EVM.   No answer. The  left a message for Pt to call me back.    Terrance Lazo LPN

## 2020-03-31 ENCOUNTER — AMBULATORY - HEALTHEAST (OUTPATIENT)
Dept: CARDIOLOGY | Facility: CLINIC | Age: 70
End: 2020-03-31

## 2020-03-31 ENCOUNTER — TRANSFERRED RECORDS (OUTPATIENT)
Dept: HEALTH INFORMATION MANAGEMENT | Facility: CLINIC | Age: 70
End: 2020-03-31

## 2020-03-31 DIAGNOSIS — I45.5 SINUS PAUSE: ICD-10-CM

## 2020-03-31 DIAGNOSIS — Z95.0 CARDIAC PACEMAKER IN SITU: ICD-10-CM

## 2020-04-21 DIAGNOSIS — I10 ESSENTIAL HYPERTENSION: ICD-10-CM

## 2020-04-21 DIAGNOSIS — I10 BENIGN ESSENTIAL HYPERTENSION: ICD-10-CM

## 2020-04-22 RX ORDER — LISINOPRIL 20 MG/1
20 TABLET ORAL DAILY
Qty: 90 TABLET | Refills: 3 | Status: SHIPPED | OUTPATIENT
Start: 2020-04-22 | End: 2020-09-09

## 2020-04-22 NOTE — TELEPHONE ENCOUNTER
AMLODIPINE BESYLATE 10 MG TAB       Last Written Prescription Date:  4/30/19  Last Fill Quantity: 100,   # refills: 3  Last Office Visit : 3/3/20  Future Office visit:  7/20/20    Routing refill request to provider for review/approval because:  Discrepancy between written RX and how he is taking med  Reports taking 5 mg daily, which appears to have been changed at time of discharge from 10 mg to 5 mg daily.  No changes made to requested refill

## 2020-04-23 RX ORDER — AMLODIPINE BESYLATE 5 MG/1
5 TABLET ORAL DAILY
Qty: 90 TABLET | Refills: 1 | Status: SHIPPED | OUTPATIENT
Start: 2020-04-23 | End: 2020-10-22

## 2020-04-23 RX ORDER — ATENOLOL 50 MG/1
50 TABLET ORAL DAILY
Qty: 90 TABLET | Refills: 1 | Status: SHIPPED | OUTPATIENT
Start: 2020-04-23 | End: 2020-09-09

## 2020-04-23 NOTE — TELEPHONE ENCOUNTER
"Per notes from [facility] in February, amlodipine dose was decreased and atenolol dose was increased. Amlodipine was decreased to 5 mg daily and atenolol was increased to 50 mg daily.    Discharge notes as follows:    \"Medication List     CHANGE how you take these medications   amLODIPine 5 MG tablet  Quantity: 30 tablet  Dose: 5 mg  Start taking on: February 28, 2020  Commonly known as: NORVASC  5 mg, Oral, DAILY  What changed:     medication strength    how much to take    atenoloL 50 MG tablet  Quantity: 30 tablet  Dose: 50 mg  Start taking on: February 28, 2020  Commonly known as: TENORMIN  50 mg, Oral, DAILY  What changed:     medication strength    how much to take      Rationale for medication changes:     The dose of amlodipine decreased to increase the dose of atenolol by cardiologist\"      Will send to PCP for review.    Sherry Cade RN (Brasch)        "

## 2020-04-25 DIAGNOSIS — H40.002 GLAUCOMA SUSPECT OF LEFT EYE: ICD-10-CM

## 2020-04-27 RX ORDER — LATANOPROST 50 UG/ML
SOLUTION/ DROPS OPHTHALMIC
Qty: 2.5 ML | Refills: 0 | OUTPATIENT
Start: 2020-04-27

## 2020-04-27 NOTE — TELEPHONE ENCOUNTER
latanoprost (XALATAN) 0.005 % ophthalmic solution  Diagnosis: 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     Requested directions: same  Current directions on the medication list: PLACE 1 DROP INTO BOTH EYES AT BEDTIME    Last Written Prescription Date:  3/5/20  Last Fill Quantity: 2.5 ml,   # refills: 0    Last Office Visit: 3/5/19  Future Office visit: none     Attending Provider: Multiple Providers   Ophthalmology  Last Clinic Note: 3/5/19  Continue latanoprost at bedtime both eyes   Patient disposition:   Return in about 1 week (around 3/12/2019) for VT only, LPI left .     Denied. Appointment needed.

## 2020-04-29 NOTE — PROGRESS NOTES
Addendum 4/29/20  Spoke with patient today. Due to the concerns of the patient and need for safety of our patient.The time between next lab tests for Anticoagulation Therapy will be extended. Patient will contact the clinic and seek medical advise and care if they have  any signs or symptoms of Bleeding or Clotting. Asked the patient to notify the clinic of any changes in Health, Medications, or Diet.    If patient comes back on 5/13/20 that will be 12 weeks from his last INR    Valdemar Powell RP

## 2020-04-30 RX ORDER — LATANOPROST 50 UG/ML
SOLUTION/ DROPS OPHTHALMIC
Qty: 2.5 ML | Refills: 3 | Status: SHIPPED | OUTPATIENT
Start: 2020-04-30 | End: 2020-07-24

## 2020-05-06 DIAGNOSIS — E78.2 MIXED HYPERLIPIDEMIA: ICD-10-CM

## 2020-05-06 DIAGNOSIS — I10 ESSENTIAL HYPERTENSION: ICD-10-CM

## 2020-05-08 RX ORDER — ATENOLOL 25 MG/1
12.5 TABLET ORAL DAILY
Qty: 45 TABLET | Refills: 3 | OUTPATIENT
Start: 2020-05-08

## 2020-05-08 RX ORDER — ATORVASTATIN CALCIUM 80 MG/1
80 TABLET, FILM COATED ORAL DAILY
Qty: 100 TABLET | Refills: 0 | Status: SHIPPED | OUTPATIENT
Start: 2020-05-08 | End: 2020-08-03

## 2020-05-08 NOTE — TELEPHONE ENCOUNTER
ATORVASTATIN 80 MG TABLET      Last Written Prescription Date:  4/30/2019  Last Fill Quantity: 90,   # refills: 3  Last Office Visit : 3/3/20  Future Office visit:  7/20/20    Lab due and ordered / future on 1/15/20.   90 day refill sent x 1 .  LDL due( done 4/30/2019)       ATENOLOL 25 MG TABLET denied.  New prescription sent 4/23/20 for Atenolol 50 mg daily( 90/1) to [facility]     Denied as above

## 2020-05-13 DIAGNOSIS — Z79.01 LONG TERM CURRENT USE OF ANTICOAGULANT THERAPY: ICD-10-CM

## 2020-05-13 DIAGNOSIS — I48.91 ATRIAL FIBRILLATION, UNSPECIFIED TYPE (H): ICD-10-CM

## 2020-05-13 RX ORDER — WARFARIN SODIUM 2 MG/1
TABLET ORAL
Qty: 90 TABLET | Refills: 1 | Status: SHIPPED | OUTPATIENT
Start: 2020-05-13 | End: 2020-09-30

## 2020-05-21 DIAGNOSIS — H40.2222: ICD-10-CM

## 2020-05-21 RX ORDER — BRIMONIDINE TARTRATE 1.5 MG/ML
1 SOLUTION/ DROPS OPHTHALMIC 3 TIMES DAILY
Qty: 15 ML | Refills: 1 | Status: SHIPPED | OUTPATIENT
Start: 2020-05-21 | End: 2023-05-31

## 2020-05-21 RX ORDER — DORZOLAMIDE HYDROCHLORIDE AND TIMOLOL MALEATE 20; 5 MG/ML; MG/ML
1 SOLUTION/ DROPS OPHTHALMIC 2 TIMES DAILY
Qty: 10 ML | Refills: 1 | Status: SHIPPED | OUTPATIENT
Start: 2020-05-21 | End: 2023-05-31

## 2020-05-21 NOTE — TELEPHONE ENCOUNTER
"Medication: dorzolamide-timolol (COSOPT) 2-0.5 % ophthalmic solution  Diagnosis: Chronic narrow angle glaucoma, left, moderate stage   Requested directions: same  Current directions on the medication list: Place 1 drop Into the left eye 2 times daily   Last Written Prescription Date:  3/5/19  Last Fill Quantity: 1 bottle,   # refills: 11      Medication: brimonidine (ALPHAGAN-P) 0.15 % ophthalmic solution   Diagnosis: Chronic narrow angle glaucoma, left, moderate stage   Requested directions: same  Current directions on the medication list: Place 1 drop Into the left eye 3 times daily     Last Written Prescription Date:  3/5/19  Last Fill Quantity: 1 bottle,   # refills: 11    Last Office Visit: 3/5/19  Future Office visit: none    Attending Provider: Stanford Welsh  Last Clinic Note: 3/5/19  Plan:   Discussed.  Recommend laser peripheral iridotomy (LPI) after improving left eye intraocular pressure next week    - Start Cosopt twice a day left eye   - Start Alphagan three times a day left eye   - Continue latanoprost at bedtime both eyes    - Continue restasis twice a day   - Artificial tears as needed   - Warm compresses twice a day and as needed   -Return in about 1 week (around 3/12/2019) for VT only, LPI left     Routing refill request to provider for review/approval because last office visit on 3/5/19, \"Return in 1 week\".     Scheduling has been notified to contact the pt for appointment.    "

## 2020-06-03 ENCOUNTER — AMBULATORY - HEALTHEAST (OUTPATIENT)
Dept: CARDIOLOGY | Facility: CLINIC | Age: 70
End: 2020-06-03

## 2020-06-03 DIAGNOSIS — I45.5 SINUS ARREST: ICD-10-CM

## 2020-06-03 DIAGNOSIS — Z95.0 CARDIAC PACEMAKER IN SITU: ICD-10-CM

## 2020-06-10 DIAGNOSIS — Z00.00 ROUTINE HEALTH MAINTENANCE: ICD-10-CM

## 2020-06-12 RX ORDER — FLECAINIDE ACETATE 150 MG/1
150 TABLET ORAL 2 TIMES DAILY
Qty: 180 TABLET | Refills: 0 | Status: SHIPPED | OUTPATIENT
Start: 2020-06-12 | End: 2020-09-06

## 2020-06-12 NOTE — TELEPHONE ENCOUNTER
Last Clinic Visit: 3/3/2020  [facility]  NCV: 7-20-20  Overdue lab: ALT, Lipid panel: FYI to clinic CMA    2-25-20 outside lab: BMP-WNL except potassium, repeat K WNL  2-27-20 Outside potassium  Potassium  (3.5 - 5.0 mmol/L)  4.0     RF 90 day

## 2020-06-15 ENCOUNTER — ANTICOAGULATION THERAPY VISIT (OUTPATIENT)
Dept: ANTICOAGULATION | Facility: CLINIC | Age: 70
End: 2020-06-15

## 2020-06-15 DIAGNOSIS — I48.91 ATRIAL FIBRILLATION, UNSPECIFIED TYPE (H): ICD-10-CM

## 2020-06-15 DIAGNOSIS — Z79.01 LONG TERM CURRENT USE OF ANTICOAGULANT THERAPY: ICD-10-CM

## 2020-06-15 DIAGNOSIS — E78.2 MIXED HYPERLIPIDEMIA: ICD-10-CM

## 2020-06-15 LAB
ALBUMIN SERPL-MCNC: 4.1 G/DL (ref 3.4–5)
ALP SERPL-CCNC: 93 U/L (ref 40–150)
ALT SERPL W P-5'-P-CCNC: 26 U/L (ref 0–70)
ANION GAP SERPL CALCULATED.3IONS-SCNC: 2 MMOL/L (ref 3–14)
AST SERPL W P-5'-P-CCNC: 17 U/L (ref 0–45)
BASOPHILS # BLD AUTO: 0.1 10E9/L (ref 0–0.2)
BASOPHILS NFR BLD AUTO: 0.8 %
BILIRUB SERPL-MCNC: 0.5 MG/DL (ref 0.2–1.3)
BUN SERPL-MCNC: 30 MG/DL (ref 7–30)
CALCIUM SERPL-MCNC: 8.9 MG/DL (ref 8.5–10.1)
CHLORIDE SERPL-SCNC: 103 MMOL/L (ref 94–109)
CHOLEST SERPL-MCNC: 176 MG/DL
CO2 SERPL-SCNC: 29 MMOL/L (ref 20–32)
CREAT SERPL-MCNC: 1.14 MG/DL (ref 0.66–1.25)
DIFFERENTIAL METHOD BLD: ABNORMAL
EOSINOPHIL # BLD AUTO: 0.1 10E9/L (ref 0–0.7)
EOSINOPHIL NFR BLD AUTO: 1 %
ERYTHROCYTE [DISTWIDTH] IN BLOOD BY AUTOMATED COUNT: 13.5 % (ref 10–15)
GFR SERPL CREATININE-BSD FRML MDRD: 66 ML/MIN/{1.73_M2}
GLUCOSE SERPL-MCNC: 94 MG/DL (ref 70–99)
HCT VFR BLD AUTO: 40.5 % (ref 40–53)
HDLC SERPL-MCNC: 32 MG/DL
HGB BLD-MCNC: 13.1 G/DL (ref 13.3–17.7)
IMM GRANULOCYTES # BLD: 0 10E9/L (ref 0–0.4)
IMM GRANULOCYTES NFR BLD: 0.5 %
INR PPP: 3.81 (ref 0.86–1.14)
LDLC SERPL CALC-MCNC: 74 MG/DL
LYMPHOCYTES # BLD AUTO: 2.1 10E9/L (ref 0.8–5.3)
LYMPHOCYTES NFR BLD AUTO: 35.1 %
MCH RBC QN AUTO: 28.4 PG (ref 26.5–33)
MCHC RBC AUTO-ENTMCNC: 32.3 G/DL (ref 31.5–36.5)
MCV RBC AUTO: 88 FL (ref 78–100)
MONOCYTES # BLD AUTO: 0.6 10E9/L (ref 0–1.3)
MONOCYTES NFR BLD AUTO: 10.3 %
NEUTROPHILS # BLD AUTO: 3.1 10E9/L (ref 1.6–8.3)
NEUTROPHILS NFR BLD AUTO: 52.3 %
NONHDLC SERPL-MCNC: 144 MG/DL
NRBC # BLD AUTO: 0 10*3/UL
NRBC BLD AUTO-RTO: 0 /100
PLATELET # BLD AUTO: 233 10E9/L (ref 150–450)
POTASSIUM SERPL-SCNC: 4.7 MMOL/L (ref 3.4–5.3)
PROT SERPL-MCNC: 8.2 G/DL (ref 6.8–8.8)
RBC # BLD AUTO: 4.61 10E12/L (ref 4.4–5.9)
SODIUM SERPL-SCNC: 134 MMOL/L (ref 133–144)
TRIGL SERPL-MCNC: 352 MG/DL
WBC # BLD AUTO: 6 10E9/L (ref 4–11)

## 2020-06-15 NOTE — PROGRESS NOTES
ANTICOAGULATION FOLLOW-UP CLINIC VISIT    Patient Name:  Ju Edwards  Date:  6/15/2020  Contact Type:  Telephone    SUBJECTIVE:         OBJECTIVE    Recent labs: (last 7 days)     06/15/20  1119   INR 3.81*       ASSESSMENT / PLAN  No question data found.  Anticoagulation Summary  As of 6/15/2020    INR goal:   2.0-3.0   TTR:   100.0 % (8.3 mo)   INR used for dosing:   3.81! (6/15/2020)   Warfarin maintenance plan:   2 mg (2 mg x 1) every Mon, Wed, Fri; 3 mg (2 mg x 1.5) all other days   Full warfarin instructions:   6/15: 1 mg; Otherwise 2 mg every Mon, Wed, Fri; 3 mg all other days   Weekly warfarin total:   18 mg   Plan last modified:   Alice Carranza RN (6/2/2016)   Next INR check:   6/29/2020   Priority:   Maintenance   Target end date:   Indefinite    Indications    Long term current use of anticoagulant therapy [Z79.01]  Atrial fibrillation (H) [I48.91]             Anticoagulation Episode Summary     INR check location:   Clinic Lab    Preferred lab:       Send INR reminders to:   [facility]    Comments:   Speak witrh Wife Lety [phone]  Pronounced Chance-A-Mout  Pt takes Warfarin dose at 4:30 PM      Anticoagulation Care Providers     Provider Role Specialty Phone number    Rayo Christiansen MD [facility] Internal Medicine [phone]            See the Encounter Report to view Anticoagulation Flowsheet and Dosing Calendar (Go to Encounters tab in chart review, and find the Anticoagulation Therapy Visit)    Left message for patient with results and dosing recommendations. Asked patient to call back to report any missed doses, falls, signs and symptoms of bleeding or clotting, any changes in health, medication, or diet. Asked patient to call back with any questions or concerns.      Hien Curry RN

## 2020-07-22 ENCOUNTER — ANTICOAGULATION THERAPY VISIT (OUTPATIENT)
Dept: ANTICOAGULATION | Facility: CLINIC | Age: 70
End: 2020-07-22

## 2020-07-22 DIAGNOSIS — I48.91 ATRIAL FIBRILLATION, UNSPECIFIED TYPE (H): ICD-10-CM

## 2020-07-22 DIAGNOSIS — Z79.01 LONG TERM CURRENT USE OF ANTICOAGULANT THERAPY: ICD-10-CM

## 2020-07-22 LAB — INR PPP: 1.71 (ref 0.86–1.14)

## 2020-07-22 NOTE — PROGRESS NOTES
ANTICOAGULATION FOLLOW-UP CLINIC VISIT    Patient Name:  Ju Edwards  Date:  2020  Contact Type:  Telephone    SUBJECTIVE:  Patient Findings     Positives:   Missed doses    Comments:   Spoke to Lety.  Missed dose recorded on calendar.  Patient has an appt on , will get an INR then.        Clinical Outcomes     Comments:   Spoke to Lety.  Missed dose recorded on calendar.  Patient has an appt on , will get an INR then.           OBJECTIVE    Recent labs: (last 7 days)     20  1321   INR 1.71*       ASSESSMENT / PLAN  INR assessment SUB    Recheck INR In: 2 WEEKS    INR Location Clinic      Anticoagulation Summary  As of 2020    INR goal:   2.0-3.0   TTR:   92.2 % (8.3 mo)   INR used for dosin.71! (2020)   Warfarin maintenance plan:   2 mg (2 mg x 1) every Mon, Wed, Fri; 3 mg (2 mg x 1.5) all other days   Full warfarin instructions:   : 4 mg; Otherwise 2 mg every Mon, Wed, Fri; 3 mg all other days   Weekly warfarin total:   18 mg   Plan last modified:   Alice Carranza, RN (2016)   Next INR check:   2020   Priority:   Maintenance   Target end date:   Indefinite    Indications    Long term current use of anticoagulant therapy [Z79.01]  Atrial fibrillation (H) [I48.91]  Atrial fibrillation  unspecified type (H) [I48.91]             Anticoagulation Episode Summary     INR check location:   Clinic Lab    Preferred lab:       Send INR reminders to:   [facility]    Comments:   Speak witrh Wife Lety [phone]  Pronounced Chance-A-Mout  Pt takes Warfarin dose at 4:30 PM      Anticoagulation Care Providers     Provider Role Specialty Phone number    Rayo Christiansen MD Referring Internal Medicine [phone]            See the Encounter Report to view Anticoagulation Flowsheet and Dosing Calendar (Go to Encounters tab in chart review, and find the Anticoagulation Therapy Visit)    INR/CFX/F2 RESULT: INR result is 1.71    ASSESSMENT:  Spoke with patient's spouse. Gave them their lab results and new warfarin recommendation.  No changes in health, medication, or diet. No missed doses, no falls. No signs or symptoms of bleed or clotting.    DOSING ADJUSTMENT:one time dose 4mg today, then resume pattern    NEXT INR/FACTOR X OR FACTOR II: 8/6    PROTOCOL FOLLOWED:goal 2-3    Alexander Becerril RN

## 2020-07-24 DIAGNOSIS — H40.002 GLAUCOMA SUSPECT OF LEFT EYE: ICD-10-CM

## 2020-07-24 RX ORDER — LATANOPROST 50 UG/ML
1 SOLUTION/ DROPS OPHTHALMIC AT BEDTIME
Qty: 2.5 ML | Refills: 0 | Status: SHIPPED | OUTPATIENT
Start: 2020-07-24 | End: 2020-08-17

## 2020-07-24 NOTE — TELEPHONE ENCOUNTER
Last Clinic Visit: 3/5/19, [place] 8/6/20  Last clinic note:  Discussed.  Recommend laser peripheral iridotomy (LPI) after improving left eye intraocular pressure next week  Start Cosopt twice a day left eye   Start Alphagan three times a day left eye   Continue latanoprost at bedtime both eyes   Continue restasis twice a day   Artificial tears as needed   Warm compresses twice a day and as needed

## 2020-07-30 DIAGNOSIS — E78.2 MIXED HYPERLIPIDEMIA: ICD-10-CM

## 2020-08-03 RX ORDER — ATORVASTATIN CALCIUM 80 MG/1
TABLET, FILM COATED ORAL
Qty: 90 TABLET | Refills: 3 | Status: SHIPPED | OUTPATIENT
Start: 2020-08-03 | End: 2021-07-20

## 2020-08-06 ENCOUNTER — ANTICOAGULATION THERAPY VISIT (OUTPATIENT)
Dept: ANTICOAGULATION | Facility: CLINIC | Age: 70
End: 2020-08-06

## 2020-08-06 ENCOUNTER — OFFICE VISIT (OUTPATIENT)
Dept: OPHTHALMOLOGY | Facility: CLINIC | Age: 70
End: 2020-08-06
Attending: OPHTHALMOLOGY
Payer: COMMERCIAL

## 2020-08-06 DIAGNOSIS — I48.91 ATRIAL FIBRILLATION, UNSPECIFIED TYPE (H): ICD-10-CM

## 2020-08-06 DIAGNOSIS — H40.002 GLAUCOMA SUSPECT OF LEFT EYE: ICD-10-CM

## 2020-08-06 DIAGNOSIS — Z79.01 LONG TERM CURRENT USE OF ANTICOAGULANT THERAPY: ICD-10-CM

## 2020-08-06 DIAGNOSIS — H40.2222: Primary | ICD-10-CM

## 2020-08-06 DIAGNOSIS — H25.13 NUCLEAR SENILE CATARACT OF BOTH EYES: ICD-10-CM

## 2020-08-06 LAB — INR PPP: 3.24 (ref 0.86–1.14)

## 2020-08-06 PROCEDURE — 92083 EXTENDED VISUAL FIELD XM: CPT | Mod: ZF | Performed by: OPHTHALMOLOGY

## 2020-08-06 PROCEDURE — 92133 CPTRZD OPH DX IMG PST SGM ON: CPT | Mod: ZF | Performed by: OPHTHALMOLOGY

## 2020-08-06 PROCEDURE — G0463 HOSPITAL OUTPT CLINIC VISIT: HCPCS | Mod: 25

## 2020-08-06 PROCEDURE — 66761 REVISION OF IRIS: CPT | Mod: LT,ZF | Performed by: OPHTHALMOLOGY

## 2020-08-06 PROCEDURE — 92015 DETERMINE REFRACTIVE STATE: CPT | Mod: ZF

## 2020-08-06 ASSESSMENT — TONOMETRY
OD_IOP_MMHG: 12
IOP_METHOD: APPLANATION
OS_IOP_MMHG: 23

## 2020-08-06 ASSESSMENT — VISUAL ACUITY
METHOD: SNELLEN - LINEAR
METHOD_MR: PT REQUESTS MRX TODAY.
OD_CC: 20/40
OS_CC: 20/25
CORRECTION_TYPE: GLASSES

## 2020-08-06 ASSESSMENT — REFRACTION_WEARINGRX
OD_CYLINDER: +0.50
OS_CYLINDER: +0.50
OS_ADD: +2.50
OD_AXIS: 016
OD_ADD: +2.50
SPECS_TYPE: BIFOCAL
OD_SPHERE: -0.75
OS_AXIS: 097
OS_SPHERE: -0.50

## 2020-08-06 ASSESSMENT — REFRACTION_MANIFEST
OD_CYLINDER: +0.50
OS_SPHERE: PLANO
OS_ADD: +2.50
OD_AXIS: 025
OD_SPHERE: -0.25
OD_ADD: +2.50
OS_AXIS: 127
OS_CYLINDER: +0.25

## 2020-08-06 ASSESSMENT — CUP TO DISC RATIO
OS_RATIO: 0.7
OD_RATIO: 0.3

## 2020-08-06 ASSESSMENT — CONF VISUAL FIELD
OD_INFERIOR_NASAL_RESTRICTION: 3
OD_SUPERIOR_NASAL_RESTRICTION: 3
OS_INFERIOR_NASAL_RESTRICTION: 3
OS_SUPERIOR_NASAL_RESTRICTION: 3
OS_INFERIOR_TEMPORAL_RESTRICTION: 3

## 2020-08-06 ASSESSMENT — SLIT LAMP EXAM - LIDS
COMMENTS: MILD MGD
COMMENTS: MILD MGD

## 2020-08-06 NOTE — NURSING NOTE
Chief Complaints and History of Present Illnesses   Patient presents with     Follow Up     1.5 year follow up Chronic narrow angle glaucoma, left, moderate stage      Chief Complaint(s) and History of Present Illness(es)     Follow Up     Comments: 1.5 year follow up Chronic narrow angle glaucoma, left, moderate stage               Comments     Pt with video  today.  Pt states vision is the same as last visit. No eye pain today.  No flashes or floaters. Occasional itching in both eyes, none today.  Pt brought Brimonidine drops with today TICATALINA DODGE. Pt notes he has other drops at home but does not know the names or color of the cap on the bottle.  Unclear which drops pt is using.  Pt reports new pacemaker since 02/2020    SHANNAN Guillermo August 6, 2020 2:20 PM

## 2020-08-06 NOTE — PROGRESS NOTES
ANTICOAGULATION FOLLOW-UP CLINIC VISIT    Patient Name:  Ju Edwards  Date:  8/6/2020  Contact Type:  Telephone    SUBJECTIVE:  Patient Findings     Comments:   Spoke to Lety.  Reduced dose one time today, then resume maintenance dose.  Patient had a slight nose bleed that resolved quickly, per spouse may be due to allergies.        Clinical Outcomes     Comments:   Spoke to Lety.  Reduced dose one time today, then resume maintenance dose.  Patient had a slight nose bleed that resolved quickly, per spouse may be due to allergies.           OBJECTIVE    Recent labs: (last 7 days)     08/06/20  1334   INR 3.24*       ASSESSMENT / PLAN  INR assessment SUPRA    Recheck INR In: 3 WEEKS    INR Location Clinic      Anticoagulation Summary  As of 8/6/2020    INR goal:   2.0-3.0   TTR:   90.1 % (8.3 mo)   INR used for dosing:   3.24! (8/6/2020)   Warfarin maintenance plan:   2 mg (2 mg x 1) every Mon, Wed, Fri; 3 mg (2 mg x 1.5) all other days   Full warfarin instructions:   8/6: 2 mg; Otherwise 2 mg every Mon, Wed, Fri; 3 mg all other days   Weekly warfarin total:   18 mg   Plan last modified:   Alice Carranza, RN (6/2/2016)   Next INR check:   8/27/2020   Priority:   Maintenance   Target end date:   Indefinite    Indications    Long term current use of anticoagulant therapy [Z79.01]  Atrial fibrillation (H) [I48.91]  Atrial fibrillation  unspecified type (H) [I48.91]             Anticoagulation Episode Summary     INR check location:   Clinic Lab    Preferred lab:       Send INR reminders to:   [facility]    Comments:   Speak witrh Wife Lety [phone]  Pronounced Chance-A-Juan  Pt takes Warfarin dose at 4:30 PM      Anticoagulation Care Providers     Provider Role Specialty Phone number    Rayo Christiansen MD Referring Internal Medicine [phone]            See the Encounter Report to view Anticoagulation Flowsheet and Dosing Calendar (Go to Encounters tab in chart review,  and find the Anticoagulation Therapy Visit)    INR/CFX/F2 RESULT:INR result is 3.24    ASSESSMENT:No Problems found. No Changes in Health, Medications, or diet. No Signs of bruising, bleeding or clotting.    DOSING ADJUSTMENT:2mg one time today, then resume pattern of dosing    NEXT INR/FACTOR X OR FACTOR II:8/27    PROTOCOL FOLLOWED:goal 2-3    Alexander Becerril, RN

## 2020-08-06 NOTE — PROGRESS NOTES
Chief Complaint(s) and History of Present Illness(es)     Follow Up     Comments: 1.5 year follow up Chronic narrow angle glaucoma, left,   moderate stage         Comments     Pt with video  today.  Pt states vision is the same as last visit. No eye pain today.  No flashes or floaters. Occasional itching in both eyes, none today.  Pt brought Brimonidine drops with today TID LE. Pt notes he has other   drops at home but does not know the names or color of the cap on the   bottle.  Unclear which drops pt is using.  Pt reports new pacemaker since 02/2020  Describes intermittent episodes of minor eye pain, left > right.    SHANNAN Guillermo August 6, 2020 2:20 PM    Review of systems for the eyes was negative other than the pertinent positives/negatives listed in the HPI.      Assessment & Plan      Ju Edwards is a 70 year old male with the following diagnoses:   1. Chronic narrow angle glaucoma, left, moderate stage    2. 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    3. Nuclear senile cataract of both eyes       Started on latanoprost in 2018 in both eyes (Dr. Carbone - glaucoma suspicion)  Lost to follow up 2018 to 3/5/2019, appositional on gonio, recommended LPI but patient lost to follow up until today  IOP improved today at 12/23 on MMT left eye.    Significant progression on OCT and Visual field since last exam  Gonio right eye opens to anterior TM 3 quadrants (appositional nasal); left eye appositional except opens to anterior TM inferiorly with compression.    Discussed options for treating his progressive CACG including LPI, CEIOL and synechialysis left eye.  After discussion, patient wishes to proceed with LPI today followed by CEIOL/ECP/synechialysis left eye.  Monitor right eye very closely    - Continue Cosopt twice a day left eye   - Continue Alphagan three times a day left eye   - Continue latanoprost at bedtime both eyes   - Continue restasis twice a day   - Artificial tears as needed    -  Warm compresses twice a day and as needed     Patient disposition:   Return for Schedule surgery left eye this month.     Omar Newsome, PGY3  Ophthalmology Resident    Attending Physician Attestation:  Complete documentation of historical and exam elements from today's encounter can be found in the full encounter summary report (not reduplicated in this progress note).  I personally obtained the chief complaint(s) and history of present illness.  I confirmed and edited as necessary the review of systems, past medical/surgical history, family history, social history, and examination findings as documented by others; and I examined the patient myself.  I personally reviewed the relevant tests, images, and reports as documented above.  I formulated and edited as necessary the assessment and plan and discussed the findings and management plan with the patient and family. Attending Physician Image/Tesing Attestation: I personally reviewed the ophthalmic test(s) associated with this encounter, agree with the interpretation(s) as documented by the resident/fellow, and have edited the corresponding report(s) as necessary.  Attending Physician Procedure Attestation: I was present for the entire procedure     . - Stanford Welsh MD

## 2020-08-11 DIAGNOSIS — Z11.59 ENCOUNTER FOR SCREENING FOR OTHER VIRAL DISEASES: Primary | ICD-10-CM

## 2020-08-11 PROBLEM — H25.13 NUCLEAR SENILE CATARACT OF BOTH EYES: Status: ACTIVE | Noted: 2020-08-11

## 2020-08-11 PROBLEM — H40.2222: Status: ACTIVE | Noted: 2020-08-11

## 2020-08-12 ENCOUNTER — TELEPHONE (OUTPATIENT)
Dept: OPHTHALMOLOGY | Facility: CLINIC | Age: 70
End: 2020-08-12

## 2020-08-12 NOTE — TELEPHONE ENCOUNTER
Spoke with patient to schedule left eye surgery with Dr. Stanford Welsh.    Surgery was scheduled on 8/24/2020 at [facility]  Patient will have H&P at PAC on 8/20/2020.     Patient is aware they will receive a call to schedule a COVID-19 test before their procedure.   The test should be with-in 4 days of your procedure.     Patient has been scheduled for their covid test on 8/20/2020.  Post-Op visit was scheduled on 8/24/2020 and 9/15/2020.  Patient is aware a / is needed day of surgery.   Surgery packet was mailed 8/12, patient has my direct contact information for any further questions [phone].

## 2020-08-12 NOTE — TELEPHONE ENCOUNTER
FUTURE VISIT INFORMATION      SURGERY INFORMATION:    Date: 20    Location:  OR    Surgeon:  Stanford Welsh MD     Anesthesia Type:  Combined MAC with Retrobulbar    Procedure: PHACOEMULSIFICATION, CATARACT, WITH INTRAOCULAR LENS IMPLANT and goniosynechialysis ENDOCYCLOPHOTOCOAGULATION    Consult: OV 20    RECORDS REQUESTED FROM:       [facility] Care [facility]    Pertinent Medical History: Hypertension, atrial fibrillation    Most recent EKG+ Tracin20    Most recent ECHO: 20- [facility]    Most recent Coronary Angiogram: 12- [facility]

## 2020-08-14 ENCOUNTER — DOCUMENTATION ONLY (OUTPATIENT)
Dept: ANTICOAGULATION | Facility: CLINIC | Age: 70
End: 2020-08-14

## 2020-08-14 DIAGNOSIS — Z79.01 LONG TERM CURRENT USE OF ANTICOAGULANT THERAPY: ICD-10-CM

## 2020-08-14 DIAGNOSIS — I48.91 ATRIAL FIBRILLATION, UNSPECIFIED TYPE (H): ICD-10-CM

## 2020-08-14 NOTE — PROGRESS NOTES
warfarin for upcoming procedure   Received: Today   Message Contents   Elder Rodriguez, [facility]  Stanford Welsh MD; Felicita Mak APRN CNP; [facility]               [facility] Dr. Welsh -   We're seeing Ju in PAC clinic next week on Thursday in anticipation of the upcoming procedure with you on 8/24/20 (PHACOEMULSIFICATION, CATARACT, WITH INTRAOCULAR LENS IMPLANT and goniosynechialysis and ENDOCYCLOPHOTOCOAGULATION).    Patient is on warfarin for atrial fibrillation w/ history of stroke.   I am not very familiar w/ the bleeding risk with this procedure.  Will the patient need to stop warfarin for this procedure?  If so what would your goal INR be for the procedure?     I am out Mon-Tues next week but am including the NP who will be seeing patient in PAC as well as patient's Warfarin clinic so we are all on the same page for the upcoming procedure.    Regards,   Elder Rodriguez, Pharm.D., [facility]   Pre-operative Assessment Center Pharmacist   Phone: [phone]   Pager: [phone]

## 2020-08-16 DIAGNOSIS — H40.002 GLAUCOMA SUSPECT OF LEFT EYE: ICD-10-CM

## 2020-08-17 ENCOUNTER — TELEPHONE (OUTPATIENT)
Dept: LAB | Facility: CLINIC | Age: 70
End: 2020-08-17

## 2020-08-17 DIAGNOSIS — I48.91 ATRIAL FIBRILLATION (H): ICD-10-CM

## 2020-08-17 DIAGNOSIS — Z79.01 LONG TERM CURRENT USE OF ANTICOAGULANT THERAPY: ICD-10-CM

## 2020-08-17 DIAGNOSIS — I48.91 ATRIAL FIBRILLATION, UNSPECIFIED TYPE (H): ICD-10-CM

## 2020-08-17 RX ORDER — LATANOPROST 50 UG/ML
1 SOLUTION/ DROPS OPHTHALMIC AT BEDTIME
Qty: 2.5 ML | Refills: 11 | Status: SHIPPED | OUTPATIENT
Start: 2020-08-17 | End: 2021-08-30

## 2020-08-17 NOTE — TELEPHONE ENCOUNTER
Patients wife called today to confirm patients surgery time.  Patient surgery arrival time is 11:55 am.

## 2020-08-17 NOTE — TELEPHONE ENCOUNTER
Spoke with patient and his wife today.  Made an appt for an INR test on 8/20/20.    He does not have to hold his warfarin for his procedure with Dr Welsh but the INR must be below 3.0

## 2020-08-17 NOTE — TELEPHONE ENCOUNTER
LATANOPROST 0.005% EYE DROPS     Requested directions: Place 1 drop into both eyes At Bedtime Please keep appointment 8/6/20 with Dr Welsh - Both Eyes   Current directions on the medication list:  Place 1 drop into both eyes At Bedtime Please keep appointment 8/6/20 with Dr Welsh - Both Eyes     Last Written Prescription Date:  7/24/2020  Last Fill Quantity: 2.5,   # refills: 0    Last Office Visit: 8/6/2020  Future Office visit: 8/25/2020    Attending Provider:    Stanford Welsh MD   Ophthalmology      Last Clinic Note: 8/6/2020  Assessment & Plan   Assessment & Plan         Ju Edwards is a 70 year old male with the following diagnoses:   1. Chronic narrow angle glaucoma, left, moderate stage    2. 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    3. Nuclear senile cataract of both eyes       Started on latanoprost in 2018 in both eyes (Dr. Carbone - glaucoma suspicion)  Lost to follow up 2018 to 3/5/2019, appositional on gonio, recommended LPI but patient lost to follow up until today  IOP improved today at 12/23 on MMT left eye.    Significant progression on OCT and Visual field since last exam  Gonio right eye opens to anterior TM 3 quadrants (appositional nasal); left eye appositional except opens to anterior TM inferiorly with compression.     Discussed options for treating his progressive CACG including LPI, CEIOL and synechialysis left eye.  After discussion, patient wishes to proceed with LPI today followed by CEIOL/ECP/synechialysis left eye.  Monitor right eye very closely     - Continue Cosopt twice a day left eye   - Continue Alphagan three times a day left eye   - Continue latanoprost at bedtime both eyes   - Continue restasis twice a day   - Artificial tears as needed    - Warm compresses twice a day and as needed      Patient disposition:   Return for Schedule surgery left eye this month.     Omar Newsome, PGY3  Ophthalmology Resident      2.5 mL, 11 Refills sent to pharm 8/17/2020      Geno  Antionette RN  Central Triage Red Flags/Med Refills

## 2020-08-18 PROBLEM — Z95.0 CARDIAC PACEMAKER IN SITU: Status: ACTIVE | Noted: 2020-03-04

## 2020-08-18 PROBLEM — R55 SYNCOPE: Status: ACTIVE | Noted: 2020-02-26

## 2020-08-18 PROBLEM — Z79.01 ANTICOAGULATION ADEQUATE: Status: ACTIVE | Noted: 2020-08-18

## 2020-08-18 PROBLEM — I63.311 CEREBROVASCULAR ACCIDENT (CVA) DUE TO THROMBOSIS OF RIGHT MIDDLE CEREBRAL ARTERY (H): Status: ACTIVE | Noted: 2020-08-18

## 2020-08-18 PROBLEM — I25.10 ARTERIOSCLEROSIS OF CORONARY ARTERY: Status: ACTIVE | Noted: 2020-08-18

## 2020-08-18 PROBLEM — I45.5 SINUS ARREST: Status: ACTIVE | Noted: 2020-08-18

## 2020-08-18 PROBLEM — I45.10 RBBB: Status: ACTIVE | Noted: 2019-04-01

## 2020-08-18 PROBLEM — I49.9 CARDIAC ARRHYTHMIA: Status: ACTIVE | Noted: 2020-02-25

## 2020-08-18 PROBLEM — G47.33 OBSTRUCTIVE SLEEP APNEA: Status: ACTIVE | Noted: 2020-08-18

## 2020-08-18 PROBLEM — Q21.11 OSTIUM SECUNDUM TYPE ATRIAL SEPTAL DEFECT: Status: ACTIVE | Noted: 2020-08-18

## 2020-08-19 NOTE — PROGRESS NOTES
Preoperative Assessment Center Medication History Note    Medication history completed via phone call on August 19, 2020 by this writer. See Epic admission navigator for prior to admission medications. Operating room staff will still need to confirm medications and last dose information on day of surgery.     Medication history interview sources:  patient, patient's wife, patient's med list from home    Changes made to PTA medication list (reason)  Added: none  Deleted: restasis eye drops (not taking), duplicate nasal saline, percocet, zofran.   Changed: docusate sig,     Additional medication history information (including reliability of information, actions taken by pharmacist):    -- No recent (within 30 days) course of antibiotics  -- No recent (within 30 days) course of systemic steroids  -- Patient declines being on any other prescription or over-the-counter medications    Prior to Admission medications    Medication Sig Last Dose Taking? Auth Provider   amLODIPine (NORVASC) 5 MG tablet Take 1 tablet (5 mg) by mouth daily Taking Yes Rayo Christiansen MD   Artificial Tear Ointment (REFRESH P.M.) OINT Apply 3.5 g to eye At Bedtime Taking Yes Janae Mcnamara MD   atenolol (TENORMIN) 50 MG tablet Take 1 tablet (50 mg) by mouth daily Taking Yes Rayo Christiansen MD   atorvastatin (LIPITOR) 80 MG tablet TAKE 1 TABLET BY MOUTH EVERY DAY  Patient taking differently: Take 80 mg by mouth At Bedtime  Taking Yes Rayo Christiansen MD   brimonidine (ALPHAGAN-P) 0.15 % ophthalmic solution PLACE 1 DROP INTO THE LEFT EYE 3 TIMES DAILY Taking Yes Obed Coronado MD   cetirizine (ZYRTEC) 10 MG tablet Take 1 tablet (10 mg) by mouth every evening Taking Yes Rayo Christiansen MD   cholecalciferol ([employer] D3) 50 MCG (2000 UT) CAPS Take 1 tablet by mouth daily Taking Yes Rayo Christiansen MD   citalopram (CELEXA) 10 MG tablet Take 2 tablets (20 mg) by mouth daily Taking Yes Guevara Hernandez MD    docusate sodium (STOOL SOFTENER) 100 MG capsule Take 100 mg by mouth 2 times daily as needed  Taking Yes Reported, Patient   dorzolamide-timolol (COSOPT) 2-0.5 % ophthalmic solution PLACE 1 DROP INTO THE LEFT EYE 2 TIMES DAILY Taking Yes Obed Coronado MD   emollient (VANICREAM) cream Apply topically as needed for other Taking Yes Jesenia Katz MD   flecainide (TAMBOCOR) 150 MG tablet Take 1 tablet (150 mg) by mouth 2 times daily Please keep 7-20-20 clinic appt, lab due Taking Yes Rayo Christiansen MD   latanoprost (XALATAN) 0.005 % ophthalmic solution Place 1 drop into both eyes At Bedtime Taking Yes Stanford Welsh MD   lisinopril (ZESTRIL) 20 MG tablet Take 1 tablet (20 mg) by mouth daily Taking Yes Rayo Christiansen MD   naproxen (NAPROSYN) 500 MG tablet Take 1 tablet (500 mg) by mouth 2 times daily (with meals)  Patient taking differently: Take 500 mg by mouth 2 times daily as needed  Taking Yes Rayo Christiansen MD   nitroGLYcerin (NITROSTAT) 0.4 MG sublingual tablet Place 0.4 mg under the tongue every 5 minutes as needed  Taking Yes Reported, Patient   pantoprazole (PROTONIX) 40 MG EC tablet TAKE 1 TABLET BY MOUTH EVERY DAY Taking Yes Rayo Christiansen MD   Sennosides (SENNA LAX PO) Take by mouth 2 times daily as needed Taking Yes Reported, Patient   sodium chloride ([employer]S SALINE NASAL SPRAY) 0.65 % nasal spray Spray 1 spray in nostril daily Taking Yes Rayo Christiansen MD   warfarin ANTICOAGULANT (COUMADIN) 2 MG tablet TAKE 1 TAB ON MON, WED, FRI. TAKE 1 AND ONE-HALF TABS ON ALL OTHER DAYS OR AS DIRECTED - NEEDS APT Taking Yes Rayo Christiansen MD   COMPRESSION STOCKINGS 1 each daily   Rayo Christiansen MD          Medication history completed by: Elder Rodriguez [facility]

## 2020-08-20 ENCOUNTER — PRE VISIT (OUTPATIENT)
Dept: SURGERY | Facility: CLINIC | Age: 70
End: 2020-08-20

## 2020-08-20 ENCOUNTER — ANCILLARY PROCEDURE (OUTPATIENT)
Dept: CARDIOLOGY | Facility: CLINIC | Age: 70
End: 2020-08-20
Attending: NURSE PRACTITIONER
Payer: COMMERCIAL

## 2020-08-20 ENCOUNTER — ANTICOAGULATION THERAPY VISIT (OUTPATIENT)
Dept: ANTICOAGULATION | Facility: CLINIC | Age: 70
End: 2020-08-20

## 2020-08-20 ENCOUNTER — OFFICE VISIT (OUTPATIENT)
Dept: SURGERY | Facility: CLINIC | Age: 70
End: 2020-08-20
Payer: COMMERCIAL

## 2020-08-20 ENCOUNTER — ANESTHESIA EVENT (OUTPATIENT)
Dept: SURGERY | Facility: AMBULATORY SURGERY CENTER | Age: 70
End: 2020-08-20

## 2020-08-20 VITALS
SYSTOLIC BLOOD PRESSURE: 142 MMHG | BODY MASS INDEX: 26.05 KG/M2 | OXYGEN SATURATION: 97 % | HEART RATE: 60 BPM | WEIGHT: 147 LBS | HEIGHT: 63 IN | TEMPERATURE: 97.4 F | RESPIRATION RATE: 24 BRPM | DIASTOLIC BLOOD PRESSURE: 88 MMHG

## 2020-08-20 DIAGNOSIS — Z79.01 LONG TERM CURRENT USE OF ANTICOAGULANT THERAPY: ICD-10-CM

## 2020-08-20 DIAGNOSIS — H40.9 GLAUCOMA OF LEFT EYE, UNSPECIFIED GLAUCOMA TYPE: ICD-10-CM

## 2020-08-20 DIAGNOSIS — I48.91 ATRIAL FIBRILLATION, UNSPECIFIED TYPE (H): ICD-10-CM

## 2020-08-20 DIAGNOSIS — I48.91 ATRIAL FIBRILLATION (H): ICD-10-CM

## 2020-08-20 DIAGNOSIS — Z11.59 ENCOUNTER FOR SCREENING FOR OTHER VIRAL DISEASES: ICD-10-CM

## 2020-08-20 DIAGNOSIS — Z01.818 PREOP EXAMINATION: Primary | ICD-10-CM

## 2020-08-20 DIAGNOSIS — Z01.818 PREOP EXAMINATION: ICD-10-CM

## 2020-08-20 DIAGNOSIS — H26.9 CATARACT OF LEFT EYE, UNSPECIFIED CATARACT TYPE: ICD-10-CM

## 2020-08-20 LAB
INR PPP: 2.35 (ref 0.86–1.14)
INR PPP: 2.44 (ref 0.86–1.14)
SARS-COV-2 RNA SPEC QL NAA+PROBE: NOT DETECTED
SPECIMEN SOURCE: NORMAL

## 2020-08-20 ASSESSMENT — LIFESTYLE VARIABLES: TOBACCO_USE: 0

## 2020-08-20 ASSESSMENT — PAIN SCALES - GENERAL: PAINLEVEL: NO PAIN (0)

## 2020-08-20 ASSESSMENT — MIFFLIN-ST. JEOR: SCORE: 1331.92

## 2020-08-20 ASSESSMENT — ENCOUNTER SYMPTOMS: DYSRHYTHMIAS: 1

## 2020-08-20 NOTE — PATIENT INSTRUCTIONS
Preparing for Your Surgery      Name:  Ju Edwards   MRN:  [id number]   :  1950   Today's Date:  2020       Arriving for surgery:  Surgery date: 20  Arrival time:  12:30 pm    Restrictions due to COVID 19:  Patients are allowed one visitor in the pre-op period  All visitors must wear a mask  No visitors under 18  No ill visitors   parking is not available     Please come to:  [facility]  [address] [place] [id number].  -  Proceed to the 5th floor to check into the Ambulatory Surgery Center.              >> There will be patient concierges on the 1st and 5th floor, for assistance or an escort, if you would like.              >> Please call [phone] with any questions.    What can I eat or drink?  -  You may eat and drink normally for up to 8 hours before your surgery.   -  You may have clear liquids until 2 hours before surgery.   Examples of clear liquids:  Water  Clear broth  Juices (apple, white grape, white cranberry  and cider) without pulp  Noncarbonated, powder based beverages  (lemonade and Ethan-Aid)  Sodas (Sprite, 7-Up, ginger ale and seltzer)  Coffee or tea (without milk or cream)  Gatorade    -  No Alcohol for at least 24 hours before surgery     Which medicines can I take?    Hold Aspirin for 7 days before surgery.   Hold Multivitamins for 7 days before surgery.  Hold Supplements for 7 days before surgery.  Hold Ibuprofen (Advil, Motrin) for 1 day before surgery--unless otherwise directed by surgeon.  Hold Naproxen (Aleve) for 4 days before surgery.  -  PLEASE TAKE these medications the day of surgery:  Tylenol if needed; take all scheduled morning medications.    How do I prepare myself?  - Please take 2 showers before surgery using Scrubcare or Hibiclens soap.    Use this soap only from the neck to your toes.     Leave the soap on your skin for one minute--then rinse thoroughly.      You may use your own shampoo  and conditioner; no other hair products.   - Please remove all jewelry and body piercings.  - No lotions, deodorants or fragrance.  - No makeup or fingernail polish.   - Bring your ID and insurance card.    - All patients are required to have a Covid-19 test within 4 days of surgery/procedure.      -Patients will be contacted by the [facility] scheduling team within 1 week of surgery to make an appointment.      - Patients may call the Scheduling team at [phone] if they have not been scheduled within 4 days of  surgery.      ALL PATIENTS GOING HOME THE SAME DAY OF SURGERY ARE REQUIRED TO HAVE A RESPONSIBLE ADULT TO DRIVE AND BE IN ATTENDANCE WITH THEM FOR 24 HOURS FOLLOWING SURGERY     Questions or Concerns:    - For any questions regarding the day of surgery or your hospital stay, please contact the Pre Admission Nursing Office at [phone].       - If you have health changes between today and your surgery please call your surgeon.       For questions after surgery please call your surgeons office.

## 2020-08-20 NOTE — PHARMACY - PREOPERATIVE ASSESSMENT CENTER
Anticoagulation Note - Preoperative Assessment Center (PAC) Pharmacist     Patient was interviewed via phone call on August 20, 2020 prior to PAC clinic appointment. The purpose of this note is to document the perioperative anticoagulation plan outlined by the providers caring for Ju Edwards.     Current Regimen  Anticoagulation Regimen as of August 20, 2020: warfarin 2 mg every Mon/Wed/Fri and 3 mg all other days of the week. Takes at 4:30 PM  Indication: atrial fibrillation  Prescriber:  Dr. Christiansen, managed by [facility] anticoagulation clinic.   Expected Duration of therapy: indefinite  Current medications that may interact with this include: naproxen  INR Goal: 2-3    Perioperative plan  Ju Edwards is scheduled for PHACOEMULSIFICATION, CATARACT, WITH INTRAOCULAR LENS IMPLANT and goniosynechialysis, ENDOCYCLOPHOTOCOAGULATION on 8/24/20 with Dr. Welsh and the perioperative anticoagulation plan outlined by Dr. Welsh is continue on warfarin, goal to have INR <3 on day of surgery.  Anticoagulation clinic aware and checking INR and will adjust dose as necessary to reach goal for surgery.    Elder Rodriguez RPH  August 20, 2020  8:52 AM

## 2020-08-20 NOTE — ANESTHESIA PREPROCEDURE EVALUATION
Anesthesia Pre-Procedure Evaluation    Patient: Ju Edwards   MRN:     [id number] Gender:   male   Age:    70 year old :      1950        Preoperative Diagnosis: Chronic narrow angle glaucoma, left, moderate stage [H40.2222]  Nuclear senile cataract of both eyes [H25.13]   Procedure(s):  PHACOEMULSIFICATION, CATARACT, WITH INTRAOCULAR LENS IMPLANT and goniosynechialysis  ENDOCYCLOPHOTOCOAGULATION     LABS:  CBC:   Lab Results   Component Value Date    WBC 6.0 06/15/2020    WBC 5.6 10/31/2019    HGB 13.1 (L) 06/15/2020    HGB 13.2 (L) 10/31/2019    HCT 40.5 06/15/2020    HCT 40.9 10/31/2019     06/15/2020     10/31/2019     BMP:   Lab Results   Component Value Date     06/15/2020     2019    POTASSIUM 4.7 06/15/2020    POTASSIUM 4.2 2019    CHLORIDE 103 06/15/2020    CHLORIDE 105 2019    CO2 29 06/15/2020    CO2 28 2019    BUN 30 06/15/2020    BUN 19 2019    CR 1.14 06/15/2020    CR 1.01 2019    GLC 94 06/15/2020    GLC 95 2019     COAGS:   Lab Results   Component Value Date    INR 2.44 (H) 2020     POC: No results found for: BGM, HCG, HCGS  OTHER:   Lab Results   Component Value Date    A1C 6.5 (H) 2019    CARLINE 8.9 06/15/2020    ALBUMIN 4.1 06/15/2020    PROTTOTAL 8.2 06/15/2020    ALT 26 06/15/2020    AST 17 06/15/2020    ALKPHOS 93 06/15/2020    BILITOTAL 0.5 06/15/2020    LIPASE 73 2007    TSH 0.59 2018    SED 9 2017        Preop Vitals    BP Readings from Last 3 Encounters:   20 (!) 142/88   20 118/68   01/15/20 (!) 144/76    Pulse Readings from Last 3 Encounters:   20 60   20 58   01/15/20 62      Resp Readings from Last 3 Encounters:   20 24   20 16   19 16    SpO2 Readings from Last 3 Encounters:   20 97%   20 98%   01/15/20 94%      Temp Readings from Last 1 Encounters:   20 97.4  F (36.3  C) (Oral)    Ht Readings from Last 1  "Encounters:   08/20/20 1.6 m (5' 3\")      Wt Readings from Last 1 Encounters:   08/20/20 66.7 kg (147 lb)    Estimated body mass index is 26.04 kg/m  as calculated from the following:    Height as of this encounter: 1.6 m (5' 3\").    Weight as of this encounter: 66.7 kg (147 lb).     LDA:        Past Medical History:   Diagnosis Date     Allergic rhinitis      Atrial fibrillation (H)     paroxysmal     Delirium     associated with hospitalization for stroke     Depression      Gastric erosions 1/2014    associated with gi bleed     GERD (gastroesophageal reflux disease)      Hyperlipidemia      Hypertension      Left hemiparesis (H)      PTSD (post-traumatic stress disorder)      Sleep apnea      Stroke (H) 1/2014    ischemic CVA with hemorrhagic transformation      Past Surgical History:   Procedure Laterality Date     CARDIAC SURGERY  2000    mitral valve repair     REPAIR ATRIAL SEPTAL DEFECT        No Known Allergies     Anesthesia Evaluation     . Pt has had prior anesthetic. Type: General and MAC    No history of anesthetic complications          ROS/MED HX    ENT/Pulmonary:     (+)sleep apnea, allergic rhinitis, doesn't use CPAP , . .   (-) tobacco use and recent URI   Neurologic:     (+)CVA date: 2014 with deficits- left hemiparesis and paresthesias, short term memory loss, other neuro     Cardiovascular:     (+) Dyslipidemia, hypertension----. : . . . pacemaker :. dysrhythmias a-fib, . Previous cardiac testing Echodate:2/21/20results:Normal left ventricular size with mild hypertrophy.    Left ventricle ejection fraction is normal. The calculated left   ventricular ejection fraction is 68%.    Normal right ventricular size. The systolic function is mildly reduced.   TAPSE is abnormal, which is consistent with abnormal right ventricular   systolic function.    No hemodynamically significant valvular heart abnormalities.    No previous study for comparison.Stress Testdate:2016 results:Stress test  2016  FINAL " CONCLUSIONS   Normal pharmacologic stress perfusion imaging study.   No significant ischemia was suggested by this study.   Normal left ventricular size and systolic function with a visually estimated LVEF > 65%.     Gated images suggest hypokinesis in the septal wall consistent with prior open heart cardiac   surgery.  All other wall motion appears   normal.   Increased uptake of RV free wall, suggesting some RVH and mildly dilated RV.     There were no prior studies available for comparison. date: results: date: results:          METS/Exercise Tolerance:  >4 METS   Hematologic:     (+) History of Transfusion no previous transfusion reaction -     (-) history of blood clots   Musculoskeletal:  - neg musculoskeletal ROS       GI/Hepatic:     (+) GERD Asymptomatic on medication, Other GI/Hepatic will have blood from the rectum if he eats spicy food.  Has had a colonoscopy for eval.        Renal/Genitourinary:  - ROS Renal section negative       Endo:  - neg endo ROS       Psychiatric:     (+) psychiatric history depression and other (comment) (PTSD)      Infectious Disease:  - neg infectious disease ROS      (-) Recent Fever   Malignancy:      - no malignancy   Other:    (+) no H/O Chronic Pain,                       PHYSICAL EXAM:   Mental Status/Neuro: A/A/O; Age Appropriate   Airway: Facies: Feasible  Mallampati: I  Mouth/Opening: Full  TM distance: > 6 cm  Neck ROM: Full   Respiratory: Auscultation: CTAB     Resp. Rate: Normal     Resp. Effort: Normal      CV: Rhythm: Regular  Rate: Age appropriate  Heart: Normal Sounds  Edema: None   Comments: 1 right lower missing tooth, 1 left lower missing teeth     Dental: Details                Assessment:   ASA SCORE: 3    H&P: History and physical reviewed and following examination; no interval change.   Smoking Status:  Non-Smoker/Unknown   NPO Status: NPO Appropriate     Plan:   Anes. Type:  MAC   Pre-Medication: None   Induction:  N/a   Airway: Native Airway    Access/Monitoring: PIV   Maintenance: N/a     Postop Plan:   Postop Pain: None  Postop Sedation/Airway: Not planned  Disposition: Outpatient     PONV Management:   Adult Risk Factors:, Non-Smoker   Prevention: Ondansetron, Dexamethasone     CONSENT: Direct conversation   Plan and risks discussed with: Patient                   PAC Discussion and Assessment    ASA Classification: 3  Case is suitable for: ASC  Anesthetic techniques and relevant risks discussed: MAC with GA as backup  Invasive monitoring and risk discussed:   Types:   Possibility and Risk of blood transfusion discussed:   NPO instructions given:   Additional anesthetic preparation and risks discussed:   Needs early admission to pre-op area:   Other:     PAC Resident/NP Anesthesia Assessment:  Ju Edwards is a 70 year old male scheduled for a PHACOEMULSIFICATION, CATARACT, WITH INTRAOCULAR LENS IMPLANT and goniosynechialysis (Left Eye) ENDOCYCLOPHOTOCOAGULATION on 8/20/20 by Dr. Welsh in treatment of left cataract and glaucoma.  PAC referral for risk assessment and optimization for anesthesia with comorbid conditions of: hyperlipidemia, atrial fibrillation, pacemaker, hypertension, allergic rhinitis, sleep apnea, history of CVA with left hemiparesis, GERD, PTSD and depression.      Pre-operative considerations:  1.  Cardiac:  Functional status- METS >4.  He walks a mile somewhat regularly for exercise.  In February he presented to the emergency room for syncope.  A holter monitor was ordered.  One night while sleeping his wife was called and told he had to come to the emergency room because a 5.2 second pause was seen.  He had a pacemaker placed on 2/26/20.  Pacemaker has been interrogated here today (results not complete yet -please see cardiology tab for report once complete). He had an echocardiogram on 2/21/20 that showed right ventricular hypertrophy, EF of 68% and abnormal systolic function.  He had a negative stress test in 2016  and negative coronary cath in 2012.  He is s/p MV repair in 2010.  He is on warfarin for A-fib and Dr. Welsh noted that that didn't need to be stopped before surgery, but that he would like his INR <3.  Vidya Friedman communicated with his anticoagulation and they are aware of that request.  INR today is 2.35. Will order repeat INR for DOS.  Low risk surgery with 0.4% risk of major adverse cardiac event.   2.  Pulm:  Airway feasible.  He has sleep apnea, but doesn't use CPAP.  His wife notes that the sleep apnea has improved since getting the pacemaker though.    3.  GI:  Risk of PONV score = 2.  If > 2, anti-emetic intervention recommended.  GERD is well controlled with protonix.  4. Neuro:  History of CVA in 2014 with what is noted as left hemiparesis, but strength is quite good.  The symptoms he describes are more neuropathy like.    5. Communication:  [place]  used.    VTE risk: 1.8%    Patient is optimized and is acceptable candidate for the proposed procedure.  No further diagnostic evaluation is needed.     Patient discussed with Dr. Petit.    **For further details of assessment, testing, and physical exam please see H and P completed on same date.          Felicita Mak DNP, RN, APRN      Reviewed and Signed by PAC Mid-Level Provider/Resident  Mid-Level Provider/Resident: Felicita Mak DNP, RN, APRN  Date: 8/20/20  Time: 1154    Attending Anesthesiologist Anesthesia Assessment:  STAFF:  70 y.o. man with cataract disease for new lens by Dr. Welsh using MAC anesthesia.   History summarized above.  Relevant issues are:   1. Mitral valve disease s/p MVR and requires warfarin.  OK to proceed as long as INR < 3.0.  Will recheck the morning on DOS.  2. Has recent implant of cardiac pacemaker,,,, will interrogate today. Patient has chronic a.fib.  3. BMI = 26  OK for Ambulatory ASC.   Instructions given and questions answered.   Final plans by anesthesiology team on DOS.   ---rcp      Reviewed and  Signed by PAC Anesthesiologist  Anesthesiologist: pat  Date: 8/20/2020  Time:   Pass/Fail:   Disposition:     PAC Pharmacist Assessment:        Pharmacist:   Date:   Time:    AYUSH Ocampo CNP

## 2020-08-20 NOTE — H&P
Pre-Operative H & P     CC:  Preoperative exam to assess for increased cardiopulmonary risk while undergoing surgery and anesthesia.    Date of Encounter: 8/20/2020  Primary Care Physician:  Rayo Christiansen  Associated diagnosis:  Left eye glaucoma and cataract    HPI  Ju Edwards is a 70 year old male who presents for pre-operative H & P in preparation for a PHACOEMULSIFICATION, CATARACT, WITH INTRAOCULAR LENS IMPLANT and goniosynechialysis (Left Eye) ENDOCYCLOPHOTOCOAGULATION on 8/20/20 by Dr. Welsh at [facility].     Ju Edwards is a 70 year old male with hyperlipidemia, atrial fibrillation, pacemaker, hypertension, allergic rhinitis, sleep apnea, history of CVA with left hemiparesis, GERD, PTSD and depression that has left eye glaucoma and cataract.  He reports that he had been following with ophthalmology for dry eyes and glaucoma and with his most recent exam was told that he had a cataract that was bad enough to require surgery.  He denies any vision problems at this time.  The above listed surgery has been recommended.        History is obtained from the patient, his wife and the medical record.     Past Medical History  Past Medical History:   Diagnosis Date     Allergic rhinitis      Atrial fibrillation (H)     paroxysmal     Delirium     associated with hospitalization for stroke     Depression      Gastric erosions 1/2014    associated with gi bleed     GERD (gastroesophageal reflux disease)      Hyperlipidemia      Hypertension      Left hemiparesis (H)      PTSD (post-traumatic stress disorder)      Sleep apnea      Stroke (H) 1/2014    ischemic CVA with hemorrhagic transformation       Past Surgical History  Past Surgical History:   Procedure Laterality Date     CARDIAC SURGERY  2000    mitral valve repair     EP PACEMAKER       REPAIR ATRIAL SEPTAL DEFECT         Hx of Blood transfusions/reactions: yes, no reactions    Hx of abnormal  bleeding or anti-platelet use: none      Steroid use in the last year: none    Personal or FH with difficulty with Anesthesia:  none    Prior to Admission Medications  Current Outpatient Medications   Medication Sig Dispense Refill     amLODIPine (NORVASC) 5 MG tablet Take 1 tablet (5 mg) by mouth daily 90 tablet 1     Artificial Tear Ointment (REFRESH P.M.) OINT Apply 3.5 g to eye At Bedtime 1 Tube 11     atenolol (TENORMIN) 50 MG tablet Take 1 tablet (50 mg) by mouth daily 90 tablet 1     atorvastatin (LIPITOR) 80 MG tablet TAKE 1 TABLET BY MOUTH EVERY DAY (Patient taking differently: Take 80 mg by mouth At Bedtime ) 90 tablet 3     brimonidine (ALPHAGAN-P) 0.15 % ophthalmic solution PLACE 1 DROP INTO THE LEFT EYE 3 TIMES DAILY 15 mL 1     cetirizine (ZYRTEC) 10 MG tablet Take 1 tablet (10 mg) by mouth every evening 90 tablet 3     cholecalciferol (CVS D3) 50 MCG (2000 UT) CAPS Take 1 tablet by mouth daily 90 capsule 3     citalopram (CELEXA) 10 MG tablet Take 2 tablets (20 mg) by mouth daily 180 tablet 0     COMPRESSION STOCKINGS 1 each daily 2 each 1     docusate sodium (STOOL SOFTENER) 100 MG capsule Take 100 mg by mouth 2 times daily as needed        dorzolamide-timolol (COSOPT) 2-0.5 % ophthalmic solution PLACE 1 DROP INTO THE LEFT EYE 2 TIMES DAILY 10 mL 1     emollient (VANICREAM) cream Apply topically as needed for other 25 g 3     flecainide (TAMBOCOR) 150 MG tablet Take 1 tablet (150 mg) by mouth 2 times daily Please keep 7-20-20 clinic appt, lab due 180 tablet 0     latanoprost (XALATAN) 0.005 % ophthalmic solution Place 1 drop into both eyes At Bedtime 2.5 mL 11     lisinopril (ZESTRIL) 20 MG tablet Take 1 tablet (20 mg) by mouth daily 90 tablet 3     naproxen (NAPROSYN) 500 MG tablet Take 1 tablet (500 mg) by mouth 2 times daily (with meals) (Patient taking differently: Take 500 mg by mouth 2 times daily as needed ) 180 tablet 1     nitroGLYcerin (NITROSTAT) 0.4 MG sublingual tablet Place 0.4 mg under  the tongue every 5 minutes as needed        pantoprazole (PROTONIX) 40 MG EC tablet TAKE 1 TABLET BY MOUTH EVERY DAY 90 tablet 3     Sennosides (SENNA LAX PO) Take by mouth 2 times daily as needed       sodium chloride (CVS SALINE NASAL SPRAY) 0.65 % nasal spray Spray 1 spray in nostril daily 15 mL 1     warfarin ANTICOAGULANT (COUMADIN) 2 MG tablet TAKE 1 TAB ON MON, WED, FRI. TAKE 1 AND ONE-HALF TABS ON ALL OTHER DAYS OR AS DIRECTED - NEEDS APT 90 tablet 1       Allergies  No Known Allergies    Social History  Social History     Socioeconomic History     Marital status:      Spouse name: Not on file     Number of children: Not on file     Years of education: Not on file     Highest education level: Not on file   Occupational History     Not on file   Social Needs     Financial resource strain: Not on file     Food insecurity     Worry: Not on file     Inability: Not on file     Transportation needs     Medical: Not on file     Non-medical: Not on file   Tobacco Use     Smoking status: Never Smoker     Smokeless tobacco: Never Used   Substance and Sexual Activity     Alcohol use: No     Drug use: No     Sexual activity: Yes     Partners: Female   Lifestyle     Physical activity     Days per week: Not on file     Minutes per session: Not on file     Stress: Not on file   Relationships     Social connections     Talks on phone: Not on file     Gets together: Not on file     Attends [employer]ism service: Not on file     Active member of club or organization: Not on file     Attends meetings of clubs or organizations: Not on file     Relationship status: Not on file     Intimate partner violence     Fear of current or ex partner: Not on file     Emotionally abused: Not on file     Physically abused: Not on file     Forced sexual activity: Not on file   Other Topics Concern      Service Not Asked     Blood Transfusions Not Asked     Caffeine Concern Not Asked     Occupational Exposure Not Asked     Hobby  Hazards Not Asked     Sleep Concern Not Asked     Stress Concern Not Asked     Weight Concern Not Asked     Special Diet No     Back Care Not Asked     Exercise Yes     Comment: rehab     Bike Helmet Not Asked     Seat Belt Not Asked     Self-Exams Not Asked   Social History Narrative     Not on file       Family History  Family History   Problem Relation Age of Onset     Eye Surgery Mother      Albinism Mother      Cervical Cancer Mother      No Known Problems Father      No Known Problems Sister      No Known Problems Brother      No Known Problems Sister      No Known Problems Sister      No Known Problems Sister      No Known Problems Brother          from trauma     Glaucoma No family hx of      Macular Degeneration No family hx of                  ROS/MED HX  The complete review of systems is negative other than noted in the HPI or here.   ENT/Pulmonary:     (+)sleep apnea, allergic rhinitis, doesn't use CPAP , . .   (-) tobacco use and recent URI   Neurologic:     (+)CVA date:  with deficits- left hemiparesis and paresthesias, short term memory loss, other neuro     Cardiovascular:     (+) Dyslipidemia, hypertension----. : . . . pacemaker :. dysrhythmias a-fib, . Previous cardiac testing       METS/Exercise Tolerance:  >4 METS   Hematologic:     (+) History of Transfusion no previous transfusion reaction -     (-) history of blood clots   Musculoskeletal:  - neg musculoskeletal ROS       GI/Hepatic:     (+) GERD Asymptomatic on medication, Other GI/Hepatic will have blood from the rectum if he eats spicy food.  Has had a colonoscopy for eval.        Renal/Genitourinary:  - ROS Renal section negative       Endo:  - neg endo ROS       Psychiatric:     (+) psychiatric history depression and other (comment) (PTSD)      Infectious Disease:  - neg infectious disease ROS      (-) Recent Fever   Malignancy:      - no malignancy   Other:    (+) no H/O Chronic Pain,                       PHYSICAL EXAM:   Mental  "Status/Neuro: A/A/O; Age Appropriate   Airway: Facies: Feasible  Mallampati: I  Mouth/Opening: Full  TM distance: > 6 cm  Neck ROM: Full   Respiratory: Auscultation: CTAB     Resp. Rate: Normal     Resp. Effort: Normal      CV: Rhythm: Regular  Rate: Age appropriate  Heart: Normal Sounds  Edema: None   Comments: 1 right lower missing tooth, 1 left lower missing teeth     Dental: Details              Temp: 97.4  F (36.3  C) Temp src: Oral BP: (!) 142/88 Pulse: 60   Resp: 24 SpO2: 97 %         147 lbs 0 oz  5' 3\"[pt reported[   Body mass index is 26.04 kg/m .       Physical Exam  Constitutional: Awake, alert, cooperative, no apparent distress, and appears stated age.  Eyes: Pupils equal, round and reactive to light, extra ocular muscles intact, sclera clear, conjunctiva normal.  HENT: Normocephalic, oral pharynx with moist mucus membranes, dentition as above. No goiter appreciated.   Respiratory: Clear to auscultation bilaterally, no crackles or wheezing.  Cardiovascular: Regular rate and rhythm, normal S1 and S2, and no murmur noted.  Carotids +2, no bruits. No edema. Palpable pulses to radial  DP and PT arteries.   GI: Normal bowel sounds, soft, non-distended, non-tender, no masses palpated, no hepatosplenomegaly.    Lymph/Hematologic: No cervical lymphadenopathy and no supraclavicular lymphadenopathy.  Genitourinary:  deferred  Skin: Warm and dry.   Musculoskeletal: Full ROM of neck. There is no redness, warmth, or swelling of the exposed joints. Gross motor strength is normal.    Neurologic: Awake, alert, oriented to name, place and time. Cranial nerves II-XII are grossly intact. Gait is normal.   Neuropsychiatric: Calm, cooperative. Normal affect.     Labs: (personally reviewed)  Component      Latest Ref Rng & Units 6/15/2020   WBC      4.0 - 11.0 10e9/L 6.0   RBC Count      4.4 - 5.9 10e12/L 4.61   Hemoglobin      13.3 - 17.7 g/dL 13.1 (L)   Hematocrit      40.0 - 53.0 % 40.5   MCV      78 - 100 fl 88   MCH     "  26.5 - 33.0 pg 28.4   MCHC      31.5 - 36.5 g/dL 32.3   RDW      10.0 - 15.0 % 13.5   Platelet Count      150 - 450 10e9/L 233   Diff Method       Automated Method   % Neutrophils      % 52.3   % Lymphocytes      % 35.1   % Monocytes      % 10.3   % Eosinophils      % 1.0   % Basophils      % 0.8   % Immature Granulocytes      % 0.5   Nucleated RBCs      0 /100 0   Absolute Neutrophil      1.6 - 8.3 10e9/L 3.1   Absolute Lymphocytes      0.8 - 5.3 10e9/L 2.1   Absolute Monocytes      0.0 - 1.3 10e9/L 0.6   Absolute Eosinophils      0.0 - 0.7 10e9/L 0.1   Absolute Basophils      0.0 - 0.2 10e9/L 0.1   Abs Immature Granulocytes      0 - 0.4 10e9/L 0.0   Absolute Nucleated RBC       0.0   Sodium      133 - 144 mmol/L 134   Potassium      3.4 - 5.3 mmol/L 4.7   Chloride      94 - 109 mmol/L 103   Carbon Dioxide      20 - 32 mmol/L 29   Anion Gap      3 - 14 mmol/L 2 (L)   Glucose      70 - 99 mg/dL 94   Urea Nitrogen      7 - 30 mg/dL 30   Creatinine      0.66 - 1.25 mg/dL 1.14   GFR Estimate      >60 mL/min/1.73:m2 66   GFR Estimate If Black      >60 mL/min/1.73:m2 76   Calcium      8.5 - 10.1 mg/dL 8.9   Bilirubin Total      0.2 - 1.3 mg/dL 0.5   Albumin      3.4 - 5.0 g/dL 4.1   Protein Total      6.8 - 8.8 g/dL 8.2   Alkaline Phosphatase      40 - 150 U/L 93   ALT      0 - 70 U/L 26   AST      0 - 45 U/L 17       Cardiac echo:   2/21/20  Normal left ventricular size with mild hypertrophy.    Left ventricle ejection fraction is normal. The calculated left   ventricular ejection fraction is 68%.    Normal right ventricular size. The systolic function is mildly reduced.   TAPSE is abnormal, which is consistent with abnormal right ventricular   systolic function.    No hemodynamically significant valvular heart abnormalities.    No previous study for comparison.    Stress test  2016  FINAL CONCLUSIONS   Normal pharmacologic stress perfusion imaging study.   No significant ischemia was suggested by this study.   Normal  left ventricular size and systolic function with a visually estimated LVEF > 65%.     Gated images suggest hypokinesis in the septal wall consistent with prior open heart cardiac   surgery.  All other wall motion appears   normal.   Increased uptake of RV free wall, suggesting some RVH and mildly dilated RV.     There were no prior studies available for comparison.      Outside records reviewed from: Care Everywhere    ASSESSMENT and PLAN  Ju Edwards is a 70 year old male scheduled for a PHACOEMULSIFICATION, CATARACT, WITH INTRAOCULAR LENS IMPLANT and goniosynechialysis (Left Eye) ENDOCYCLOPHOTOCOAGULATION on 8/20/20 by Dr. Welsh in treatment of left cataract and glaucoma.  PAC referral for risk assessment and optimization for anesthesia with comorbid conditions of: hyperlipidemia, atrial fibrillation, pacemaker, hypertension, allergic rhinitis, sleep apnea, history of CVA with left hemiparesis, GERD, PTSD and depression.      Pre-operative considerations:  1.  Cardiac:  Functional status- METS >4.  He walks a mile somewhat regularly for exercise.  In February he presented to the emergency room for syncope.  A holter monitor was ordered.  One night while sleeping his wife was called and told he had to come to the emergency room because a 5.2 second pause was seen.  He had a pacemaker placed on 2/26/20.  Pacemaker has been interrogated here today (results not complete yet -please see cardiology tab for report once complete). He had an echocardiogram on 2/21/20 that showed right ventricular hypertrophy, EF of 68% and abnormal systolic function.  He had a negative stress test in 2016 and negative coronary cath in 2012.  He is s/p MV repair in 2010.  He is on warfarin for A-fib and Dr. Welsh noted that that didn't need to be stopped before surgery, but that he would like his INR <3.  Vidya Friedman communicated with his anticoagulation and they are aware of that request.  INR today is 2.35. Will order  repeat INR for DOS.  Low risk surgery with 0.4% risk of major adverse cardiac event.  2.  Pulm:  Airway feasible.  He has sleep apnea, but doesn't use CPAP.  His wife notes that the sleep apnea has improved since getting the pacemaker though.    3.  GI:  Risk of PONV score = 2.  If > 2, anti-emetic intervention recommended.  GERD is well controlled with protonix.  4. Neuro:  History of CVA in 2014 with what is noted as left hemiparesis, but strength is quite good.  The symptoms he describes are more neuropathy like.    5. Communication:  Upper sorbian  used.    VTE risk: 1.8%    Patient is optimized and is acceptable candidate for the proposed procedure.  No further diagnostic evaluation is needed.     Patient discussed with Dr. Petit.      Felicita Mak DNP, RN, APRN  Preoperative Assessment Center  [facility]  Phone: [phone]  Fax: [phone]

## 2020-08-20 NOTE — PROGRESS NOTES
ANTICOAGULATION FOLLOW-UP CLINIC VISIT    Patient Name:  Ju Edwards  Date:  2020  Contact Type:  Telephone    SUBJECTIVE:  Patient Findings     Comments:   Ju has an eye procedure on 2020 and INR needs to be < 3.  INR today is 2.44--recommend he continue with his maintenance dose of warfarin and to make sure he eats his usual amount of greens over the weekend.  [facility] will follow up with him after his procedure.          Clinical Outcomes     Comments:   Ju has an eye procedure on 2020 and INR needs to be < 3.  INR today is 2.44--recommend he continue with his maintenance dose of warfarin and to make sure he eats his usual amount of greens over the weekend.  [facility] will follow up with him after his procedure.             OBJECTIVE    Recent labs: (last 7 days)     20  0833   INR 2.44*       ASSESSMENT / PLAN  INR assessment THER    Recheck INR In: 4 DAYS    INR Location Clinic      Anticoagulation Summary  As of 2020    INR goal:   2.0-3.0   TTR:   88.4 % (8.3 mo)   INR used for dosin.44 (2020)   Warfarin maintenance plan:   2 mg (2 mg x 1) every Mon, Wed, Fri; 3 mg (2 mg x 1.5) all other days   Full warfarin instructions:   2 mg every Mon, Wed, Fri; 3 mg all other days   Weekly warfarin total:   18 mg   No change documented:   Alice Fish RN   Plan last modified:   Alice Carranza RN (2016)   Next INR check:   2020   Priority:   Maintenance   Target end date:   Indefinite    Indications    Long term current use of anticoagulant therapy [Z79.01]  Atrial fibrillation (H) [I48.91]  Atrial fibrillation  unspecified type (H) [I48.91]             Anticoagulation Episode Summary     INR check location:   Clinic Lab    Preferred lab:       Send INR reminders to:   [facility]    Comments:   Speak witrh Wife Lety [phone]  Pronounced [facility]  Pt takes Warfarin dose at 4:30 PM      Anticoagulation Care Providers      Provider Role Specialty Phone number    Rayo Christiansen MD Referring Internal Medicine [phone]            See the Encounter Report to view Anticoagulation Flowsheet and Dosing Calendar (Go to Encounters tab in chart review, and find the Anticoagulation Therapy Visit)    Left message for patient with results and dosing recommendations. Asked patient to call back to report any missed doses, falls, signs and symptoms of bleeding or clotting, any changes in health, medication, or diet. Asked patient to call back with any questions or concerns.    Alice Fish RN

## 2020-08-21 LAB
MDC_IDC_LEAD_IMPLANT_DT: NORMAL
MDC_IDC_LEAD_IMPLANT_DT: NORMAL
MDC_IDC_LEAD_LOCATION: NORMAL
MDC_IDC_LEAD_LOCATION: NORMAL
MDC_IDC_LEAD_LOCATION_DETAIL_1: NORMAL
MDC_IDC_LEAD_LOCATION_DETAIL_1: NORMAL
MDC_IDC_LEAD_MFG: NORMAL
MDC_IDC_LEAD_MFG: NORMAL
MDC_IDC_LEAD_MODEL: NORMAL
MDC_IDC_LEAD_MODEL: NORMAL
MDC_IDC_LEAD_POLARITY_TYPE: NORMAL
MDC_IDC_LEAD_POLARITY_TYPE: NORMAL
MDC_IDC_LEAD_SERIAL: NORMAL
MDC_IDC_LEAD_SERIAL: NORMAL
MDC_IDC_MSMT_BATTERY_STATUS: NORMAL
MDC_IDC_MSMT_LEADCHNL_RA_IMPEDANCE_VALUE: 666 OHM
MDC_IDC_MSMT_LEADCHNL_RA_PACING_THRESHOLD_AMPLITUDE: 1.6 V
MDC_IDC_MSMT_LEADCHNL_RA_PACING_THRESHOLD_PULSEWIDTH: 0.4 MS
MDC_IDC_MSMT_LEADCHNL_RA_SENSING_INTR_AMPL: 3.6 MV
MDC_IDC_MSMT_LEADCHNL_RV_IMPEDANCE_VALUE: 838 OHM
MDC_IDC_MSMT_LEADCHNL_RV_PACING_THRESHOLD_AMPLITUDE: 0.7 V
MDC_IDC_MSMT_LEADCHNL_RV_PACING_THRESHOLD_PULSEWIDTH: 0.4 MS
MDC_IDC_MSMT_LEADCHNL_RV_SENSING_INTR_AMPL: 23.1 MV
MDC_IDC_PG_IMPLANT_DTM: NORMAL
MDC_IDC_PG_MFG: NORMAL
MDC_IDC_PG_MODEL: NORMAL
MDC_IDC_PG_SERIAL: NORMAL
MDC_IDC_PG_TYPE: NORMAL
MDC_IDC_SESS_CLINIC_NAME: NORMAL
MDC_IDC_SESS_DTM: NORMAL
MDC_IDC_SESS_TYPE: NORMAL
MDC_IDC_SET_BRADY_AT_MODE_SWITCH_MODE: NORMAL
MDC_IDC_SET_BRADY_AT_MODE_SWITCH_RATE: 160 {BEATS}/MIN
MDC_IDC_SET_BRADY_LOWRATE: 60 {BEATS}/MIN
MDC_IDC_SET_BRADY_MAX_SENSOR_RATE: 130 {BEATS}/MIN
MDC_IDC_SET_BRADY_MAX_TRACKING_RATE: 130 {BEATS}/MIN
MDC_IDC_SET_BRADY_MODE: NORMAL
MDC_IDC_SET_BRADY_PAV_DELAY_HIGH: 200 MS
MDC_IDC_SET_BRADY_PAV_DELAY_LOW: 200 MS
MDC_IDC_SET_BRADY_SAV_DELAY_HIGH: 150 MS
MDC_IDC_SET_BRADY_SAV_DELAY_LOW: 150 MS
MDC_IDC_SET_LEADCHNL_RA_PACING_AMPLITUDE: 3.5 V
MDC_IDC_SET_LEADCHNL_RA_PACING_CAPTURE_MODE: NORMAL
MDC_IDC_SET_LEADCHNL_RA_PACING_POLARITY: NORMAL
MDC_IDC_SET_LEADCHNL_RA_PACING_PULSEWIDTH: 0.4 MS
MDC_IDC_SET_LEADCHNL_RA_SENSING_ADAPTATION_MODE: NORMAL
MDC_IDC_SET_LEADCHNL_RA_SENSING_POLARITY: NORMAL
MDC_IDC_SET_LEADCHNL_RA_SENSING_SENSITIVITY: 0.25 MV
MDC_IDC_SET_LEADCHNL_RV_PACING_AMPLITUDE: 3.5 V
MDC_IDC_SET_LEADCHNL_RV_PACING_CAPTURE_MODE: NORMAL
MDC_IDC_SET_LEADCHNL_RV_PACING_POLARITY: NORMAL
MDC_IDC_SET_LEADCHNL_RV_PACING_PULSEWIDTH: 0.4 MS
MDC_IDC_SET_LEADCHNL_RV_SENSING_ADAPTATION_MODE: NORMAL
MDC_IDC_SET_LEADCHNL_RV_SENSING_POLARITY: NORMAL
MDC_IDC_SET_LEADCHNL_RV_SENSING_SENSITIVITY: 1.5 MV
MDC_IDC_SET_ZONE_DETECTION_INTERVAL: 375 MS
MDC_IDC_SET_ZONE_TYPE: NORMAL
MDC_IDC_SET_ZONE_VENDOR_TYPE: NORMAL
MDC_IDC_STAT_EPISODE_RECENT_COUNT: 0
MDC_IDC_STAT_EPISODE_RECENT_COUNT_DTM_END: NORMAL
MDC_IDC_STAT_EPISODE_RECENT_COUNT_DTM_START: NORMAL
MDC_IDC_STAT_EPISODE_TOTAL_COUNT: 0
MDC_IDC_STAT_EPISODE_TOTAL_COUNT_DTM_END: NORMAL
MDC_IDC_STAT_EPISODE_TYPE: NORMAL
MDC_IDC_STAT_EPISODE_TYPE: NORMAL
MDC_IDC_STAT_EPISODE_VENDOR_TYPE: NORMAL
MDC_IDC_STAT_EPISODE_VENDOR_TYPE: NORMAL

## 2020-08-24 ENCOUNTER — ANESTHESIA (OUTPATIENT)
Dept: SURGERY | Facility: AMBULATORY SURGERY CENTER | Age: 70
End: 2020-08-24

## 2020-08-24 ENCOUNTER — HOSPITAL ENCOUNTER (OUTPATIENT)
Facility: AMBULATORY SURGERY CENTER | Age: 70
End: 2020-08-24
Attending: OPHTHALMOLOGY
Payer: COMMERCIAL

## 2020-08-24 VITALS
TEMPERATURE: 97.4 F | SYSTOLIC BLOOD PRESSURE: 143 MMHG | HEART RATE: 60 BPM | OXYGEN SATURATION: 97 % | RESPIRATION RATE: 18 BRPM | DIASTOLIC BLOOD PRESSURE: 80 MMHG

## 2020-08-24 DIAGNOSIS — H25.13 NUCLEAR SENILE CATARACT OF BOTH EYES: Primary | ICD-10-CM

## 2020-08-24 DIAGNOSIS — H40.2222: ICD-10-CM

## 2020-08-24 DIAGNOSIS — H25.13 NUCLEAR SENILE CATARACT OF BOTH EYES: ICD-10-CM

## 2020-08-24 LAB — INR PPP: 3.03 (ref 0.86–1.14)

## 2020-08-24 DEVICE — EYE IMP IOL AMO PCL TECNIS ZCB00 21.0: Type: IMPLANTABLE DEVICE | Site: EYE | Status: FUNCTIONAL

## 2020-08-24 RX ORDER — BALANCED SALT SOLUTION 6.4; .75; .48; .3; 3.9; 1.7 MG/ML; MG/ML; MG/ML; MG/ML; MG/ML; MG/ML
SOLUTION OPHTHALMIC PRN
Status: DISCONTINUED | OUTPATIENT
Start: 2020-08-24 | End: 2020-08-24 | Stop reason: HOSPADM

## 2020-08-24 RX ORDER — DEXAMETHASONE SODIUM PHOSPHATE 4 MG/ML
INJECTION, SOLUTION INTRA-ARTICULAR; INTRALESIONAL; INTRAMUSCULAR; INTRAVENOUS; SOFT TISSUE PRN
Status: DISCONTINUED | OUTPATIENT
Start: 2020-08-24 | End: 2020-08-24 | Stop reason: HOSPADM

## 2020-08-24 RX ORDER — SODIUM CHLORIDE, SODIUM LACTATE, POTASSIUM CHLORIDE, CALCIUM CHLORIDE 600; 310; 30; 20 MG/100ML; MG/100ML; MG/100ML; MG/100ML
INJECTION, SOLUTION INTRAVENOUS CONTINUOUS
Status: DISCONTINUED | OUTPATIENT
Start: 2020-08-24 | End: 2020-08-24 | Stop reason: HOSPADM

## 2020-08-24 RX ORDER — TIMOLOL 5 MG/ML
SOLUTION/ DROPS OPHTHALMIC PRN
Status: DISCONTINUED | OUTPATIENT
Start: 2020-08-24 | End: 2020-08-24 | Stop reason: HOSPADM

## 2020-08-24 RX ORDER — PREDNISOLONE ACETATE 10 MG/ML
1 SUSPENSION/ DROPS OPHTHALMIC 4 TIMES DAILY
Qty: 1 BOTTLE | Refills: 1 | Status: SHIPPED | OUTPATIENT
Start: 2020-08-24 | End: 2020-09-30

## 2020-08-24 RX ORDER — CYCLOPENTOLAT/TROPIC/PHENYLEPH 1%-1%-2.5%
1 DROPS (EA) OPHTHALMIC (EYE)
Status: COMPLETED | OUTPATIENT
Start: 2020-08-24 | End: 2020-08-24

## 2020-08-24 RX ORDER — SODIUM CHLORIDE, SODIUM LACTATE, POTASSIUM CHLORIDE, CALCIUM CHLORIDE 600; 310; 30; 20 MG/100ML; MG/100ML; MG/100ML; MG/100ML
INJECTION, SOLUTION INTRAVENOUS CONTINUOUS
Status: DISCONTINUED | OUTPATIENT
Start: 2020-08-24 | End: 2020-08-25 | Stop reason: HOSPADM

## 2020-08-24 RX ORDER — FENTANYL CITRATE 50 UG/ML
INJECTION, SOLUTION INTRAMUSCULAR; INTRAVENOUS PRN
Status: DISCONTINUED | OUTPATIENT
Start: 2020-08-24 | End: 2020-08-24

## 2020-08-24 RX ORDER — MOXIFLOXACIN IN NACL,ISO-OS/PF 0.3MG/0.3
SYRINGE (ML) INTRAOCULAR PRN
Status: DISCONTINUED | OUTPATIENT
Start: 2020-08-24 | End: 2020-08-24 | Stop reason: HOSPADM

## 2020-08-24 RX ORDER — PROPARACAINE HYDROCHLORIDE 5 MG/ML
1 SOLUTION/ DROPS OPHTHALMIC ONCE
Status: COMPLETED | OUTPATIENT
Start: 2020-08-24 | End: 2020-08-24

## 2020-08-24 RX ORDER — LIDOCAINE 40 MG/G
CREAM TOPICAL
Status: DISCONTINUED | OUTPATIENT
Start: 2020-08-24 | End: 2020-08-24 | Stop reason: HOSPADM

## 2020-08-24 RX ORDER — PROPOFOL 10 MG/ML
INJECTION, EMULSION INTRAVENOUS PRN
Status: DISCONTINUED | OUTPATIENT
Start: 2020-08-24 | End: 2020-08-24

## 2020-08-24 RX ORDER — MOXIFLOXACIN 5 MG/ML
1 SOLUTION/ DROPS OPHTHALMIC 3 TIMES DAILY
Qty: 1 BOTTLE | Refills: 1 | Status: SHIPPED | OUTPATIENT
Start: 2020-08-24 | End: 2020-09-30

## 2020-08-24 RX ORDER — ONDANSETRON 2 MG/ML
INJECTION INTRAMUSCULAR; INTRAVENOUS PRN
Status: DISCONTINUED | OUTPATIENT
Start: 2020-08-24 | End: 2020-08-24

## 2020-08-24 RX ORDER — LIDOCAINE HYDROCHLORIDE 20 MG/ML
INJECTION, SOLUTION INFILTRATION; PERINEURAL PRN
Status: DISCONTINUED | OUTPATIENT
Start: 2020-08-24 | End: 2020-08-24

## 2020-08-24 RX ORDER — LIDOCAINE HYDROCHLORIDE 10 MG/ML
INJECTION, SOLUTION EPIDURAL; INFILTRATION; INTRACAUDAL; PERINEURAL PRN
Status: DISCONTINUED | OUTPATIENT
Start: 2020-08-24 | End: 2020-08-24 | Stop reason: HOSPADM

## 2020-08-24 RX ADMIN — PROPOFOL 35 MG: 10 INJECTION, EMULSION INTRAVENOUS at 10:01

## 2020-08-24 RX ADMIN — Medication 1 DROP: at 08:46

## 2020-08-24 RX ADMIN — LIDOCAINE HYDROCHLORIDE 15 MG: 20 INJECTION, SOLUTION INFILTRATION; PERINEURAL at 10:01

## 2020-08-24 RX ADMIN — Medication 1 DROP: at 08:54

## 2020-08-24 RX ADMIN — PROPARACAINE HYDROCHLORIDE 1 DROP: 5 SOLUTION/ DROPS OPHTHALMIC at 08:46

## 2020-08-24 RX ADMIN — FENTANYL CITRATE 50 MCG: 50 INJECTION, SOLUTION INTRAMUSCULAR; INTRAVENOUS at 09:55

## 2020-08-24 RX ADMIN — ONDANSETRON 4 MG: 2 INJECTION INTRAMUSCULAR; INTRAVENOUS at 09:55

## 2020-08-24 RX ADMIN — Medication 1 DROP: at 09:09

## 2020-08-24 RX ADMIN — SODIUM CHLORIDE, SODIUM LACTATE, POTASSIUM CHLORIDE, CALCIUM CHLORIDE: 600; 310; 30; 20 INJECTION, SOLUTION INTRAVENOUS at 09:09

## 2020-08-24 NOTE — DISCHARGE INSTRUCTIONS
"[facility]  Home Care Following Anesthesia  For 24 hours after surgery:  1. Get plenty of rest.  A responsible adult must stay with you for at least 24 hours after you leave the surgery center.  2. Do not drive or use heavy equipment.  If you have weakness or tingling, don't drive or use heavy equipment until this feeling goes away.   3. Do not drink alcohol.   4. Avoid strenuous or risky activities.  Ask for help when climbing stairs.  5. You may feel lightheaded.  IF so, sit for a few minutes before standing.  Have someone help you get up.   6. If you have nausea (feel sick to your stomach): Drink only clear liquids such as apple juice, ginger ale, broth or 7-Up.  Rest may also help.  Be sure to drink enough fluids.  Move to a regular diet as you feel able.   7. You may have a slight fever.  Call the doctor if your fever is over 100 F (37.7 C) (taken under the tongue) or lasts longer than 24 hours.  8. You may have a dry mouth, a sore throat, muscle aches or trouble sleeping. These should go away after 24 hours.  9. Do not make important or legal decisions.   Today you received a Marcaine or bupivacaine block to numb the nerves near your surgery site.  This is a block using local anesthetic or \"numbing\" medication injected around the nerves to anesthetize or \"numb\" the area supplied by those nerves.  This block is injected into the muscle layer near your surgical site.  The medication may numb the location where you had surgery for 6-18 hours, but may last up to 24 hours.  If your surgical site is an arm or leg you should be careful with your affected limb, since it is possible to injure your limb without being aware of it due to the numbing.  Until full feeling returns, you should guard against bumping or hitting your limb, and avoid extreme hot or cold temperatures on the skin.  As the block wears off, the feeling will return as a tingling or prickly sensation near your " surgical site.  You will experience more discomfort from your incision as the feeling returns.  You may want to take a pain pill (a narcotic or Tylenol if this was prescribed by your surgeon) when you start to experience mild pain before the pain beccomes more severe.  If your pain medications do not control your pain you should notifiy your surgeon.    Tips for taking pain medications  To get the best pain relief possible, remember these points:    Take pain medications as directed, before pain becomes severe.    Pain medication can upset your stomach: taking it with food may help.    Constipation is a common side effect of pain medication. Drink plenty of  fluids.    Eat foods high in fiber. Take a stool softener if recommended by your doctor or pharmacist.    Do not drink alcohol, drive or operate machinery while taking pain medications.    Ask about other ways to control pain, such as with heat, ice or relaxation.    Tylenol/Acetaminophen Consumption  To help encourage the safe use of acetaminophen, the makers of TYLENOL  have lowered the maximum daily dose for single-ingredient Extra Strength TYLENOL  (acetaminophen) products sold in the [place] from 8 pills per day (4,000 mg) to 6 pills per day (3,000 mg). The dosing interval has also changed from 2 pills every 4-6 hours to 2 pills every 6 hours.    If you feel your pain relief is insufficient, you may take Tylenol/Acetaminophen in addition to your narcotic pain medication.     Be careful not to exceed 3,000 mg of Tylenol/Acetaminophen in a 24 hour period from all sources.    If you are taking extra strength Tylenol/acetaminophen (500 mg), the maximum dose is 6 tablets in 24 hours.    If you are taking regular strength acetaminophen (325 mg), the maximum dose is 9 tablets in 24 hours.    Call a doctor for any of the followin. Signs of infection (fever, growing tenderness at the surgery site, a large amount of drainage or bleeding, severe pain, foul-smelling  drainage, redness, swelling).  2. It has been over 8 to 10 hours since surgery and you are still not able to urinate (pass water).  3. Headache for over 24 hours.  4. Numbness, tingling or weakness the day after surgery (if you had spinal anesthesia).  5. Signs of Covid-19 infection (temperature over 100 degrees, shortness of breath, cough, loss of taste/smell, generalized body aches, persistent headache, chills, sore throat, nausea/vomiting/diarrhea)  Your doctor is:       Dr. Stanford Welsh, Ophthalmology: [phone]               Or dial [phone] and ask for the resident on call for:  Ophthalmology  For emergency care, call the:  [facility]:  [phone] (TTY for hearing impaired: [phone])

## 2020-08-24 NOTE — ANESTHESIA POSTPROCEDURE EVALUATION
Anesthesia POST Procedure Evaluation    Patient: Ju Edwards   MRN:     [id number] Gender:   male   Age:    70 year old :      1950        Preoperative Diagnosis: Chronic narrow angle glaucoma, left, moderate stage [H40.2222]  Nuclear senile cataract of both eyes [H25.13]   Procedure(s):  PHACOEMULSIFICATION, CATARACT, WITH INTRAOCULAR LENS IMPLANT and goniosynechialysis  ENDOCYCLOPHOTOCOAGULATION   Postop Comments: No value filed.     Anesthesia Type: MAC       Disposition: Outpatient   Postop Pain Control: Uneventful            Sign Out: Well controlled pain   PONV: No   Neuro/Psych: Uneventful            Sign Out: Acceptable/Baseline neuro status   Airway/Respiratory: Uneventful            Sign Out: Acceptable/Baseline resp. status   CV/Hemodynamics: Uneventful            Sign Out: Acceptable CV status   Other NRE: NONE   DID A NON-ROUTINE EVENT OCCUR? No         Last Anesthesia Record Vitals:  [facility] VITALS  2020 1015 - 2020 1111      2020             Resp Rate (set):  10          Last PACU Vitals:  Vitals Value Taken Time   /86 2020 10:48 AM   Temp 36.3  C (97.3  F) 2020 10:48 AM   Pulse     Resp 18 2020 10:48 AM   SpO2 99 % 2020 10:48 AM   Temp src Available 2020 10:45 AM   NIBP 140/78 2020 10:40 AM   Pulse 60 2020 10:45 AM   SpO2 100 % 2020 10:45 AM   Resp     Temp     Ht Rate 60 2020 10:44 AM   Temp 2           Electronically Signed By: Thierno Bland MD, MD, 2020, 11:11 AM

## 2020-08-24 NOTE — OP NOTE
PREOPERATIVE DIAGNOSIS:   1. Nuclear senile cataract of both eyes    2. Chronic narrow angle glaucoma, left, moderate stage            POSTOPERATIVE DIAGNOSIS: Same   PROCEDURES:   1. Cataract extraction with intraocular lens implant Left eye.  2. Endocyclophotocoagulation, left eye   3. Goniosynechialysis, left eye   SURGEON: Stanford Welsh M.D.  Assistant: Javier Newsome MD          INDICATIONS: The patient Ju Edwards presented to the eye clinic with decreased vision secondary to cataract in the Left eye. The risks, benefits and alternatives to cataract extraction were discussed. The patient elected to proceed. All questions were answered to the patient's satisfaction.   DESCRIPTION OF PROCEDURE:Prior to the procedure, appropriate cardiac and respiratory monitors were applied to the patient.  In the pre-operative holding area, under monitored anesthesia care, a retrobulbar injection of 2% lidocaine with hyaluronidase was given.  The patient was brought to the operating room where a surgical pause was carried out to identify with all members of the surgical team the correct surgical site.  With adequate anesthesia and akinesia, the Left eye was prepped and draped in the usual sterile fashion. A lid speculum was placed, and the operating microscope was rotated into position. A paracentesis was created.  Through this limbal paracentesis, the anterior chamber was inflated with dispersive viscoelastic. A temporal wound was created at the limbus using a 2.5 mm blade. A capsulorrhexis was initiated using a bent 25-gauge needle and was completed in continuous and circular fashion using the capsulorrhexis forceps. The lens nucleus was hydrodissected using balanced salt solution.  The lens nucleus was rotated and removed using phacoemulsification in a stop and chop technique.  Residual cortical material was removed using irrigation-aspiration.  The capsular bag was reinflated to its maximal extent with  cohesive viscoelastic.  A 21.0 diopter ZCBOO inserted into the capsular bag.  The lens power selected was reviewed using the intraocular lens power measurements that were obtained preoperatively to confirm that the correct lens was selected for the desired post-operative refractive state. The anterior chamber and sulcus were again inflated with viscoelastic.  The endoscopic probe was positioned in the nasal sulcus.  Laser was administered over 180 degrees of the nasal ciliary body using energy setting of 0.25 W.  Treatment was titrated to a mild blanching and shrinkage of the ciliary body.   The patient and the operating microscope were repositioned to allow for direct gonioscopy.  A Holmen-Thierno lens was placed to visualize the nasal angle anatomy.  Peripheral anterior synechiae were lysed using a micro-forceps throughout the nasal extent of the angle.  The residual viscoelastic was removed in its entirety, the wound were hydrated and found to be self-sealing.  Intracameral moxifloxacin was administered. Tactile pressure was confirmed to be in a normal range.  Subconjunctival Dexamethasone (2 mg) was injected.. The lid speculum was removed and a patch and shield were applied.  The patient tolerated the procedure well, and there were no complications.   PLAN: The patient will be discharged to home and will follow up tomorrow morning in the eye clinic.  EBL:  None  Complications:  None  Implant Name Type Inv. Item Serial No.  Lot No. LRB No. Used Action   EYE IMP IOL MARLY PCL TECNIS [id number] 21.0 Lens/Eye Implant EYE IMP IOL MARLY PCL TECNIS ZCB00 21.0 [id number] [employer]  Left 1 Implanted       Attending Physician Procedure Attestation: I was present for the entire procedure       Stanford Welsh MD  , Comprehensive Ophthalmology  Department of Ophthalmology and Visual Neurosciences  [facility]

## 2020-08-24 NOTE — ANESTHESIA CARE TRANSFER NOTE
Patient: Ju Edwards    Procedure(s):  PHACOEMULSIFICATION, CATARACT, WITH INTRAOCULAR LENS IMPLANT and goniosynechialysis  ENDOCYCLOPHOTOCOAGULATION    Diagnosis: Chronic narrow angle glaucoma, left, moderate stage [H40.2222]  Nuclear senile cataract of both eyes [H25.13]  Diagnosis Additional Information: No value filed.    Anesthesia Type:   MAC     Note:  Airway :Room Air  Patient transferred to:Phase II  Comments: Uneventful transport to Phase II: VSS; IV patent; pt comfortable; Report given to RN; pt. Responds appropriately to commandsHandoff Report: Identifed the Patient, Identified the Reponsible Provider, Reviewed the pertinent medical history, Discussed the surgical course, Reviewed Intra-OP anesthesia mangement and issues during anesthesia, Set expectations for post-procedure period and Allowed opportunity for questions and acknowledgement of understanding      Vitals: (Last set prior to Anesthesia Care Transfer)    CRNA VITALS  8/24/2020 1012 - 8/24/2020 1042      8/24/2020             Pulse:  63    Ht Rate:  61    SpO2:  100 %    Resp Rate (set):  10                Electronically Signed By: AYUSH Tineo CRNA  August 24, 2020  10:42 AM

## 2020-08-24 NOTE — BRIEF OP NOTE
[facility] Brief Operative Note    Pre-operative diagnosis: Chronic narrow angle glaucoma, left, moderate stage [H40.2222]  Nuclear senile cataract of both eyes [H25.13]     Post-operative diagnosis Same    Procedure: Procedure(s):  PHACOEMULSIFICATION, CATARACT, WITH INTRAOCULAR LENS IMPLANT and goniosynechialysis  ENDOCYCLOPHOTOCOAGULATION   Surgeon(s): Surgeon(s) and Role:     * Stanford Welsh MD - Primary     * Edwin Kirby MD - Resident - Assisting     * Javier Newsome MD - Resident - Observing   Estimated blood loss: 0 mL    Specimens: * No specimens in log *   Findings: As expected

## 2020-08-25 ENCOUNTER — OFFICE VISIT (OUTPATIENT)
Dept: OPHTHALMOLOGY | Facility: CLINIC | Age: 70
End: 2020-08-25
Attending: OPHTHALMOLOGY
Payer: COMMERCIAL

## 2020-08-25 ENCOUNTER — ANTICOAGULATION THERAPY VISIT (OUTPATIENT)
Dept: ANTICOAGULATION | Facility: CLINIC | Age: 70
End: 2020-08-25

## 2020-08-25 DIAGNOSIS — I48.91 ATRIAL FIBRILLATION, UNSPECIFIED TYPE (H): ICD-10-CM

## 2020-08-25 DIAGNOSIS — Z96.1 PSEUDOPHAKIA: Primary | ICD-10-CM

## 2020-08-25 DIAGNOSIS — Z79.01 LONG TERM CURRENT USE OF ANTICOAGULANT THERAPY: ICD-10-CM

## 2020-08-25 DIAGNOSIS — I48.91 ATRIAL FIBRILLATION (H): ICD-10-CM

## 2020-08-25 DIAGNOSIS — Z98.890 POSTOPERATIVE EYE STATE: ICD-10-CM

## 2020-08-25 PROCEDURE — G0463 HOSPITAL OUTPT CLINIC VISIT: HCPCS | Mod: ZF

## 2020-08-25 ASSESSMENT — SLIT LAMP EXAM - LIDS
COMMENTS: MILD MGD
COMMENTS: MILD MGD

## 2020-08-25 ASSESSMENT — VISUAL ACUITY
OS_SC: 20/25
OD_SC+: -1
METHOD: SNELLEN - LINEAR
OS_PH_SC: 20/25
OS_SC+: -3
OD_PH_SC+: -2
OD_PH_SC: 20/25
OD_SC: 20/40

## 2020-08-25 ASSESSMENT — TONOMETRY
OD_IOP_MMHG: 15
OS_IOP_MMHG: 15
IOP_METHOD: TONOPEN

## 2020-08-25 NOTE — NURSING NOTE
Chief Complaints and History of Present Illnesses   Patient presents with     Post Op (Ophthalmology) Left Eye     S/p Cataract extraction with intraocular lens implant, Endocyclophotocoagulation and Goniosynechialysis, left eye 08/24/2020     Chief Complaint(s) and History of Present Illness(es)     Post Op (Ophthalmology) Left Eye     Laterality: left eye    Associated symptoms: itching and redness.  Negative for eye pain and tearing    Pain scale: 0/10    Comments: S/p Cataract extraction with intraocular lens implant, Endocyclophotocoagulation and Goniosynechialysis, left eye 08/24/2020              Comments     Patient returns to clinic for a one day post operative exam of left eye.   Patch was removed in office.  His left eye feels quite itchy. He states that his vision is good, he can see well.    Eunice Up, COT 10:09 AM  August 25, 2020

## 2020-08-25 NOTE — PROGRESS NOTES
ANTICOAGULATION FOLLOW-UP CLINIC VISIT    Patient Name:  Ju Edwards  Date:  8/25/2020  Contact Type:  Telephone    SUBJECTIVE:         OBJECTIVE    Recent labs: (last 7 days)     08/24/20  0900   INR 3.03*       ASSESSMENT / PLAN  INR assessment SUPRA    Recheck INR In: 2 DAYS    INR Location Clinic      Anticoagulation Summary  As of 8/25/2020    INR goal:   2.0-3.0   TTR:   88.3 % (8.2 mo)   INR used for dosing:   3.03! (8/24/2020)   Warfarin maintenance plan:   2 mg (2 mg x 1) every Mon, Wed, Fri; 3 mg (2 mg x 1.5) all other days   Full warfarin instructions:   8/25: 2 mg; Otherwise 2 mg every Mon, Wed, Fri; 3 mg all other days   Weekly warfarin total:   18 mg   Plan last modified:   Alice Carranza RN (6/2/2016)   Next INR check:   8/27/2020   Priority:   Maintenance   Target end date:   Indefinite    Indications    Long term current use of anticoagulant therapy [Z79.01]  Atrial fibrillation (H) [I48.91]  Atrial fibrillation  unspecified type (H) [I48.91]             Anticoagulation Episode Summary     INR check location:   Clinic Lab    Preferred lab:       Send INR reminders to:   [facility]    Comments:   Speak witrh Wife Lety [phone]  Pronounced Chance-AAna  Pt takes Warfarin dose at 4:30 PM      Anticoagulation Care Providers     Provider Role Specialty Phone number    Rayo Christiansen MD Referring Internal Medicine [phone]            See the Encounter Report to view Anticoagulation Flowsheet and Dosing Calendar (Go to Encounters tab in chart review, and find the Anticoagulation Therapy Visit)  Left message for patient with results and dosing recommendations. Asked patient to call back to report any missed doses, falls, signs and symptoms of bleeding or clotting, any changes in health, medication, or diet. Asked patient to call back with any questions or concerns.      Lilibeth Conti RN

## 2020-09-02 ENCOUNTER — TRANSFERRED RECORDS (OUTPATIENT)
Dept: HEALTH INFORMATION MANAGEMENT | Facility: CLINIC | Age: 70
End: 2020-09-02

## 2020-09-02 ENCOUNTER — AMBULATORY - HEALTHEAST (OUTPATIENT)
Dept: CARDIOLOGY | Facility: CLINIC | Age: 70
End: 2020-09-02

## 2020-09-02 DIAGNOSIS — Z95.0 CARDIAC PACEMAKER IN SITU: ICD-10-CM

## 2020-09-02 DIAGNOSIS — I45.5 SINUS ARREST: ICD-10-CM

## 2020-09-02 DIAGNOSIS — Z00.00 ROUTINE HEALTH MAINTENANCE: ICD-10-CM

## 2020-09-06 NOTE — TELEPHONE ENCOUNTER
FLECAINIDE ACETATE 150 MG TAB       Last Written Prescription Date:  6/12/20  Last Fill Quantity: 180,   # refills: 0  Last Office Visit : 3/3/20  Future Office visit:  9/9/20    Routing refill request to provider for review/approval because:  Drug not on the [facility], [facility] or [facility] refill protocol

## 2020-09-08 RX ORDER — FLECAINIDE ACETATE 150 MG/1
150 TABLET ORAL 2 TIMES DAILY
Qty: 180 TABLET | Refills: 3 | Status: SHIPPED | OUTPATIENT
Start: 2020-09-08 | End: 2021-09-02

## 2020-09-09 ENCOUNTER — OFFICE VISIT (OUTPATIENT)
Dept: INTERNAL MEDICINE | Facility: CLINIC | Age: 70
End: 2020-09-09
Payer: COMMERCIAL

## 2020-09-09 ENCOUNTER — ANTICOAGULATION THERAPY VISIT (OUTPATIENT)
Dept: ANTICOAGULATION | Facility: CLINIC | Age: 70
End: 2020-09-09

## 2020-09-09 VITALS
OXYGEN SATURATION: 98 % | HEART RATE: 61 BPM | DIASTOLIC BLOOD PRESSURE: 62 MMHG | WEIGHT: 149.1 LBS | SYSTOLIC BLOOD PRESSURE: 126 MMHG | BODY MASS INDEX: 26.41 KG/M2

## 2020-09-09 DIAGNOSIS — R20.0 NUMBNESS: ICD-10-CM

## 2020-09-09 DIAGNOSIS — F32.A DEPRESSION, UNSPECIFIED DEPRESSION TYPE: ICD-10-CM

## 2020-09-09 DIAGNOSIS — I10 ESSENTIAL HYPERTENSION: ICD-10-CM

## 2020-09-09 DIAGNOSIS — I48.91 ATRIAL FIBRILLATION, UNSPECIFIED TYPE (H): ICD-10-CM

## 2020-09-09 DIAGNOSIS — K62.5 RECTAL BLEEDING: ICD-10-CM

## 2020-09-09 DIAGNOSIS — I48.91 ATRIAL FIBRILLATION (H): ICD-10-CM

## 2020-09-09 DIAGNOSIS — Z79.01 LONG TERM CURRENT USE OF ANTICOAGULANT THERAPY: ICD-10-CM

## 2020-09-09 DIAGNOSIS — K62.5 RECTAL BLEEDING: Primary | ICD-10-CM

## 2020-09-09 DIAGNOSIS — I10 BENIGN ESSENTIAL HYPERTENSION: ICD-10-CM

## 2020-09-09 DIAGNOSIS — Z95.0 CARDIAC PACEMAKER IN SITU: ICD-10-CM

## 2020-09-09 LAB
BASOPHILS # BLD AUTO: 0 10E9/L (ref 0–0.2)
BASOPHILS NFR BLD AUTO: 0.6 %
DIFFERENTIAL METHOD BLD: ABNORMAL
EOSINOPHIL # BLD AUTO: 0.1 10E9/L (ref 0–0.7)
EOSINOPHIL NFR BLD AUTO: 1.1 %
ERYTHROCYTE [DISTWIDTH] IN BLOOD BY AUTOMATED COUNT: 13.2 % (ref 10–15)
HCT VFR BLD AUTO: 40.4 % (ref 40–53)
HGB BLD-MCNC: 13.1 G/DL (ref 13.3–17.7)
IMM GRANULOCYTES # BLD: 0 10E9/L (ref 0–0.4)
IMM GRANULOCYTES NFR BLD: 0.4 %
INR PPP: 4.17 (ref 0.86–1.14)
LYMPHOCYTES # BLD AUTO: 2.2 10E9/L (ref 0.8–5.3)
LYMPHOCYTES NFR BLD AUTO: 30.9 %
MCH RBC QN AUTO: 28.1 PG (ref 26.5–33)
MCHC RBC AUTO-ENTMCNC: 32.4 G/DL (ref 31.5–36.5)
MCV RBC AUTO: 87 FL (ref 78–100)
MONOCYTES # BLD AUTO: 0.8 10E9/L (ref 0–1.3)
MONOCYTES NFR BLD AUTO: 12.1 %
NEUTROPHILS # BLD AUTO: 3.8 10E9/L (ref 1.6–8.3)
NEUTROPHILS NFR BLD AUTO: 54.9 %
NRBC # BLD AUTO: 0 10*3/UL
NRBC BLD AUTO-RTO: 0 /100
PLATELET # BLD AUTO: 239 10E9/L (ref 150–450)
RBC # BLD AUTO: 4.67 10E12/L (ref 4.4–5.9)
VIT B12 SERPL-MCNC: 449 PG/ML (ref 193–986)
WBC # BLD AUTO: 7 10E9/L (ref 4–11)

## 2020-09-09 RX ORDER — CITALOPRAM HYDROBROMIDE 10 MG/1
20 TABLET ORAL DAILY
Qty: 180 TABLET | Refills: 3 | Status: SHIPPED | OUTPATIENT
Start: 2020-09-09 | End: 2021-09-02

## 2020-09-09 RX ORDER — ATENOLOL 50 MG/1
50 TABLET ORAL DAILY
Qty: 90 TABLET | Refills: 3 | Status: SHIPPED | OUTPATIENT
Start: 2020-09-09 | End: 2021-11-22

## 2020-09-09 RX ORDER — LISINOPRIL 20 MG/1
20 TABLET ORAL DAILY
Qty: 90 TABLET | Refills: 3 | Status: SHIPPED | OUTPATIENT
Start: 2020-09-09 | End: 2021-10-24

## 2020-09-09 ASSESSMENT — PAIN SCALES - GENERAL: PAINLEVEL: NO PAIN (0)

## 2020-09-09 NOTE — PROGRESS NOTES
ANTICOAGULATION FOLLOW-UP CLINIC VISIT    Patient Name:  Ju Edwards  Date:  2020  Contact Type:  Telephone    SUBJECTIVE:  Patient Findings     Comments:   Per Dr. Lam's note pt is having some bleeding when he wipes, but no blood noted in stools or dripping and a colonoscopy will be ordered in the future. Not date is scheduled for this and per past colonoscopy 3/9/16 no Lovenox bridging needed just holding.          Clinical Outcomes     Comments:   Per Dr. Lam's note pt is having some bleeding when he wipes, but no blood noted in stools or dripping and a colonoscopy will be ordered in the future. Not date is scheduled for this and per past colonoscopy 3/9/16 no Lovenox bridging needed just holding.             OBJECTIVE    Recent labs: (last 7 days)     20  1628   INR 4.17*       ASSESSMENT / PLAN  INR assessment SUPRA    Recheck INR In: 1 WEEK    INR Location Clinic      Anticoagulation Summary  As of 2020    INR goal:   2.0-3.0   TTR:   81.8 % (8.3 mo)   INR used for dosin.17! (2020)   Warfarin maintenance plan:   2 mg (2 mg x 1) every Mon, Wed, Fri; 3 mg (2 mg x 1.5) all other days   Full warfarin instructions:   : Hold; Otherwise 2 mg every Mon, Wed, Fri; 3 mg all other days   Weekly warfarin total:   18 mg   Plan last modified:   Alice Carranza RN (2016)   Next INR check:   2020   Priority:   High   Target end date:   Indefinite    Indications    Long term current use of anticoagulant therapy [Z79.01]  Atrial fibrillation (H) [I48.91]  Atrial fibrillation  unspecified type (H) [I48.91]             Anticoagulation Episode Summary     INR check location:   Clinic Lab    Preferred lab:       Send INR reminders to:   [facility]    Comments:   Speak witrh Wife Lety [phone]  Pronounced Chance-A-Mout  Pt takes Warfarin dose at 4:30 PM      Anticoagulation Care Providers     Provider Role Specialty Phone number    Rayo Christiansen,  MD Referring Internal Medicine [phone]            See the Encounter Report to view Anticoagulation Flowsheet and Dosing Calendar (Go to Encounters tab in chart review, and find the Anticoagulation Therapy Visit)    Left message for patient's spouse Lety with results and dosing recommendations. Asked Lety to call back to report any missed doses, falls, signs and symptoms of bleeding or clotting, any changes in health, medication, or diet. Asked Lety to call back with any questions or concerns.     aYnni Up RN

## 2020-09-09 NOTE — TELEPHONE ENCOUNTER
[facility] Call Center    Phone Message    May a detailed message be left on voicemail: yes     Reason for Call: Medication Refill Request    Has the patient contacted the pharmacy for the refill? Yes   Name of medication being requested: citalopram (CELEXA) 10 MG tablet    Provider who prescribed the medication: [facility]   Pharmacy: [facility]/PHARMACY #[id number] [address] [facility]    Date medication is needed: ASAP/ pt is out - per pt wife pharmacy sent over a refill request but I see nothing          Action Taken: Message routed to:  [facility]    Travel Screening: Not Applicable

## 2020-09-09 NOTE — NURSING NOTE
Chief Complaint   Patient presents with     Physical     Pt comes in for annual physical today      BONILLA Lewis at 3:35 PM sign on 9/9/2020

## 2020-09-09 NOTE — PATIENT INSTRUCTIONS
[facility] Medication Refill Request Information:  * Please contact your pharmacy regarding ANY request for medication refills.  ** [facility] Prescription Fax = [phone]  * Please allow 3 business days for routine medication refills.  * Please allow 5 business days for controlled substance medication refills.     [facility] Test Result notification information:  *You will be notified with in 7-10 days of your appointment day regarding the results of your test.  If you are on MyChart you will be notified as soon as the provider has reviewed the results and signed off on them.    [facility]: [phone]     Gastroenterology [phone] (4th [facility])   Cardiovascular [phone] (3rd [facility])

## 2020-09-09 NOTE — PROGRESS NOTES
HPI  70 year old male with hyperlipidemia, atrial fibrillation on coumadin, pacemaker, hypertension, allergic rhinitis, sleep apnea, history of CVA with left hemiparesis, GERD, PTSD and depression for physical exam and rectal bleeding through interpretor.  Bleeding mostly when wipes but does drip and no blood in the stool.  He has had previous history of a bleeding his last colonoscopy was 4 to 5 years ago.  He has had no abdominal pain or pain with bowel movements in association with this.  He has noted some intermittent numbness in the left hand in the left foot dating to his stroke but the numbness in the foot is more pronounced now than it had been in the past.  He has not had any weakness or other focal neurologic symptoms in association with this.  Otherwise he has been tolerating physical activity and he does not indicate any other symptoms problems or difficulties with his present medication.  Past Medical History:   Diagnosis Date     Allergic rhinitis      Atrial fibrillation (H)     paroxysmal     Delirium     associated with hospitalization for stroke     Depression      Gastric erosions 1/2014    associated with gi bleed     GERD (gastroesophageal reflux disease)      Hyperlipidemia      Hypertension      Left hemiparesis (H)      PTSD (post-traumatic stress disorder)      Sleep apnea      Stroke (H) 1/2014    ischemic CVA with hemorrhagic transformation     Past Surgical History:   Procedure Laterality Date     CARDIAC SURGERY  2000    mitral valve repair     CYCLOPHOTOCOAGULATION TRANSSCLERAL WITH MICROPULSE LASER Left 8/24/2020    Procedure: ENDOCYCLOPHOTOCOAGULATION;  Surgeon: Stanford Welsh MD;  Location: [facility] OR     EP PACEMAKER       PHACOEMULSIFICATION CLEAR CORNEA WITH STANDARD INTRAOCULAR LENS IMPLANT Left 8/24/2020    Procedure: PHACOEMULSIFICATION, CATARACT, WITH INTRAOCULAR LENS IMPLANT and goniosynechialysis;  Surgeon: Stanford Welsh MD;  Location: [facility] OR     REPAIR ATRIAL  SEPTAL DEFECT       Family History   Problem Relation Age of Onset     Eye Surgery Mother      Albinism Mother      Cervical Cancer Mother      No Known Problems Father      No Known Problems Sister      No Known Problems Brother      No Known Problems Sister      No Known Problems Sister      No Known Problems Sister      No Known Problems Brother          from trauma     Glaucoma No family hx of      Macular Degeneration No family hx of      Social History     Socioeconomic History     Marital status:      Spouse name: Not on file     Number of children: Not on file     Years of education: Not on file     Highest education level: Not on file   Occupational History     Not on file   Social Needs     Financial resource strain: Not on file     Food insecurity     Worry: Not on file     Inability: Not on file     Transportation needs     Medical: Not on file     Non-medical: Not on file   Tobacco Use     Smoking status: Never Smoker     Smokeless tobacco: Never Used   Substance and Sexual Activity     Alcohol use: No     Drug use: No     Sexual activity: Yes     Partners: Female   Lifestyle     Physical activity     Days per week: Not on file     Minutes per session: Not on file     Stress: Not on file   Relationships     Social connections     Talks on phone: Not on file     Gets together: Not on file     Attends Cheondoism service: Not on file     Active member of club or organization: Not on file     Attends meetings of clubs or organizations: Not on file     Relationship status: Not on file     Intimate partner violence     Fear of current or ex partner: Not on file     Emotionally abused: Not on file     Physically abused: Not on file     Forced sexual activity: Not on file   Other Topics Concern      Service Not Asked     Blood Transfusions Not Asked     Caffeine Concern Not Asked     Occupational Exposure Not Asked     Hobby Hazards Not Asked     Sleep Concern Not Asked     Stress Concern Not  Asked     Weight Concern Not Asked     Special Diet No     Back Care Not Asked     Exercise Yes     Comment: rehab     Bike Helmet Not Asked     Seat Belt Not Asked     Self-Exams Not Asked   Social History Narrative     Not on file       Exam:  /62 (BP Location: Right arm, Patient Position: Sitting, Cuff Size: Adult Regular)   Pulse 61   Wt 67.6 kg (149 lb 1.6 oz)   SpO2 98%   BMI 26.41 kg/m    149 lbs 1.6 oz  Physical Exam   The patient is alert, oriented with a clear sensorium.   Skin shows no lesions or rashes and good turgor.   Head is normocephalic and atraumatic.   Eyes show PERRLA   Ears show normal TMs bilaterally.   Mouth shows clear oral mucosa.   Neck shows no nodes, thyromegaly or bruits.   Back is non tender.  Lungs are clear to percussion and auscultation.   Heart shows normal S1 and S2 without murmur or gallop.   Abdomen is soft and nontender without masses or organomegaly.   Genitalia show normal testes. No evidence of inguinal hernia.  Rectal shows small smooth prostate without nodules or masses, no bleeding or hemorrhoids noted.  Extremities show no edema and no evidence of active synovitis.   Neurologic examination shows cranial nerves II-XII intact. Motor shows 5/5 strength. Reflexes are symmetric. Cerebellar testing shows normal tandem gait.  Romberg negative.    ASSESSMENT  1 rectal bleeding uncertain etiology  2 history of CVA appears stable neurologically  3 hypertension well controlled  4 hyperlipidemia fair control on atorvastatin  5 GERD  6 atrial fibrillation on anticoagulation likely contributing to #1 above    Plan  We will check a CBC and INR today also check his B12 because of the numbness and will arrange for a colonoscopy refilled his present medications.    This note was completed using Dragon voice recognition software.      Rayo Christiansen MD  General Internal Medicine  [facility]  [phone]

## 2020-09-10 NOTE — PROGRESS NOTES
9/29/20 ADDENDUM  Sent first reminder letter for an INR.  Patient has a procedure scheduled for 10/5.  Labs are scheduled for 10/1.  This writer did not contact patient.  Overdue for an INR.  Uncertain if there is a plan to hold Coumadin pre procedure.  CONCEPCIÓN Saez          Addendum 9/10/20 Consulted with Valdemar Powell RPH about my plan below for pt and was ok'd by him. Pt is noncompliant with coming in when requested to get an INR. On my voice message left for the spouse Lety I emphasized the importance of getting another INR next week due to maybe having to reduce pt's Warfarin. Did request Lety to call back if she needs help with scheduling pt's INR lab appointment. Yanni Up RN

## 2020-09-15 ENCOUNTER — OFFICE VISIT (OUTPATIENT)
Dept: OPHTHALMOLOGY | Facility: CLINIC | Age: 70
End: 2020-09-15
Attending: OPHTHALMOLOGY
Payer: COMMERCIAL

## 2020-09-15 DIAGNOSIS — Z98.890 POSTOPERATIVE EYE STATE: ICD-10-CM

## 2020-09-15 DIAGNOSIS — Z96.1 PSEUDOPHAKIA: Primary | ICD-10-CM

## 2020-09-15 DIAGNOSIS — H40.2222: ICD-10-CM

## 2020-09-15 DIAGNOSIS — H40.2211: ICD-10-CM

## 2020-09-15 DIAGNOSIS — H25.811 COMBINED FORM OF AGE-RELATED CATARACT, RIGHT EYE: ICD-10-CM

## 2020-09-15 PROCEDURE — 92015 DETERMINE REFRACTIVE STATE: CPT | Mod: 52,ZF

## 2020-09-15 PROCEDURE — G0463 HOSPITAL OUTPT CLINIC VISIT: HCPCS | Mod: ZF

## 2020-09-15 ASSESSMENT — TONOMETRY
OS_IOP_MMHG: 17
IOP_METHOD: PALPATION
OD_IOP_MMHG: 16

## 2020-09-15 ASSESSMENT — VISUAL ACUITY
OD_SC+: -2
OS_SC: 20/25
OD_SC: 20/30
METHOD: SNELLEN - LINEAR

## 2020-09-15 ASSESSMENT — REFRACTION_MANIFEST
OS_SPHERE: -0.75
OD_AXIS: 025
OD_ADD: +2.50
OD_SPHERE: -0.25
OS_ADD: +2.50
OS_AXIS: 140
OS_CYLINDER: +0.75
OD_CYLINDER: +0.50

## 2020-09-15 ASSESSMENT — SLIT LAMP EXAM - LIDS
COMMENTS: MILD MGD
COMMENTS: MILD MGD

## 2020-09-15 ASSESSMENT — CUP TO DISC RATIO: OS_RATIO: 0.7

## 2020-09-15 NOTE — NURSING NOTE
Chief Complaints and History of Present Illnesses   Patient presents with     Post Op (Ophthalmology) Left Eye     Chief Complaint(s) and History of Present Illness(es)     Post Op (Ophthalmology) Left Eye     Laterality: left eye    Onset: 2 weeks ago              Comments     S/p CE/IOL with goniosynechialysis and ENDOCYLOPHOTOCOAGULATION left eye-Pt. States that he is doing well. VA is improved LE. No pain BE.   Amrita Truong COT 10:43 AM September 15, 2020

## 2020-09-15 NOTE — PROGRESS NOTES
Chief Complaint(s) and History of Present Illness(es)     Post Op (Ophthalmology) Left Eye     Laterality: left eye    Onset: 2 weeks ago         Comments     S/p CE/IOL with goniosynechialysis and ENDOCYLOPHOTOCOAGULATION left   eye-Pt (8/14/2020). States that he is doing well. VA is improved LE. No pain BE. No floaters, or flashes of lights, or discharge out of the eye.   Amrita Truong COT 10:43 AM September 15, 2020; Joseph Winters MD        Review of systems for the eyes was negative other than the pertinent positives/negatives listed in the HPI.      Assessment & Plan      Ju Edwards is a 70 year old male with the following diagnoses:      1. Pseudophakia - Left Eye    2. Postoperative eye state - Left Eye    3. Chronic narrow angle glaucoma, left, moderate stage    4. Chronic narrow angle glaucoma, right, mild stage       Patient with a hx of chronic narrow angle glaucoma moderate stage in left eye, and chronic narrow angle glaucoma mild stage in the right eye,  now s/p CEIOL/ECP/synechialysis left eye (8/14/2020).     Left eye: Healing well, good visual result.  Intraocular pressure great.  - Stop all eye drops in the left eye.    Right eye:   - Plan for CE IOL, with intraoperative gonioscopy to assess for need for synechialysis OD. (Pt wants to wait for a few months with this).   -Continue using Brimonidine, BID, right eye  - Maximum intraocular pressure 26/38    Patient disposition:   Return in 3 months (on 12/15/2020) for return in 3 mo (1 week prior to OD surgery) with OCT NFL, VT only.    Joseph Winters MD  Department of Ophthalmology  Pager: [phone]      Attending Physician Attestation:  Complete documentation of historical and exam elements from today's encounter can be found in the full encounter summary report (not reduplicated in this progress note).  I personally obtained the chief complaint(s) and history of present illness.  I confirmed and edited as necessary the review of  systems, past medical/surgical history, family history, social history, and examination findings as documented by others; and I examined the patient myself.  I personally reviewed the relevant tests, images, and reports as documented above.  I formulated and edited as necessary the assessment and plan and discussed the findings and management plan with the patient and family. . - Stanford Welsh MD

## 2020-09-18 ENCOUNTER — TELEPHONE (OUTPATIENT)
Dept: OPHTHALMOLOGY | Facility: CLINIC | Age: 70
End: 2020-09-18

## 2020-09-18 ENCOUNTER — HOSPITAL ENCOUNTER (OUTPATIENT)
Facility: AMBULATORY SURGERY CENTER | Age: 70
End: 2020-09-18
Attending: OPHTHALMOLOGY
Payer: COMMERCIAL

## 2020-09-18 DIAGNOSIS — Z11.59 ENCOUNTER FOR SCREENING FOR OTHER VIRAL DISEASES: Primary | ICD-10-CM

## 2020-09-18 DIAGNOSIS — H40.2211: ICD-10-CM

## 2020-09-18 DIAGNOSIS — H25.811 COMBINED FORM OF AGE-RELATED CATARACT, RIGHT EYE: ICD-10-CM

## 2020-09-18 NOTE — TELEPHONE ENCOUNTER
Spoke with patient to schedule right eye surgery with Dr. Stanford Welsh.    Surgery was scheduled on 10/5/20 at [facility]  Patient will have H&P at [facility] on 10/1/20.     Patient is aware a COVID-19 test is needed before their procedure. The test should be with-in 4 days of their procedure.   Test Details: Date 10/1/20 Location  LAB.    Post-Op visit was scheduled on 10/5/20 and 10/20/20.     Patient was advised a / is needed day of surgery. As well as, for 24 hours after their surgery procedure.    Surgery packet was mailed 9/18, patient has my direct contact information for any further questions [phone].

## 2020-09-21 NOTE — TELEPHONE ENCOUNTER
FUTURE VISIT INFORMATION      SURGERY INFORMATION:    Date: 10/5/20    [place]  [facility]    Surgeon:  Stanford Welsh MD     Anesthesia Type:  Combined MAC with Retrobulbar     Procedure: RIGHT EYE PHACOEMULSIFICATION, CATARACT, WITH INTRAOCULAR LENS IMPLANT     Consult: ov 9/15    RECORDS REQUESTED FROM:       Primary Care Provider: Taurus Fuentes [facility]  - [facility]    Pertinent Medical History: ATrial fibrillation, hypertension, sleep apnea    Most recent EKG+ Tracin20    Most recent ECHO: 20- [facility]    Most recent Cardiac Stress Test: 16- [facility]

## 2020-09-30 DIAGNOSIS — I48.91 ATRIAL FIBRILLATION, UNSPECIFIED TYPE (H): ICD-10-CM

## 2020-09-30 DIAGNOSIS — Z79.01 LONG TERM CURRENT USE OF ANTICOAGULANT THERAPY: ICD-10-CM

## 2020-09-30 RX ORDER — WARFARIN SODIUM 2 MG/1
TABLET ORAL
Qty: 90 TABLET | Refills: 1 | Status: SHIPPED | OUTPATIENT
Start: 2020-09-30 | End: 2021-03-24

## 2020-09-30 NOTE — PROGRESS NOTES
Addendum 9/30/20    Elder Rodriguez, DEANGELO  [facility]               Hi -   Here is an FYI for upcoming surgery for Mr Jerry.  His wife is aware of plan to stay on warfarin but keep INR <3.  OR is on 10/5 and I mentioned to her that you may want to see him before hand to make sure INR <3 for DOS.  If you wanted he certainly could get an INR tomorrow at PAC visit (He comes in for that visit at 4 PM).   Thanks!   Elder

## 2020-09-30 NOTE — PHARMACY - PREOPERATIVE ASSESSMENT CENTER
Anticoagulation Note - Preoperative Assessment Center (PAC) Pharmacist     Patient was interviewed via phone call on September 30, 2020 prior to PAC clinic appointment. The purpose of this note is to document the perioperative anticoagulation plan outlined by the providers caring for Ju Edwards.     Current Regimen  Anticoagulation Regimen as of September 30, 2020: warfarin 2 mg on Mon/Wed/Fri and 3 mg on all other days of the week. Takes at 4:30 PM each day.   IIndication: atrial fibrillation  Prescriber:  Dr. Christiansen, managed by [facility] anticoagulation clinic.   Expected Duration of therapy: indefinite  Current medications that may interact with this include: naproxen  INR Goal: 2-3    Perioperative plan  Ju Edwards is scheduled for RIGHT EYE PHACOEMULSIFICATION, CATARACT, WITH INTRAOCULAR LENS IMPLANT on 10/5/20 with Dr. Welsh and the perioperative anticoagulation plan outlined by Dr. Welsh is continue on warfarin, but keep INR <3 for procedure day (note this is within patient's usual goal range).  Message sent out to [facility] A/C clinic to help coordinate this plan.    Elder Rodriguez RPH  September 30, 2020  3:52 PM

## 2020-10-01 ENCOUNTER — TRANSFERRED RECORDS (OUTPATIENT)
Dept: HEALTH INFORMATION MANAGEMENT | Facility: CLINIC | Age: 70
End: 2020-10-01

## 2020-10-01 ENCOUNTER — PRE VISIT (OUTPATIENT)
Dept: SURGERY | Facility: CLINIC | Age: 70
End: 2020-10-01

## 2020-10-01 ENCOUNTER — OFFICE VISIT (OUTPATIENT)
Dept: SURGERY | Facility: CLINIC | Age: 70
End: 2020-10-01
Payer: COMMERCIAL

## 2020-10-01 ENCOUNTER — ANESTHESIA EVENT (OUTPATIENT)
Dept: SURGERY | Facility: CLINIC | Age: 70
End: 2020-10-01

## 2020-10-01 VITALS
HEIGHT: 63 IN | HEART RATE: 65 BPM | WEIGHT: 151 LBS | DIASTOLIC BLOOD PRESSURE: 71 MMHG | BODY MASS INDEX: 26.75 KG/M2 | OXYGEN SATURATION: 99 % | RESPIRATION RATE: 14 BRPM | SYSTOLIC BLOOD PRESSURE: 132 MMHG

## 2020-10-01 DIAGNOSIS — Z01.818 PREOP EXAMINATION: Primary | ICD-10-CM

## 2020-10-01 PROCEDURE — 99207 PR NO CHARGE LOS: CPT | Performed by: NURSE PRACTITIONER

## 2020-10-01 ASSESSMENT — ENCOUNTER SYMPTOMS: DYSRHYTHMIAS: 1

## 2020-10-01 ASSESSMENT — MIFFLIN-ST. JEOR: SCORE: 1350.06

## 2020-10-01 ASSESSMENT — LIFESTYLE VARIABLES: TOBACCO_USE: 0

## 2020-10-01 ASSESSMENT — PAIN SCALES - GENERAL: PAINLEVEL: MODERATE PAIN (4)

## 2020-10-01 NOTE — ANESTHESIA PREPROCEDURE EVALUATION
Anesthesia Pre-Procedure Evaluation    Patient: Ju Edwards   MRN:     [id number] Gender:   male   Age:    70 year old :      1950        Preoperative Diagnosis: Chronic narrow angle glaucoma, right, mild stage [H40.2211]  Combined form of age-related cataract, right eye [H25.811]   Procedure(s):  RIGHT EYE PHACOEMULSIFICATION, CATARACT, WITH INTRAOCULAR LENS IMPLANT     LABS:  CBC:   Lab Results   Component Value Date    WBC 7.0 2020    WBC 6.0 06/15/2020    HGB 13.1 (L) 2020    HGB 13.1 (L) 06/15/2020    HCT 40.4 2020    HCT 40.5 06/15/2020     2020     06/15/2020     BMP:   Lab Results   Component Value Date     06/15/2020     2019    POTASSIUM 4.7 06/15/2020    POTASSIUM 4.2 2019    CHLORIDE 103 06/15/2020    CHLORIDE 105 2019    CO2 29 06/15/2020    CO2 28 2019    BUN 30 06/15/2020    BUN 19 2019    CR 1.14 06/15/2020    CR 1.01 2019    GLC 94 06/15/2020    GLC 95 2019     COAGS:   Lab Results   Component Value Date    INR 4.17 (H) 2020     POC: No results found for: BGM, HCG, HCGS  OTHER:   Lab Results   Component Value Date    A1C 6.5 (H) 2019    CARLINE 8.9 06/15/2020    ALBUMIN 4.1 06/15/2020    PROTTOTAL 8.2 06/15/2020    ALT 26 06/15/2020    AST 17 06/15/2020    ALKPHOS 93 06/15/2020    BILITOTAL 0.5 06/15/2020    LIPASE 73 2007    TSH 0.59 2018    SED 9 2017        Preop Vitals    BP Readings from Last 3 Encounters:   20 126/62   20 (!) 143/80   20 (!) 142/88    Pulse Readings from Last 3 Encounters:   20 61   20 60   20 60      Resp Readings from Last 3 Encounters:   20 18   20 24   20 16    SpO2 Readings from Last 3 Encounters:   20 98%   20 97%   20 97%      Temp Readings from Last 1 Encounters:   20 97.4  F (36.3  C) (Temporal)    Ht Readings from Last 1 Encounters:   20 1.6 m (5'  "3\")      Wt Readings from Last 1 Encounters:   09/09/20 67.6 kg (149 lb 1.6 oz)    Estimated body mass index is 26.41 kg/m  as calculated from the following:    Height as of 8/20/20: 1.6 m (5' 3\").    Weight as of 9/9/20: 67.6 kg (149 lb 1.6 oz).     LDA:        Past Medical History:   Diagnosis Date     Allergic rhinitis      Atrial fibrillation (H)     paroxysmal     Delirium     associated with hospitalization for stroke     Depression      Gastric erosions 1/2014    associated with gi bleed     GERD (gastroesophageal reflux disease)      Hyperlipidemia      Hypertension      Left hemiparesis (H)      PTSD (post-traumatic stress disorder)      Sleep apnea      Stroke (H) 1/2014    ischemic CVA with hemorrhagic transformation      Past Surgical History:   Procedure Laterality Date     CARDIAC SURGERY  2000    mitral valve repair     CYCLOPHOTOCOAGULATION TRANSSCLERAL WITH MICROPULSE LASER Left 8/24/2020    Procedure: ENDOCYCLOPHOTOCOAGULATION;  Surgeon: Stanford Welsh MD;  Location: [facility] OR     EP PACEMAKER       PHACOEMULSIFICATION CLEAR CORNEA WITH STANDARD INTRAOCULAR LENS IMPLANT Left 8/24/2020    Procedure: PHACOEMULSIFICATION, CATARACT, WITH INTRAOCULAR LENS IMPLANT and goniosynechialysis;  Surgeon: Stanford Welsh MD;  Location: [facility] OR     REPAIR ATRIAL SEPTAL DEFECT        No Known Allergies     Anesthesia Evaluation     . Pt has had prior anesthetic. Type: General and MAC    No history of anesthetic complications          ROS/MED HX    ENT/Pulmonary:     (+)sleep apnea, allergic rhinitis, doesn't use CPAP , . .   (-) tobacco use and recent URI   Neurologic:     (+)CVA date: 2014 with deficits- left hemiparesis and paresthesias, short term memory loss, other neuro     Cardiovascular:     (+) Dyslipidemia, hypertension----. : . . . pacemaker Reason placed: sinus arrest:type: [employer] L331 Accolade MRI EL settings: AP 60 . dysrhythmias a-fib, . Previous cardiac testing " Echodate:2/21/20results:Normal left ventricular size with mild hypertrophy.    Left ventricle ejection fraction is normal. The calculated left   ventricular ejection fraction is 68%.    Normal right ventricular size. The systolic function is mildly reduced.   TAPSE is abnormal, which is consistent with abnormal right ventricular   systolic function.    No hemodynamically significant valvular heart abnormalities.    No previous study for comparison.Stress Testdate:2016 results:Stress test  2016  FINAL CONCLUSIONS   Normal pharmacologic stress perfusion imaging study.   No significant ischemia was suggested by this study.   Normal left ventricular size and systolic function with a visually estimated LVEF > 65%.     Gated images suggest hypokinesis in the septal wall consistent with prior open heart cardiac   surgery.  All other wall motion appears   normal.   Increased uptake of RV free wall, suggesting some RVH and mildly dilated RV.     There were no prior studies available for comparison. date: results: date: results:          METS/Exercise Tolerance:  >4 METS   Hematologic:     (+) History of Transfusion no previous transfusion reaction -     (-) history of blood clots   Musculoskeletal:  - neg musculoskeletal ROS       GI/Hepatic:     (+) GERD Asymptomatic on medication, Other GI/Hepatic will have blood from the rectum if he eats spicy food.  Has had a colonoscopy for eval.        Renal/Genitourinary:  - ROS Renal section negative       Endo:  - neg endo ROS       Psychiatric:     (+) psychiatric history depression and other (comment) (PTSD)      Infectious Disease:  - neg infectious disease ROS      (-) Recent Fever   Malignancy:      - no malignancy   Other:    (+) no H/O Chronic Pain,                   JZG FV AN PHYSICAL EXAM    JZG FV AN PLAN NO PONV RULE       PAC Discussion and Assessment    ASA Classification: 3  Case is suitable for: ASC  Anesthetic techniques and relevant risks discussed: MAC with GA as  backup  Invasive monitoring and risk discussed:   Types:   Possibility and Risk of blood transfusion discussed:   NPO instructions given:   Additional anesthetic preparation and risks discussed:   Needs early admission to pre-op area:   Other:     PAC Resident/NP Anesthesia Assessment:    Erroneous encounter -  Patient came to clinic, but requested to cancel PAC visit and cancel upcoming surgery.  He instead requested that he get a f/u appointment in ophthalmology to have his left eye evaluated for symptoms of pain, blurriness and drainage that he has been having since the last surgery in August.  Staff message sent to Dr. Welsh to notify and request that someone call him to arrange follow up visit.      Malian  utilized via iPad for discussion.  No charge.      Felicita Mak DNP, RN, APRN          Mid-Level Provider/Resident:   Date:   Time:     Attending Anesthesiologist Anesthesia Assessment:        Anesthesiologist:   Date:   Time:   Pass/Fail:   Disposition:     PAC Pharmacist Assessment:        Pharmacist:   Date:   Time:    AYUSH Ocampo CNP

## 2020-10-01 NOTE — H&P
Pre-Operative H & P     CC:  Preoperative exam to assess for increased cardiopulmonary risk while undergoing surgery and anesthesia.    Date of Encounter: 10/1/2020  Primary Care Physician:  Rayo Christiansen      Erroneous encounter -  Patient came to clinic, but requested to cancel PAC visit and cancel upcoming surgery.  He instead requested that he get a f/u appointment in ophthalmology to have his left eye evaluated for symptoms of pain, blurriness and drainage that he has been having since the last surgery in August.  Staff message sent to Dr. Welsh to notify and request that someone call him to arrange follow up visit.      Garth  utilized via iPad for discussion.  No charge.      Felicita Mak DNP, RN, APRN  Preoperative Assessment Center  [facility]  Cl[facility]  Phone: [phone]  Fax: [phone]

## 2020-10-02 NOTE — TELEPHONE ENCOUNTER
Patient called and requested to cancel his surgery procedure with Dr. Welsh that was scheduled for 10/5.    This writer will call patient to reschedule.

## 2020-10-06 DIAGNOSIS — R04.0 EPISTAXIS: ICD-10-CM

## 2020-10-06 RX ORDER — SODIUM CHLORIDE 0.65 %
AEROSOL, SPRAY (ML) NASAL
Qty: 15 ML | Refills: 4 | Status: SHIPPED | OUTPATIENT
Start: 2020-10-06 | End: 2021-03-26

## 2020-10-08 ENCOUNTER — OFFICE VISIT (OUTPATIENT)
Dept: OPHTHALMOLOGY | Facility: CLINIC | Age: 70
End: 2020-10-08
Attending: OPHTHALMOLOGY
Payer: COMMERCIAL

## 2020-10-08 DIAGNOSIS — H20.022 RECURRENT IRITIS OF LEFT EYE: ICD-10-CM

## 2020-10-08 DIAGNOSIS — Z98.890 POSTOPERATIVE EYE STATE: Primary | ICD-10-CM

## 2020-10-08 PROCEDURE — G0463 HOSPITAL OUTPT CLINIC VISIT: HCPCS

## 2020-10-08 PROCEDURE — 99024 POSTOP FOLLOW-UP VISIT: CPT | Mod: GC | Performed by: STUDENT IN AN ORGANIZED HEALTH CARE EDUCATION/TRAINING PROGRAM

## 2020-10-08 RX ORDER — NEOMYCIN POLYMYXIN B SULFATES AND DEXAMETHASONE 3.5; 10000; 1 MG/ML; [USP'U]/ML; MG/ML
SUSPENSION/ DROPS OPHTHALMIC
Qty: 5 ML | Refills: 0 | Status: SHIPPED | OUTPATIENT
Start: 2020-10-08 | End: 2024-02-24

## 2020-10-08 RX ORDER — NEOMYCIN SULFATE, POLYMYXIN B SULFATE, AND DEXAMETHASONE 3.5; 10000; 1 MG/G; [USP'U]/G; MG/G
0.5 OINTMENT OPHTHALMIC AT BEDTIME
Qty: 1 TUBE | Refills: 0 | Status: SHIPPED | OUTPATIENT
Start: 2020-10-08 | End: 2024-02-24

## 2020-10-08 ASSESSMENT — TONOMETRY
IOP_METHOD: TONOPEN
OD_IOP_MMHG: 14
OS_IOP_MMHG: 16

## 2020-10-08 ASSESSMENT — CONF VISUAL FIELD
OD_INFERIOR_NASAL_RESTRICTION: 3
OS_INFERIOR_TEMPORAL_RESTRICTION: 3
OS_SUPERIOR_NASAL_RESTRICTION: 3
OD_SUPERIOR_NASAL_RESTRICTION: 3
OS_INFERIOR_NASAL_RESTRICTION: 3

## 2020-10-08 ASSESSMENT — VISUAL ACUITY
OD_SC+: -1+2
OS_SC+: -2
OS_SC: 20/25
METHOD: SNELLEN - LINEAR
OD_SC: 20/50

## 2020-10-08 ASSESSMENT — SLIT LAMP EXAM - LIDS
COMMENTS: MILD MGD
COMMENTS: MILD MGD

## 2020-10-08 ASSESSMENT — CUP TO DISC RATIO
OD_RATIO: 0.3
OS_RATIO: 0.5

## 2020-10-08 NOTE — PROGRESS NOTES
CAMILLE Edwards is a 70 year old male here for left eye changes. He had cataract surgery, ECP, and goniosynechialysis, left eye 8/14/20. Since then he has had irritation and burning. Also complains of light sensitivity, both eyes.    Very confused about which drops he is using.     POHx: Chronic angle closure  Ocular GTTS: Brimonidine BID right eye, ?Cosopt BID left eye   Soc Hx: Garth speaking    Assessment & Plan      #Pseudophakia, left eye   -S/p CEIOL/ECP/Synechialysis left eye 8/14/20  -Mild mixed cell post-dilation, dryness worse right eye   -?rebound iritis    -Start PFATs QID, warm compresses  -Start maxitrol drops TID, maxitrol ointment at bedtime   - Return precautions reviewed     #Cataract, right eye   -Patient cancelled surgery last week d/t irritation left eye   -Continue Brimonidine BID right eye   -?Cosopt BID left eye   -Patient will bring drops next visit  -----------------------------------------------------------------------------------    Patient disposition:   Return in about 3 weeks (around 10/29/2020) for Follow Up [facility]. or sooner as needed. MD pupil check next visit.    Becky Soto MD  Ophthalmology Resident, PGY-4    Discussed and seen with Dr. Welsh.    Attending Physician Attestation:  Complete documentation of historical and exam elements from today's encounter can be found in the full encounter summary report (not reduplicated in this progress note).  I personally obtained the chief complaint(s) and history of present illness.  I confirmed and edited as necessary the review of systems, past medical/surgical history, family history, social history, and examination findings as documented by others; and I examined the patient myself.  I personally reviewed the relevant tests, images, and reports as documented above.  I formulated and edited as necessary the assessment and plan and discussed the findings and management plan with the patient and family. . -  Stanford Welsh MD

## 2020-10-08 NOTE — NURSING NOTE
"Chief Complaints and History of Present Illnesses   Patient presents with     Eye Pain Left Eye     Chief Complaint(s) and History of Present Illness(es)     Eye Pain Left Eye     Laterality: In left eye    Quality: foreign body sensation    Pain scale: 2/10    Associated symptoms: blurred vision.  Negative for discharge              Comments     Pt complains of developing pain LE since having cataract surgery 8/24/20 - \"feels like there is something stuck in my eye\"  Complains of vision LE not being clear.  Ocular meds: pt unsure of what eye drops he is using.    Radha Anderson OT 12:56 PM October 8, 2020                   "

## 2020-10-12 DIAGNOSIS — Z11.59 ENCOUNTER FOR SCREENING FOR OTHER VIRAL DISEASES: Primary | ICD-10-CM

## 2020-10-15 DIAGNOSIS — K62.5 RECTAL BLEEDING: Primary | ICD-10-CM

## 2020-10-15 RX ORDER — BISACODYL 5 MG/1
10 TABLET, DELAYED RELEASE ORAL DAILY
Qty: 4 TABLET | Refills: 0 | Status: SHIPPED | OUTPATIENT
Start: 2020-10-15 | End: 2020-10-17

## 2020-10-15 NOTE — NURSING NOTE
eRx Dulcolax et 2-day Golytely: [id number] [address]     1345: Gold Waiting Rm, INR Appointment/Ordered, 1400: Endoscopy Check-in    [phone] Wife's phone number  Lety Edwards  Please call, per pt's specific request, to obtain email address in order to email 2-day Colonoscopy prep instructions.     eMail address confirmed: Pt requests endoscopy appointment information communication via unencrypted e-mail.  This includes: 2-day Golytely instructions, Unit J map link/map, Conscious Sedation, procedure date/time/location/provider.  Pt acknowledges that they have agreed to accept the risks and receive unencrypted communications for this incidence.     Second call to pt:  ID [phone] to confirm pt's telephone number.  Initial  provided different number.  This number matches EMR.  This  states that pt gave her several numbers before 'landing' in this number.     Attempted to contact pt via telephone.  with request for pt to contact me by return call. May leave Message (callback number provided) with email address.      Jaky Neslon RN  [facility]  [id number]

## 2020-10-18 ENCOUNTER — AMBULATORY - HEALTHEAST (OUTPATIENT)
Dept: LAB | Facility: CLINIC | Age: 70
End: 2020-10-18

## 2020-10-18 DIAGNOSIS — Z11.59 ENCOUNTER FOR SCREENING FOR OTHER VIRAL DISEASES: ICD-10-CM

## 2020-10-20 ENCOUNTER — AMBULATORY - HEALTHEAST (OUTPATIENT)
Dept: LAB | Facility: CLINIC | Age: 70
End: 2020-10-20

## 2020-10-20 DIAGNOSIS — I10 ESSENTIAL HYPERTENSION: Primary | ICD-10-CM

## 2020-10-20 DIAGNOSIS — Z11.59 ENCOUNTER FOR SCREENING FOR OTHER VIRAL DISEASES: ICD-10-CM

## 2020-10-21 LAB
SARS-COV-2 PCR COMMENT: NORMAL
SARS-COV-2 RNA SPEC QL NAA+PROBE: NEGATIVE
SARS-COV-2 VIRUS SPECIMEN SOURCE: NORMAL

## 2020-10-22 ENCOUNTER — COMMUNICATION - HEALTHEAST (OUTPATIENT)
Dept: SCHEDULING | Facility: CLINIC | Age: 70
End: 2020-10-22

## 2020-10-22 ENCOUNTER — HOSPITAL ENCOUNTER (OUTPATIENT)
Facility: CLINIC | Age: 70
Discharge: HOME | End: 2020-10-22
Attending: INTERNAL MEDICINE | Admitting: INTERNAL MEDICINE
Payer: COMMERCIAL

## 2020-10-22 VITALS
HEART RATE: 60 BPM | RESPIRATION RATE: 17 BRPM | OXYGEN SATURATION: 94 % | DIASTOLIC BLOOD PRESSURE: 79 MMHG | SYSTOLIC BLOOD PRESSURE: 112 MMHG

## 2020-10-22 LAB
B-HCG FREE SERPL-ACNC: 1 [IU]/L (ref 0.86–1.14)
COLONOSCOPY: NORMAL

## 2020-10-22 PROCEDURE — 45385 COLONOSCOPY W/LESION REMOVAL: CPT | Performed by: INTERNAL MEDICINE

## 2020-10-22 PROCEDURE — 88305 TISSUE EXAM BY PATHOLOGIST: CPT | Mod: 26 | Performed by: PATHOLOGY

## 2020-10-22 PROCEDURE — 45385 COLONOSCOPY W/LESION REMOVAL: CPT | Mod: 33 | Performed by: INTERNAL MEDICINE

## 2020-10-22 PROCEDURE — 85610 PROTHROMBIN TIME: CPT

## 2020-10-22 PROCEDURE — 250N000011 HC RX IP 250 OP 636: Performed by: INTERNAL MEDICINE

## 2020-10-22 PROCEDURE — G0500 MOD SEDAT ENDO SERVICE >5YRS: HCPCS | Performed by: INTERNAL MEDICINE

## 2020-10-22 PROCEDURE — 250N000013 HC RX MED GY IP 250 OP 250 PS 637: Performed by: INTERNAL MEDICINE

## 2020-10-22 PROCEDURE — 88305 TISSUE EXAM BY PATHOLOGIST: CPT | Mod: TC | Performed by: INTERNAL MEDICINE

## 2020-10-22 RX ORDER — FENTANYL CITRATE 50 UG/ML
INJECTION, SOLUTION INTRAMUSCULAR; INTRAVENOUS PRN
Status: DISCONTINUED | OUTPATIENT
Start: 2020-10-22 | End: 2020-10-22 | Stop reason: HOSPADM

## 2020-10-22 RX ORDER — AMLODIPINE BESYLATE 5 MG/1
5 TABLET ORAL DAILY
Qty: 90 TABLET | Refills: 3 | Status: SHIPPED | OUTPATIENT
Start: 2020-10-22 | End: 2021-10-20

## 2020-10-22 RX ORDER — SIMETHICONE
LIQUID (ML) MISCELLANEOUS PRN
Status: DISCONTINUED | OUTPATIENT
Start: 2020-10-22 | End: 2020-10-22 | Stop reason: HOSPADM

## 2020-10-22 NOTE — LETTER
October 30, 2020      Ju Rai  [address]  [address]  [place] [address]        Dear ,    We are writing to inform you of your test results. You will need to repeat colonoscopy as prep was incomplete.  The polyp tissue removed from your colon showed no evidence of cancer, but was identified as adenomatous.  These types of polyps can progress to cancer if left in the colon.    Although your polyp tissue was completely removed, we know that you may develop more polyps in the future.  The results and recommendations regarding a follow-up colonoscopy were sent to your primary physician.  He or she will review our recommendation in the context of your other medical problems.  Please remember that our recommendation is only for individuals who have no symptoms.  If you experience a persistent change in bowel habits, or develop new abdominal pain or bleeding in your stools, please consult your primary care physician.    If you have questions, please feel free to make an appointment with your primary care physician or gastroenterology clinic.      Resulted Orders   Surgical pathology exam   Result Value Ref Range    Copath Report       Patient Name: JU RAI  MR#: [id number]  Specimen #: [id number]  Collected: 10/22/2020  Received: 10/22/2020  Reported: 10/23/2020 11:14  Ordering Phy(s): KRISTAL HARRIS    For improved result formatting, select 'View Enhanced Report Format' under   Linked Documents section.    SPECIMEN(S):  Colon polyp, transverse    FINAL DIAGNOSIS:  TRANSVERSE COLON, POLYP, POLYPECTOMY:  - Tubular adenoma  - Negative for high-grade dysplasia    I have personally reviewed all specimens and/or slides, including the   listed special stains, and used them  with my medical judgement to determine or confirm the final diagnosis.    Electronically signed out by:    Daniel Cueto M.D., Quan    GROSS:  A: The specimen is received in formalin  "with proper patient   identification, labeled \"transverse polyp\".  The  specimen consists of a 0.5 x 0.3 x 0.3 cm tan soft polyp.  The margin is   inked black, it is bisected and  entirely submitted in cassette A1. (Dictated by: Lacey GIORDANO [facility]   10/22/2020 02:59 PM)    MICROSCOPIC:  Microscopic examination was performed.    The technical component of this testing was completed at the [facility], with the professional component performed   at the [facility], [address] [place] [address],   [place] [address] ([phone])    CPT Codes:  A: 73067-GK7    COLLECTION SITE:  Client: [facility]  Location: [facility] (B)    Resident  AVEL       It was a pleasure to see you at your recent visit. Please let me know if you have any questions or concerns.     Clinic Staff - [phone] option 3     Sincerely,     Ana Paula Padilla MD  [address] [place] [address], Mail Code [id number]  [place]  [address].                "

## 2020-10-22 NOTE — LETTER
October 23, 2020      Ju Rai  [address]  [address]  [place] [address]        Dear ,    We are writing to inform you of your test results.  The polyp tissue removed from your colon showed no evidence of cancer, but was identified as adenomatous.  These types of polyps can progress to cancer if left in the colon.  Needs repeat colonoscopy as prep was incomplete    Although your polyp tissue was completely removed, we know that you may develop more polyps in the future.  The results and recommendations regarding a follow-up colonoscopy were sent to your primary physician.  He or she will review our recommendation in the context of your other medical problems.  Please remember that our recommendation is only for individuals who have no symptoms.  If you experience a persistent change in bowel habits, or develop new abdominal pain or bleeding in your stools, please consult your primary care physician.    If you have questions, please feel free to make an appointment with your primary care physician or gastroenterology clinic.      Resulted Orders   Surgical pathology exam   Result Value Ref Range    Copath Report       Patient Name: JU RAI  MR#: [id number]  Specimen #: [id number]  Collected: 10/22/2020  Received: 10/22/2020  Reported: 10/23/2020 11:14  Ordering Phy(s): KRISTAL HARRIS    For improved result formatting, select 'View Enhanced Report Format' under   Linked Documents section.    SPECIMEN(S):  Colon polyp, transverse    FINAL DIAGNOSIS:  TRANSVERSE COLON, POLYP, POLYPECTOMY:  - Tubular adenoma  - Negative for high-grade dysplasia    I have personally reviewed all specimens and/or slides, including the   listed special stains, and used them  with my medical judgement to determine or confirm the final diagnosis.    Electronically signed out by:    Daniel Cueto M.D., MAINORPhysisowmya    GROSS:  A: The specimen is received in formalin with proper  "patient   identification, labeled \"transverse polyp\".  The  specimen consists of a 0.5 x 0.3 x 0.3 cm tan soft polyp.  The margin is   inked black, it is bisected and  entirely submitted in cassette A1. (Dictated by: Lacey GIORDANO [facility]   10/22/2020 02:59 PM)    MICROSCOPIC:  Microscopic examination was performed.    The technical component of this testing was completed at the [facility], with the professional component performed   at the [facility], [address] [place] [phone] ([phone])    CPT Codes:  A: 40935-QM6    COLLECTION SITE:  Client: [facility]  Location: [facility] ()    Resident  AVEL         If you have any questions or concerns, please call the clinic at the number listed above.       Sincerely,        No name on file.                "

## 2020-10-22 NOTE — LETTER
October 27, 2020      Ju Rai  [address]  [address]  [place] [address]        Dear ,    We are writing to inform you of your test results. The polyp tissue removed from your colon showed no evidence of cancer, but was identified as adenomatous.  These types of polyps can progress to cancer if left in the colon.    Although your polyp tissue was completely removed, we know that you may develop more polyps in the future.  The results and recommendations regarding a follow-up colonoscopy were sent to your primary physician.  He or she will review our recommendation in the context of your other medical problems.  Please remember that our recommendation is only for individuals who have no symptoms.  If you experience a persistent change in bowel habits, or develop new abdominal pain or bleeding in your stools, please consult your primary care physician.    If you have questions, please feel free to make an appointment with your primary care physician or gastroenterology clinic.    Needs repeat colonoscopy as prep was incomplete.       Resulted Orders   Surgical pathology exam   Result Value Ref Range    Copath Report       Patient Name: JU RAI  MR#: [id number]  Specimen #: [id number]  Collected: 10/22/2020  Received: 10/22/2020  Reported: 10/23/2020 11:14  Ordering Phy(s): KRISTAL HARRIS    For improved result formatting, select 'View Enhanced Report Format' under   Linked Documents section.    SPECIMEN(S):  Colon polyp, transverse    FINAL DIAGNOSIS:  TRANSVERSE COLON, POLYP, POLYPECTOMY:  - Tubular adenoma  - Negative for high-grade dysplasia    I have personally reviewed all specimens and/or slides, including the   listed special stains, and used them  with my medical judgement to determine or confirm the final diagnosis.    Electronically signed out by:    Daniel Cueto M.D., MAINORPhysisowmya    GROSS:  A: The specimen is received in formalin with  "proper patient   identification, labeled \"transverse polyp\".  The  specimen consists of a 0.5 x 0.3 x 0.3 cm tan soft polyp.  The margin is   inked black, it is bisected and  entirely submitted in cassette A1. (Dictated by: Lacey GIORDANO [facility]   10/22/2020 02:59 PM)    MICROSCOPIC:  Microscopic examination was performed.    The technical component of this testing was completed at the [facility], with the professional component performed   at the [facility], [address] [place] [address],   [place] [address] ([phone])    CPT Codes:  A: 69661-YT9    COLLECTION SITE:  Client: [facility]  Location: JOY (B)    Resident  AVEL         It was a pleasure to see you at your recent visit. Please let me know if you have any questions or concerns.     Clinic Staff - [phone] option 3     Sincerely,     Ana Paula Padilla MD  [address] [place] [address], Mail Code [id number]  [place]  [address].          "

## 2020-10-22 NOTE — DISCHARGE INSTRUCTIONS
Detail Level: Zone Discharge Instructions after Colonoscopy    Today you had a Colonoscopy    Activity and Diet  You were given medicine for pain. You may be dizzy or sleepy.  For 24 hours:    Do not drive or use heavy equipment.    Do not make important decisions.    Do not drink any alcohol.  You may return to your normal diet and medicines.    Discomfort    Air was placed in your colon during the exam in order to see it. Walking helps to pass the air.    You may take Tylenol (acetaminophen) for pain unless your doctor has told you not to.  Do not take aspirin or ibuprofen (Advil, Motrin, or other anti-inflammatory  drugs) for 3 days.    Follow-up  _X_ We took small tissue samples to study. Your doctor will call you with the results  within two weeks.    When to call:    Call right away if you have:    Unusual pain in belly or chest pain not relieved with passing air.    More than 1 to 2 Tablespoons of bleeding from your rectum.    Fever above 100.6  F (37.5  C).    If you have severe pain, bleeding, or shortness of breath, go to an emergency room.    If you have questions, call:  Monday to Friday, 7 a.m. to 4:30 p.m.  Endoscopy: [phone] (We may have to call you back)    After hours  Hospital: [phone] (Ask for the GI fellow on call)

## 2020-10-22 NOTE — OR NURSING
Procedure: Colonoscopy with polyp removal with cold snare  Sedation: conscious sedation   Specimens: x 1, sent to lab.   O2: 2L/NC through case off after  Tolerated procedure: well  Pt to recovery area in stable condition accompanied by RN.   Other:  Can retart coumadin tomorrow should check with his INR clinic per Dr Randy Black, RN

## 2020-10-22 NOTE — TELEPHONE ENCOUNTER
Action    Action Taken 10-22: requested from [facility]:    Device check strips    KEYONA Monitor strips    EKG strips    Echo    9-3-20 (or most recent)    3-30-20    2-26-20, 2-26-20, 2-13-20 2-21-20 11-25: verified echos and EKGs are in Epic/PACS. Sent second request to [facility] for Device strips and KEYONA monitor strips  11-27: sent device strips to scanning  11-27: sent strips to scanning

## 2020-10-23 LAB — COPATH REPORT: NORMAL

## 2020-10-27 ENCOUNTER — OFFICE VISIT (OUTPATIENT)
Dept: INTERNAL MEDICINE | Facility: CLINIC | Age: 70
End: 2020-10-27
Attending: INTERNAL MEDICINE
Payer: COMMERCIAL

## 2020-10-27 VITALS
OXYGEN SATURATION: 98 % | RESPIRATION RATE: 16 BRPM | BODY MASS INDEX: 26.36 KG/M2 | DIASTOLIC BLOOD PRESSURE: 79 MMHG | SYSTOLIC BLOOD PRESSURE: 132 MMHG | TEMPERATURE: 98 F | HEIGHT: 63 IN | HEART RATE: 75 BPM | WEIGHT: 148.8 LBS

## 2020-10-27 DIAGNOSIS — M62.838 SPASM OF MUSCLE: ICD-10-CM

## 2020-10-27 DIAGNOSIS — I20.89 EXERTIONAL ANGINA (H): Primary | ICD-10-CM

## 2020-10-27 LAB — INTERPRETATION ECG - MUSE: NORMAL

## 2020-10-27 PROCEDURE — 93005 ELECTROCARDIOGRAM TRACING: CPT | Performed by: STUDENT IN AN ORGANIZED HEALTH CARE EDUCATION/TRAINING PROGRAM

## 2020-10-27 PROCEDURE — 99214 OFFICE O/P EST MOD 30 MIN: CPT | Mod: 25 | Performed by: STUDENT IN AN ORGANIZED HEALTH CARE EDUCATION/TRAINING PROGRAM

## 2020-10-27 RX ORDER — CYCLOBENZAPRINE HCL 5 MG
2.5 TABLET ORAL AT BEDTIME
Qty: 30 TABLET | Refills: 1 | Status: SHIPPED | OUTPATIENT
Start: 2020-10-27

## 2020-10-27 ASSESSMENT — PAIN SCALES - GENERAL: PAINLEVEL: NO PAIN (0)

## 2020-10-27 ASSESSMENT — PATIENT HEALTH QUESTIONNAIRE - PHQ9: SUM OF ALL RESPONSES TO PHQ QUESTIONS 1-9: 0

## 2020-10-27 ASSESSMENT — MIFFLIN-ST. JEOR: SCORE: 1340.08

## 2020-10-27 NOTE — PATIENT INSTRUCTIONS
[facility] Medication Refill Request Information:  * Please contact your pharmacy regarding ANY request for medication refills.  ** [facility] Prescription Fax = [phone]  * Please allow 3 business days for routine medication refills.  * Please allow 5 business days for controlled substance medication refills.     [facility] Test Result notification information:  *You will be notified with in 7-10 days of your appointment day regarding the results of your test.  If you are on MyChart you will be notified as soon as the provider has reviewed the results and signed off on them.    [facility]: [phone]

## 2020-10-27 NOTE — NURSING NOTE
Chief Complaint   Patient presents with     Pre-Op Exam     DOS: 11/13/20 Left Shoulder Joint Reconstruction with Dr. Carrillo @ [facility] FAX: [phone]         Baljeet Yañez MA on 10/27/2020 at 9:57 AM

## 2020-10-27 NOTE — LETTER
Patient:  Ju Edwards  :   1950  MRN:     [id number]        Mr. Ju Edwards  [address]  [address]  [place] [address]        2020    Dear Mr. Edwards,    Thank you for choosing the [facility] for your healthcare needs.  We appreciate the opportunity to serve you.    The following are your recent test results.     Hello Mr Edwards,     Your ECG is not changed from your previous ECGs. Please do your stress test and follow-up with us in a few weeks. Let us know if you have any questions!     Amada Griffin MD     Resulted Orders   EKG Performed in Clinic w/ Provider Reading Fee   Result Value Ref Range    Interpretation ECG Click View Image link to view waveform and result      Please contact your provider if you have any questions or concerns.  We look forward to serving your needs in the future.  Sincerely,      [facility] Staff

## 2020-10-27 NOTE — PROGRESS NOTES
PRIMARY [facility] CENTER       SUBJECTIVE:  Ju Edwards is a 70 year old male with a PMHx of hyperlipidemia, atrial fibrillation on coumadin, pacemaker, hypertension, allergic rhinitis, sleep apnea, history of CVA with left hemiparesis, GERD, PTSD and depression who comes in for pre-op for left shoulder surgery. Patient has no complaints except left calf spasm especially on squatting. Endorses chest pain and SOB occasionally on exertion, has not seen his cardiologist in 3 years.  Has had the flu shot.    Current Outpatient Medications   Medication     amLODIPine (NORVASC) 5 MG tablet     atenolol (TENORMIN) 50 MG tablet     atorvastatin (LIPITOR) 80 MG tablet     brimonidine (ALPHAGAN-P) 0.15 % ophthalmic solution     cholecalciferol (CVS D3) 50 MCG (2000 UT) CAPS     citalopram (CELEXA) 10 MG tablet     COMPRESSION STOCKINGS     CVS SALINE NASAL SPRAY 0.65 % nasal spray     cyclobenzaprine (FLEXERIL) 5 MG tablet     docusate sodium (STOOL SOFTENER) 100 MG capsule     dorzolamide-timolol (COSOPT) 2-0.5 % ophthalmic solution     emollient (VANICREAM) cream     flecainide (TAMBOCOR) 150 MG tablet     latanoprost (XALATAN) 0.005 % ophthalmic solution     lisinopril (ZESTRIL) 20 MG tablet     naproxen (NAPROSYN) 500 MG tablet     neomycin-polymixin-dexamethasone (MAXITROL) 0.1 % ophthalmic suspension     neomycin-polymyxin-dexamethasone (MAXITROL) 3.5-18191-9.1 ophthalmic ointment     nitroGLYcerin (NITROSTAT) 0.4 MG sublingual tablet     pantoprazole (PROTONIX) 40 MG EC tablet     Sennosides (SENNA LAX PO)     warfarin ANTICOAGULANT (COUMADIN) 2 MG tablet     No current facility-administered medications for this visit.         No Known Allergies      Review of systems:    General:  No weight loss, appetite loss, fatigue, tiredness, general weakness  Eyes:  No vision loss  Ears:  No hearing loss, tinnitus, ear pain  Nose:  No nasal discharge, sneezing, sinus congestion  Throat:  No throat  "pain, trouble swallowing or painful swallowing  Head:  No rashes, pain, deformities  Pulmonary:  SOB on exertion, no MARTINEZ, wheezing, pleurisy, hemoptysis, cough, snoring/apnea and daytime sleepiness  Cardiovascular:  No palpitations, passing out spells (syncope), chest pain on exertion, leg swelling, orthopnea, PND  GI:  No nausea, vomiting, abdominal pain, diarrhea, constipation, rectal bleeding, heartburn/reflux  : No genital rashes or lesions, penile discharge, erectile dysfunction, testicular masses, hernias, urinary frequency, hesitancy, urgency, incomplete emptying, nocturia, incontinence, flank pain  Heme/lymph:  No lymph node swelling or easy bruising  Endocrine:  No new intolerance to heat or cold, excessive thirst or urination  Skin:  No skin lesions or concerning moles,rashes, jaundice.    M/S:  No new arthralgias, myalgias, joint swelling, redness or deformities, right calf muscle pain, left shoulder pain  Neuro: No new headaches, dizziness or vertigo, numbness, tingling, weakness, unsteadiness, speech slurring, confusion, memory loss, problems with coordination, tremors  Psych:  No new depression, anxiety, insomnia.    OBJECTIVE:    /79 (BP Location: Right arm, Patient Position: Sitting, Cuff Size: Adult Regular)   Pulse 75   Temp 98  F (36.7  C) (Oral)   Resp 16   Ht 1.6 m (5' 3\")   Wt 67.5 kg (148 lb 12.8 oz)   SpO2 98%   BMI 26.36 kg/m     Wt Readings from Last 1 Encounters:   10/27/20 67.5 kg (148 lb 12.8 oz)     Physical Examination:    General:  Conversant, generally healthy appearing, no acute distress  Head: atraumatic  Eyes:  Pupils 2-3 mm, sclera white, EOM's full, conjunctiva moist, no periorbital swelling    Lungs:  Clear to auscultation throughout, no wheezes, rhonchi or rales. Normal respiratory effort and no intercostal retractions.  C/V:  Regular rate and rhythm, no murmurs, rubs or gallops.  No JVD, no carotid bruits. Radial and DP pulses 2+, equal and regular.  Abdomen:  " Not distended.  Bowel sounds active.  No tenderness, no hepatosplenomegaly or masses.  No CVA tenderness or masses.  Lymph:  No cervical lymph nodes.  Neuro: Alert and oriented, face symmetric. Able to get on/off exam table without assistance.  Strength grossly intact. No tremor.  Gait steady.   M/S:   No joint deformities noted.  No joint swelling, pain on palpation of right calf  Skin:   Normal temperature., turgor and texture. No rashes, suspicious lesions, or jaundice on exposed skin surfaces.   Extremities:  No peripheral edema, no digital cyanosis  Psych:  Alert and oriented. Appropriate affect.  Not psychomotor slowed.  No signs of anxiety or agitation.     ASSESSMENT/PLAN:    Mr Ju Edwards was seen today for pre-op exam.  Diagnoses and all orders for this visit:    Exertional angina (H)  Patient says he gets exertional chest pain and sometimes SOB. Last stress echo was done around 5 years ago.  -     EKG Performed in Clinic w/ Provider Reading Fee. Normal sinus rhythm with RBBB, paced. Unchanged from previous ECGs on file.  -     Echo Stress Echocardiogram; Future    Right calf spasm  Not worrisome for DVT per physical exam (no redness, warmth or swelling).  -     cyclobenzaprine (FLEXERIL) 5 MG tablet; Take 0.5 tablets (2.5 mg) by mouth At Bedtime       Pt should return to clinic for f/u with me or Dr Christiansen in 1-2 weeks  Pt was seen and plan of care discussed with Dr Christiansen.    Amada Griffin MD  Oct 27, 2020  Internal Medicine PGY-1 resident  [phone]    Pt was seen and examined with Dr. Griffin.  I agree with her documentation as noted above.    My additional comments: None    Rayo Christiansen MD

## 2020-10-28 ENCOUNTER — TELEPHONE (OUTPATIENT)
Dept: OPHTHALMOLOGY | Facility: CLINIC | Age: 70
End: 2020-10-28

## 2020-10-28 ENCOUNTER — ANTICOAGULATION THERAPY VISIT (OUTPATIENT)
Dept: ANTICOAGULATION | Facility: CLINIC | Age: 70
End: 2020-10-28

## 2020-10-28 DIAGNOSIS — I48.91 ATRIAL FIBRILLATION (H): ICD-10-CM

## 2020-10-28 DIAGNOSIS — I48.91 ATRIAL FIBRILLATION, UNSPECIFIED TYPE (H): ICD-10-CM

## 2020-10-28 DIAGNOSIS — Z79.01 LONG TERM CURRENT USE OF ANTICOAGULANT THERAPY: ICD-10-CM

## 2020-10-28 NOTE — TELEPHONE ENCOUNTER
Called patient to schedule procedure with Dr. Welsh, there was no answer.  Left message with my direct line [phone].

## 2020-10-28 NOTE — PROGRESS NOTES
Patient is overdue for an INR check.  Message is left to please call [facility] clinic and verify if patient is even taking warfarin.

## 2020-10-29 ENCOUNTER — ANTICOAGULATION THERAPY VISIT (OUTPATIENT)
Dept: ANTICOAGULATION | Facility: CLINIC | Age: 70
End: 2020-10-29

## 2020-10-29 DIAGNOSIS — Z79.01 LONG TERM CURRENT USE OF ANTICOAGULANT THERAPY: ICD-10-CM

## 2020-10-29 DIAGNOSIS — I48.91 ATRIAL FIBRILLATION, UNSPECIFIED TYPE (H): ICD-10-CM

## 2020-10-29 DIAGNOSIS — I48.91 ATRIAL FIBRILLATION (H): ICD-10-CM

## 2020-10-29 DIAGNOSIS — K62.5 RECTAL BLEEDING: ICD-10-CM

## 2020-10-29 LAB — INR PPP: 2.1 (ref 0.86–1.14)

## 2020-10-29 PROCEDURE — 36415 COLL VENOUS BLD VENIPUNCTURE: CPT | Performed by: PATHOLOGY

## 2020-10-29 PROCEDURE — 85610 PROTHROMBIN TIME: CPT | Performed by: PATHOLOGY

## 2020-10-29 NOTE — PROGRESS NOTES
Addendum 10/29/20  Rayo Christiansen MD Hoekstra, Wendy K, RN             He is having shoulder surgery and should hold the coumadin preop with no need for bridging.   Rayo Mercer MD Hoekstra, Wendy K, RN             As I recall around .  He needs the exercise test done first.   LEONARD              ANTICOAGULATION FOLLOW-UP CLINIC VISIT    Patient Name:  Ju Edwards  Date:  10/29/2020  Contact Type:  Telephone    SUBJECTIVE:  Patient Findings     Comments:  Patient was seen on 10/27 for a preop.  Writer cannot tell from the notes what /when procedure is going to happen.  Writer leaves a message for Leslie to call back with more information and also sends an in basket message to Dr. Christiansen for additional details in regards to upcoming procedure.         Clinical Outcomes     Comments:  Patient was seen on 10/27 for a preop.  Writer cannot tell from the notes what /when procedure is going to happen.  Writer leaves a message for Leslie to call back with more information and also sends an in basket message to Dr. Christiansen for additional details in regards to upcoming procedure.            OBJECTIVE    Recent labs: (last 7 days)     10/29/20  1020   INR 2.10*       ASSESSMENT / PLAN  No question data found.  Anticoagulation Summary  As of 10/29/2020    INR goal:  2.0-3.0   TTR:  70.5 % (8.3 mo)   INR used for dosin.10 (10/29/2020)   Warfarin maintenance plan:  2 mg (2 mg x 1) every Mon, Wed, Fri; 3 mg (2 mg x 1.5) all other days   Full warfarin instructions:  2 mg every Mon, Wed, Fri; 3 mg all other days   Weekly warfarin total:  18 mg   No change documented:  Hien Curry, RN   Plan last modified:  Alice Carranza, RN (2016)   Next INR check:  2020   Priority:  High   Target end date:  Indefinite    Indications    Long term current use of anticoagulant therapy [Z79.01]  Atrial fibrillation (H) [I48.91]  Atrial fibrillation  unspecified type (H)  [I48.91]             Anticoagulation Episode Summary     INR check location:  Clinic Lab    Preferred lab:      Send INR reminders to:  [facility]    Comments:  Speak witrh Wife Lety [phone]  Pronounced [facility]  Pt takes Warfarin dose at 4:30 PM      Anticoagulation Care Providers     Provider Role Specialty Phone number    Rayo Christiansen MD Referring Internal Medicine [phone]            See the Encounter Report to view Anticoagulation Flowsheet and Dosing Calendar (Go to Encounters tab in chart review, and find the Anticoagulation Therapy Visit)    Left message for patient with results and dosing recommendations. Asked patient to call back to report any missed doses, falls, signs and symptoms of bleeding or clotting, any changes in health, medication, or diet. Asked patient to call back with any questions or concerns.      Hien Curry RN

## 2020-11-05 ENCOUNTER — OFFICE VISIT (OUTPATIENT)
Dept: OPHTHALMOLOGY | Facility: CLINIC | Age: 70
End: 2020-11-05
Attending: OPHTHALMOLOGY
Payer: COMMERCIAL

## 2020-11-05 ENCOUNTER — TELEPHONE (OUTPATIENT)
Dept: OPHTHALMOLOGY | Facility: CLINIC | Age: 70
End: 2020-11-05

## 2020-11-05 DIAGNOSIS — H40.2211: ICD-10-CM

## 2020-11-05 DIAGNOSIS — H20.022 RECURRENT IRITIS OF LEFT EYE: ICD-10-CM

## 2020-11-05 DIAGNOSIS — Z98.890 POSTOPERATIVE EYE STATE: Primary | ICD-10-CM

## 2020-11-05 DIAGNOSIS — H25.11 NUCLEAR SENILE CATARACT OF RIGHT EYE: ICD-10-CM

## 2020-11-05 DIAGNOSIS — H40.2222: ICD-10-CM

## 2020-11-05 PROCEDURE — G0463 HOSPITAL OUTPT CLINIC VISIT: HCPCS

## 2020-11-05 PROCEDURE — 66761 REVISION OF IRIS: CPT | Mod: RT | Performed by: OPHTHALMOLOGY

## 2020-11-05 PROCEDURE — 99213 OFFICE O/P EST LOW 20 MIN: CPT | Mod: 24 | Performed by: OPHTHALMOLOGY

## 2020-11-05 ASSESSMENT — CONF VISUAL FIELD
METHOD: COUNTING FINGERS
OS_SUPERIOR_NASAL_RESTRICTION: 3
OD_INFERIOR_NASAL_RESTRICTION: 3
OS_INFERIOR_NASAL_RESTRICTION: 3
OD_SUPERIOR_NASAL_RESTRICTION: 3
OS_INFERIOR_TEMPORAL_RESTRICTION: 3

## 2020-11-05 ASSESSMENT — CUP TO DISC RATIO: OS_RATIO: 0.5

## 2020-11-05 ASSESSMENT — VISUAL ACUITY
METHOD: SNELLEN - LINEAR
OD_PH_SC+: -2
OD_SC+: -2+2
OD_PH_SC: 20/40
OS_SC+: -2
OS_SC: 20/20
OD_SC: 20/50

## 2020-11-05 ASSESSMENT — TONOMETRY
OD_IOP_MMHG: 16
OS_IOP_MMHG: 18
IOP_METHOD: TONOPEN

## 2020-11-05 ASSESSMENT — SLIT LAMP EXAM - LIDS
COMMENTS: MILD MGD
COMMENTS: MILD MGD

## 2020-11-05 NOTE — PROGRESS NOTES
Chief Complaint(s) and History of Present Illness(es)     Iritis Follow Up     Laterality: left eye    Quality: blurred vision    Course: gradually improving    Associated symptoms: Negative for eye pain, dryness, tearing, discharge,   flashes and floaters    Pain scale: 0/10    Comments: 4 week follow up recurrent iritis, left eye              Comments     Pt feels his vision LE has improved since last visit.  Denies any flashes, floaters, pain, pressure, irritation, discharge, and   tearing.  Ocular Meds: PFAT's 2-4x/day LE, Maxitrol TID LE, Maxitrol norberto at bedtime   LE, & Cosopt BID RE    Radha Anderson OT 8:46 AM November 5, 2020               Review of systems for the eyes was negative other than the pertinent positives/negatives listed in the HPI.      Assessment & Plan      Ju Edwards is a 70 year old male with the following diagnoses:   1. Postoperative eye state - Left Eye    2. Recurrent iritis of left eye    3. Chronic narrow angle glaucoma, right, mild stage    4. Chronic narrow angle glaucoma, left, moderate stage    5. Nuclear senile cataract of right eye         Doing better in the left eye.  Feels comfortable and is happy with vision   Does not want to proceed to right eye cataract surgery at this time.  RBA to laser peripheral iridotomy (LPI) as an alternative reviewed.  Consent obtained, will complete today     Continue maxitrol drops twice a day for 1 week, then daily for 1 week, then stop   Continue maxitrol norberto until tube is gone  Cosopt and alphagan twice a day right eye   Artificial tears as needed       Patient disposition:   Return in about 4 weeks (around 12/3/2020) for VT only.           Attending Physician Attestation:  Complete documentation of historical and exam elements from today's encounter can be found in the full encounter summary report (not reduplicated in this progress note).  I personally obtained the chief complaint(s) and history of present illness.  I  confirmed and edited as necessary the review of systems, past medical/surgical history, family history, social history, and examination findings as documented by others; and I examined the patient myself.  I personally reviewed the relevant tests, images, and reports as documented above.  I formulated and edited as necessary the assessment and plan and discussed the findings and management plan with the patient and family. . - Stanford Welsh MD

## 2020-11-05 NOTE — NURSING NOTE
Chief Complaints and History of Present Illnesses   Patient presents with     Iritis Follow Up     4 week follow up recurrent iritis, left eye     Chief Complaint(s) and History of Present Illness(es)     Iritis Follow Up     Laterality: left eye    Quality: blurred vision    Course: gradually improving    Associated symptoms: Negative for eye pain, dryness, tearing, discharge, flashes and floaters    Pain scale: 0/10    Comments: 4 week follow up recurrent iritis, left eye              Comments     Pt feels his vision LE has improved since last visit.  Denies any flashes, floaters, pain, pressure, irritation, discharge, and tearing.  Ocular Meds: PFAT's 2-4x/day LE, Maxitrol TID LE, Maxitrol norberto at bedtime LE, & Cosopt BID MARCO A Anderson OT 8:46 AM November 5, 2020

## 2020-11-05 NOTE — TELEPHONE ENCOUNTER
Called patient to schedule procedure with Dr. Welsh, there was no answer.  Left message with my direct line [phone].

## 2020-11-11 NOTE — TELEPHONE ENCOUNTER
Called patient to schedule procedure with Dr. Welsh, there was no answer.  Left message with my direct line [phone].

## 2020-11-16 NOTE — TELEPHONE ENCOUNTER
Called patient to schedule procedure with Dr. Welsh, there was no answer.  Left message with my direct line [phone].

## 2020-11-30 ENCOUNTER — PRE VISIT (OUTPATIENT)
Dept: CARDIOLOGY | Facility: CLINIC | Age: 70
End: 2020-11-30

## 2020-11-30 ENCOUNTER — OFFICE VISIT (OUTPATIENT)
Dept: CARDIOLOGY | Facility: CLINIC | Age: 70
End: 2020-11-30
Attending: INTERNAL MEDICINE
Payer: COMMERCIAL

## 2020-11-30 VITALS
HEART RATE: 60 BPM | WEIGHT: 153.9 LBS | OXYGEN SATURATION: 96 % | SYSTOLIC BLOOD PRESSURE: 123 MMHG | DIASTOLIC BLOOD PRESSURE: 74 MMHG | HEIGHT: 61 IN | BODY MASS INDEX: 29.06 KG/M2

## 2020-11-30 DIAGNOSIS — I48.0 PAROXYSMAL ATRIAL FIBRILLATION (H): Primary | ICD-10-CM

## 2020-11-30 DIAGNOSIS — I63.9 ISCHEMIC CEREBROVASCULAR ACCIDENT (CVA) (H): ICD-10-CM

## 2020-11-30 DIAGNOSIS — I10 BENIGN ESSENTIAL HYPERTENSION: ICD-10-CM

## 2020-11-30 DIAGNOSIS — Z98.890 S/P ATRIAL SEPTAL DEFECT CLOSURE, SURGICAL: ICD-10-CM

## 2020-11-30 DIAGNOSIS — Z95.0 CARDIAC PACEMAKER IN SITU: ICD-10-CM

## 2020-11-30 PROBLEM — E11.9 DIABETES MELLITUS, TYPE 2 (H): Status: ACTIVE | Noted: 2020-11-30

## 2020-11-30 PROCEDURE — G0463 HOSPITAL OUTPT CLINIC VISIT: HCPCS

## 2020-11-30 PROCEDURE — 99204 OFFICE O/P NEW MOD 45 MIN: CPT | Performed by: INTERNAL MEDICINE

## 2020-11-30 ASSESSMENT — PAIN SCALES - GENERAL: PAINLEVEL: NO PAIN (0)

## 2020-11-30 ASSESSMENT — MIFFLIN-ST. JEOR: SCORE: 1331.47

## 2020-11-30 NOTE — PROGRESS NOTES
Referring provider: Rayo Christiansen MD    HPI: Mr. Ju Edwards is a 70 year old  male with PMH significant for surgical ASD repair in 2000,  hypertension, paroxysmal atrial flutter and fibrillation since 2014 ( s/p atrial flutter ablation in 2013), MCA stroke 3/2014, history of GI bleeding in 2014, and sick sinus syndrome s/p dual-chamber pacemaker 2/2020.    Patient is establishing care with us.  All his prior cardiac care was at outside hospital.  Today's clinic visit is with the help of  on iPad.  He is originally from [facility].    Patient reports mild left hand and left leg numbness since stroke in 2014.  Otherwise he is asymptomatic from cardiac standpoint.  He tells me that he walks for 40 to 45 minutes every day.  Denies chest pain, shortness of breath, palpitations, syncope, dizziness or lower extremity edema.    Patient is unaware of the medications that he is on.  His wife is managing all his medications.    He is currently maintaining sinus rhythm on flecainide 150 mg twice daily.  He is also on amlodipine 5 mg, atenolol 50 mg daily, atorvastatin 80 mg, lisinopril 20 and warfarin.    He was recently seen by Dr. Christiansen, his primary care physician and patient reported bleeding when he wipes himself.  He underwent colonoscopy 10/22 which showed no significant finding.  Patient reports no bleeding over the last 2 months.    Lifetime non-smoker.  No alcohol abuse.    He had a device check in our clinic 8/20/2020 which showed intrinsic rhythm sinus with 2 A. fib episodes lasting less than a minute.  A paced 92% V paced less than 1%.  Battery life 12 years.    Echocardiogram from [facility] 2/21/2020 showed normal LV size and function.  RV size was normal.  Systolic function was mildly reduced.  No valve disease was noted.    I reviewed patient's EKG 10/27/2020 which shows A paced V sensed rhythm.    Coronary angiogram in 2012 showed normal coronaries.    Medications, personal,  family, and social history reviewed with patient and revised.    PAST MEDICAL HISTORY:  Cerebrovascular accident 3/12/2014  Atrial fibrillation flutter ablation 2013  Paroxysmal atrial fibrillation  ASD surgical repair 2000  Hypertension  Sick sinus syndrome status post pacemaker 2/2020  GI bleed in 2014  H. pylori 2013    CURRENT MEDICATIONS:  Current Outpatient Medications   Medication Sig Dispense Refill     amLODIPine (NORVASC) 5 MG tablet Take 1 tablet (5 mg) by mouth daily 90 tablet 3     atenolol (TENORMIN) 50 MG tablet Take 1 tablet (50 mg) by mouth daily 90 tablet 3     atorvastatin (LIPITOR) 80 MG tablet TAKE 1 TABLET BY MOUTH EVERY DAY (Patient taking differently: Take 80 mg by mouth At Bedtime ) 90 tablet 3     brimonidine (ALPHAGAN-P) 0.15 % ophthalmic solution PLACE 1 DROP INTO THE LEFT EYE 3 TIMES DAILY 15 mL 1     cholecalciferol ([employer]S D3) 50 MCG (2000 UT) CAPS Take 1 tablet by mouth daily 90 capsule 3     citalopram (CELEXA) 10 MG tablet Take 2 tablets (20 mg) by mouth daily 180 tablet 3     COMPRESSION STOCKINGS 1 each daily 2 each 1     CVS SALINE NASAL SPRAY 0.65 % nasal spray INSTILL 1 SPRAY IN NOSTRIL DAILY 15 mL 4     cyclobenzaprine (FLEXERIL) 5 MG tablet Take 0.5 tablets (2.5 mg) by mouth At Bedtime 30 tablet 1     docusate sodium (STOOL SOFTENER) 100 MG capsule Take 100 mg by mouth 2 times daily as needed        dorzolamide-timolol (COSOPT) 2-0.5 % ophthalmic solution PLACE 1 DROP INTO THE LEFT EYE 2 TIMES DAILY 10 mL 1     emollient (VANICREAM) cream Apply topically as needed for other 25 g 3     flecainide (TAMBOCOR) 150 MG tablet Take 1 tablet (150 mg) by mouth 2 times daily 180 tablet 3     latanoprost (XALATAN) 0.005 % ophthalmic solution Place 1 drop into both eyes At Bedtime 2.5 mL 11     lisinopril (ZESTRIL) 20 MG tablet Take 1 tablet (20 mg) by mouth daily 90 tablet 3     naproxen (NAPROSYN) 500 MG tablet Take 1 tablet (500 mg) by mouth 2 times daily (with meals) (Patient taking  differently: Take 500 mg by mouth 2 times daily as needed ) 180 tablet 1     neomycin-polymixin-dexamethasone (MAXITROL) 0.1 % ophthalmic suspension Instill 1 drop in to the left eye 3 times daily 5 mL 0     neomycin-polymyxin-dexamethasone (MAXITROL) 3.5-01607-6.1 ophthalmic ointment Place 0.5 inches Into the left eye At Bedtime 1 Tube 0     nitroGLYcerin (NITROSTAT) 0.4 MG sublingual tablet Place 0.4 mg under the tongue every 5 minutes as needed        pantoprazole (PROTONIX) 40 MG EC tablet TAKE 1 TABLET BY MOUTH EVERY DAY 90 tablet 3     Sennosides (SENNA LAX PO) Take by mouth 2 times daily as needed       warfarin ANTICOAGULANT (COUMADIN) 2 MG tablet TAKE 1 TAB ON MON, WED, FRI. TAKE 1 AND ONE-HALF TABS ON ALL OTHER DAYS OR AS DIRECTED - NEEDS APT (Patient taking differently: TAKE 1 TAB ON MON, WED, FRI. TAKE 1 AND ONE-HALF TABS ON ALL OTHER DAYS OR AS DIRECTED. Takes at 4:30 PM.) 90 tablet 1       PAST SURGICAL HISTORY:  Past Surgical History:   Procedure Laterality Date     CARDIAC SURGERY      mitral valve repair     CYCLOPHOTOCOAGULATION TRANSSCLERAL WITH MICROPULSE LASER Left 2020    Procedure: ENDOCYCLOPHOTOCOAGULATION;  Surgeon: Stanford Welsh MD;  Location:  OR     EP PACEMAKER       PHACOEMULSIFICATION CLEAR CORNEA WITH STANDARD INTRAOCULAR LENS IMPLANT Left 2020    Procedure: PHACOEMULSIFICATION, CATARACT, WITH INTRAOCULAR LENS IMPLANT and goniosynechialysis;  Surgeon: Stanford Welsh MD;  Location: [facility] OR     REPAIR ATRIAL SEPTAL DEFECT         ALLERGIES:   No Known Allergies    FAMILY HISTORY:  Family History   Problem Relation Age of Onset     Eye Surgery Mother      Albinism Mother      Cervical Cancer Mother      No Known Problems Father      No Known Problems Sister      No Known Problems Brother      No Known Problems Sister      No Known Problems Sister      No Known Problems Sister      No Known Problems Brother          from trauma     Glaucoma No family hx of  "     Macular Degeneration No family hx of          SOCIAL HISTORY:  Social History     Tobacco Use     Smoking status: Never Smoker     Smokeless tobacco: Never Used   Substance Use Topics     Alcohol use: No     Drug use: No       ROS:   Constitutional: No fever, chills, or sweats. Weight stable.   ENT: No visual disturbance, ear ache, epistaxis, sore throat.   Cardiovascular: As per HPI.   Respiratory: No cough, hemoptysis.    GI: No nausea, vomiting, hematemesis, melena, or hematochezia.   : No hematuria.   Integument: Negative.   Psychiatric: Negative.   Hematologic:  No easy bruising, no easy bleeding.  Neuro: Left hand and left leg numbness+  Endocrinology: No significant heat or cold intolerance   Musculoskeletal: No myalgia.    Exam:  /74 (BP Location: Right arm, Patient Position: Chair, Cuff Size: Adult Regular)   Pulse 60   Ht 1.549 m (5' 1\")   Wt 69.8 kg (153 lb 14.4 oz)   SpO2 96%   BMI 29.08 kg/m    GENERAL APPEARANCE: alert and no distress  HEENT: no icterus, no central cyanosis  LYMPH/NECK: no adenopathy, no asymmetry, JVP not elevated, no carotid bruits.  RESPIRATORY: lungs clear to auscultation - no rales, rhonchi or wheezes, no use of accessory muscles, no retractions, respirations are unlabored, normal respiratory rate  CARDIOVASCULAR: regular rhythm, normal S1, S2, no S3 or S4 and no murmur, click or rub, precordium quiet with normal PMI.  GI: soft, non tender  EXTREMITIES: peripheral pulses normal, no edema  NEURO: alert, normal speech,and affect  VASC: Radial, dorsalis pedis and posterior tibialis pulses are normal in volumes and symmetric bilaterally.   SKIN: no ecchymoses, no rashes     I have reviewed the labs, outside hospital medical records and personally reviewed the EKG and device report and made my comment in the assessment and plan.    Labs:  CBC RESULTS:   Lab Results   Component Value Date    WBC 7.0 09/09/2020    RBC 4.67 09/09/2020    HGB 13.1 (L) 09/09/2020    HCT " 40.4 09/09/2020    MCV 87 09/09/2020    MCH 28.1 09/09/2020    MCHC 32.4 09/09/2020    RDW 13.2 09/09/2020     09/09/2020       BMP RESULTS:  Lab Results   Component Value Date     06/15/2020    POTASSIUM 4.7 06/15/2020    CHLORIDE 103 06/15/2020    CO2 29 06/15/2020    ANIONGAP 2 (L) 06/15/2020    GLC 94 06/15/2020    BUN 30 06/15/2020    CR 1.14 06/15/2020    GFRESTIMATED 66 06/15/2020    GFRESTBLACK 76 06/15/2020    CARLINE 8.9 06/15/2020        INR RESULTS:  Lab Results   Component Value Date    INR 2.10 (H) 10/29/2020    INR 4.17 (H) 09/09/2020    INR 3.03 (H) 08/24/2020    INR 2.35 (H) 08/20/2020     Coronary angiogram [facility] 9/17/2012  Summary/Conclusions  PRESENTATION / INDICATIONS    Chest Pain, with previous heart surgery.    DIAGNOSTIC SUMMARY    The LMCA is free of significant disease.    The LAD is free of significant disease.    The Circumflex is free of significant disease and is non-dominant.    The RCA is normal.      [facility]  Normal left ventricular size with mild hypertrophy.    Left ventricle ejection fraction is normal. The calculated left   ventricular ejection fraction is 68%.    Normal right ventricular size. The systolic function is mildly reduced.   TAPSE is abnormal, which is consistent with abnormal right ventricular   systolic function.    No hemodynamically significant valvular heart abnormalities.    No previous study for comparison.      EKG 10/27/2020      Catheter ablation 2013 [facility]  1.  Clockwise right atrial flutter.  Successful radiofrequency ablation of the right atrial cavo-tricuspid isthmus for isthmus-dependent flutter with resultant bidirectional block.  2.  Paroxysmal atrial fibrillation was also seen spontaneously from isoproterenol infusion.  Patient awoke and complained of chest discomfort as he had done during previous admission.  Suspect this will continue to be problematic clinically, even after atrial flutter  ablation.      Permanent pacemaker implantation [facility] 2/26/2020    Assessment and Plan:   Mr. Ju Edwards is a 70 year old  male with PMH significant for surgical ASD repair in 2000,  hypertension, paroxysmal atrial flutter and fibrillation since 2014 ( s/p atrial flutter ablation in 2013), MCA stroke 3/2014, history of GI bleeding in 2014, and sick sinus syndrome s/p dual-chamber pacemaker 2/2020.    Patient is establishing care with us.  Today's clinic visit was by the help of .  Patient underwent ASD surgical repair in 2000.  After that he was diagnosed with paroxysmal atrial fibrillation and flutter.  Atrial flutter ablation was done in 2013. Patient had ischemic stroke with hemorrhagic transformation in March 2014. He was not on anticoagulation at the time of stroke.   He was started on oral anticoagulation after the CVA.  No recurrent CVA since then.  He has been maintaining sinus rhythm with flecainide.  No major side effects with warfarin.  2 months ago he had minor bleeding for which he underwent colonoscopy.  Colonoscopy showed no concerning findings.  He continues to be on warfarin with no recurrent bleeding per patient.  At this time he is asymptomatic from cardiac standpoint and feels well.  He is very active walking for 40 minutes every day.  Blood pressure is well controlled with amlodipine and atenolol.  He has established care with device clinic and had his first check in August of this year.      I recommended to continue current treatment and follow-up in cardiology in 1 year or earlier if he develops symptoms.  The patient is agreeable with the plan.  I did not make any medication changes today.    A total of 30 minutes spent face-to-face today with greater than 50% of the time spent in counseling and coordinating cares of the issues above. I have spent additional 60 minutes to review all previous hospital admissions and cardiology reports since 2014.    Please donot  hesitate to contact me if you have any questions or concerns. Again, thank you for allowing me to participate in the care of your patient.    Arnoldo KERN MD  [facility] Division of Cardiology  Pager [phone]

## 2020-11-30 NOTE — PATIENT INSTRUCTIONS
Patient Instructions:  Return in one year. You will receive a scheduling reminder in advance.       It was a pleasure to see you in the cardiology clinic today.    We are encouraging the use of Robin to communicate with your Healthcare Provider.  If you have any questions, call  Suman Rodriguez LPN, at [phone].  Press Option #1 for the [facility], and then press Option #4 for nursing.  Cardiology Fax  : [phone]      If you have an urgent need after hours (8:00 am to 4:30 pm) please call [phone] and ask for the cardiology fellow on call.

## 2020-11-30 NOTE — LETTER
11/30/2020      RE: Ju Edwards  [address]  [address]  [place] [address]       Dear Colleague,    Thank you for the opportunity to participate in the care of your patient, Ju Edwards, at the [facility] [place] at [facility]. Please see a copy of my visit note below.      Referring provider: Rayo Christiansen MD    HPI: Mr. Ju Edwards is a 70 year old  male with PMH significant for surgical ASD repair in 2000,  hypertension, paroxysmal atrial flutter and fibrillation since 2014 ( s/p atrial flutter ablation in 2013), MCA stroke 3/2014, history of GI bleeding in 2014, and sick sinus syndrome s/p dual-chamber pacemaker 2/2020.    Patient is establishing care with us.  All his prior cardiac care was at outside hospital.  Today's clinic visit is with the help of  on iPad.  He is originally from [facility].    Patient reports mild left hand and left leg numbness since stroke in 2014.  Otherwise he is asymptomatic from cardiac standpoint.  He tells me that he walks for 40 to 45 minutes every day.  Denies chest pain, shortness of breath, palpitations, syncope, dizziness or lower extremity edema.    Patient is unaware of the medications that he is on.  His wife is managing all his medications.    He is currently maintaining sinus rhythm on flecainide 150 mg twice daily.  He is also on amlodipine 5 mg, atenolol 50 mg daily, atorvastatin 80 mg, lisinopril 20 and warfarin.    He was recently seen by Dr. Christiansen, his primary care physician and patient reported bleeding when he wipes himself.  He underwent colonoscopy 10/22 which showed no significant finding.  Patient reports no bleeding over the last 2 months.    Lifetime non-smoker.  No alcohol abuse.    He had a device check in our clinic 8/20/2020 which showed intrinsic rhythm sinus with 2 A. fib episodes lasting less than a minute.  A paced 92% V paced less than  1%.  Battery life 12 years.    Echocardiogram from [facility] 2/21/2020 showed normal LV size and function.  RV size was normal.  Systolic function was mildly reduced.  No valve disease was noted.    I reviewed patient's EKG 10/27/2020 which shows A paced V sensed rhythm.    Coronary angiogram in 2012 showed normal coronaries.    Medications, personal, family, and social history reviewed with patient and revised.    PAST MEDICAL HISTORY:  Cerebrovascular accident 3/12/2014  Atrial fibrillation flutter ablation 2013  Paroxysmal atrial fibrillation  ASD surgical repair 2000  Hypertension  Sick sinus syndrome status post pacemaker 2/2020  GI bleed in 2014  H. pylori 2013    CURRENT MEDICATIONS:  Current Outpatient Medications   Medication Sig Dispense Refill     amLODIPine (NORVASC) 5 MG tablet Take 1 tablet (5 mg) by mouth daily 90 tablet 3     atenolol (TENORMIN) 50 MG tablet Take 1 tablet (50 mg) by mouth daily 90 tablet 3     atorvastatin (LIPITOR) 80 MG tablet TAKE 1 TABLET BY MOUTH EVERY DAY (Patient taking differently: Take 80 mg by mouth At Bedtime ) 90 tablet 3     brimonidine (ALPHAGAN-P) 0.15 % ophthalmic solution PLACE 1 DROP INTO THE LEFT EYE 3 TIMES DAILY 15 mL 1     cholecalciferol ([employer]S D3) 50 MCG (2000 UT) CAPS Take 1 tablet by mouth daily 90 capsule 3     citalopram (CELEXA) 10 MG tablet Take 2 tablets (20 mg) by mouth daily 180 tablet 3     COMPRESSION STOCKINGS 1 each daily 2 each 1     CVS SALINE NASAL SPRAY 0.65 % nasal spray INSTILL 1 SPRAY IN NOSTRIL DAILY 15 mL 4     cyclobenzaprine (FLEXERIL) 5 MG tablet Take 0.5 tablets (2.5 mg) by mouth At Bedtime 30 tablet 1     docusate sodium (STOOL SOFTENER) 100 MG capsule Take 100 mg by mouth 2 times daily as needed        dorzolamide-timolol (COSOPT) 2-0.5 % ophthalmic solution PLACE 1 DROP INTO THE LEFT EYE 2 TIMES DAILY 10 mL 1     emollient (VANICREAM) cream Apply topically as needed for other 25 g 3     flecainide (TAMBOCOR) 150 MG tablet Take 1  tablet (150 mg) by mouth 2 times daily 180 tablet 3     latanoprost (XALATAN) 0.005 % ophthalmic solution Place 1 drop into both eyes At Bedtime 2.5 mL 11     lisinopril (ZESTRIL) 20 MG tablet Take 1 tablet (20 mg) by mouth daily 90 tablet 3     naproxen (NAPROSYN) 500 MG tablet Take 1 tablet (500 mg) by mouth 2 times daily (with meals) (Patient taking differently: Take 500 mg by mouth 2 times daily as needed ) 180 tablet 1     neomycin-polymixin-dexamethasone (MAXITROL) 0.1 % ophthalmic suspension Instill 1 drop in to the left eye 3 times daily 5 mL 0     neomycin-polymyxin-dexamethasone (MAXITROL) 3.5-51316-6.1 ophthalmic ointment Place 0.5 inches Into the left eye At Bedtime 1 Tube 0     nitroGLYcerin (NITROSTAT) 0.4 MG sublingual tablet Place 0.4 mg under the tongue every 5 minutes as needed        pantoprazole (PROTONIX) 40 MG EC tablet TAKE 1 TABLET BY MOUTH EVERY DAY 90 tablet 3     Sennosides (SENNA LAX PO) Take by mouth 2 times daily as needed       warfarin ANTICOAGULANT (COUMADIN) 2 MG tablet TAKE 1 TAB ON MON, WED, FRI. TAKE 1 AND ONE-HALF TABS ON ALL OTHER DAYS OR AS DIRECTED - NEEDS APT (Patient taking differently: TAKE 1 TAB ON MON, WED, FRI. TAKE 1 AND ONE-HALF TABS ON ALL OTHER DAYS OR AS DIRECTED. Takes at 4:30 PM.) 90 tablet 1       PAST SURGICAL HISTORY:  Past Surgical History:   Procedure Laterality Date     CARDIAC SURGERY  2000    mitral valve repair     CYCLOPHOTOCOAGULATION TRANSSCLERAL WITH MICROPULSE LASER Left 8/24/2020    Procedure: ENDOCYCLOPHOTOCOAGULATION;  Surgeon: Stanford Welsh MD;  Location: [facility] OR     EP PACEMAKER       PHACOEMULSIFICATION CLEAR CORNEA WITH STANDARD INTRAOCULAR LENS IMPLANT Left 8/24/2020    Procedure: PHACOEMULSIFICATION, CATARACT, WITH INTRAOCULAR LENS IMPLANT and goniosynechialysis;  Surgeon: Stanford Welsh MD;  Location: [facility] OR     REPAIR ATRIAL SEPTAL DEFECT         ALLERGIES:   No Known Allergies    FAMILY HISTORY:  Family History   Problem  "Relation Age of Onset     Eye Surgery Mother      Albinism Mother      Cervical Cancer Mother      No Known Problems Father      No Known Problems Sister      No Known Problems Brother      No Known Problems Sister      No Known Problems Sister      No Known Problems Sister      No Known Problems Brother          from trauma     Glaucoma No family hx of      Macular Degeneration No family hx of          SOCIAL HISTORY:  Social History     Tobacco Use     Smoking status: Never Smoker     Smokeless tobacco: Never Used   Substance Use Topics     Alcohol use: No     Drug use: No       ROS:   Constitutional: No fever, chills, or sweats. Weight stable.   ENT: No visual disturbance, ear ache, epistaxis, sore throat.   Cardiovascular: As per HPI.   Respiratory: No cough, hemoptysis.    GI: No nausea, vomiting, hematemesis, melena, or hematochezia.   : No hematuria.   Integument: Negative.   Psychiatric: Negative.   Hematologic:  No easy bruising, no easy bleeding.  Neuro: Left hand and left leg numbness+  Endocrinology: No significant heat or cold intolerance   Musculoskeletal: No myalgia.    Exam:  /74 (BP Location: Right arm, Patient Position: Chair, Cuff Size: Adult Regular)   Pulse 60   Ht 1.549 m (5' 1\")   Wt 69.8 kg (153 lb 14.4 oz)   SpO2 96%   BMI 29.08 kg/m    GENERAL APPEARANCE: alert and no distress  HEENT: no icterus, no central cyanosis  LYMPH/NECK: no adenopathy, no asymmetry, JVP not elevated, no carotid bruits.  RESPIRATORY: lungs clear to auscultation - no rales, rhonchi or wheezes, no use of accessory muscles, no retractions, respirations are unlabored, normal respiratory rate  CARDIOVASCULAR: regular rhythm, normal S1, S2, no S3 or S4 and no murmur, click or rub, precordium quiet with normal PMI.  GI: soft, non tender  EXTREMITIES: peripheral pulses normal, no edema  NEURO: alert, normal speech,and affect  VASC: Radial, dorsalis pedis and posterior tibialis pulses are normal in volumes " and symmetric bilaterally.   SKIN: no ecchymoses, no rashes     I have reviewed the labs, outside hospital medical records and personally reviewed the EKG and device report and made my comment in the assessment and plan.    Labs:  CBC RESULTS:   Lab Results   Component Value Date    WBC 7.0 09/09/2020    RBC 4.67 09/09/2020    HGB 13.1 (L) 09/09/2020    HCT 40.4 09/09/2020    MCV 87 09/09/2020    MCH 28.1 09/09/2020    MCHC 32.4 09/09/2020    RDW 13.2 09/09/2020     09/09/2020       BMP RESULTS:  Lab Results   Component Value Date     06/15/2020    POTASSIUM 4.7 06/15/2020    CHLORIDE 103 06/15/2020    CO2 29 06/15/2020    ANIONGAP 2 (L) 06/15/2020    GLC 94 06/15/2020    BUN 30 06/15/2020    CR 1.14 06/15/2020    GFRESTIMATED 66 06/15/2020    GFRESTBLACK 76 06/15/2020    CARLINE 8.9 06/15/2020        INR RESULTS:  Lab Results   Component Value Date    INR 2.10 (H) 10/29/2020    INR 4.17 (H) 09/09/2020    INR 3.03 (H) 08/24/2020    INR 2.35 (H) 08/20/2020     Coronary angiogram [facility] 9/17/2012  Summary/Conclusions  PRESENTATION / INDICATIONS    Chest Pain, with previous heart surgery.    DIAGNOSTIC SUMMARY    The LMCA is free of significant disease.    The LAD is free of significant disease.    The Circumflex is free of significant disease and is non-dominant.    The RCA is normal.      Echocardiogram 2/21/2020 [facility]  Normal left ventricular size with mild hypertrophy.    Left ventricle ejection fraction is normal. The calculated left   ventricular ejection fraction is 68%.    Normal right ventricular size. The systolic function is mildly reduced.   TAPSE is abnormal, which is consistent with abnormal right ventricular   systolic function.    No hemodynamically significant valvular heart abnormalities.    No previous study for comparison.      EKG 10/27/2020      Catheter ablation 2013 [facility]  1.  Clockwise right atrial flutter.  Successful radiofrequency ablation of the right atrial  cavo-tricuspid isthmus for isthmus-dependent flutter with resultant bidirectional block.  2.  Paroxysmal atrial fibrillation was also seen spontaneously from isoproterenol infusion.  Patient awoke and complained of chest discomfort as he had done during previous admission.  Suspect this will continue to be problematic clinically, even after atrial flutter ablation.      Permanent pacemaker implantation [facility] 2/26/2020    Assessment and Plan:   Mr. Ju Edwards is a 70 year old  male with PMH significant for surgical ASD repair in 2000,  hypertension, paroxysmal atrial flutter and fibrillation since 2014 ( s/p atrial flutter ablation in 2013), MCA stroke 3/2014, history of GI bleeding in 2014, and sick sinus syndrome s/p dual-chamber pacemaker 2/2020.    Patient is establishing care with us.  Today's clinic visit was by the help of .  Patient underwent ASD surgical repair in 2000.  After that he was diagnosed with paroxysmal atrial fibrillation and flutter.  Atrial flutter ablation was done in 2013. Patient had ischemic stroke with hemorrhagic transformation in March 2014. He was not on anticoagulation at the time of stroke.   He was started on oral anticoagulation after the CVA.  No recurrent CVA since then.  He has been maintaining sinus rhythm with flecainide.  No major side effects with warfarin.  2 months ago he had minor bleeding for which he underwent colonoscopy.  Colonoscopy showed no concerning findings.  He continues to be on warfarin with no recurrent bleeding per patient.  At this time he is asymptomatic from cardiac standpoint and feels well.  He is very active walking for 40 minutes every day.  Blood pressure is well controlled with amlodipine and atenolol.  He has established care with device clinic and had his first check in August of this year.      I recommended to continue current treatment and follow-up in cardiology in 1 year or earlier if he develops symptoms.  The  patient is agreeable with the plan.  I did not make any medication changes today.    A total of 30 minutes spent face-to-face today with greater than 50% of the time spent in counseling and coordinating cares of the issues above. I have spent additional 60 minutes to review all previous hospital admissions and cardiology reports since 2014.    Please donot hesitate to contact me if you have any questions or concerns. Again, thank you for allowing me to participate in the care of your patient.    Arnoldo KERN MD  [facility] Division of Cardiology  Pager [phone]

## 2020-12-03 ENCOUNTER — AMBULATORY - HEALTHEAST (OUTPATIENT)
Dept: CARDIOLOGY | Facility: CLINIC | Age: 70
End: 2020-12-03

## 2020-12-03 DIAGNOSIS — Z95.0 CARDIAC PACEMAKER IN SITU: ICD-10-CM

## 2020-12-03 DIAGNOSIS — I45.5 SINUS ARREST: ICD-10-CM

## 2020-12-14 ENCOUNTER — TELEPHONE (OUTPATIENT)
Dept: OPHTHALMOLOGY | Facility: CLINIC | Age: 70
End: 2020-12-14

## 2020-12-30 ENCOUNTER — ANTICOAGULATION THERAPY VISIT (OUTPATIENT)
Dept: ANTICOAGULATION | Facility: CLINIC | Age: 70
End: 2020-12-30

## 2020-12-30 DIAGNOSIS — Z79.01 LONG TERM CURRENT USE OF ANTICOAGULANT THERAPY: ICD-10-CM

## 2020-12-30 DIAGNOSIS — I48.0 PAROXYSMAL ATRIAL FIBRILLATION (H): ICD-10-CM

## 2020-12-30 DIAGNOSIS — I48.91 ATRIAL FIBRILLATION, UNSPECIFIED TYPE (H): ICD-10-CM

## 2020-12-30 NOTE — PROGRESS NOTES
Message is left for Leslie reminding her that [place] is overdue for an INR check.  Requested that patient get in as soon as able.

## 2020-12-31 ENCOUNTER — DOCUMENTATION ONLY (OUTPATIENT)
Dept: ANTICOAGULATION | Facility: CLINIC | Age: 70
End: 2020-12-31

## 2020-12-31 DIAGNOSIS — I48.0 PAROXYSMAL ATRIAL FIBRILLATION (H): ICD-10-CM

## 2020-12-31 DIAGNOSIS — I48.91 ATRIAL FIBRILLATION, UNSPECIFIED TYPE (H): ICD-10-CM

## 2020-12-31 DIAGNOSIS — Z79.01 LONG TERM CURRENT USE OF ANTICOAGULANT THERAPY: ICD-10-CM

## 2021-01-05 ENCOUNTER — ANTICOAGULATION THERAPY VISIT (OUTPATIENT)
Dept: ANTICOAGULATION | Facility: CLINIC | Age: 71
End: 2021-01-05

## 2021-01-05 DIAGNOSIS — I48.91 ATRIAL FIBRILLATION, UNSPECIFIED TYPE (H): ICD-10-CM

## 2021-01-05 DIAGNOSIS — Z79.01 LONG TERM CURRENT USE OF ANTICOAGULANT THERAPY: ICD-10-CM

## 2021-01-05 DIAGNOSIS — I48.0 PAROXYSMAL ATRIAL FIBRILLATION (H): ICD-10-CM

## 2021-01-05 LAB — INR PPP: 3.61 (ref 0.86–1.14)

## 2021-01-05 PROCEDURE — 85610 PROTHROMBIN TIME: CPT | Performed by: PATHOLOGY

## 2021-01-05 PROCEDURE — 36416 COLLJ CAPILLARY BLOOD SPEC: CPT | Performed by: PATHOLOGY

## 2021-01-05 NOTE — PROGRESS NOTES
ANTICOAGULATION FOLLOW-UP CLINIC VISIT    Patient Name:  Ju Edwards  Date:  1/5/2021  Contact Type:  Telephone    SUBJECTIVE:  Patient Findings     Comments:  Left message for patient's spouse, Lety.        Clinical Outcomes     Comments:  Left message for patient's spouse, Lety.           OBJECTIVE    Recent labs: (last 7 days)     01/05/21  1236   INR 3.61*       ASSESSMENT / PLAN  INR assessment SUPRA    Recheck INR In: 1 WEEK    INR Location Clinic      Anticoagulation Summary  As of 1/5/2021    INR goal:  2.0-3.0   TTR:  59.4 % (8.3 mo)   INR used for dosing:  3.61 (1/5/2021)   Warfarin maintenance plan:  2 mg (2 mg x 1) every Mon, Wed, Fri; 3 mg (2 mg x 1.5) all other days   Full warfarin instructions:  1/5: 1 mg; Otherwise 2 mg every Mon, Wed, Fri; 3 mg all other days   Weekly warfarin total:  18 mg   Plan last modified:  Alice Carranza RN (6/2/2016)   Next INR check:  1/12/2021   Priority:  High   Target end date:  Indefinite    Indications    Long term current use of anticoagulant therapy [Z79.01]  Atrial fibrillation (H) [I48.91]  Atrial fibrillation  unspecified type (H) [I48.91]             Anticoagulation Episode Summary     INR check location:  Clinic Lab    Preferred lab:      Send INR reminders to:  [facility]    Comments:  Speak witrh Wife Lety [phone]  Pronounced Chance-A-Mout  Pt takes Warfarin dose at 4:30 PM      Anticoagulation Care Providers     Provider Role Specialty Phone number    Rayo Christiansen MD Referring Internal Medicine [phone]            See the Encounter Report to view Anticoagulation Flowsheet and Dosing Calendar (Go to Encounters tab in chart review, and find the Anticoagulation Therapy Visit)    INR/CFX/F2 RESULT:INR result is 3.61    ASSESSMENT:Left message for patient with results and dosing recommendations. Asked patient to call back to report any missed doses, falls, signs and symptoms of bleeding or clotting, any  changes in health, medication, or diet. Asked patient to call back with any questions or concerns.    DOSING ADJUSTMENT:1mg one time today, then resume patttern    NEXT INR/FACTOR X OR FACTOR II:1/12    PROTOCOL FOLLOWED:goal 2-3    Alexander Becerril RN

## 2021-01-12 ENCOUNTER — TELEPHONE (OUTPATIENT)
Dept: OPHTHALMOLOGY | Facility: CLINIC | Age: 71
End: 2021-01-12

## 2021-01-12 NOTE — TELEPHONE ENCOUNTER
Called patient to schedule procedure with , there was no answer.  Left message with my direct line [phone].

## 2021-02-12 ENCOUNTER — TELEPHONE (OUTPATIENT)
Dept: INTERNAL MEDICINE | Facility: CLINIC | Age: 71
End: 2021-02-12

## 2021-02-12 NOTE — TELEPHONE ENCOUNTER
ANTICOAGULATION     Ju Edwards is overdue for INR check.      Left message for patient to call and schedule lab appointment as soon as possible. If returning call, please schedule. Message left for Lety.     Due 1/12/21    Ginny Grant RN

## 2021-02-23 DIAGNOSIS — I63.039: ICD-10-CM

## 2021-02-25 RX ORDER — CHOLECALCIFEROL (VITAMIN D3) 50 MCG
CAPSULE ORAL
Qty: 90 CAPSULE | Refills: 2 | Status: SHIPPED | OUTPATIENT
Start: 2021-02-25 | End: 2021-10-29

## 2021-02-25 NOTE — TELEPHONE ENCOUNTER
10/27/2020 last visit date   [facility] Internal Medicine [place]. Refilled per protocol.

## 2021-03-05 ENCOUNTER — AMBULATORY - HEALTHEAST (OUTPATIENT)
Dept: CARDIOLOGY | Facility: CLINIC | Age: 71
End: 2021-03-05

## 2021-03-05 DIAGNOSIS — I45.5 SINUS ARREST: ICD-10-CM

## 2021-03-05 DIAGNOSIS — Z95.0 CARDIAC PACEMAKER IN SITU: ICD-10-CM

## 2021-03-08 ENCOUNTER — TELEPHONE (OUTPATIENT)
Dept: INTERNAL MEDICINE | Facility: CLINIC | Age: 71
End: 2021-03-08

## 2021-03-08 NOTE — LETTER
Ju Edwards  [address]  [address]  [place] [address]  : 1950  MRN:  [id number]      2021          To Whom It May Concern:    I am the primary care provider of Josesandra Sonalielizabeth and he is safe to operate a motor vehicle.  Please contact us if you have any questions.      Sincerely,      Rayo Christiansen MD  Internal Medicine

## 2021-03-08 NOTE — TELEPHONE ENCOUNTER
[facility] Call Center    Phone Message    May a detailed message be left on voicemail: yes     Reason for Call: Form or Letter   Type or form/letter needing completion: The patient needs a letter from the doctor indicating he is safe to operate a motor vehicle please fax the letter to  [phone] please review and follow up address any questions or concerns.  Provider: Rayo Christiansen MD  Date form needed: asap  Once completed: Fax form to: [phone]      Action Taken: Message routed to:  [facility] pcc    Travel Screening: Not Applicable             JAROCHO VALLE

## 2021-03-09 ENCOUNTER — TELEPHONE (OUTPATIENT)
Dept: ANTICOAGULATION | Facility: CLINIC | Age: 71
End: 2021-03-09

## 2021-03-09 NOTE — TELEPHONE ENCOUNTER
ANTICOAGULATION     Ju Edwards is overdue for INR check.      Left message for patient to call and schedule lab appointment as soon as possible. If returning call, please schedule.     Alexander Becerril RN

## 2021-03-15 ENCOUNTER — ANTICOAGULATION THERAPY VISIT (OUTPATIENT)
Dept: ANTICOAGULATION | Facility: CLINIC | Age: 71
End: 2021-03-15

## 2021-03-15 DIAGNOSIS — Z79.01 LONG TERM CURRENT USE OF ANTICOAGULANT THERAPY: ICD-10-CM

## 2021-03-15 DIAGNOSIS — I48.91 ATRIAL FIBRILLATION, UNSPECIFIED TYPE (H): ICD-10-CM

## 2021-03-15 DIAGNOSIS — I48.0 PAROXYSMAL ATRIAL FIBRILLATION (H): ICD-10-CM

## 2021-03-15 LAB — INR PPP: 3.59 (ref 0.86–1.14)

## 2021-03-15 PROCEDURE — 85610 PROTHROMBIN TIME: CPT | Performed by: PATHOLOGY

## 2021-03-15 PROCEDURE — 36416 COLLJ CAPILLARY BLOOD SPEC: CPT | Performed by: PATHOLOGY

## 2021-03-15 NOTE — PROGRESS NOTES
ANTICOAGULATION FOLLOW-UP CLINIC VISIT    Patient Name:  Ju Edwards  Date:  3/15/2021  Contact Type:  Telephone    SUBJECTIVE:         OBJECTIVE    Recent labs: (last 7 days)     03/15/21  1443   INR 3.59*       ASSESSMENT / PLAN  No question data found.  Anticoagulation Summary  As of 3/15/2021    INR goal:  2.0-3.0   TTR:  37.8 % (9.1 mo)   INR used for dosing:  3.59 (3/15/2021)   Warfarin maintenance plan:  3 mg (2 mg x 1.5) every Sun, Tue, Thu; 2 mg (2 mg x 1) all other days   Full warfarin instructions:  3 mg every Sun, Tue, Thu; 2 mg all other days   Weekly warfarin total:  17 mg   Plan last modified:  Hien Curry RN (3/15/2021)   Next INR check:  3/29/2021   Priority:  High   Target end date:  Indefinite    Indications    Long term current use of anticoagulant therapy [Z79.01]  Atrial fibrillation (H) [I48.91]  Atrial fibrillation  unspecified type (H) [I48.91]             Anticoagulation Episode Summary     INR check location:  Clinic Lab    Preferred lab:      Send INR reminders to:  [facility]    Comments:  Speak witrh Wife Lety [phone]  Pronounced Chance-A-Mout  Pt takes Warfarin dose at 4:30 PM      Anticoagulation Care Providers     Provider Role Specialty Phone number    Rayo Christiansen MD Referring Internal Medicine [phone]            See the Encounter Report to view Anticoagulation Flowsheet and Dosing Calendar (Go to Encounters tab in chart review, and find the Anticoagulation Therapy Visit)    Left message for patient with results and dosing recommendations. Asked patient to call back to report any missed doses, falls, signs and symptoms of bleeding or clotting, any changes in health, medication, or diet. Asked patient to call back with any questions or concerns.      Hien Curry RN

## 2021-03-23 ENCOUNTER — AMBULATORY - HEALTHEAST (OUTPATIENT)
Dept: CARDIOLOGY | Facility: CLINIC | Age: 71
End: 2021-03-23

## 2021-03-23 DIAGNOSIS — I45.9 STOKES-ADAMS SYNCOPE: ICD-10-CM

## 2021-03-23 DIAGNOSIS — I45.5 SINUS PAUSE: ICD-10-CM

## 2021-03-23 DIAGNOSIS — I45.5 SINUS ARREST: ICD-10-CM

## 2021-03-23 DIAGNOSIS — Z95.0 CARDIAC PACEMAKER IN SITU: ICD-10-CM

## 2021-03-23 DIAGNOSIS — R04.0 EPISTAXIS: ICD-10-CM

## 2021-03-23 ASSESSMENT — MIFFLIN-ST. JEOR: SCORE: 1361.4

## 2021-03-24 ENCOUNTER — IMMUNIZATION (OUTPATIENT)
Dept: NURSING | Facility: CLINIC | Age: 71
End: 2021-03-24
Payer: COMMERCIAL

## 2021-03-24 DIAGNOSIS — Z79.01 LONG TERM CURRENT USE OF ANTICOAGULANT THERAPY: ICD-10-CM

## 2021-03-24 DIAGNOSIS — I48.91 ATRIAL FIBRILLATION, UNSPECIFIED TYPE (H): ICD-10-CM

## 2021-03-24 PROCEDURE — 0001A PR COVID VAC PFIZER DIL RECON 30 MCG/0.3 ML IM: CPT

## 2021-03-24 PROCEDURE — 91300 PR COVID VAC PFIZER DIL RECON 30 MCG/0.3 ML IM: CPT

## 2021-03-24 RX ORDER — WARFARIN SODIUM 2 MG/1
TABLET ORAL
Qty: 90 TABLET | Refills: 1 | Status: SHIPPED | OUTPATIENT
Start: 2021-03-24 | End: 2021-08-09

## 2021-03-26 RX ORDER — ECHINACEA PURPUREA EXTRACT 125 MG
1 TABLET ORAL DAILY
Qty: 15 ML | Refills: 6 | Status: SHIPPED | OUTPATIENT
Start: 2021-03-26 | End: 2021-11-03

## 2021-03-27 DIAGNOSIS — K21.00 GASTROESOPHAGEAL REFLUX DISEASE WITH ESOPHAGITIS: ICD-10-CM

## 2021-03-30 RX ORDER — PANTOPRAZOLE SODIUM 40 MG/1
40 TABLET, DELAYED RELEASE ORAL DAILY
Qty: 90 TABLET | Refills: 1 | Status: SHIPPED | OUTPATIENT
Start: 2021-03-30 | End: 2021-09-21

## 2021-03-30 NOTE — TELEPHONE ENCOUNTER
Last Clinic Visit: 10/27/2020  [facility] Internal Medicine [place]

## 2021-04-14 ENCOUNTER — IMMUNIZATION (OUTPATIENT)
Dept: NURSING | Facility: CLINIC | Age: 71
End: 2021-04-14
Attending: INTERNAL MEDICINE
Payer: COMMERCIAL

## 2021-04-14 PROCEDURE — 91300 PR COVID VAC PFIZER DIL RECON 30 MCG/0.3 ML IM: CPT

## 2021-04-14 PROCEDURE — 0002A PR COVID VAC PFIZER DIL RECON 30 MCG/0.3 ML IM: CPT

## 2021-04-21 ENCOUNTER — ANTICOAGULATION THERAPY VISIT (OUTPATIENT)
Dept: ANTICOAGULATION | Facility: CLINIC | Age: 71
End: 2021-04-21

## 2021-04-21 DIAGNOSIS — Z79.01 LONG TERM CURRENT USE OF ANTICOAGULANT THERAPY: ICD-10-CM

## 2021-04-21 DIAGNOSIS — I48.91 ATRIAL FIBRILLATION, UNSPECIFIED TYPE (H): ICD-10-CM

## 2021-04-21 DIAGNOSIS — Z11.59 ENCOUNTER FOR SCREENING FOR OTHER VIRAL DISEASES: ICD-10-CM

## 2021-04-21 DIAGNOSIS — I48.0 PAROXYSMAL ATRIAL FIBRILLATION (H): ICD-10-CM

## 2021-04-21 LAB — INR PPP: 2.12 (ref 0.86–1.14)

## 2021-04-21 PROCEDURE — 85610 PROTHROMBIN TIME: CPT | Performed by: PATHOLOGY

## 2021-04-21 PROCEDURE — 36416 COLLJ CAPILLARY BLOOD SPEC: CPT | Performed by: PATHOLOGY

## 2021-04-21 NOTE — PROGRESS NOTES
ANTICOAGULATION FOLLOW-UP CLINIC VISIT    Patient Name:  Ju Edwards  Date:  2021  Contact Type:  Telephone    SUBJECTIVE:         OBJECTIVE    Recent labs: (last 7 days)     21  1558   INR 2.12*       ASSESSMENT / PLAN  No question data found.  Anticoagulation Summary  As of 2021    INR goal:  2.0-3.0   TTR:  40.4 % (10.3 mo)   INR used for dosin.12 (2021)   Warfarin maintenance plan:  3 mg (2 mg x 1.5) every Sun, Tue, Thu; 2 mg (2 mg x 1) all other days   Full warfarin instructions:  3 mg every Sun, Tue, Thu; 2 mg all other days   Weekly warfarin total:  17 mg   No change documented:  Hien Curry RN   Plan last modified:  Hien Curry RN (3/15/2021)   Next INR check:  2021   Priority:  High   Target end date:  Indefinite    Indications    Long term current use of anticoagulant therapy [Z79.01]  Atrial fibrillation (H) [I48.91]  Atrial fibrillation  unspecified type (H) [I48.91]             Anticoagulation Episode Summary     INR check location:  Clinic Lab    Preferred lab:      Send INR reminders to:  [facility]    Comments:  Speak witrh Wife Lety [phone]  Pronounced Chance-A-Mout  Pt takes Warfarin dose at 4:30 PM      Anticoagulation Care Providers     Provider Role Specialty Phone number    Rayo Christiansen MD Referring Internal Medicine [phone]            See the Encounter Report to view Anticoagulation Flowsheet and Dosing Calendar (Go to Encounters tab in chart review, and find the Anticoagulation Therapy Visit)     Left message for patient with results and dosing recommendations. Asked patient to call back to report any missed doses, falls, signs and symptoms of bleeding or clotting, any changes in health, medication, or diet. Asked patient to call back with any questions or concerns.      Hien Curry RN

## 2021-04-23 DIAGNOSIS — Z12.11 ENCOUNTER FOR SCREENING COLONOSCOPY: Primary | ICD-10-CM

## 2021-04-28 ENCOUNTER — OFFICE VISIT (OUTPATIENT)
Dept: OPHTHALMOLOGY | Facility: CLINIC | Age: 71
End: 2021-04-28
Attending: OPHTHALMOLOGY
Payer: COMMERCIAL

## 2021-04-28 DIAGNOSIS — Z96.1 PSEUDOPHAKIA: ICD-10-CM

## 2021-04-28 DIAGNOSIS — I63.9 CEREBROVASCULAR ACCIDENT (CVA), UNSPECIFIED MECHANISM (H): ICD-10-CM

## 2021-04-28 DIAGNOSIS — E11.9 TYPE 2 DIABETES MELLITUS WITHOUT COMPLICATION, UNSPECIFIED WHETHER LONG TERM INSULIN USE (H): ICD-10-CM

## 2021-04-28 DIAGNOSIS — H40.2222: ICD-10-CM

## 2021-04-28 DIAGNOSIS — H40.2211: Primary | ICD-10-CM

## 2021-04-28 PROCEDURE — G0463 HOSPITAL OUTPT CLINIC VISIT: HCPCS

## 2021-04-28 PROCEDURE — 99213 OFFICE O/P EST LOW 20 MIN: CPT | Mod: GC | Performed by: OPHTHALMOLOGY

## 2021-04-28 ASSESSMENT — CONF VISUAL FIELD
OS_INFERIOR_TEMPORAL_RESTRICTION: 3
OS_SUPERIOR_NASAL_RESTRICTION: 3
METHOD: COUNTING FINGERS
OD_INFERIOR_NASAL_RESTRICTION: 3
OD_SUPERIOR_NASAL_RESTRICTION: 3
OS_INFERIOR_NASAL_RESTRICTION: 3

## 2021-04-28 ASSESSMENT — TONOMETRY
OD_IOP_MMHG: 12
IOP_METHOD: APPLANATION BY JMK
IOP_METHOD: APPLANATION
OS_IOP_MMHG: 15
OD_IOP_MMHG: 17
OS_IOP_MMHG: 18

## 2021-04-28 ASSESSMENT — SLIT LAMP EXAM - LIDS
COMMENTS: MILD MGD
COMMENTS: MILD MGD

## 2021-04-28 ASSESSMENT — CUP TO DISC RATIO: OS_RATIO: 0.7

## 2021-04-28 ASSESSMENT — VISUAL ACUITY
OD_SC+: -1+2
OS_SC: 20/20
OD_SC: 20/50
OS_SC+: -2
METHOD: SNELLEN - LINEAR

## 2021-04-28 NOTE — NURSING NOTE
Chief Complaints and History of Present Illnesses   Patient presents with     Glaucoma Follow Up     6 month follow up chronic narrow angle glaucoma, both eyes     Chief Complaint(s) and History of Present Illness(es)     Glaucoma Follow Up     Laterality: both eyes    Associated symptoms: Negative for eye pain, tearing and discharge    Treatment side effects: none    Compliance with Treatment: always    Pain scale: 0/10    Comments: 6 month follow up chronic narrow angle glaucoma, both eyes              Comments     Pt denies any significant vision changes in either eye since last visit.  Denies any flashes, floaters, pain, or irritation.  Ocular meds: Cosopt BID BE, Brimonidine BID BE, & AT's PRN BE    Radha Anderson OT 3:28 PM April 28, 2021

## 2021-04-29 ENCOUNTER — TELEPHONE (OUTPATIENT)
Dept: OPHTHALMOLOGY | Facility: CLINIC | Age: 71
End: 2021-04-29

## 2021-05-03 ENCOUNTER — HOSPITAL ENCOUNTER (EMERGENCY)
Dept: EMERGENCY MEDICINE | Facility: HOSPITAL | Age: 71
Discharge: LEFT BEFORE DISCHARGE | End: 2021-05-03
Admitting: STUDENT IN AN ORGANIZED HEALTH CARE EDUCATION/TRAINING PROGRAM
Payer: COMMERCIAL

## 2021-05-03 LAB
ALBUMIN SERPL-MCNC: 4.6 G/DL (ref 3.5–5)
ALP SERPL-CCNC: 110 U/L (ref 45–120)
ALT SERPL W P-5'-P-CCNC: 26 U/L (ref 0–45)
ANION GAP SERPL CALCULATED.3IONS-SCNC: 11 MMOL/L (ref 5–18)
AST SERPL W P-5'-P-CCNC: 23 U/L (ref 0–40)
BILIRUB DIRECT SERPL-MCNC: 0.2 MG/DL
BILIRUB SERPL-MCNC: 0.5 MG/DL (ref 0–1)
BUN SERPL-MCNC: 17 MG/DL (ref 8–22)
CALCIUM SERPL-MCNC: 9.5 MG/DL (ref 8.5–10.5)
CHLORIDE BLD-SCNC: 105 MMOL/L (ref 98–107)
CO2 SERPL-SCNC: 22 MMOL/L (ref 22–31)
CREAT SERPL-MCNC: 1.18 MG/DL (ref 0.7–1.3)
ERYTHROCYTE [DISTWIDTH] IN BLOOD BY AUTOMATED COUNT: 14.1 % (ref 11–14.5)
GFR SERPL CREATININE-BSD FRML MDRD: >60 ML/MIN/1.73M2
GLUCOSE BLD-MCNC: 103 MG/DL (ref 70–125)
HCT VFR BLD AUTO: 42.3 % (ref 40–54)
HGB BLD-MCNC: 13.5 G/DL (ref 14–18)
LIPASE SERPL-CCNC: 146 U/L (ref 0–52)
MCH RBC QN AUTO: 27.3 PG (ref 27–34)
MCHC RBC AUTO-ENTMCNC: 31.9 G/DL (ref 32–36)
MCV RBC AUTO: 86 FL (ref 80–100)
PLATELET # BLD AUTO: 292 THOU/UL (ref 140–440)
PMV BLD AUTO: 9.5 FL (ref 8.5–12.5)
POTASSIUM BLD-SCNC: 4.7 MMOL/L (ref 3.5–5)
PROT SERPL-MCNC: 8.7 G/DL (ref 6–8)
RBC # BLD AUTO: 4.95 MILL/UL (ref 4.4–6.2)
SODIUM SERPL-SCNC: 138 MMOL/L (ref 136–145)
WBC: 10.2 THOU/UL (ref 4–11)

## 2021-05-03 ASSESSMENT — MIFFLIN-ST. JEOR: SCORE: 1282.02

## 2021-05-18 ENCOUNTER — HOSPITAL ENCOUNTER (EMERGENCY)
Facility: CLINIC | Age: 71
Discharge: HOME | End: 2021-05-18
Attending: EMERGENCY MEDICINE | Admitting: EMERGENCY MEDICINE
Payer: COMMERCIAL

## 2021-05-18 ENCOUNTER — APPOINTMENT (OUTPATIENT)
Dept: GENERAL RADIOLOGY | Facility: CLINIC | Age: 71
End: 2021-05-18
Attending: EMERGENCY MEDICINE
Payer: COMMERCIAL

## 2021-05-18 ENCOUNTER — TELEPHONE (OUTPATIENT)
Dept: ANTICOAGULATION | Facility: CLINIC | Age: 71
End: 2021-05-18

## 2021-05-18 VITALS
TEMPERATURE: 97.3 F | SYSTOLIC BLOOD PRESSURE: 150 MMHG | HEART RATE: 58 BPM | OXYGEN SATURATION: 98 % | DIASTOLIC BLOOD PRESSURE: 76 MMHG | RESPIRATION RATE: 16 BRPM

## 2021-05-18 DIAGNOSIS — S67.10XA CRUSHING INJURY OF DISTAL FINGER, INITIAL ENCOUNTER: ICD-10-CM

## 2021-05-18 DIAGNOSIS — S60.10XA SUBUNGUAL HEMATOMA OF FINGER, INITIAL ENCOUNTER: ICD-10-CM

## 2021-05-18 PROCEDURE — 250N000013 HC RX MED GY IP 250 OP 250 PS 637: Performed by: EMERGENCY MEDICINE

## 2021-05-18 PROCEDURE — 99283 EMERGENCY DEPT VISIT LOW MDM: CPT | Performed by: EMERGENCY MEDICINE

## 2021-05-18 PROCEDURE — 73140 X-RAY EXAM OF FINGER(S): CPT | Mod: RT

## 2021-05-18 RX ORDER — IBUPROFEN 600 MG/1
600 TABLET, FILM COATED ORAL ONCE
Status: COMPLETED | OUTPATIENT
Start: 2021-05-18 | End: 2021-05-18

## 2021-05-18 RX ADMIN — IBUPROFEN 600 MG: 600 TABLET, FILM COATED ORAL at 15:58

## 2021-05-18 NOTE — ED TRIAGE NOTES
Pt states his right hand caught in the car door; Only the third digit affected; Taken OTC meds with no relief

## 2021-05-18 NOTE — TELEPHONE ENCOUNTER
ANTICOAGULATION  MANAGEMENT: Discharge Review    Ju Edwards chart reviewed for anticoagulation continuity of care    Emergency room visit on 5/18/21 for     1.Subungual Hematoma  2. Finger Contusion    Your finger xray is normal.  You are having pain from the crush injury of your finger.  It will improve over the next 1 week.  Take ibuprofen 400 mg with food for pain control.    Discharge disposition: Home    Results:    No results for input(s): INR, [id number], AXA in the last 168 hours.  Anticoagulation inpatient management:     not applicable     Anticoagulation discharge instructions:     Warfarin dosing: home regimen continued   Bridging: No   INR goal change: No      Medication changes affecting anticoagulation: Yes: Ibuprofen 400mg for pain    Additional factors affecting anticoagulation: Yes: Hematoma    Plan     Recommend to check INR on ASAP    Left a detailed message for patient on Lety's VM.    No adjustment to Anticoagulation Calendar was required     Patient did not have labs drawn in ED today.  Left message for patient to schedule an INR as soon as possible.    Alexander Wadsworth RN

## 2021-05-18 NOTE — DISCHARGE INSTRUCTIONS
Your finger xray is normal.     You are having pain from the crush injury of your finger.     It will improve over the next 1 week.     Take ibuprofen 400 mg with food for pain control.

## 2021-05-18 NOTE — ED PROVIDER NOTES
ED Provider Note  [facility]      History     Chief Complaint   Patient presents with     Hand Pain     third digit pain on right hand, caught in car door     The history is provided by the patient and medical records.     Ju Edwards is a 70-year-old male who presents to the ER due to injury to his right middle finger.  Patient says on Sunday he was at a park in [place] and his finger was caught in the door.  Patient says the door was only about 2 inches open when it hit his finger.  Patient says he initially had a small black spot under his finger and the area has grown.  Patient had his finger has been despite him taking Tylenol at home.  Patient wanted to come to the ER today to make sure that it was not broken and there is no other injuries.  Patient denies any other issues at this time.  No other injuries noted.      This part of the medical record was transcribed by Luis Bear Medical Scribe, from a dictation done by Cristina Almanza MD.     Past Medical History  Past Medical History:   Diagnosis Date     Allergic rhinitis      Atrial fibrillation (H)     paroxysmal     Delirium     associated with hospitalization for stroke     Depression      Gastric erosions 1/2014    associated with gi bleed     GERD (gastroesophageal reflux disease)      Hyperlipidemia      Hypertension      Left hemiparesis (H)      PTSD (post-traumatic stress disorder)      Sleep apnea      Stroke (H) 1/2014    ischemic CVA with hemorrhagic transformation     Past Surgical History:   Procedure Laterality Date     CARDIAC SURGERY  2000    mitral valve repair     CYCLOPHOTOCOAGULATION TRANSSCLERAL WITH MICROPULSE LASER Left 8/24/2020    Procedure: ENDOCYCLOPHOTOCOAGULATION;  Surgeon: Stanford Welsh MD;  Location: [facility] OR     EP PACEMAKER       PHACOEMULSIFICATION CLEAR CORNEA WITH STANDARD INTRAOCULAR LENS IMPLANT Left 8/24/2020    Procedure: PHACOEMULSIFICATION, CATARACT, WITH INTRAOCULAR  LENS IMPLANT and goniosynechialysis;  Surgeon: Stanford Welsh MD;  Location: [facility] OR     REPAIR ATRIAL SEPTAL DEFECT       amLODIPine (NORVASC) 5 MG tablet  atenolol (TENORMIN) 50 MG tablet  atorvastatin (LIPITOR) 80 MG tablet  brimonidine (ALPHAGAN-P) 0.15 % ophthalmic solution  citalopram (CELEXA) 10 MG tablet  COMPRESSION STOCKINGS  CVS D3 50 MCG ( [place]) CAPS  cyclobenzaprine (FLEXERIL) 5 MG tablet  docusate sodium (STOOL SOFTENER) 100 MG capsule  dorzolamide-timolol (COSOPT) 2-0.5 % ophthalmic solution  emollient (VANICREAM) cream  flecainide (TAMBOCOR) 150 MG tablet  latanoprost (XALATAN) 0.005 % ophthalmic solution  lisinopril (ZESTRIL) 20 MG tablet  naproxen (NAPROSYN) 500 MG tablet  neomycin-polymixin-dexamethasone (MAXITROL) 0.1 % ophthalmic suspension  neomycin-polymyxin-dexamethasone (MAXITROL) 3.5-79925-7.1 ophthalmic ointment  nitroGLYcerin (NITROSTAT) 0.4 MG sublingual tablet  pantoprazole (PROTONIX) 40 MG EC tablet  Sennosides (SENNA LAX PO)  sodium chloride (CVS SALINE NASAL SPRAY) 0.65 % nasal spray  warfarin ANTICOAGULANT (COUMADIN) 2 MG tablet      No Known Allergies  Family History  Family History   Problem Relation Age of Onset     Eye Surgery Mother      Albinism Mother      Cervical Cancer Mother      No Known Problems Father      No Known Problems Sister      No Known Problems Brother      No Known Problems Sister      No Known Problems Sister      No Known Problems Sister      No Known Problems Brother          from trauma     Glaucoma No family hx of      Macular Degeneration No family hx of      Social History   Social History     Tobacco Use     Smoking status: Never Smoker     Smokeless tobacco: Never Used   Substance Use Topics     Alcohol use: No     Drug use: No      Past medical history, past surgical history, medications, allergies, family history, and social history were reviewed with the patient. No additional pertinent items.       Review of Systems   Musculoskeletal:         Positive for finger pain.   All other systems reviewed and are negative.    A complete review of systems was performed with pertinent positives and negatives noted in the HPI, and all other systems negative.    Physical Exam   BP: 136/71  Pulse: 61  Temp: 97.3  F (36.3  C)  Resp: 12  SpO2: 97 %  Physical Exam  Musculoskeletal:      Comments: Right middle finger with swelling distal to the DIP joint.  Subungual hematoma visible under the nail.  Normal range of motion of the finger.  Normal distal capillary refill.  No open lacerations.     Physical Exam   Constitutional: oriented to person, place, and time. appears well-developed and well-nourished.   HENT:   Head: Normocephalic and atraumatic.   Neck: Normal range of motion.   Pulmonary/Chest: Effort normal. No respiratory distress.   Neurological: alert and oriented to person, place, and time.   Skin: Skin is warm and dry.   Psychiatric:  normal mood and affect.  behavior is normal. Thought content normal.       ED Course      Procedures        The medical record was reviewed and interpreted.  Current labs reviewed and interpreted.  Previous labs reviewed and interpreted.       Results for orders placed or performed during the hospital encounter of 05/18/21   XR Finger Right G/E 2 Views     Status: None    Narrative    XR FINGER RT G/E 2 VW 5/18/2021 3:12 PM     HISTORY: Trauma    COMPARISON: None.      Impression    IMPRESSION: No fractures are identified. Normal alignment. Mild  hypertrophic change in the DIP joint of the middle and index fingers.    ELADIO MCDANIELS MD     Medications   ibuprofen (ADVIL/MOTRIN) tablet 600 mg (has no administration in time range)        Assessments & Plan (with Medical Decision Making)   Patient did have an x-ray done from triage.  The middle finger is well-appearing with no acute fracture.  On exam patient does have a subungual hematoma on his left nail.  Patient does have some swelling on the distal aspect of the finger  distal to the DIP joint.  Patient however has normal range of motion and normal sensation.  At this time since has been approximately 48 hours since the injury I do not recommend trephinating the nail.  Patient is on Coumadin which is likely what caused the larger subungual hematoma.  Patient recommended to take NSAIDs for pain control.  Patient does have a history of naproxen and has a prescription for naproxen but has not been taking it.  Patient verbalized understanding.  Patient stable for discharge.    This part of the medical record was transcribed by Janeen Miguel Scribe, from a dictation done by Cristina Almanza MD.     I have reviewed the nursing notes. I have reviewed the findings, diagnosis, plan and need for follow up with the patient.    New Prescriptions    No medications on file       Final diagnoses:   Crushing injury of distal finger, initial encounter       --  Cristina Almanza MD  [facility] EMERGENCY DEPARTMENT  5/18/2021     Cristina Almanza MD  05/18/21 1835

## 2021-05-19 ENCOUNTER — ANTICOAGULATION THERAPY VISIT (OUTPATIENT)
Dept: ANTICOAGULATION | Facility: CLINIC | Age: 71
End: 2021-05-19

## 2021-05-19 DIAGNOSIS — Z79.01 LONG TERM CURRENT USE OF ANTICOAGULANT THERAPY: ICD-10-CM

## 2021-05-19 DIAGNOSIS — I48.91 ATRIAL FIBRILLATION, UNSPECIFIED TYPE (H): ICD-10-CM

## 2021-05-19 DIAGNOSIS — I48.0 PAROXYSMAL ATRIAL FIBRILLATION (H): ICD-10-CM

## 2021-05-19 LAB — INR PPP: 1.58 (ref 0.86–1.14)

## 2021-05-19 PROCEDURE — 85610 PROTHROMBIN TIME: CPT | Performed by: PATHOLOGY

## 2021-05-19 PROCEDURE — 36416 COLLJ CAPILLARY BLOOD SPEC: CPT | Performed by: PATHOLOGY

## 2021-05-19 NOTE — PROGRESS NOTES
ANTICOAGULATION FOLLOW-UP CLINIC VISIT    Patient Name:  Ju Edwards  Date:  2021  Contact Type:  Telephone    SUBJECTIVE:  Patient Findings     Positives:  Emergency department visit (Ju was seen in the ER on 21 with a finger contusion and subungual hematoma--his finger was crushed in car door)    Comments:  INR today is 1.58.          Clinical Outcomes     Comments:  INR today is 1.58.             OBJECTIVE    Recent labs: (last 7 days)     21  1320   INR 1.58*       ASSESSMENT / PLAN  INR assessment SUB    Recheck INR In: 1 WEEK    INR Location Clinic      Anticoagulation Summary  As of 2021    INR goal:  2.0-3.0   TTR:  38.9 % (11.3 mo)   INR used for dosin.58 (2021)   Warfarin maintenance plan:  3 mg (2 mg x 1.5) every Sun, Tue, Thu; 2 mg (2 mg x 1) all other days   Full warfarin instructions:  3 mg every Sun, Tue, Thu; 2 mg all other days   Weekly warfarin total:  17 mg   Plan last modified:  Hien Curry RN (3/15/2021)   Next INR check:  2021   Priority:  High   Target end date:  Indefinite    Indications    Long term current use of anticoagulant therapy [Z79.01]  Atrial fibrillation (H) [I48.91]  Atrial fibrillation  unspecified type (H) [I48.91]             Anticoagulation Episode Summary     INR check location:  Clinic Lab    Preferred lab:      Send INR reminders to:  [facility]    Comments:  Speak witrh Wife Lety [phone]  Pronounced Chance-A-Mout  Pt takes Warfarin dose at 4:30 PM      Anticoagulation Care Providers     Provider Role Specialty Phone number    Rayo Christiansen MD Referring Internal Medicine [phone]            See the Encounter Report to view Anticoagulation Flowsheet and Dosing Calendar (Go to Encounters tab in chart review, and find the Anticoagulation Therapy Visit)    Left message for patient with results and dosing recommendations. Asked patient to call back to report any missed doses, falls,  signs and symptoms of bleeding or clotting, any changes in health, medication, or diet. Asked patient to call back with any questions or concerns.  Message left on spouse, Leslie's phone.  Asked that she call the [facility] today so we can verify the dose Ju has taken this past week.     Alice Fish RN     4:00 PM:  Addendum:  Left another message for Leslie to call the [facility].  If she does not get this message until later today, she can call tomorrow, 5/20/21.  Have recommended he take a 3 mg dose of warfarin hood.  Alice Fish RN

## 2021-05-20 ENCOUNTER — DOCUMENTATION ONLY (OUTPATIENT)
Dept: ANTICOAGULATION | Facility: CLINIC | Age: 71
End: 2021-05-20

## 2021-05-20 DIAGNOSIS — I48.0 PAROXYSMAL ATRIAL FIBRILLATION (H): Primary | ICD-10-CM

## 2021-05-20 NOTE — PROGRESS NOTES
ANTICOAGULATION MANAGEMENT      Ju Edwards due for annual renewal of referral to anticoagulation monitoring. Order pended for your review and signature.      ANTICOAGULATION SUMMARY      Warfarin indication(s)     Atrial fibrillation    Heart valve present?  NO       Current goal range   INR: 2.0-3.0     Goal appropriate for indication? Yes, INR 2-3 appropriate for hx of DVT, PE, hypercoagulable state, Afib, LVAD, or bileaflet AVR without risk factors     Current duration of therapy Indefinite/long term therapy   Time in Therapeutic Range (TTR)  (Goal > 60%) 38.9 %       Office visit with referring provider's group within last year yes on 9/9/2020       Alice Fish RN

## 2021-05-24 ENCOUNTER — RECORDS - HEALTHEAST (OUTPATIENT)
Dept: ADMINISTRATIVE | Facility: CLINIC | Age: 71
End: 2021-05-24

## 2021-05-24 ENCOUNTER — ANTICOAGULATION THERAPY VISIT (OUTPATIENT)
Dept: ANTICOAGULATION | Facility: CLINIC | Age: 71
End: 2021-05-24

## 2021-05-24 DIAGNOSIS — I48.0 PAROXYSMAL ATRIAL FIBRILLATION (H): ICD-10-CM

## 2021-05-24 DIAGNOSIS — I48.91 ATRIAL FIBRILLATION, UNSPECIFIED TYPE (H): ICD-10-CM

## 2021-05-24 DIAGNOSIS — Z79.01 LONG TERM CURRENT USE OF ANTICOAGULANT THERAPY: ICD-10-CM

## 2021-05-24 LAB — INR PPP: 1.63 (ref 0.86–1.14)

## 2021-05-24 PROCEDURE — 85610 PROTHROMBIN TIME: CPT | Performed by: PATHOLOGY

## 2021-05-24 PROCEDURE — 36416 COLLJ CAPILLARY BLOOD SPEC: CPT | Performed by: PATHOLOGY

## 2021-05-24 NOTE — PROGRESS NOTES
ANTICOAGULATION FOLLOW-UP CLINIC VISIT    Patient Name:  Ju Edwards  Date:  2021  Contact Type:  Telephone    SUBJECTIVE:  Patient Findings     Comments:  Request a call back regarding pt continuing to eat greens of if there have been any missed doses         Clinical Outcomes     Comments:  Request a call back regarding pt continuing to eat greens of if there have been any missed doses            OBJECTIVE    Recent labs: (last 7 days)     21  1118   INR 1.63*       ASSESSMENT / PLAN  INR assessment SUB    Recheck INR In: 8 DAYS    INR Location Clinic      Anticoagulation Summary  As of 2021    INR goal:  2.0-3.0   TTR:  38.3 % (11.4 mo)   INR used for dosin.63 (2021)   Warfarin maintenance plan:  2 mg (2 mg x 1) every Mon, Fri; 3 mg (2 mg x 1.5) all other days   Full warfarin instructions:  : 3 mg; Otherwise 2 mg every Mon, Fri; 3 mg all other days   Weekly warfarin total:  19 mg   Plan last modified:  Yanni Up RN (2021)   Next INR check:  2021   Priority:  High   Target end date:  Indefinite    Indications    Long term current use of anticoagulant therapy [Z79.01]  Atrial fibrillation (H) [I48.91]  Atrial fibrillation  unspecified type (H) [I48.91]  Paroxysmal atrial fibrillation (H) [I48.0]             Anticoagulation Episode Summary     INR check location:  Clinic Lab    Preferred lab:      Send INR reminders to:  [facility]    Comments:  Speak wit Wife Lety [phone]  Pronounced Chance-A-Mout  Pt takes Warfarin dose at 4:30 PM      Anticoagulation Care Providers     Provider Role Specialty Phone number    Rayo Christiansen MD Referring Internal Medicine [phone]            See the Encounter Report to view Anticoagulation Flowsheet and Dosing Calendar (Go to Encounters tab in chart review, and find the Anticoagulation Therapy Visit)    Left voice message for spouse Leslie    Yanni Up, CONCE[place]N

## 2021-05-27 VITALS — HEIGHT: 63 IN | BODY MASS INDEX: 24.1 KG/M2 | WEIGHT: 136 LBS

## 2021-06-02 ENCOUNTER — RECORDS - HEALTHEAST (OUTPATIENT)
Dept: ADMINISTRATIVE | Facility: CLINIC | Age: 71
End: 2021-06-02

## 2021-06-02 ENCOUNTER — TELEPHONE (OUTPATIENT)
Dept: ANTICOAGULATION | Facility: CLINIC | Age: 71
End: 2021-06-02

## 2021-06-02 VITALS — HEIGHT: 63 IN | BODY MASS INDEX: 25.48 KG/M2 | WEIGHT: 143.8 LBS

## 2021-06-02 NOTE — TELEPHONE ENCOUNTER
ANTICOAGULATION     Ju Edwards is overdue for INR check.      Was unable to get through to  Services today to set up an appt for patient. Will try again by the end of the week if patient has not had an INR checked before then.     GRANT CORTEZ RN

## 2021-06-04 VITALS
DIASTOLIC BLOOD PRESSURE: 58 MMHG | BODY MASS INDEX: 25.51 KG/M2 | HEART RATE: 60 BPM | WEIGHT: 144 LBS | TEMPERATURE: 97.6 F | RESPIRATION RATE: 18 BRPM | SYSTOLIC BLOOD PRESSURE: 112 MMHG

## 2021-06-04 VITALS
HEIGHT: 63 IN | HEART RATE: 60 BPM | BODY MASS INDEX: 25.76 KG/M2 | SYSTOLIC BLOOD PRESSURE: 110 MMHG | RESPIRATION RATE: 20 BRPM | DIASTOLIC BLOOD PRESSURE: 60 MMHG | WEIGHT: 145.4 LBS

## 2021-06-04 VITALS — WEIGHT: 145.4 LBS | BODY MASS INDEX: 25.76 KG/M2 | HEIGHT: 63 IN

## 2021-06-04 VITALS — BODY MASS INDEX: 24.89 KG/M2 | WEIGHT: 140.5 LBS | HEIGHT: 63 IN

## 2021-06-05 VITALS
SYSTOLIC BLOOD PRESSURE: 124 MMHG | BODY MASS INDEX: 27.2 KG/M2 | HEIGHT: 63 IN | DIASTOLIC BLOOD PRESSURE: 62 MMHG | HEART RATE: 64 BPM | RESPIRATION RATE: 28 BRPM | WEIGHT: 153.5 LBS

## 2021-06-06 NOTE — TELEPHONE ENCOUNTER
----- Message from ROSA MARIA Villalobos sent at 2/25/2020  1:12 PM CST -----  Regarding: [employer] Guadalupe NAGY Notify  Pt. Had a 5.2 second pause. In the [address] of [employer] for your review.

## 2021-06-06 NOTE — PATIENT INSTRUCTIONS - HE
Pacemaker Post-operative Checklist      The Device Registered Nurse (RN) reviewed the pacemaker function.      The Device RN did a wound assessment and wound care teaching.    Please call the Device Nurses with any signs of infection or questions regarding wound healing. Device Nurse Line: [phone], Option #3      The Device RN demonstrated and displayed the specific remote monitoring system for your pacemaker.      The Device RN reviewed the Partnership Agreement Form.    Patient Instructions    Do not lift your LEFT arm above the shoulder height, perform any vigorous arm movements such as swimming, golfing, washing windows, shoveling snow, digging, vacuuming, sweeping or lifting greater than 10 lbs with the affected arm for FOUR weeks from the date of implant through 03/25/2020.  You may apply an ice pack to the pacemaker site for 10 minutes at a time, 4 to 6 tmes daily as needed for pain or swelling.      To reduce the risk of infection, try to avoid any dental procedures for the first 6 weeks, after 04/08/2020 after your pacemaker implant. If you have an emergent or urgent dental need during this time, contact the device clinic for a prescription for an antibiotic.      You will receive a device identification (ID) card in the mail from the device  within 6 weeks to replace the temporary ID card you were given in the hospital.      You may travel by any mode of transportation; just show your pacemaker ID card. You may be subject to a hand search or use of a handheld wand, but official should not keep the wand over the implant site for greater than 5-10 seconds.      For any surgery, let your doctor know you have a pacemaker.       Your pacemaker is MRI safe after 04/08/2020       Most household appliances, including a microwave, will not interfere with your pacemaker function. If you suspect interference, simply move away from the source. Cell phones do not cause a problem. Please refer to the  device booklet from the  or their website under the section on electromagnetic interference (ISAIAH) for further guidelines on things that may interfere with your pacemaker.       Device Clinic Contact Information  Device Nurses: [phone], Option #3    Device Remote Specialists: [phone], Option #2. For questions about your Remote Transmission or Transmission Schedule  Device Schedulers: [phone], Option #1

## 2021-06-06 NOTE — PROGRESS NOTES
"DEVICE CLINIC RN POST-OP NOTE    Reason for visit: In-clinic post-operative wound and device check; s/p implant of a dual chamber pacemaker system, MRI conditional     Device: [employer] L331 ACCOLADE MRI EL, RA Lead: [employer] 7740 Ingevity MRI, RV Lead: [employer] 7741 Ingevity MRI  Procedure date: 02/26/2020  Implant Diagnosis: Sinus Pause (5 seconds), Syncope  Implanting Physician: Dr. Brandon Gusman      Assessment  Subjective: Today's visit in its entirety was conducted with the professional Laotian , Don #[id number], via  line.  Mr. Edwards reports the surgical sit is \"still sore.\"  He also informs me he \"may have lifted [his] arm up above [his] shoulder.\"  Vitals:   Vitals:    03/04/20 0922   BP: 112/58   Pulse: 60   Resp: 18   Temp: 97.6  F (36.4  C)     Heart Sounds: normal  Lung Sounds: clear to auscultation bilaterally  Incision/device pocket: Clean, dry and intact with resolving ecchymosis and edema.  There are no signs of infection present.  The gauze pad and Steri-strips were removed at today's visit, the area was then cleaned of residual adhesive.  Other: Mr. Edwards was seen at his primary physician's office yesterday where they took off the outer dressing and replaced it with a 4x4 inch gauze pad and tape over the Steri-strips, as documented, this was removed at today's visit.      Device Findings  Interrogation of device reveals normal sensing and mildly elevated capture thresholds  See the Paceart Report for a full summary of the device information.    Other: Intrinsic rhythm is sinus bradycardia at 53 bpm today.      Patient Education  Ju Edwards was accompanied today by his wife, Lety.  Lety does not speak Laotian and Mr. Edwards understands minimal English.  The plan will be to utilize the  Line for phone calls and office visits when a professional Laotian  cannot be physically present. "     [facility]'s Partnership Agreement for Device Patients and Post-operative Checklist were presented and reviewed with Mr. Edwards and his wife at today's appointment.    Signs and symptoms of infection, poor wound healing, and normal device function were reviewed. Range of motion and weight restrictions for the left arm are for four weeks. He was issued a [employer] NXT Communicator and instructed on its set-up and use for remote monitoring by the [employer] representative prior to hospital discharge. These instructions were reviewed in detail at today s visit.  Patient verbalizes understanding of these instructions and is agreeable to the plan.      Plan  - Remote device check on 03/31/2020  - Three month in-clinic post-op wound and device check on 05/27/2020 at our [facility] location    Kassandra Harrison RN, MA  Device Nurse  [facility]

## 2021-06-10 ENCOUNTER — TELEPHONE (OUTPATIENT)
Dept: INTERNAL MEDICINE | Facility: CLINIC | Age: 71
End: 2021-06-10

## 2021-06-10 NOTE — TELEPHONE ENCOUNTER
[facility] Call Center    Phone Message    May a detailed message be left on voicemail: yes     Reason for Call: Form or Letter   Type or form/letter needing completion: Letter for DMV stating patient is capable of driving a motor vehicle   Provider: Kuldip  Date form needed: ASAP  Once completed: Fax to [place] Dept of Public Safety at [phone]    Patient just received letter stating that his driving privileges are being cancelled due to failure to file medical or disability info on 6/2/2021. Patient spouse states that he has until 6/18/21 to file the paperwork before suspension.     Action Taken: Message routed to:  [facility]    Travel Screening: Not Applicable

## 2021-06-15 ENCOUNTER — TELEPHONE (OUTPATIENT)
Dept: NURSING | Facility: CLINIC | Age: 71
End: 2021-06-15

## 2021-06-15 NOTE — TELEPHONE ENCOUNTER
".[facility]: Nurse Triage Note  SITUATION/BACKGROUND                                                      Ju Edwards is a 70 year old male. His wife Lety calls to report patient having abdominal pain. This nurse enlisted help of Garth  ID#[id number] to speak with wife/ patient.   Per patient, he has been having abdominal pain, \"all over and intestinal\" began on Friday. Pain is constant and rates pain at 3/10. BM this morning without blood and formed.  Denies any other symptoms of protocol.   Home care instruction reviewed with wife/ patient per abdominal pain protocol.   This nurse contacted scheduling. Video appointments available in Primary Care today. In office appointment available in NP Clinic on Friday, June 18th...   Options given to wife, Lety. Appointment scheduled for Friday, June 18th.   Advised to report to clinic if pain is worse with application of heat or activity prior to scheduled appointment.   Provided telephone # to contact GI for referral placed in April for colonoscopy.     FYI forwarded to NP Clinic.       RECOMMENDATION/PLAN                                                      RECOMMENDED DISPOSITION: home care instructions given per abdominal pain protocol. Report to clinic if pain worsen with application of heat and/or activity.     Contact GI and schedule colonoscopy.     Seek ED with usually firm or hard abdomen; persistent vomiting; severe persistent pain; fainting/ light - headedness; bloody or black stools or emesis.     Will comply with recommendation: Yes    If further questions/concerns or if symptoms do not improve, worsen or new symptoms develop, call your PCP or [phone] to talk with the Resident on call, as soon as possible.    Guideline used: abdominal pain   Telephone Triage Protocols for Nurses, Fifth Edition, Ana Paula Lord, RN, RN  "

## 2021-06-16 NOTE — TELEPHONE ENCOUNTER
Telephone Encounter by Joan Escamilla RN at 2/25/2020  2:16 PM     Author: Joan Escamilla RN Service: -- Author Type: Registered Nurse    Filed: 2/25/2020  2:19 PM Encounter Date: 2/25/2020 Status: Signed    : Joan Escamilla RN (Registered Nurse)       There was not an answer with the use of the  to the patient's number.   The wife's number was tried, no answer.    The wife's number was tried again without the , she answered and was advised of the [employer] Alert on her 's monitor showing a pause of 5.2 seconds. She shared that he was sleeping for the last hour.     She was directed to take the patient to the nearest ED , she will be taking him to [facility]. The provider at [facility] was contacted to let them know to expect him shortly.

## 2021-06-17 NOTE — ED TRIAGE NOTES
Pt comes in by self. NVD that started this AM at 1000. States that he also has abd pain. Cant keep any foods or fluids down.

## 2021-06-18 ENCOUNTER — ANTICOAGULATION THERAPY VISIT (OUTPATIENT)
Dept: ANTICOAGULATION | Facility: CLINIC | Age: 71
End: 2021-06-18

## 2021-06-18 ENCOUNTER — ANCILLARY PROCEDURE (OUTPATIENT)
Dept: ULTRASOUND IMAGING | Facility: CLINIC | Age: 71
End: 2021-06-18
Attending: NURSE PRACTITIONER
Payer: COMMERCIAL

## 2021-06-18 ENCOUNTER — OFFICE VISIT (OUTPATIENT)
Dept: FAMILY MEDICINE | Facility: CLINIC | Age: 71
End: 2021-06-18
Payer: COMMERCIAL

## 2021-06-18 VITALS
HEIGHT: 61 IN | WEIGHT: 136 LBS | RESPIRATION RATE: 16 BRPM | HEART RATE: 74 BPM | DIASTOLIC BLOOD PRESSURE: 87 MMHG | BODY MASS INDEX: 25.68 KG/M2 | TEMPERATURE: 98 F | OXYGEN SATURATION: 96 % | SYSTOLIC BLOOD PRESSURE: 153 MMHG

## 2021-06-18 DIAGNOSIS — I48.91 ATRIAL FIBRILLATION, UNSPECIFIED TYPE (H): ICD-10-CM

## 2021-06-18 DIAGNOSIS — I48.0 PAROXYSMAL ATRIAL FIBRILLATION (H): ICD-10-CM

## 2021-06-18 DIAGNOSIS — R10.32 ABDOMINAL PAIN, LEFT LOWER QUADRANT: Primary | ICD-10-CM

## 2021-06-18 DIAGNOSIS — N28.1 ACQUIRED COMPLEX RENAL CYST: ICD-10-CM

## 2021-06-18 DIAGNOSIS — D64.9 LOW HEMOGLOBIN: ICD-10-CM

## 2021-06-18 DIAGNOSIS — Z79.01 LONG TERM CURRENT USE OF ANTICOAGULANT THERAPY: ICD-10-CM

## 2021-06-18 DIAGNOSIS — R10.32 ABDOMINAL PAIN, LEFT LOWER QUADRANT: ICD-10-CM

## 2021-06-18 LAB
ALBUMIN SERPL-MCNC: 4 G/DL (ref 3.4–5)
ALP SERPL-CCNC: 104 U/L (ref 40–150)
ALT SERPL W P-5'-P-CCNC: 31 U/L (ref 0–70)
ANION GAP SERPL CALCULATED.3IONS-SCNC: 6 MMOL/L (ref 3–14)
AST SERPL W P-5'-P-CCNC: 24 U/L (ref 0–45)
BASOPHILS # BLD AUTO: 0 10E9/L (ref 0–0.2)
BASOPHILS NFR BLD AUTO: 0.5 %
BILIRUB SERPL-MCNC: 0.6 MG/DL (ref 0.2–1.3)
BUN SERPL-MCNC: 14 MG/DL (ref 7–30)
CALCIUM SERPL-MCNC: 9.3 MG/DL (ref 8.5–10.1)
CHLORIDE SERPL-SCNC: 106 MMOL/L (ref 94–109)
CO2 SERPL-SCNC: 28 MMOL/L (ref 20–32)
CREAT SERPL-MCNC: 0.98 MG/DL (ref 0.66–1.25)
DIFFERENTIAL METHOD BLD: ABNORMAL
EOSINOPHIL # BLD AUTO: 0.1 10E9/L (ref 0–0.7)
EOSINOPHIL NFR BLD AUTO: 1.4 %
ERYTHROCYTE [DISTWIDTH] IN BLOOD BY AUTOMATED COUNT: 14.4 % (ref 10–15)
GFR SERPL CREATININE-BSD FRML MDRD: 79 ML/MIN/{1.73_M2}
GLUCOSE SERPL-MCNC: 94 MG/DL (ref 70–99)
HCT VFR BLD AUTO: 38.8 % (ref 40–53)
HGB BLD-MCNC: 12.8 G/DL (ref 13.3–17.7)
IMM GRANULOCYTES # BLD: 0 10E9/L (ref 0–0.4)
IMM GRANULOCYTES NFR BLD: 0.3 %
INR PPP: 3.47 (ref 0.86–1.14)
LYMPHOCYTES # BLD AUTO: 1.7 10E9/L (ref 0.8–5.3)
LYMPHOCYTES NFR BLD AUTO: 28.3 %
MCH RBC QN AUTO: 27.8 PG (ref 26.5–33)
MCHC RBC AUTO-ENTMCNC: 33 G/DL (ref 31.5–36.5)
MCV RBC AUTO: 84 FL (ref 78–100)
MONOCYTES # BLD AUTO: 0.7 10E9/L (ref 0–1.3)
MONOCYTES NFR BLD AUTO: 11.1 %
NEUTROPHILS # BLD AUTO: 3.4 10E9/L (ref 1.6–8.3)
NEUTROPHILS NFR BLD AUTO: 58.4 %
NRBC # BLD AUTO: 0 10*3/UL
NRBC BLD AUTO-RTO: 0 /100
PLATELET # BLD AUTO: 226 10E9/L (ref 150–450)
POTASSIUM SERPL-SCNC: 4.4 MMOL/L (ref 3.4–5.3)
PROT SERPL-MCNC: 8.3 G/DL (ref 6.8–8.8)
RBC # BLD AUTO: 4.61 10E12/L (ref 4.4–5.9)
SODIUM SERPL-SCNC: 140 MMOL/L (ref 133–144)
WBC # BLD AUTO: 5.8 10E9/L (ref 4–11)

## 2021-06-18 PROCEDURE — 36415 COLL VENOUS BLD VENIPUNCTURE: CPT | Performed by: PATHOLOGY

## 2021-06-18 PROCEDURE — 85025 COMPLETE CBC W/AUTO DIFF WBC: CPT | Performed by: PATHOLOGY

## 2021-06-18 PROCEDURE — 80053 COMPREHEN METABOLIC PANEL: CPT | Performed by: PATHOLOGY

## 2021-06-18 PROCEDURE — 99214 OFFICE O/P EST MOD 30 MIN: CPT | Performed by: NURSE PRACTITIONER

## 2021-06-18 PROCEDURE — 76700 US EXAM ABDOM COMPLETE: CPT | Performed by: RADIOLOGY

## 2021-06-18 PROCEDURE — 85610 PROTHROMBIN TIME: CPT | Performed by: PATHOLOGY

## 2021-06-18 ASSESSMENT — PAIN SCALES - GENERAL: PAINLEVEL: NO PAIN (0)

## 2021-06-18 ASSESSMENT — MIFFLIN-ST. JEOR: SCORE: 1250.27

## 2021-06-18 NOTE — LETTER
Letter by Orestes Espinal MD at      Author: Orestes Espinal MD Service: -- Author Type: --    Filed:  Encounter Date: 2/26/2019 Status: (Other)       February 26, 2019     Rayo Christiansen MD  [address]  [place] [address]    Patient: Ju Edwards   MR Number: [id number]   YOB: 1950   Date of Visit: 2/26/2019     Dear Dr. Kuldip MD:    Thank you for referring Ju Edwards to me for evaluation. Below are the relevant portions of my assessment and plan of care.    If you have questions, please do not hesitate to call me. I look forward to following Ju along with you.    Sincerely,        Orestes Espinal MD        CC  Ercik Mcmillan MD    Progress Notes:

## 2021-06-18 NOTE — PROGRESS NOTES
ANTICOAGULATION FOLLOW-UP CLINIC VISIT    Patient Name:  Ju Edwards  Date:  6/18/2021  Contact Type:  Telephone    SUBJECTIVE:  Patient Findings     Comments:  Per Epic note, Ju was seen in clinic today for abdominal pain.  In the note it also states he has had a decreased appetite.  Left message on spouse, Leslie's phone with INR result and warfarin recommendations.  Asked that she call the [facility] if Ju has any changes in medications, diet, health; or with any questions.        Clinical Outcomes     Comments:  Per Epic note, Ju was seen in clinic today for abdominal pain.  In the note it also states he has had a decreased appetite.  Left message on spouse, Leslie's phone with INR result and warfarin recommendations.  Asked that she call the [facility] if Ju has any changes in medications, diet, health; or with any questions.           OBJECTIVE    Recent labs: (last 7 days)     06/18/21  1344   INR 3.47*       ASSESSMENT / PLAN  INR assessment SUPRA    Recheck INR In: 5 DAYS    INR Location Clinic      Anticoagulation Summary  As of 6/18/2021    INR goal:  2.0-3.0   TTR:  39.7 % (1 y)   INR used for dosing:  3.47 (6/18/2021)   Warfarin maintenance plan:  2 mg (2 mg x 1) every Mon, Fri; 3 mg (2 mg x 1.5) all other days   Full warfarin instructions:  6/18: 1 mg; 6/19: 2 mg; Otherwise 2 mg every Mon, Fri; 3 mg all other days   Weekly warfarin total:  19 mg   Plan last modified:  Yanni Up RN (5/24/2021)   Next INR check:  6/23/2021   Priority:  High   Target end date:  Indefinite    Indications    Long term current use of anticoagulant therapy [Z79.01]  Atrial fibrillation (H) [I48.91]  Atrial fibrillation  unspecified type (H) [I48.91]  Paroxysmal atrial fibrillation (H) [I48.0]             Anticoagulation Episode Summary     INR check location:  Clinic Lab    Preferred lab:      Send INR reminders to:  [facility]    Comments:  Speak with Wife Lety [phone])  [phone]  Pronounced [facility]  Pt takes Warfarin dose at 4:30 PM      Anticoagulation Care Providers     Provider Role Specialty Phone number    Rayo Christiansen MD Referring Internal Medicine [phone]            See the Encounter Report to view Anticoagulation Flowsheet and Dosing Calendar (Go to Encounters tab in chart review, and find the Anticoagulation Therapy Visit)    Left message for patient with results and dosing recommendations. Asked patient to call back to report any missed doses, falls, signs and symptoms of bleeding or clotting, any changes in health, medication, or diet. Asked patient to call back with any questions or concerns.  Message left on spouse, Leslie's phone.    Alice Fish RN

## 2021-06-18 NOTE — PATIENT INSTRUCTIONS
Suspect diverticular disease  Plan: US Abdomen complete  Limit diet to liquids, soft bland foods, non-spicy foods.    No raw fruits & vegetables, no nuts.   [phone]. Wife Leslie Edwards.  I will phone her with results.      [facility]: [phone]     Unique Londono, ANP, [facility]  Nurse Practitioner

## 2021-06-18 NOTE — NURSING NOTE
Chief Complaint   Patient presents with     Abdominal Pain     Patient comes in to discuss abdominal pain.         Baljeet Yañez MA on 6/18/2021 at 11:55 AM

## 2021-06-18 NOTE — PROGRESS NOTES
"Mr. Edwards is a 70 year old male with history of GERD, GI bleed, CVA presents for abdominal pain.   used for this visit.      History of Present Illness:    Patient's abdominal pain started two weeks ago and is located toward left lower quadrant. It is constant, rated 2-3/10, and feels like a \"burning pain\" inside his intestines/colon. He states spicy foods make the pain worse. He has not tried anything to treat his pain. He states his appetite is decreased and he does not keep a normal eating schedule--he will only eat when he is hungry and sometimes at midnight or 2 AM. He denies unusual food or circumstances precipitating episode. His last BM was this morning and it was normal and formed. He is usually regular but occasionally gets diarrhea which irritates his hemorrhoids when he eats Garth food or spicy/salty food, so he has been avoiding it. He denies blood in stool other than from hemorrhoids. He denies previous similar abdominal pain episodes. There is no change in energy. He denies alcohol or tobacco use. He reports last colonoscopy was 2 years ago - denies abnormal findings.     Records review:    10/22/2020-polyp and poor bowel prep. Recommendation to repeat in one year;  previous 2016 was normal    Past Medical History:  Past Medical History:   Diagnosis Date     Allergic rhinitis      Atrial fibrillation (H)     paroxysmal     Delirium     associated with hospitalization for stroke     Depression      Gastric erosions 1/2014    associated with gi bleed     GERD (gastroesophageal reflux disease)      Hyperlipidemia      Hypertension      Left hemiparesis (H)      PTSD (post-traumatic stress disorder)      Sleep apnea      Stroke (H) 1/2014    ischemic CVA with hemorrhagic transformation       Active Meds:  Current Outpatient Medications   Medication     amLODIPine (NORVASC) 5 MG tablet     atenolol (TENORMIN) 50 MG tablet     atorvastatin (LIPITOR) 80 MG tablet     brimonidine (ALPHAGAN-P) " "0.15 % ophthalmic solution     citalopram (CELEXA) 10 MG tablet     COMPRESSION STOCKINGS     CVS D3 50 MCG (2000 UT) CAPS     cyclobenzaprine (FLEXERIL) 5 MG tablet     docusate sodium (STOOL SOFTENER) 100 MG capsule     dorzolamide-timolol (COSOPT) 2-0.5 % ophthalmic solution     emollient (VANICREAM) cream     flecainide (TAMBOCOR) 150 MG tablet     latanoprost (XALATAN) 0.005 % ophthalmic solution     lisinopril (ZESTRIL) 20 MG tablet     naproxen (NAPROSYN) 500 MG tablet     neomycin-polymixin-dexamethasone (MAXITROL) 0.1 % ophthalmic suspension     neomycin-polymyxin-dexamethasone (MAXITROL) 3.5-65239-7.1 ophthalmic ointment     nitroGLYcerin (NITROSTAT) 0.4 MG sublingual tablet     pantoprazole (PROTONIX) 40 MG EC tablet     Sennosides (SENNA LAX PO)     sodium chloride (CVS SALINE NASAL SPRAY) 0.65 % nasal spray     warfarin ANTICOAGULANT (COUMADIN) 2 MG tablet     No current facility-administered medications for this visit.         Allergies:  Reviewed, refer to EMR    Family History:  No relevant family history      Relevant Social History:  Relationship:   Diet/Nutrition: decreased appetite, avoiding salty/spicy foods  Alcohol: no  Tobacco/Vaping: no  Street Drugs/Cannabis:  Exercise/Activity: walking      Review Of Systems  Skin: negative  Eyes: negative  Ears/Nose/Throat: negative for sore throat, congestion  Respiratory: No shortness of breath, dyspnea on exertion, cough, or hemoptysis  Cardiovascular: history of heart surgery, has pacemaker, no chest pain  Gastrointestinal: see HPI  Genitourinary: negative  Musculoskeletal: negative  Neurologic: tolerable numbnessof hands and numbness of feet secondary to history of CVA  Psychiatric: negative  Hematologic/Lymphatic/Immunologic: negative  Endocrine: negative      Physical Exam:  Vitals: BP (!) 153/87   Pulse 74   Temp 98  F (36.7  C) (Oral)   Resp 16   Ht 1.549 m (5' 1\")   Wt 61.7 kg (136 lb)   SpO2 96%   BMI 25.70 kg/m    Constitutional: " Alert, oriented, pleasant, no acute distress  Head: Normocephalic, atraumatic  Neck: Supple, no lymphadenopathy, no thyromegaly, no JVD  Cardiovascular: Regular rate and rhythm, no murmurs, rubs or gallops, peripheral pulses full/symmetric  Respiratory: Good air movement bilaterally, lungs clear, no wheezes/rales/rhonchi  GI: Abdomen soft, bowel sounds hyperactive all quadrants, distended, tender in LLQ, no organomegaly or masses, no rebound/guarding, tympany with percussion over abdomen  Musculoskeletal: No edema, normal muscle tone, normal gait  Neurologic: Alert and oriented, cranial nerves 2-12 grossly intact, strength 5/5 throughout, sensation to light touch intact  Skin: No rashes/lesions  Psychiatric: normal mentation, affect and mood      Assessment and Plan:    (R10.32) Abdominal pain, left lower quadrant  (primary encounter diagnosis)  Comment:   Suspect diverticular disease. Less likely colon CA.  Plan: US Abdomen complete  Limit diet to liquids, soft bland foods, non-spicy foods.    No raw fruits & vegetables, no nuts.   WifeLeslie Edwards, [phone].  Will phone her with results per patient request.      #Routine Health Maintenence:  Immunizations (zoster, pneumovax, flu, Tdap, Hep A/B):   Most Recent Immunizations   Administered Date(s) Administered     COVID-19,PF,Pfizer 04/14/2021     DTAP (<7y) 01/01/2001     HEPA 02/21/2013     HepA-Adult 01/10/2017     Hepatitis B Immunity: Titer 03/01/2017     Influenza (High Dose) 3 valent vaccine 09/12/2019     Influenza (IIV3) PF 10/01/2015     Influenza Vaccine, 6+MO IM (QUADRIVALENT W/PRESERVATIVES) 09/15/2016     Influenza, Quad, High Dose, Pf, 65yr + 09/17/2020     Pneumo Conj 13-V (2010&after) 03/03/2020     Pneumococcal 23 valent 08/22/2017     Poliovirus, inactivated (IPV) 01/10/2017     TDAP Vaccine (Boostrix) 03/03/2020     Tdap (Adacel,Boostrix) 03/10/2010     Typhoid IM 01/10/2017     Lipids:   Recent Labs   Lab Test 06/15/20  1119  04/30/19  1034 04/02/15  0818 04/02/15  0818 04/29/14  1052   CHOL 176 209*   < > 196 171   HDL 32* 35*   < > 38* 45   LDL 74 118*   < > 115 75   TRIG 352* 278*   < > 216* 257*   CHOLHDLRATIO  --   --   --  5.2* 3.8    < > = values in this interval not displayed.       Return to clinic: as needed    Options for treatment and follow-up care were reviewed with the patient. He engaged in the decision making process and verbalized understanding of the options discussed and agreed with the final plan.    Lilibeth Feliz, [employer] student  [facility]      I was present with the NPP student who participated in the service and in the documentation of the services provided.  I have verified the history and personally performed the physical exam and medical decision making, as documented by the student and edited by me.      Unique Londono, ANP, [facility]  Nurse Practitioner      Addendum 6/19/2021:  Abdominal US 6/18/2021  IMPRESSION:  1. Complicated left renal cyst, 12 month follow-up ultrasound  recommended.  2. Hepatic steatosis    Unable to reach the patient by phone with this information and he does not have Easyaulahart activated.  Letter sent.  I am entering a referral for urology follow-up and also for colonoscopy, given current abdominal pain, polyp and incomplete prep with 2020 colonoscopy, and slightly decreased Hgb/Hct.    AYUSH Hendrix, CNP

## 2021-06-20 NOTE — LETTER
Letter by Gracia Ledesma at      Author: Gracia Ledesma Service: -- Author Type: --    Filed:  Encounter Date: 9/2/2020 Status: (Other)         Ju Edwards  [address]  [place] [address]      September 2, 2020      Dear Mr. Edwards,    RE: Remote Results    We are writing to you regarding your recent Remote Pacemaker check from home. Your transmission was received successfully. Battery status is satisfactory at this time.     Your results are within normal limits.    Your next device appointment will be a remote check on December 3, 2020; this will occur automatically.    To schedule or reschedule, please call [phone] and press 1.    NOTE: If you would like to do an extra transmission, please call [phone] and press 3 to speak to a nurse BEFORE transmitting. This ensures that the Device Clinic staff is aware of the reason you are sending a transmission, and can follow-up with you after it has been reviewed.    We will be checking your implanted device from home (remotely) every three months unless otherwise instructed. We will need to see you in the clinic at least once a year. You may need to be seen in the clinic sooner depending on the results of your check.    Please be aware:    The follow-up schedule is like a Physician prescription.    Your remote monitor is paired to your specific implanted device.      Sincerely,    [facility]

## 2021-06-20 NOTE — LETTER
Letter by Gracia Ledesma at      Author: Gracia Ledesma Service: -- Author Type: --    Filed:  Encounter Date: 3/31/2020 Status: (Other)         Ju Edwards  [address]  [place] [address]      March 31, 2020      Dear Mr. Edwards,    RE: Remote Results    We are writing to you regarding your recent Remote Pacemaker check from home. Your transmission was received successfully. Battery status is satisfactory at this time.     Your results are within normal limits.    Your next device appointment will be a clinic visit on May 27, 2020 at 10:20am at our  downtown [place]'s location, [address] [place] [address].    To schedule or reschedule, please call [phone] and press 1.    NOTE: If you would like to do an extra transmission, please call [phone] and press 3 to speak to a nurse BEFORE transmitting. This ensures that the Device Clinic staff is aware of the reason you are sending a transmission, and can follow-up with you after it has been reviewed.    We will be checking your implanted device from home (remotely) every three months unless otherwise instructed. We will need to see you in the clinic at least once a year. You may need to be seen in the clinic sooner depending on the results of your check.    Please be aware:    The follow-up schedule is like a Physician prescription.    Your remote monitor is paired to your specific implanted device.      Sincerely,    [facility]

## 2021-06-21 NOTE — LETTER
Letter by Sherie Joy RDCS at      Author: Sherie Joy RDCS Service: -- Author Type: --    Filed:  Encounter Date: 3/5/2021 Status: (Other)         Ju Edwards  [address]  [place] [address]      March 5, 2021      Dear Mr. Edwards,    RE: Remote Results    We are writing to you regarding your recent Remote Pacemaker check from home. Your transmission was received successfully. Battery status is satisfactory at this time.     Your results are showing no significant changes.    Your next device appointment will be a clinic visit.  Please call in March to schedule.      To schedule or reschedule, please call [phone] and press 1.    NOTE: If you would like to do an extra transmission, please call [phone] and press 3 to speak to a nurse BEFORE transmitting. This ensures that the Device Clinic staff is aware of the reason you are sending a transmission, and can follow-up with you after it has been reviewed.    We will be checking your implanted device from home (remotely) every three months unless otherwise instructed. We will need to see you in the clinic at least once a year. You may need to be seen in the clinic sooner depending on the results of your check.    Please be aware:    The follow-up schedule is like a Physician prescription.    Your remote monitor is paired to your specific implanted device.      Sincerely,    [facility]

## 2021-06-21 NOTE — LETTER
Letter by Shahrzad Cortez RN at      Author: Shahrzad Cortez RN Service: -- Author Type: --    Filed:  Encounter Date: 12/3/2020 Status: (Other)         Ju Edwards  [address]  [place] [address]      December 3, 2020      Dear Mr. Edwards,    RE: Remote Results    We are writing to you regarding your recent Remote Pacemaker check from home. Your transmission was received successfully. Battery status is satisfactory at this time.     Your results are within normal limits.    Your next device appointment will be a remote check on 3/4/2021; this will occur automatically.       To schedule or reschedule, please call [phone] and press 1.    NOTE: If you would like to do an extra transmission, please call [phone] and press 3 to speak to a nurse BEFORE transmitting. This ensures that the Device Clinic staff is aware of the reason you are sending a transmission, and can follow-up with you after it has been reviewed.    We will be checking your implanted device from home (remotely) every three months unless otherwise instructed. We will need to see you in the clinic at least once a year. You may need to be seen in the clinic sooner depending on the results of your check.    Please be aware:    The follow-up schedule is like a Physician prescription.    Your remote monitor is paired to your specific implanted device.      Sincerely,    [facility]

## 2021-06-23 NOTE — TELEPHONE ENCOUNTER
External - [facility]   Referring Provider: Dr. Mcmillan   DX: Positive serology for syphilis     Ref./rec. Were received in ID consult folder on 02.04.2019 @ 3:26 pm

## 2021-06-24 NOTE — PROGRESS NOTES
Consultation - Infectious Disease  Ju Edwards,  1950, MRN [id number]    [facility]  No admission diagnoses are documented for this encounter.    PCP: Rayo Christiansen MD, [phone]   Code status:  full       Extended Emergency Contact Information  Primary Emergency Contact: Vanessa Edwards  Address: [address] [place] [address] [address]           [place] [address] [place]  Home Phone: [phone]  Relation: Spouse       Assessment and Plan       Assessment:  1. 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 Treated promptly for primary syphilis with a shot of penicllin G.  2. Was noted to have a pos FTA in  on arrival. 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  3. Had a large R MCA CVA a few years ago with presistent numbness and weakness L arm.  4. No symptoms of neuro- or cardiovascular syphilis that I can see.  5. Recent + FTA test and TP-PA test with negative RPR titer is entirley consistent with old, fully treated syphilis.   6. On chronic Warfarin for hx of CVA. Cannot do an LP wo suspending this.    Recommendations:  No further workup or Rx needed for syphilis at this point.    Thanks for asking me to participate in this patients care.  Orestes Espinal       Chief Complaint Pos FTA test for syphilis.       HPI   We have been requested by Dr. Mya Mcmillan to evaluate Ju Edwards for late syphilis. Had primary syphilis with a chancre at age 20 in [place]. Treated promptly with a shot of penicllin G.  2. Was noted to have a pos FTA in  on arrival in [place]. 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 No ongoing symptoms or aortitis, tabes, or dementia.  Has a hx of CAD and a R MCA CVA. On warfarin.    Had a recent repeat syphilis testing: + FTA test, + TP-PA test, neg RPR titer.   Feels fine except weak L arm with  numbness. MRI with large area of encephalomalacia in R MCA area.       Medical History  Active Ambulatory (Non-Hospital) Problems    Diagnosis     Essential Hypertension     Coronary Artery Disease     Hyperlipidemia     Obstructive Sleep Apnea     Past Medical History:   Diagnosis Date     CAD (coronary artery disease)      HTN (hypertension)      Hypercholesteremia      Obstructive sleep apnea      Stroke (H)      PMH reviewed, updated, w/o changes. Surgical History  He  has a past surgical history that includes ASD repair.   Social History  Reviewed, and he  reports that  has never smoked. He does not have any smokeless tobacco history on file. He reports that he does not drink alcohol or use drugs.   Allergies  No Known Allergies Family History  Reviewed, and noncontributory.      Prior to Admission Medications   Current Outpatient Medications on File Prior to Visit   Medication Sig Dispense Refill     ATENOLOL ORAL Take by mouth.       ATORVASTATIN CALCIUM (ATORVASTATIN ORAL) Take by mouth.       cholecalciferol, vitamin D3, (VITAMIN D3) 2,000 unit cap Take by mouth.       WARFARIN SODIUM (COUMADIN ORAL) Take by mouth.       DOCUSATE SODIUM (COLACE ORAL) Take by mouth.       FLECAINIDE ACETATE (FLECAINIDE ORAL) Take by mouth.       hydrocortisone with aloe 1 % Crea cream Apply to affected area 2 times daily 15 g 0     ondansetron (ZOFRAN, AS HYDROCHLORIDE,) 4 MG tablet Take 1-2 tab po 4 times daily prn nausea, max daily dose 4 tab 20 tablet 0     oxyCODONE-acetaminophen (PERCOCET) 5-325 mg per tablet Take 1 tablet by mouth every 4 (four) hours as needed for pain. 20 tablet 0     No current facility-administered medications on file prior to visit.           Review of Systems:  Review of systems:  Eyes: no burning, dry eyes, diplopia  ENT: no sinus pain, sore throat  Neck: no stiffness or swelling  Resp: no cough, sputum, hemoptysis  Cv: no chest pain, MARTINEZ, PND, edema  GI: no nausea, vomiting, diarrhea, blood in  "stool  : no dysuria, blood in urine, urine retention.  Musculoskelatal: no joint swelling, bunions, vertebral pain  Neurology: + weakness and numbness L arm.  Social hx, family hx reviewed, unchanged and noncontributory.   Physical Exam:  /78   Temp 97.5  F (36.4  C) (Oral)   Ht 5' 2.5\" (1.588 m)   Wt 143 lb 12.8 oz (65.2 kg)   BMI 25.88 kg/m    ENT: TMs clear, oropharynx without exudate or thrush  Eyes: anicteric. EOMs full. EUSEBIA.  Respiratory: normal respiratory patter, no rales or rubs.  CV: normal heart tones. No rub, murmur or S3. No JVD.  Sternotomy scar.  Abdmomen: soft, normal bowel sounds, non-tender, no masses or   organomegaly  Orestes Espinal MD  Neuro: L arm weakness.       Pertinent Labs  Lab Results:personally reviewed.   Lab Results   Component Value Date     02/01/2016     08/11/2015     10/09/2014    K 3.8 02/01/2016    K 4.2 08/11/2015    K 4.3 10/09/2014    CO2 27 02/01/2016    CO2 26 08/11/2015    CO2 23 10/09/2014    BUN 18 02/01/2016    BUN 17 08/11/2015    BUN 21 10/09/2014    CREATININE 0.83 02/01/2016    CREATININE 0.81 08/11/2015    CREATININE 0.85 10/09/2014    CALCIUM 9.0 02/01/2016    CALCIUM 9.0 08/11/2015    CALCIUM 9.0 10/09/2014     Lab Results   Component Value Date    WBC 5.4 02/01/2016    WBC 5.6 08/11/2015    WBC 8.9 10/09/2014    WBC 7.4 01/27/2014    HGB 13.8 (L) 02/01/2016    HGB 13.2 (L) 08/11/2015    HGB 13.0 (L) 10/09/2014    HCT 42.0 02/01/2016    HCT 39.2 (L) 08/11/2015    HCT 38.5 (L) 10/09/2014    MCV 90 02/01/2016    MCV 88 08/11/2015    MCV 87 10/09/2014     02/01/2016     08/11/2015     10/09/2014     Lab Results   Component Value Date    ALT 19 02/01/2016    AST 19 02/01/2016    ALKPHOS 68 02/01/2016    BILITOT 0.5 02/01/2016        Pertinent Radiology  Radiology Results: Personally reviewed reports of head MRI.  Microbiology:see immunology above                 "

## 2021-06-24 NOTE — PATIENT INSTRUCTIONS - HE
The tests show that you had syphilis in the past and that it is not active.  It has been fully treated.    Nothing to worrry about!    Orestes Espinal

## 2021-06-28 NOTE — PROGRESS NOTES
Progress Notes by Leyda Borrego MD at 2/17/2020  1:20 PM     Author: Leyda Borrego MD Service: -- Author Type: Physician    Filed: 2/17/2020  1:54 PM Encounter Date: 2/17/2020 Status: Signed    : Leyda Borrego MD (Physician)             [facility] Heart Care Note    Thank you, Dr. Decker, for asking the [facility] Heart Care team to see Ju Edwards to evaluate syncope .    Assessment:    Ju Edwards is a 69 y.o. old male with a PMHx significant for stroke on warfarin, HTN on lisinopril, hypercholesterolemia on atorvastatin, CAD on statin and Bblcoker, PADDY, s/p ASD repair who presents to Heart Care clinic for follow-up assessment after initial ED presentation for syncope. While in the ED, ECG was unremarkable for acute ischemic, but significant for sinus bradycardia and RBBB changes and Troponin I was negative.     Of note, Mr. Edwards has a prior episode of a fall in 9/19. During that time it was mechanical in nature as he tripped on a curb. This most recent fall was associated with LOC/syncope.    ECG: Personally reviewed. sinus bradycardia, RBBB.    Plan:  - echocardiogram to evaluate underlying cardiac structure and function  - KEYONA to assess for rhythm disturbances  - US carotids to evaluate for any underlying stenosis that may be flow limiting    ______________________________________________________________________    Subjective:  CC: Today, Jerry denies any chest pain, palpitations, lightheadedness/dizziness, dyspnea. HE describes the pain that prompted her ED visit a sudden loss of consciousness with any symptoms leading up to the events. He denies any associated lightheadedness, dizziness, chest pain, palpitations, nauseas/vomitting, bowel movements. He confirms that this is the only time he has ever had an episode of LOC and states that the event was no more than a minute in duration. He reports that it was witnessed by  friends who did not see any seizure like activity. He denies any incontinence during the episode.     ______________________________________________________________________      Review of Systems:   A complete 10 systems ROS was reviewed  And is negative except what is listed in the HPI.     Problem List:  Patient Active Problem List   Diagnosis   ? Essential Hypertension   ? Coronary Artery Disease   ? Hyperlipidemia   ? Obstructive Sleep Apnea     Medical History:  Past Medical History:   Diagnosis Date   ? CAD (coronary artery disease)    ? HTN (hypertension)    ? Hypercholesteremia    ? Obstructive sleep apnea    ? Stroke (H)      Surgical History:  Past Surgical History:   Procedure Laterality Date   ? ASD REPAIR       Social History:  Social History     Socioeconomic History   ? Marital status:      Spouse name: Not on file   ? Number of children: Not on file   ? Years of education: Not on file   ? Highest education level: Not on file   Occupational History   ? Not on file   Social Needs   ? Financial resource strain: Not on file   ? Food insecurity:     Worry: Not on file     Inability: Not on file   ? Transportation needs:     Medical: Not on file     Non-medical: Not on file   Tobacco Use   ? Smoking status: Never Smoker   Substance and Sexual Activity   ? Alcohol use: No   ? Drug use: No   ? Sexual activity: Not on file   Lifestyle   ? Physical activity:     Days per week: Not on file     Minutes per session: Not on file   ? Stress: Not on file   Relationships   ? Social connections:     Talks on phone: Not on file     Gets together: Not on file     Attends Druze service: Not on file     Active member of club or organization: Not on file     Attends meetings of clubs or organizations: Not on file     Relationship status: Not on file   ? Intimate partner violence:     Fear of current or ex partner: Not on file     Emotionally abused: Not on file     Physically abused: Not on file     Forced  sexual activity: Not on file   Other Topics Concern   ? Not on file   Social History Narrative   ? Not on file           Family History:  No significant family history of cardiovascular disease      Allergies:  No Known Allergies    Medications:  Current Outpatient Medications   Medication Sig Dispense Refill   ? amLODIPine (NORVASC) 10 MG tablet Take 10 mg by mouth daily.     ? atenoloL (TENORMIN) 25 MG tablet Take 12.5 mg by mouth daily.     ? atorvastatin (LIPITOR) 80 MG tablet Take 80 mg by mouth at bedtime.     ? brimonidine (ALPHAGAN) 0.15 % ophthalmic solution Administer 1 drop into the left eye 3 (three) times a day.     ? cholecalciferol, vitamin D3, (VITAMIN D3) 2,000 unit cap Take 2,000 Units by mouth daily.      ? citalopram (CELEXA) 20 MG tablet Take 20 mg by mouth daily.     ? docusate sodium (COLACE) 100 MG capsule Take 100 mg by mouth daily as needed for constipation.     ? dorzolamide-timolol (COSOPT) 22.3-6.8 mg/mL ophthalmic solution Administer 1 drop into the left eye 2 (two) times a day.     ? flecainide (TAMBOCOR) 150 MG tablet Take 150 mg by mouth 2 (two) times a day.     ? latanoprost (XALATAN) 0.005 % ophthalmic solution Administer 1 drop to both eyes at bedtime.     ? lisinopril (PRINIVIL,ZESTRIL) 20 MG tablet Take 20 mg by mouth daily.     ? nitroglycerin (NITROSTAT) 0.4 MG SL tablet Place 0.4 mg under the tongue every 5 (five) minutes as needed for chest pain.     ? oxymetazoline (AFRIN) 0.05 % nasal spray Apply 1 spray into each nostril daily as needed for congestion.      ? pantoprazole (PROTONIX) 40 MG tablet Take 40 mg by mouth daily.     ? sodium chloride (OCEAN) 0.65 % nasal spray Apply 1 spray into each nostril as needed for congestion.     ? warfarin ANTICOAGULANT (COUMADIN/JANTOVEN) 2 MG tablet Take 2-3 mg by mouth See Admin Instructions. Take 2 mg three days weekly (on Mondays, Wednesdays, and Fridays) and 3 mg four days weekly (on all other days of the week). Adjust dose based  "on INR results as directed.       No current facility-administered medications for this visit.        Objective:   Vital signs:  /60 (Patient Site: Left Arm, Patient Position: Sitting, Cuff Size: Adult Regular)   Pulse 60   Resp 20   Ht 5' 3\" (1.6 m)   Wt 145 lb 6.4 oz (66 kg)   BMI 25.76 kg/m        Physical Exam:    GENERAL APPEARANCE: Alert, cooperative and in no acute distress.   HEENT: No scleral icterus. Oral mucuos membranes pink and moist.   NECK: no JVD. No Hepatojugular reflux. Thyroid not visualized. No lymphadenopathy   CHEST: clear to auscultation   CARDIOVASCULAR: S1, S2 without murmur ,clicks or rubs. Brachial, radial and posterior tibial pulses are intact and symetric. No carotid bruits noted. No edema  ABDOMEN: Nontender. BS+. No bruits.   SKIN: No Xanthelasma   Musculoskeletal: No cyanosis, clubbing or swelling.      Lab Results:  LIPIDS:  Lab Results   Component Value Date    CHOL 160 01/19/2014    CHOL 230 (H) 05/20/2011     Lab Results   Component Value Date    HDL 43 01/19/2014    HDL 31 (L) 05/20/2011     Lab Results   Component Value Date    LDLCALC 92 01/19/2014    LDLCALC 176.6 (H) 05/20/2011     Lab Results   Component Value Date    TRIG 122 01/19/2014    TRIG 112 05/20/2011     No components found for: CHOLHDL    BMP:  Lab Results   Component Value Date    CREATININE 1.00 02/13/2020    BUN 21 02/13/2020     02/13/2020    K 4.0 02/13/2020     02/13/2020    CO2 26 02/13/2020         Royce Borrego MD., S  [facility] HEART [facility]           "

## 2021-07-18 DIAGNOSIS — E78.2 MIXED HYPERLIPIDEMIA: ICD-10-CM

## 2021-07-20 RX ORDER — ATORVASTATIN CALCIUM 80 MG/1
TABLET, FILM COATED ORAL
Qty: 90 TABLET | Refills: 3 | Status: SHIPPED | OUTPATIENT
Start: 2021-07-20 | End: 2022-08-08

## 2021-07-20 NOTE — TELEPHONE ENCOUNTER
10/27/2020  [facility] Internal Medicine [place]  Last LDL 6/15/20. 90 refill sent

## 2021-07-21 ENCOUNTER — TELEPHONE (OUTPATIENT)
Dept: INTERNAL MEDICINE | Facility: CLINIC | Age: 71
End: 2021-07-21

## 2021-07-21 NOTE — LETTER
Ju Edwards  [address]  [address]  [place] [address]  : 1950  MRN:  [id number]      2021      Case #: [id number]    To Whom It May Concern:    I am Ju Edwards's primary care physician since .  Due to medical reasons, Lety Edwards (his wife) has been providing continuous care for Ju.   She manages his medications, appointments, and transportation.      Thank you,    Rayo Christiansen MD  Internal Medicine

## 2021-07-21 NOTE — TELEPHONE ENCOUNTER
Health Call Center    Phone Message    May a detailed message be left on voicemail: yes     Reason for Call: Form or Letter   Type or form/letter needing completion: letter stating wife takes care of him 24 hours 7 days a week. Including gives medication, schedules appointments, transportation etc. Please call with questions  Provider: Dr. Christiansen  Date form needed: As soon as possible.   Once completed: Fax form to: Attn: Bren  [phone] Case number [id number]      Action Taken: Message routed to:  [facility]    Travel Screening: Not Applicable

## 2021-08-03 ENCOUNTER — ANTICOAGULATION THERAPY VISIT (OUTPATIENT)
Dept: ANTICOAGULATION | Facility: CLINIC | Age: 71
End: 2021-08-03

## 2021-08-03 ENCOUNTER — LAB (OUTPATIENT)
Dept: LAB | Facility: CLINIC | Age: 71
End: 2021-08-03
Payer: COMMERCIAL

## 2021-08-03 ENCOUNTER — TELEPHONE (OUTPATIENT)
Dept: INTERNAL MEDICINE | Facility: CLINIC | Age: 71
End: 2021-08-03

## 2021-08-03 DIAGNOSIS — I48.0 PAROXYSMAL ATRIAL FIBRILLATION (H): ICD-10-CM

## 2021-08-03 DIAGNOSIS — I48.91 ATRIAL FIBRILLATION (H): ICD-10-CM

## 2021-08-03 DIAGNOSIS — Z79.01 LONG TERM CURRENT USE OF ANTICOAGULANT THERAPY: Primary | ICD-10-CM

## 2021-08-03 DIAGNOSIS — I48.91 ATRIAL FIBRILLATION, UNSPECIFIED TYPE (H): ICD-10-CM

## 2021-08-03 LAB — INR BLD: 3.5 (ref 0.9–1.1)

## 2021-08-03 PROCEDURE — 36416 COLLJ CAPILLARY BLOOD SPEC: CPT | Performed by: PATHOLOGY

## 2021-08-03 PROCEDURE — 85610 PROTHROMBIN TIME: CPT | Performed by: PATHOLOGY

## 2021-08-03 NOTE — TELEPHONE ENCOUNTER
ANTICOAGULATION     Josemout Jerry is overdue for INR check.      Spoke with Lety (wife) and scheduled lab appointment on Tuesday, 8/3 at 3:45PM at the [facility].     Guevara Muir    ---  Guevara Muir  Pharmacy Intern  [facility], [facility] Discharge

## 2021-08-03 NOTE — PROGRESS NOTES
ANTICOAGULATION MANAGEMENT     Ju Edwards 71 year old male is on warfarin with supratherapeutic INR result. (Goal INR 2.0-3.0)    Recent labs: (last 7 days)     08/03/21  1601   INR 3.5*       ASSESSMENT     Source(s): Chart Review       Warfarin doses taken: Reviewed in chart    Diet: Unable to reach for an assessment    New illness, injury, or hospitalization: Unable to reach for an assessment.    Medication/supplement changes: None noted    Signs or symptoms of bleeding or clotting: Unable to reach for an assessment.    Previous INR: Supratherapeutic    Additional findings: None     PLAN     Recommended plan for no diet, medication or health factor changes affecting INR     Dosing Instructions:  Decrease your warfarin dose (5.3% change) with next INR in 1-2 weeks       Summary  As of 8/3/2021    Full warfarin instructions:  2 mg every Tue, Thu, Sat; 3 mg all other days   Next INR check:  8/17/2021             Detailed voice message left for  patient's spouse, Lety with dosing instructions and follow up date.     Contact [phone] to schedule and with any changes, questions or concerns.     Education provided: Please call back if any changes to your diet, medications or how you've been taking warfarin, Monitoring for bleeding signs and symptoms and Contact [phone] with any changes, questions or concerns.     Plan made per [facility] anticoagulation protocol    GRANT CORTEZ RN  Anticoagulation Clinic  8/3/2021    _______________________________________________________________________     Anticoagulation Episode Summary     Current INR goal:  2.0-3.0   TTR:  32.5 % (1 y)   Target end date:  Indefinite   Send INR reminders to:  [facility]    Indications    Long term current use of anticoagulant therapy [Z79.01]  Atrial fibrillation (H) [I48.91]  Atrial fibrillation  unspecified type (H) [I48.91]  Paroxysmal atrial fibrillation (H) [I48.0]           Comments:  Speak with Wife Lety ([phone]  [phone]  Pronounced Chance-A-Mout  Pt takes Warfarin dose at 4:30 PM         Anticoagulation Care Providers     Provider Role Specialty Phone number    Rayo Christiansen MD Referring Internal Medicine [phone]

## 2021-08-08 DIAGNOSIS — Z79.01 LONG TERM CURRENT USE OF ANTICOAGULANT THERAPY: ICD-10-CM

## 2021-08-08 DIAGNOSIS — I48.91 ATRIAL FIBRILLATION, UNSPECIFIED TYPE (H): ICD-10-CM

## 2021-08-09 RX ORDER — WARFARIN SODIUM 2 MG/1
TABLET ORAL
Qty: 90 TABLET | Refills: 1 | Status: SHIPPED | OUTPATIENT
Start: 2021-08-09 | End: 2021-12-02

## 2021-08-29 DIAGNOSIS — H40.002 GLAUCOMA SUSPECT OF LEFT EYE: ICD-10-CM

## 2021-08-30 RX ORDER — LATANOPROST 50 UG/ML
1 SOLUTION/ DROPS OPHTHALMIC AT BEDTIME
Qty: 2.5 ML | Refills: 11 | Status: SHIPPED | OUTPATIENT
Start: 2021-08-30 | End: 2023-07-05

## 2021-08-30 NOTE — TELEPHONE ENCOUNTER
LATANOPROST 0.005% EYE DROPS    Requested directions: Place 1 drop into both eyes At Bedtime - Both Eyes  Current directions on the medication list: Place 1 drop into both eyes At Bedtime - Both Eyes    Last Written Prescription Date:  8/17/2020  Last Fill Quantity: 2.5,   # refills: 11    Last Office Visit:  4/28/2021  Future Office visit: 11/3/2021    Attending Provider:    Stanford Welsh MD  Ophthalmology     Last Clinic Note:  4/28/2021    Assessment & Plan         Ju Edwards is a 70 year old male with the following diagnoses:   1. Chronic narrow angle glaucoma, right, mild stage    2. Chronic narrow angle glaucoma, left, moderate stage    3. Pseudophakia - Left Eye    4. Type 2 diabetes mellitus without complication, unspecified whether long term insulin use (H)    5. Cerebrovascular accident (CVA), unspecified mechanism (H)          History obtained with interpretor  S/p CE/IOL with goniosynechialysis and ENDOCYLOPHOTOCOAGULATION left   eye-Pt (8/14/2020)  - Doing well. [facility] 20/20      IOP today 17/18 mmHg - did not take drops today (04/28/21)  Compliance with drops is ~80% per patient    Discussed importance of consistent use of glaucoma drops to prevent progression of glaucoma  - Continue Alphagan and Cosopt BID in both eyes     VISUALLY SIGNIFICANT cataract right eye   Functioning well  Angle without peripheral anterior synechiae at this time  Laser peripheral iridotomy (LPI) patent  Ok to monitor  Return precautions reviewed         Patient disposition:   Return in about 6 months (around 10/28/2021) for DFE, OCT NFL.        Rayo Morin MD  Ophthalmology PGY-3  [facility]       Routing refill request to provider for review/approval because:  This medication is not mentioned for continue to use for Pt care.    Would Provider like this eye drop continued for Pt care?  Please advise       Geno Adan RN  Central Triage Red Flags/Med Refills

## 2021-08-31 DIAGNOSIS — Z00.00 ROUTINE HEALTH MAINTENANCE: Primary | ICD-10-CM

## 2021-08-31 DIAGNOSIS — F32.A DEPRESSION, UNSPECIFIED DEPRESSION TYPE: ICD-10-CM

## 2021-09-01 ENCOUNTER — TELEPHONE (OUTPATIENT)
Dept: ANTICOAGULATION | Facility: CLINIC | Age: 71
End: 2021-09-01

## 2021-09-02 RX ORDER — CITALOPRAM HYDROBROMIDE 10 MG/1
TABLET ORAL
Qty: 180 TABLET | Refills: 0 | Status: SHIPPED | OUTPATIENT
Start: 2021-09-02 | End: 2021-12-01

## 2021-09-02 RX ORDER — FLECAINIDE ACETATE 150 MG/1
150 TABLET ORAL 2 TIMES DAILY
Qty: 180 TABLET | Refills: 0 | Status: SHIPPED | OUTPATIENT
Start: 2021-09-02 | End: 2022-03-01

## 2021-09-13 NOTE — PROGRESS NOTES
ANTICOAGULATION FOLLOW-UP CLINIC VISIT    Patient Name:  Ju Edwards  Date:  2019  Contact Type:  Telephone    SUBJECTIVE:         OBJECTIVE    INR   Date Value Ref Range Status   2019 2.31 (H) 0.86 - 1.14 Final       ASSESSMENT / PLAN  No question data found.  Anticoagulation Summary  As of 2019    INR goal:   2.0-3.0   TTR:   57.6 % (3.2 y)   INR used for dosin.31 (2019)   Warfarin maintenance plan:   2 mg (2 mg x 1) every Mon, Wed, Fri; 3 mg (2 mg x 1.5) all other days   Full warfarin instructions:   2 mg every Mon, Wed, Fri; 3 mg all other days   Weekly warfarin total:   18 mg   No change documented:   Hien Curry RN   Plan last modified:   Alice Carranza RN (2016)   Next INR check:   2019   Priority:   INR   Target end date:   Indefinite    Indications    Long term current use of anticoagulant therapy [Z79.01]  Atrial fibrillation (H) [I48.91]             Anticoagulation Episode Summary     INR check location:   Clinic Lab    Preferred lab:       Send INR reminders to:   [facility]    Comments:   Speak witrh Wife Lety [phone]  Pronounced Chance-A-Juan  Pt takes Warfarin dose at 4:30 PM      Anticoagulation Care Providers     Provider Role Specialty Phone number    Rajikinza Rayo Escobar MD [facility] Internal Medicine [phone]            See the Encounter Report to view Anticoagulation Flowsheet and Dosing Calendar (Go to Encounters tab in chart review, and find the Anticoagulation Therapy Visit)    Left message for patient with results and dosing recommendations. Asked patient to call back to report any missed doses, falls, signs and symptoms of bleeding or clotting, any changes in health, medication, or diet. Asked patient to call back with any questions or concerns.     Hien Curry, RN                 
done

## 2021-09-16 ENCOUNTER — LAB (OUTPATIENT)
Dept: LAB | Facility: CLINIC | Age: 71
End: 2021-09-16
Payer: COMMERCIAL

## 2021-09-16 ENCOUNTER — ANTICOAGULATION THERAPY VISIT (OUTPATIENT)
Dept: ANTICOAGULATION | Facility: CLINIC | Age: 71
End: 2021-09-16

## 2021-09-16 DIAGNOSIS — I48.91 ATRIAL FIBRILLATION (H): ICD-10-CM

## 2021-09-16 DIAGNOSIS — I48.0 PAROXYSMAL ATRIAL FIBRILLATION (H): ICD-10-CM

## 2021-09-16 DIAGNOSIS — Z79.01 LONG TERM CURRENT USE OF ANTICOAGULANT THERAPY: Primary | ICD-10-CM

## 2021-09-16 DIAGNOSIS — I48.91 ATRIAL FIBRILLATION, UNSPECIFIED TYPE (H): ICD-10-CM

## 2021-09-16 LAB — INR PPP: 3.3 (ref 0.85–1.15)

## 2021-09-16 PROCEDURE — 36415 COLL VENOUS BLD VENIPUNCTURE: CPT | Performed by: PATHOLOGY

## 2021-09-16 PROCEDURE — 85610 PROTHROMBIN TIME: CPT | Performed by: PATHOLOGY

## 2021-09-16 NOTE — PROGRESS NOTES
ANTICOAGULATION MANAGEMENT     Ju Edwards 71 year old male is on warfarin with supratherapeutic INR result. (Goal INR 2.0-3.0)    Recent labs: (last 7 days)     09/16/21  1435   INR 3.30*       ASSESSMENT     Source(s): Chart Review       Warfarin doses taken: Warfarin taken as instructed    Diet: No new diet changes identified    New illness, injury, or hospitalization: No    Medication/supplement changes: None noted    Signs or symptoms of bleeding or clotting: No    Previous INR: Supratherapeutic    Additional findings: None     PLAN     Recommended plan for no diet, medication or health factor changes affecting INR     Dosing Instructions:  Decrease your warfarin dose (5.6% change) with next INR in 2 weeks       Summary  As of 9/16/2021    Full warfarin instructions:  3 mg every Mon, Wed, Fri; 2 mg all other days   Next INR check:               Detailed voice message left for  Lety with dosing instructions and follow up date.     Contact [phone] to schedule and with any changes, questions or concerns.     Education provided: Please call back if any changes to your diet, medications or how you've been taking warfarin and Contact [phone] with any changes, questions or concerns.     Plan made per [facility] anticoagulation protocol    Hien Curry RN  Anticoagulation Clinic  9/16/2021    _______________________________________________________________________     Anticoagulation Episode Summary     Current INR goal:  2.0-3.0   TTR:  28.5 % (1 y)   Target end date:  Indefinite   Send INR reminders to:  [facility]    Indications    Long term current use of anticoagulant therapy [Z79.01]  Atrial fibrillation (H) [I48.91]  Atrial fibrillation  unspecified type (H) [I48.91]  Paroxysmal atrial fibrillation (H) [I48.0]           Comments:  Speak with Wife Lety [phone]  Pronounced Chance-A-Juan  Pt takes Warfarin dose at 4:30 PM         Anticoagulation Care Providers     Provider  Role Specialty Phone number    Rayo Christiansen MD Referring Internal Medicine [phone]

## 2021-09-19 DIAGNOSIS — K21.00 GASTROESOPHAGEAL REFLUX DISEASE WITH ESOPHAGITIS: ICD-10-CM

## 2021-09-21 RX ORDER — PANTOPRAZOLE SODIUM 40 MG/1
40 TABLET, DELAYED RELEASE ORAL DAILY
Qty: 90 TABLET | Refills: 0 | Status: SHIPPED | OUTPATIENT
Start: 2021-09-21 | End: 2021-12-02

## 2021-09-21 NOTE — TELEPHONE ENCOUNTER
PANTOPRAZOLE SOD DR 40 MG TAB  Last Written Prescription Date:  3/30/2021  Last Fill Quantity: 90,   # refills: 1  Last Office Visit : 10/27/2020  Future Office visit:  MNone  90 Tabs  sent to pharm 9/21/2021      Geno Adan RN  Central Triage Red Flags/Med Refills

## 2021-10-01 ENCOUNTER — ANCILLARY PROCEDURE (OUTPATIENT)
Dept: CARDIOLOGY | Facility: CLINIC | Age: 71
End: 2021-10-01
Attending: INTERNAL MEDICINE
Payer: COMMERCIAL

## 2021-10-01 DIAGNOSIS — Z95.0 CARDIAC PACEMAKER IN SITU: ICD-10-CM

## 2021-10-01 LAB
MDC_IDC_EPISODE_DTM: NORMAL
MDC_IDC_EPISODE_DTM: NORMAL
MDC_IDC_EPISODE_ID: NORMAL
MDC_IDC_EPISODE_ID: NORMAL
MDC_IDC_EPISODE_TYPE: NORMAL
MDC_IDC_EPISODE_TYPE: NORMAL
MDC_IDC_LEAD_IMPLANT_DT: NORMAL
MDC_IDC_LEAD_IMPLANT_DT: NORMAL
MDC_IDC_LEAD_LOCATION: NORMAL
MDC_IDC_LEAD_LOCATION: NORMAL
MDC_IDC_LEAD_LOCATION_DETAIL_1: NORMAL
MDC_IDC_LEAD_LOCATION_DETAIL_1: NORMAL
MDC_IDC_LEAD_MFG: NORMAL
MDC_IDC_LEAD_MFG: NORMAL
MDC_IDC_LEAD_MODEL: NORMAL
MDC_IDC_LEAD_MODEL: NORMAL
MDC_IDC_LEAD_POLARITY_TYPE: NORMAL
MDC_IDC_LEAD_POLARITY_TYPE: NORMAL
MDC_IDC_LEAD_SERIAL: NORMAL
MDC_IDC_LEAD_SERIAL: NORMAL
MDC_IDC_MSMT_BATTERY_DTM: NORMAL
MDC_IDC_MSMT_BATTERY_REMAINING_LONGEVITY: 132 MO
MDC_IDC_MSMT_BATTERY_REMAINING_PERCENTAGE: 100 %
MDC_IDC_MSMT_BATTERY_STATUS: NORMAL
MDC_IDC_MSMT_LEADCHNL_RA_IMPEDANCE_VALUE: 621 OHM
MDC_IDC_MSMT_LEADCHNL_RV_IMPEDANCE_VALUE: 637 OHM
MDC_IDC_MSMT_LEADCHNL_RV_PACING_THRESHOLD_AMPLITUDE: 1.2 V
MDC_IDC_MSMT_LEADCHNL_RV_PACING_THRESHOLD_PULSEWIDTH: 0.4 MS
MDC_IDC_PG_IMPLANT_DTM: NORMAL
MDC_IDC_PG_MFG: NORMAL
MDC_IDC_PG_MODEL: NORMAL
MDC_IDC_PG_SERIAL: NORMAL
MDC_IDC_PG_TYPE: NORMAL
MDC_IDC_SESS_CLINIC_NAME: NORMAL
MDC_IDC_SESS_DTM: NORMAL
MDC_IDC_SESS_TYPE: NORMAL
MDC_IDC_SET_BRADY_AT_MODE_SWITCH_MODE: NORMAL
MDC_IDC_SET_BRADY_AT_MODE_SWITCH_RATE: 160 {BEATS}/MIN
MDC_IDC_SET_BRADY_LOWRATE: 60 {BEATS}/MIN
MDC_IDC_SET_BRADY_MAX_SENSOR_RATE: 130 {BEATS}/MIN
MDC_IDC_SET_BRADY_MAX_TRACKING_RATE: 130 {BEATS}/MIN
MDC_IDC_SET_BRADY_MODE: NORMAL
MDC_IDC_SET_BRADY_PAV_DELAY_LOW: 200 MS
MDC_IDC_SET_BRADY_SAV_DELAY_LOW: 150 MS
MDC_IDC_SET_LEADCHNL_RA_PACING_AMPLITUDE: 3.5 V
MDC_IDC_SET_LEADCHNL_RA_PACING_CAPTURE_MODE: NORMAL
MDC_IDC_SET_LEADCHNL_RA_PACING_POLARITY: NORMAL
MDC_IDC_SET_LEADCHNL_RA_PACING_PULSEWIDTH: 0.4 MS
MDC_IDC_SET_LEADCHNL_RA_SENSING_ADAPTATION_MODE: NORMAL
MDC_IDC_SET_LEADCHNL_RA_SENSING_POLARITY: NORMAL
MDC_IDC_SET_LEADCHNL_RA_SENSING_SENSITIVITY: 0.25 MV
MDC_IDC_SET_LEADCHNL_RV_PACING_AMPLITUDE: 3.5 V
MDC_IDC_SET_LEADCHNL_RV_PACING_CAPTURE_MODE: NORMAL
MDC_IDC_SET_LEADCHNL_RV_PACING_POLARITY: NORMAL
MDC_IDC_SET_LEADCHNL_RV_PACING_PULSEWIDTH: 0.4 MS
MDC_IDC_SET_LEADCHNL_RV_SENSING_ADAPTATION_MODE: NORMAL
MDC_IDC_SET_LEADCHNL_RV_SENSING_POLARITY: NORMAL
MDC_IDC_SET_LEADCHNL_RV_SENSING_SENSITIVITY: 1.5 MV
MDC_IDC_SET_ZONE_DETECTION_INTERVAL: 375 MS
MDC_IDC_SET_ZONE_TYPE: NORMAL
MDC_IDC_SET_ZONE_VENDOR_TYPE: NORMAL
MDC_IDC_STAT_AT_BURDEN_PERCENT: 0 %
MDC_IDC_STAT_AT_DTM_END: NORMAL
MDC_IDC_STAT_AT_DTM_START: NORMAL
MDC_IDC_STAT_BRADY_DTM_END: NORMAL
MDC_IDC_STAT_BRADY_DTM_START: NORMAL
MDC_IDC_STAT_BRADY_RA_PERCENT_PACED: 89 %
MDC_IDC_STAT_BRADY_RV_PERCENT_PACED: 1 %
MDC_IDC_STAT_EPISODE_RECENT_COUNT: 0
MDC_IDC_STAT_EPISODE_RECENT_COUNT_DTM_END: NORMAL
MDC_IDC_STAT_EPISODE_RECENT_COUNT_DTM_START: NORMAL
MDC_IDC_STAT_EPISODE_TYPE: NORMAL
MDC_IDC_STAT_EPISODE_VENDOR_TYPE: NORMAL

## 2021-10-01 PROCEDURE — 93296 REM INTERROG EVL PM/IDS: CPT | Performed by: INTERNAL MEDICINE

## 2021-10-01 PROCEDURE — 93294 REM INTERROG EVL PM/LDLS PM: CPT | Performed by: INTERNAL MEDICINE

## 2021-10-17 DIAGNOSIS — I10 ESSENTIAL HYPERTENSION: ICD-10-CM

## 2021-10-20 ENCOUNTER — TELEPHONE (OUTPATIENT)
Dept: INTERNAL MEDICINE | Facility: CLINIC | Age: 71
End: 2021-10-20

## 2021-10-20 ENCOUNTER — TELEPHONE (OUTPATIENT)
Dept: ANTICOAGULATION | Facility: CLINIC | Age: 71
End: 2021-10-20

## 2021-10-20 RX ORDER — AMLODIPINE BESYLATE 5 MG/1
5 TABLET ORAL DAILY
Qty: 90 TABLET | Refills: 0 | Status: SHIPPED | OUTPATIENT
Start: 2021-10-20 | End: 2022-03-22

## 2021-10-20 NOTE — TELEPHONE ENCOUNTER
ANTICOAGULATION     Ju Edwards is overdue for INR check.      Left message for patient to call and schedule lab appointment as soon as possible. If returning call, please schedule.     GRANT CORTEZ RN

## 2021-10-20 NOTE — TELEPHONE ENCOUNTER
LCV: 10/27/2020  [facility] Internal Medicine [place]  BP above protocol parameter, FYI to clinic  RF 90 day    Scheduling has been notified to contact the pt for appointment.

## 2021-10-21 DIAGNOSIS — I10 BENIGN ESSENTIAL HYPERTENSION: ICD-10-CM

## 2021-10-24 RX ORDER — LISINOPRIL 20 MG/1
20 TABLET ORAL DAILY
Qty: 90 TABLET | Refills: 0 | Status: SHIPPED | OUTPATIENT
Start: 2021-10-24 | End: 2021-12-02

## 2021-10-24 NOTE — TELEPHONE ENCOUNTER
"  lisinopril (ZESTRIL) 20 MG tablet  Last Written Prescription Date:  9/9/20  Last Fill Quantity: 90,   # refills: 3  Last Office Visit : 10/27/20  Future Office visit:  None     \" Return in about 2 weeks (around 11/10/2020)\"    Scheduling has been notified to contact the pt for appointment.      Routing refill request to provider for review/approval because:bp > 140/90  "

## 2021-10-27 DIAGNOSIS — I63.039: ICD-10-CM

## 2021-10-29 RX ORDER — CHOLECALCIFEROL (VITAMIN D3) 50 MCG
1 CAPSULE ORAL DAILY
Qty: 90 CAPSULE | Refills: 0 | Status: SHIPPED | OUTPATIENT
Start: 2021-10-29 | End: 2021-12-02

## 2021-10-29 NOTE — TELEPHONE ENCOUNTER
Last Clinic Visit: 10/27/2020  [facility] Internal Medicine [place]    Appointment just now due: collin refill 90 days and staff message sent to [facility] clinic scheduling.

## 2021-10-29 NOTE — TELEPHONE ENCOUNTER
Left message with Primary Care clinic number requesting patient to call back to schedule annual physical appointment with PCP.

## 2021-10-30 DIAGNOSIS — R04.0 EPISTAXIS: Primary | ICD-10-CM

## 2021-11-01 DIAGNOSIS — H40.2211: ICD-10-CM

## 2021-11-01 DIAGNOSIS — H40.002 GLAUCOMA SUSPECT OF LEFT EYE: Primary | ICD-10-CM

## 2021-11-03 RX ORDER — ECHINACEA PURPUREA EXTRACT 125 MG
TABLET ORAL
Qty: 44 ML | Refills: 2 | Status: SHIPPED | OUTPATIENT
Start: 2021-11-03 | End: 2022-04-25

## 2021-11-09 ENCOUNTER — TELEPHONE (OUTPATIENT)
Dept: ANTICOAGULATION | Facility: CLINIC | Age: 71
End: 2021-11-09
Payer: COMMERCIAL

## 2021-11-09 NOTE — TELEPHONE ENCOUNTER
ANTICOAGULATION     Ju Edwards is overdue for INR check.      Left message for patient to call and schedule lab appointment as soon as possible. If returning call, please schedule.     Portia Duran

## 2021-11-15 DIAGNOSIS — H40.002 GLAUCOMA SUSPECT OF LEFT EYE: ICD-10-CM

## 2021-11-15 RX ORDER — LATANOPROST 50 UG/ML
SOLUTION/ DROPS OPHTHALMIC
Qty: 7.5 ML | Refills: 4 | OUTPATIENT
Start: 2021-11-15

## 2021-11-19 DIAGNOSIS — I10 ESSENTIAL HYPERTENSION: ICD-10-CM

## 2021-11-19 NOTE — TELEPHONE ENCOUNTER
[facility] Call Center    Phone Message    May a detailed message be left on voicemail: yes     Reason for Call: Medication Refill Request    Has the patient contacted the pharmacy for the refill? Yes   Name of medication being requested: atenolol (TENORMIN) 50 MG tablet    Provider who prescribed the medication:[facility]    Pharmacy: [facility]Y [id number] [address] [facility]    Date medication is needed: As soon as possible patient is completely out.       Action Taken: Message routed to:  [facility]    Travel Screening: Not Applicable

## 2021-11-22 RX ORDER — ATENOLOL 50 MG/1
50 TABLET ORAL DAILY
Qty: 90 TABLET | Refills: 0 | Status: SHIPPED | OUTPATIENT
Start: 2021-11-22 | End: 2021-12-02

## 2021-11-22 NOTE — TELEPHONE ENCOUNTER
Last Clinic Visit: 10/27/2020  [facility] Internal Medicine [place]  BP above protocol- FYI to Clinic  RF 90 day  Scheduling has been notified to contact the pt for appointment.    BP Readings from Last 3 Encounters:   06/18/21 (!) 153/87   05/18/21 (!) 150/76   03/23/21 124/62

## 2021-11-22 NOTE — TELEPHONE ENCOUNTER
Left message with Primary Care clinic number requesting patient to call back to schedule annual physical appointment with PCP Dr Christiansen via [employer]

## 2021-11-28 DIAGNOSIS — F32.A DEPRESSION, UNSPECIFIED DEPRESSION TYPE: ICD-10-CM

## 2021-11-30 DIAGNOSIS — I10 ESSENTIAL HYPERTENSION: ICD-10-CM

## 2021-11-30 DIAGNOSIS — Z79.01 LONG TERM CURRENT USE OF ANTICOAGULANT THERAPY: ICD-10-CM

## 2021-11-30 DIAGNOSIS — K21.00 GASTROESOPHAGEAL REFLUX DISEASE WITH ESOPHAGITIS: ICD-10-CM

## 2021-11-30 DIAGNOSIS — I48.91 ATRIAL FIBRILLATION, UNSPECIFIED TYPE (H): ICD-10-CM

## 2021-11-30 DIAGNOSIS — I10 BENIGN ESSENTIAL HYPERTENSION: ICD-10-CM

## 2021-11-30 DIAGNOSIS — I63.039: ICD-10-CM

## 2021-12-01 ENCOUNTER — TELEPHONE (OUTPATIENT)
Dept: INTERNAL MEDICINE | Facility: CLINIC | Age: 71
End: 2021-12-01
Payer: COMMERCIAL

## 2021-12-01 RX ORDER — CITALOPRAM HYDROBROMIDE 10 MG/1
TABLET ORAL
Qty: 180 TABLET | Refills: 0 | Status: SHIPPED | OUTPATIENT
Start: 2021-12-01 | End: 2022-01-19

## 2021-12-01 NOTE — TELEPHONE ENCOUNTER
LCV: 10/27/2020  [facility] Internal Medicine [place]  Overdue PHQ9, FYI to clinic  RF 90 day  Scheduling has been notified to contact the pt for appointment.    previous override  Overridden by Rayo Christiansen MD on Sep 9, 2020 3:38 PM  Drug-Drug  1. SELECTIVE SEROTONIN REUPTAKE INHIBITORS / NSAIDS [Level: Major]  Other Orders: naproxen (NAPROSYN) 500 MG tablet

## 2021-12-01 NOTE — TELEPHONE ENCOUNTER
----- Message from Nery Griffin sent at 12/1/2021  1:50 PM CST -----  Patient is overdue for clinic visit with Dr. Christiansen or any other provider. Please call to offer his next available appointment for continued refills of his medications.    Nery Griffin, EMT at 1:51 PM on 12/1/2021.  [facility]  [facility]  [place]  [phone]

## 2021-12-02 RX ORDER — ATENOLOL 50 MG/1
50 TABLET ORAL DAILY
Qty: 90 TABLET | Refills: 1 | Status: SHIPPED | OUTPATIENT
Start: 2021-12-02 | End: 2022-08-08

## 2021-12-02 RX ORDER — WARFARIN SODIUM 2 MG/1
TABLET ORAL
Qty: 90 TABLET | Refills: 1 | Status: SHIPPED | OUTPATIENT
Start: 2021-12-02 | End: 2023-01-12

## 2021-12-02 RX ORDER — PANTOPRAZOLE SODIUM 40 MG/1
40 TABLET, DELAYED RELEASE ORAL DAILY
Qty: 90 TABLET | Refills: 1 | Status: SHIPPED | OUTPATIENT
Start: 2021-12-02 | End: 2022-06-14

## 2021-12-02 RX ORDER — LISINOPRIL 20 MG/1
20 TABLET ORAL DAILY
Qty: 90 TABLET | Refills: 1 | Status: SHIPPED | OUTPATIENT
Start: 2021-12-02 | End: 2022-07-13

## 2021-12-02 RX ORDER — ACETAMINOPHEN 160 MG
1 TABLET,DISINTEGRATING ORAL DAILY
Qty: 90 CAPSULE | Refills: 1 | Status: SHIPPED | OUTPATIENT
Start: 2021-12-02 | End: 2022-07-13

## 2021-12-02 NOTE — TELEPHONE ENCOUNTER
LISINOPRIL 20 MG TABLET  Last Written Prescription Date:  10/24/2021  Last Fill Quantity: 90,   # refills: 0  Last Office Visit : 10/27/2020  Future Office visit:  None  Routing refill request to provider for review/approval because:  Second Request   Over due office visit and labs  30 day pended    Clinic notified     WARFARIN SODIUM 2 MG TABLET  Last Written Prescription Date:  8/9/2021  Last Fill Quantity: 90,   # refills: 1  Last Office Visit : 10/27/2020  Future Office visit:  None  Routing refill request to provider for review/approval because:  Second Request   Over due office visit and labs  30 day pended    Clinic notified     PANTOPRAZOLE SOD DR 40 MG TAB  Last Written Prescription Date:  9/21/2021  Last Fill Quantity: 90,   # refills: 0  Last Office Visit : 10/27/2020  Future Office visit:  None  Routing refill request to provider for review/approval because:  Second Request   Over due office visit and labs  30 day pended    Clinic notified     VITAMIN D3 50 MCG (2,000 UNIT)  Last Written Prescription Date:  10/29/2021  Last Fill Quantity: 90,   # refills: 0  Last Office Visit : 10/27/2020  Future Office visit:  None  Routing refill request to provider for review/approval because:  Second Request   Over due office visit and labs  30 day pended    Clinic notified     ATENOLOL 50 MG TABLET  Last Written Prescription Date:  11/22/2021  Last Fill Quantity: 90,   # refills: 0  Last Office Visit : 10/27/2020  Future Office visit:  None  Routing refill request to provider for review/approval because:  Second Request   Over due office visit and labs  30 day pended    Clinic notified     Geno Adan RN  Central Triage Red Flags/Med Refills

## 2022-01-11 ENCOUNTER — ANCILLARY PROCEDURE (OUTPATIENT)
Dept: CARDIOLOGY | Facility: CLINIC | Age: 72
End: 2022-01-11
Attending: INTERNAL MEDICINE
Payer: COMMERCIAL

## 2022-01-11 DIAGNOSIS — Z95.0 PACEMAKER: ICD-10-CM

## 2022-01-11 DIAGNOSIS — I49.5 SICK SINUS SYNDROME (H): ICD-10-CM

## 2022-01-11 LAB
MDC_IDC_EPISODE_DTM: NORMAL
MDC_IDC_EPISODE_DURATION: 27 S
MDC_IDC_EPISODE_DURATION: 7 S
MDC_IDC_EPISODE_ID: NORMAL
MDC_IDC_EPISODE_TYPE: NORMAL
MDC_IDC_LEAD_IMPLANT_DT: NORMAL
MDC_IDC_LEAD_IMPLANT_DT: NORMAL
MDC_IDC_LEAD_LOCATION: NORMAL
MDC_IDC_LEAD_LOCATION: NORMAL
MDC_IDC_LEAD_LOCATION_DETAIL_1: NORMAL
MDC_IDC_LEAD_LOCATION_DETAIL_1: NORMAL
MDC_IDC_LEAD_MFG: NORMAL
MDC_IDC_LEAD_MFG: NORMAL
MDC_IDC_LEAD_MODEL: NORMAL
MDC_IDC_LEAD_MODEL: NORMAL
MDC_IDC_LEAD_POLARITY_TYPE: NORMAL
MDC_IDC_LEAD_POLARITY_TYPE: NORMAL
MDC_IDC_LEAD_SERIAL: NORMAL
MDC_IDC_LEAD_SERIAL: NORMAL
MDC_IDC_MSMT_BATTERY_DTM: NORMAL
MDC_IDC_MSMT_BATTERY_REMAINING_LONGEVITY: 132 MO
MDC_IDC_MSMT_BATTERY_REMAINING_PERCENTAGE: 100 %
MDC_IDC_MSMT_BATTERY_STATUS: NORMAL
MDC_IDC_MSMT_LEADCHNL_RA_IMPEDANCE_VALUE: 613 OHM
MDC_IDC_MSMT_LEADCHNL_RV_IMPEDANCE_VALUE: 616 OHM
MDC_IDC_MSMT_LEADCHNL_RV_PACING_THRESHOLD_AMPLITUDE: 1.2 V
MDC_IDC_MSMT_LEADCHNL_RV_PACING_THRESHOLD_PULSEWIDTH: 0.4 MS
MDC_IDC_PG_IMPLANT_DTM: NORMAL
MDC_IDC_PG_MFG: NORMAL
MDC_IDC_PG_MODEL: NORMAL
MDC_IDC_PG_SERIAL: NORMAL
MDC_IDC_PG_TYPE: NORMAL
MDC_IDC_SESS_CLINIC_NAME: NORMAL
MDC_IDC_SESS_DTM: NORMAL
MDC_IDC_SESS_TYPE: NORMAL
MDC_IDC_SET_BRADY_AT_MODE_SWITCH_MODE: NORMAL
MDC_IDC_SET_BRADY_AT_MODE_SWITCH_RATE: 160 {BEATS}/MIN
MDC_IDC_SET_BRADY_LOWRATE: 60 {BEATS}/MIN
MDC_IDC_SET_BRADY_MAX_SENSOR_RATE: 130 {BEATS}/MIN
MDC_IDC_SET_BRADY_MAX_TRACKING_RATE: 130 {BEATS}/MIN
MDC_IDC_SET_BRADY_MODE: NORMAL
MDC_IDC_SET_BRADY_PAV_DELAY_LOW: 200 MS
MDC_IDC_SET_BRADY_SAV_DELAY_LOW: 150 MS
MDC_IDC_SET_LEADCHNL_RA_PACING_AMPLITUDE: 3.5 V
MDC_IDC_SET_LEADCHNL_RA_PACING_CAPTURE_MODE: NORMAL
MDC_IDC_SET_LEADCHNL_RA_PACING_POLARITY: NORMAL
MDC_IDC_SET_LEADCHNL_RA_PACING_PULSEWIDTH: 0.4 MS
MDC_IDC_SET_LEADCHNL_RA_SENSING_ADAPTATION_MODE: NORMAL
MDC_IDC_SET_LEADCHNL_RA_SENSING_POLARITY: NORMAL
MDC_IDC_SET_LEADCHNL_RA_SENSING_SENSITIVITY: 0.25 MV
MDC_IDC_SET_LEADCHNL_RV_PACING_AMPLITUDE: 3.5 V
MDC_IDC_SET_LEADCHNL_RV_PACING_CAPTURE_MODE: NORMAL
MDC_IDC_SET_LEADCHNL_RV_PACING_POLARITY: NORMAL
MDC_IDC_SET_LEADCHNL_RV_PACING_PULSEWIDTH: 0.4 MS
MDC_IDC_SET_LEADCHNL_RV_SENSING_ADAPTATION_MODE: NORMAL
MDC_IDC_SET_LEADCHNL_RV_SENSING_POLARITY: NORMAL
MDC_IDC_SET_LEADCHNL_RV_SENSING_SENSITIVITY: 1.5 MV
MDC_IDC_SET_ZONE_DETECTION_INTERVAL: 375 MS
MDC_IDC_SET_ZONE_TYPE: NORMAL
MDC_IDC_SET_ZONE_VENDOR_TYPE: NORMAL
MDC_IDC_STAT_AT_BURDEN_PERCENT: 1 %
MDC_IDC_STAT_AT_DTM_END: NORMAL
MDC_IDC_STAT_AT_DTM_START: NORMAL
MDC_IDC_STAT_BRADY_DTM_END: NORMAL
MDC_IDC_STAT_BRADY_DTM_START: NORMAL
MDC_IDC_STAT_BRADY_RA_PERCENT_PACED: 89 %
MDC_IDC_STAT_BRADY_RV_PERCENT_PACED: 1 %
MDC_IDC_STAT_EPISODE_RECENT_COUNT: 0
MDC_IDC_STAT_EPISODE_RECENT_COUNT: 3
MDC_IDC_STAT_EPISODE_RECENT_COUNT_DTM_END: NORMAL
MDC_IDC_STAT_EPISODE_RECENT_COUNT_DTM_START: NORMAL
MDC_IDC_STAT_EPISODE_TYPE: NORMAL
MDC_IDC_STAT_EPISODE_VENDOR_TYPE: NORMAL

## 2022-01-11 PROCEDURE — 93294 REM INTERROG EVL PM/LDLS PM: CPT | Performed by: INTERNAL MEDICINE

## 2022-01-11 PROCEDURE — 93296 REM INTERROG EVL PM/IDS: CPT | Performed by: INTERNAL MEDICINE

## 2022-01-14 DIAGNOSIS — F32.A DEPRESSION, UNSPECIFIED DEPRESSION TYPE: ICD-10-CM

## 2022-01-17 ENCOUNTER — ANTICOAGULATION THERAPY VISIT (OUTPATIENT)
Dept: ANTICOAGULATION | Facility: CLINIC | Age: 72
End: 2022-01-17

## 2022-01-17 ENCOUNTER — LAB (OUTPATIENT)
Dept: LAB | Facility: CLINIC | Age: 72
End: 2022-01-17

## 2022-01-17 ENCOUNTER — OFFICE VISIT (OUTPATIENT)
Dept: INTERNAL MEDICINE | Facility: CLINIC | Age: 72
End: 2022-01-17
Payer: COMMERCIAL

## 2022-01-17 VITALS
OXYGEN SATURATION: 97 % | HEART RATE: 60 BPM | SYSTOLIC BLOOD PRESSURE: 131 MMHG | DIASTOLIC BLOOD PRESSURE: 70 MMHG | BODY MASS INDEX: 26.68 KG/M2 | RESPIRATION RATE: 16 BRPM | WEIGHT: 150.6 LBS | TEMPERATURE: 97.9 F | HEIGHT: 63 IN

## 2022-01-17 DIAGNOSIS — R39.9 LOWER URINARY TRACT SYMPTOMS (LUTS): ICD-10-CM

## 2022-01-17 DIAGNOSIS — I48.91 ATRIAL FIBRILLATION (H): ICD-10-CM

## 2022-01-17 DIAGNOSIS — E78.5 HYPERLIPIDEMIA, UNSPECIFIED HYPERLIPIDEMIA TYPE: ICD-10-CM

## 2022-01-17 DIAGNOSIS — Z98.890 S/P ABLATION OF ATRIAL FLUTTER: ICD-10-CM

## 2022-01-17 DIAGNOSIS — I48.0 PAROXYSMAL ATRIAL FIBRILLATION (H): ICD-10-CM

## 2022-01-17 DIAGNOSIS — Z86.79 S/P ABLATION OF ATRIAL FLUTTER: ICD-10-CM

## 2022-01-17 DIAGNOSIS — Z95.0 CARDIAC PACEMAKER IN SITU: ICD-10-CM

## 2022-01-17 DIAGNOSIS — R35.0 URINARY FREQUENCY: Primary | ICD-10-CM

## 2022-01-17 DIAGNOSIS — Z79.01 LONG TERM CURRENT USE OF ANTICOAGULANT THERAPY: Primary | ICD-10-CM

## 2022-01-17 DIAGNOSIS — I48.91 ATRIAL FIBRILLATION, UNSPECIFIED TYPE (H): ICD-10-CM

## 2022-01-17 LAB
ALBUMIN UR-MCNC: NEGATIVE MG/DL
APPEARANCE UR: CLEAR
BILIRUB UR QL STRIP: NEGATIVE
CHOLEST SERPL-MCNC: 181 MG/DL
COLOR UR AUTO: YELLOW
FASTING STATUS PATIENT QL REPORTED: NO
GLUCOSE UR STRIP-MCNC: NEGATIVE MG/DL
HDLC SERPL-MCNC: 36 MG/DL
HGB UR QL STRIP: NEGATIVE
INR PPP: 1.17 (ref 0.85–1.15)
KETONES UR STRIP-MCNC: 5 MG/DL
LDLC SERPL CALC-MCNC: 98 MG/DL
LEUKOCYTE ESTERASE UR QL STRIP: NEGATIVE
MUCOUS THREADS #/AREA URNS LPF: PRESENT /LPF
NITRATE UR QL: NEGATIVE
NONHDLC SERPL-MCNC: 145 MG/DL
PH UR STRIP: 5 [PH] (ref 5–7)
RBC URINE: 0 /HPF
SP GR UR STRIP: 1.02 (ref 1–1.03)
TRIGL SERPL-MCNC: 235 MG/DL
UROBILINOGEN UR STRIP-MCNC: NORMAL MG/DL
WBC URINE: 4 /HPF

## 2022-01-17 PROCEDURE — 99214 OFFICE O/P EST MOD 30 MIN: CPT | Mod: 25 | Performed by: INTERNAL MEDICINE

## 2022-01-17 PROCEDURE — 80061 LIPID PANEL: CPT | Performed by: PATHOLOGY

## 2022-01-17 PROCEDURE — 36415 COLL VENOUS BLD VENIPUNCTURE: CPT | Performed by: PATHOLOGY

## 2022-01-17 PROCEDURE — 81001 URINALYSIS AUTO W/SCOPE: CPT | Performed by: PATHOLOGY

## 2022-01-17 PROCEDURE — G0008 ADMIN INFLUENZA VIRUS VAC: HCPCS | Performed by: INTERNAL MEDICINE

## 2022-01-17 PROCEDURE — 85610 PROTHROMBIN TIME: CPT | Performed by: PATHOLOGY

## 2022-01-17 PROCEDURE — 90662 IIV NO PRSV INCREASED AG IM: CPT | Performed by: INTERNAL MEDICINE

## 2022-01-17 RX ORDER — TAMSULOSIN HYDROCHLORIDE 0.4 MG/1
0.4 CAPSULE ORAL DAILY
Qty: 90 CAPSULE | Refills: 3 | Status: SHIPPED | OUTPATIENT
Start: 2022-01-17 | End: 2023-01-25

## 2022-01-17 ASSESSMENT — PATIENT HEALTH QUESTIONNAIRE - PHQ9: SUM OF ALL RESPONSES TO PHQ QUESTIONS 1-9: 0

## 2022-01-17 ASSESSMENT — MIFFLIN-ST. JEOR: SCORE: 1336.86

## 2022-01-17 NOTE — LETTER
January 18, 2022      Ju Edwards  [address]  [address]  [place] [address]        Dear ,    We are writing to inform you of your test results.    The urine appears clear.   If you have any questions regarding this don't hesitate to contact us.       Resulted Orders   UA with Micro reflex to Culture   Result Value Ref Range    Color Urine Yellow Colorless, Straw, Light Yellow, Yellow    Appearance Urine Clear Clear    Glucose Urine Negative Negative mg/dL    Bilirubin Urine Negative Negative    Ketones Urine 5  (A) Negative mg/dL    Specific Gravity Urine 1.016 1.003 - 1.035    Blood Urine Negative Negative    pH Urine 5.0 5.0 - 7.0    Protein Albumin Urine Negative Negative mg/dL    Urobilinogen Urine Normal Normal, 2.0 mg/dL    Nitrite Urine Negative Negative    Leukocyte Esterase Urine Negative Negative    Mucus Urine Present (A) None Seen /LPF    RBC Urine 0 <=2 /HPF    WBC Urine 4 <=5 /HPF    Narrative    Urine Culture not indicated       If you have any questions or concerns, please call the clinic at the number listed above.       Sincerely,      Rayo Christiansen MD

## 2022-01-17 NOTE — NURSING NOTE
"Ju Edwards is a 71 year old male patient that presents today in clinic for the following:    Chief Complaint   Patient presents with     UTI     Frequent urination: patient reports five to six times a night and every forty-five minutes during the day.      Lab Only     The patient's allergies and medications were reviewed as noted. A set of vitals were recorded as noted without incident: /70 (BP Location: Right arm, Patient Position: Sitting, Cuff Size: Adult Regular)   Pulse 60   Temp 97.9  F (36.6  C) (Oral)   Resp 16   Ht 1.59 m (5' 2.6\")   Wt 68.3 kg (150 lb 9.6 oz)   SpO2 97%   BMI 27.02 kg/m  . The patient was asked if they had any of the following symptoms in the last forty-eight hours: (1) fever or chills, (2) cough, (3) shortness of breath or difficulty breathing, (4) fatigue, (5) muscle or body aches, (6) headache, (7) new loss of taste or smell, (8) sore throat, (9) congestion or runny nose, (10) nausea or vomiting, and (11) diarrhea. Ju Edwards presents with a cough today in clinic. The patient does not have any other questions for the provider.    Channing Art, EMT at 4:51 PM on 1/17/2022  "

## 2022-01-17 NOTE — PROGRESS NOTES
HPI  71-year-old presents today for several issues.  He is interested in having his cholesterol rechecked has been fasting for the last 5 to 6 hours.  He also complains of urinary frequency.  He is up 4-5 times at night and can go as often as every 20 minutes during the day.  Does not feel as if he is emptying his bladder completely.  He does have a intermittent stream and association of this.  He also has significant urinary urgency.  No burning or pain on urination no fever chills or sweats no flank pain or side pain although he does have some lower lumbar midline tenderness.  Said no new or different medications he drinks some coffee in the morning otherwise has not increased or changed his caffeine intake at all recently.  He has not had a flu shot and will arrange for this today as well.  Past Medical History:   Diagnosis Date     Allergic rhinitis      Atrial fibrillation (H)     paroxysmal     Atrial fibrillation (H)      CAD (coronary artery disease)      Delirium     associated with hospitalization for stroke     Depression      Gastric erosions 1/2014    associated with gi bleed     GERD (gastroesophageal reflux disease)      HTN (hypertension)      Hypercholesteremia      Hyperlipidemia      Hypertension      Left hemiparesis (H)      Obstructive sleep apnea      PTSD (post-traumatic stress disorder)      Sleep apnea      Stroke (H) 1/2014    ischemic CVA with hemorrhagic transformation     Stroke (H)      Past Surgical History:   Procedure Laterality Date     ASD REPAIR       CARDIAC SURGERY  2000    mitral valve repair     CYCLOPHOTOCOAGULATION TRANSSCLERAL WITH MICROPULSE LASER Left 8/24/2020    Procedure: ENDOCYCLOPHOTOCOAGULATION;  Surgeon: Stanford Welsh MD;  Location: [facility]     EP PACEMAKER       EP PACEMAKER INSERT N/A 2/26/2020    Procedure: EP Pacemaker Insertion;  Surgeon: Brandon Gusman MD;  Location: [facility];  Service: Cardiology     IR MISCELLANEOUS PROCEDURE   "2014     PHACOEMULSIFICATION CLEAR CORNEA WITH STANDARD INTRAOCULAR LENS IMPLANT Left 2020    Procedure: PHACOEMULSIFICATION, CATARACT, WITH INTRAOCULAR LENS IMPLANT and goniosynechialysis;  Surgeon: Stanford Welsh MD;  Location: [facility] OR     REPAIR ATRIAL SEPTAL DEFECT       Family History   Problem Relation Age of Onset     Eye Surgery Mother      Albinism Mother      Cervical Cancer Mother      No Known Problems Father      No Known Problems Sister      No Known Problems Brother      No Known Problems Sister      No Known Problems Sister      No Known Problems Sister      No Known Problems Brother          from trauma     Glaucoma No family hx of      Macular Degeneration No family hx of          Exam:  /70 (BP Location: Right arm, Patient Position: Sitting, Cuff Size: Adult Regular)   Pulse 60   Temp 97.9  F (36.6  C) (Oral)   Resp 16   Ht 1.59 m (5' 2.6\")   Wt 68.3 kg (150 lb 9.6 oz)   SpO2 97%   BMI 27.02 kg/m    150 lbs 9.6 oz  The patient is alert, oriented with a clear sensorium.   Skin shows no lesions or rashes and good turgor.   Head is normocephalic and atraumatic.    Neck shows no nodes, thyromegaly.     Lungs are clear.   Heart shows normal S1 and S2 without murmur or gallop.    Extremities show no edema.  Genitalia show normal testes no evidence of inguinal hernia  Rectal shows small smooth prostate without nodules or masses.  Labs reviewed:  Results for orders placed or performed in visit on 22   UA with Micro reflex to Culture     Status: Abnormal    Specimen: Urine, Midstream   Result Value Ref Range    Color Urine Yellow Colorless, Straw, Light Yellow, Yellow    Appearance Urine Clear Clear    Glucose Urine Negative Negative mg/dL    Bilirubin Urine Negative Negative    Ketones Urine 5  (A) Negative mg/dL    Specific Gravity Urine 1.016 1.003 - 1.035    Blood Urine Negative Negative    pH Urine 5.0 5.0 - 7.0    Protein Albumin Urine Negative Negative mg/dL    " Urobilinogen Urine Normal Normal, 2.0 mg/dL    Nitrite Urine Negative Negative    Leukocyte Esterase Urine Negative Negative    Mucus Urine Present (A) None Seen /LPF    RBC Urine 0 <=2 /HPF    WBC Urine 4 <=5 /HPF    Narrative    Urine Culture not indicated           ASSESSMENT  1 LUTS related BPH  2 history of CVA stable neurologically  3 hypertension well controlled  4 hyperlipidemia on atorvastatin  5 GERD  6 atrial fibrillation on coumadin anticoagulation     Plan  We will start him on tamsulosin 0.4 mg at bedtime call with a progress report in the next couple of weeks on this.  Have him follow-up sooner immediately if any new or different symptoms or problems.    Over 30 minutes spent on the day of service in chart review, patient contact, record completion and review and notification of lab reports    This note was completed using Dragon voice recognition software.      Rayo Christiansen MD  General Internal Medicine  [facility]  [phone]

## 2022-01-17 NOTE — PROGRESS NOTES
At the request of Dr. Rayo Christiansen, Ju Edwards received the Influenza Vaccine Fluzone High-Dose Quadrivalent [id number] Formula For 65 yrs of age and older vaccine today in clinic. The flu shot was given under the supervision of Dr. Rayo Christiansen if assistance was needed. The immunization site was cleaned with an alcohol prep wipe. The flu shot was given without incident--see immunization list for administration details. No swelling or redness was observed at the site of injection after the immunization was given. The patient reported to the first floor laboratory for a lipid panel and will come back to clinic after that to review the results of the UA.     Channing Art, EMT at 5:28 PM on 1/17/2022

## 2022-01-17 NOTE — LETTER
1/17/2022      RE: Ju Edwards  [address]  [address]  [place] [address]       HPI  71-year-old presents today for several issues.  He is interested in having his cholesterol rechecked has been fasting for the last 5 to 6 hours.  He also complains of urinary frequency.  He is up 4-5 times at night and can go as often as every 20 minutes during the day.  Does not feel as if he is emptying his bladder completely.  He does have a intermittent stream and association of this.  He also has significant urinary urgency.  No burning or pain on urination no fever chills or sweats no flank pain or side pain although he does have some lower lumbar midline tenderness.  Said no new or different medications he drinks some coffee in the morning otherwise has not increased or changed his caffeine intake at all recently.  He has not had a flu shot and will arrange for this today as well.  Past Medical History:   Diagnosis Date     Allergic rhinitis      Atrial fibrillation (H)     paroxysmal     Atrial fibrillation (H)      CAD (coronary artery disease)      Delirium     associated with hospitalization for stroke     Depression      Gastric erosions 1/2014    associated with gi bleed     GERD (gastroesophageal reflux disease)      HTN (hypertension)      Hypercholesteremia      Hyperlipidemia      Hypertension      Left hemiparesis (H)      Obstructive sleep apnea      PTSD (post-traumatic stress disorder)      Sleep apnea      Stroke (H) 1/2014    ischemic CVA with hemorrhagic transformation     Stroke (H)      Past Surgical History:   Procedure Laterality Date     ASD REPAIR       CARDIAC SURGERY  2000    mitral valve repair     CYCLOPHOTOCOAGULATION TRANSSCLERAL WITH MICROPULSE LASER Left 8/24/2020    Procedure: ENDOCYCLOPHOTOCOAGULATION;  Surgeon: Stanford Welsh MD;  [facility]: [facility]     EP PACEMAKER       EP PACEMAKER INSERT N/A 2/26/2020    Procedure: EP Pacemaker Insertion;  Surgeon: Brandon Gusman  "MD ANKIT;  Location: [facility] Lab;  Service: Cardiology     IR MISCELLANEOUS PROCEDURE  2014     PHACOEMULSIFICATION CLEAR CORNEA WITH STANDARD INTRAOCULAR LENS IMPLANT Left 2020    Procedure: PHACOEMULSIFICATION, CATARACT, WITH INTRAOCULAR LENS IMPLANT and goniosynechialysis;  Surgeon: Stanford Welsh MD;  Location:  OR     REPAIR ATRIAL SEPTAL DEFECT       Family History   Problem Relation Age of Onset     Eye Surgery Mother      Albinism Mother      Cervical Cancer Mother      No Known Problems Father      No Known Problems Sister      No Known Problems Brother      No Known Problems Sister      No Known Problems Sister      No Known Problems Sister      No Known Problems Brother          from trauma     Glaucoma No family hx of      Macular Degeneration No family hx of          Exam:  /70 (BP Location: Right arm, Patient Position: Sitting, Cuff Size: Adult Regular)   Pulse 60   Temp 97.9  F (36.6  C) (Oral)   Resp 16   Ht 1.59 m (5' 2.6\")   Wt 68.3 kg (150 lb 9.6 oz)   SpO2 97%   BMI 27.02 kg/m    150 lbs 9.6 oz  The patient is alert, oriented with a clear sensorium.   Skin shows no lesions or rashes and good turgor.   Head is normocephalic and atraumatic.    Neck shows no nodes, thyromegaly.     Lungs are clear.   Heart shows normal S1 and S2 without murmur or gallop.    Extremities show no edema.  Genitalia show normal testes no evidence of inguinal hernia  Rectal shows small smooth prostate without nodules or masses.  Labs reviewed:  Results for orders placed or performed in visit on 22   UA with Micro reflex to Culture     Status: Abnormal    Specimen: Urine, Midstream   Result Value Ref Range    Color Urine Yellow Colorless, Straw, Light Yellow, Yellow    Appearance Urine Clear Clear    Glucose Urine Negative Negative mg/dL    Bilirubin Urine Negative Negative    Ketones Urine 5  (A) Negative mg/dL    Specific Gravity Urine 1.016 1.003 - 1.035    Blood Urine " Negative Negative    pH Urine 5.0 5.0 - 7.0    Protein Albumin Urine Negative Negative mg/dL    Urobilinogen Urine Normal Normal, 2.0 mg/dL    Nitrite Urine Negative Negative    Leukocyte Esterase Urine Negative Negative    Mucus Urine Present (A) None Seen /LPF    RBC Urine 0 <=2 /HPF    WBC Urine 4 <=5 /HPF    Narrative    Urine Culture not indicated           ASSESSMENT  1 LUTS related BPH  2 history of CVA stable neurologically  3 hypertension well controlled  4 hyperlipidemia on atorvastatin  5 GERD  6 atrial fibrillation on coumadin anticoagulation     Plan  We will start him on tamsulosin 0.4 mg at bedtime call with a progress report in the next couple of weeks on this.  Have him follow-up sooner immediately if any new or different symptoms or problems.    Over 30 minutes spent on the day of service in chart review, patient contact, record completion and review and notification of lab reports    This note was completed using Dragon voice recognition software.      Rayo Christiansen MD  General Internal Medicine  [facility]  [phone]          At the request of Dr. Rayo Christiansen, Ju Edwards received the Influenza Vaccine Fluzone High-Dose Quadrivalent [id number] Formula For 65 yrs of age and older vaccine today in clinic. The flu shot was given under the supervision of Dr. Rayo Christiansen if assistance was needed. The immunization site was cleaned with an alcohol prep wipe. The flu shot was given without incident--see immunization list for administration details. No swelling or redness was observed at the site of injection after the immunization was given. The patient reported to the first floor laboratory for a lipid panel and will come back to clinic after that to review the results of the UA.     Channing Art, EMT at 5:28 PM on 1/17/2022      Rayo Christiansen MD

## 2022-01-18 RX ORDER — CITALOPRAM HYDROBROMIDE 10 MG/1
TABLET ORAL
Qty: 180 TABLET | Refills: 0 | OUTPATIENT
Start: 2022-01-18

## 2022-01-18 NOTE — PROGRESS NOTES
Addendum: Return call received from Leslie--No missed doses, no changes to health, diet, medications, no hold. Confirmed tablet size and dosing since last encounter. Maintenance dose decreased 17.6% per protocol with recheck lab scheduled. LUCY RN         Addendum 1/18/22  Message is left for Leslie requesting that she return call to [facility]. Writer inquires if warfarin is on hold or if patient stopped warfarin. [facility]        ANTICOAGULATION MANAGEMENT     Ju Edwards 71 year old male is on warfarin with subtherapeutic INR result. (Goal INR 2.0-3.0)    Recent labs: (last 7 days)     01/17/22  1743   INR 1.17*       ASSESSMENT     Source(s): Chart Review     Warfarin doses taken: Reviewed in chart  Diet: No new diet changes identified  New illness, injury, or hospitalization: No  Medication/supplement changes: None noted  Signs or symptoms of bleeding or clotting: No  Previous INR: Supratherapeutic  Additional findings: He has not had INR checked since 9/2021.  Left message for Leslie to call the [facility] on 1/18/22--to see if he has taken warfarin or if he is holding for something specific.     PLAN     Recommended plan for no diet, medication or health factor changes affecting INR     Dosing Instructions: Booster dose then continue your current warfarin dose with next INR in 1 week       Summary  As of 1/17/2022    Full warfarin instructions:  1/17: 5 mg; Otherwise 3 mg every Mon, Wed, Fri; 2 mg all other days   Next INR check:  1/18/2022             Detailed voice message left for  Leslie with dosing instructions and follow up date.   On message, recommended that Ju take a 5 mg dose of warfarin today (1/17/22) if he is not holding warfarin for something specific and to call the [facility] 1/18/22 for further instructions.  Need to know if he has taken any warfarin or if he is holding.    Contact [phone] to schedule and with any changes, questions or concerns.     Education provided: Asked that spouse,  Leslie calls the [facility] on 1/18/2022.    Plan made per [facility] anticoagulation protocol    Alice Fish, RN  Anticoagulation Clinic  1/17/2022    _______________________________________________________________________     Anticoagulation Episode Summary     Current INR goal:  2.0-3.0   TTR:  17.3 % (8.1 mo)   Target end date:  Indefinite   Send INR reminders to:  [facility]    Indications    Long term current use of anticoagulant therapy [Z79.01]  Atrial fibrillation (H) [I48.91]  Atrial fibrillation  unspecified type (H) [I48.91]  Paroxysmal atrial fibrillation (H) [I48.0]           Comments:  Speak with Wife Lety [phone]  Pronounced Chance-A-Mout  Pt takes Warfarin dose at 4:30 PM         Anticoagulation Care Providers     Provider Role Specialty Phone number    Rayo Christiansen MD Referring Internal Medicine [phone]

## 2022-01-18 NOTE — TELEPHONE ENCOUNTER
CITALOPRAM HBR 10 MG TABLET   TAKE 2 TABLETS BY MOUTH EVERY DAY  Last Written Prescription Date:  12/1/21  Last Fill Quantity: 180,   # refills: 0  Last Office Visit : 1/17/2022  Future Office visit:  None       Too soon for refill: 12/1/21 citalopram 10 mg #180/ O Rf to [phone]

## 2022-01-19 ENCOUNTER — TELEPHONE (OUTPATIENT)
Dept: INTERNAL MEDICINE | Facility: CLINIC | Age: 72
End: 2022-01-19
Payer: COMMERCIAL

## 2022-01-19 DIAGNOSIS — F32.A DEPRESSION, UNSPECIFIED DEPRESSION TYPE: ICD-10-CM

## 2022-01-19 RX ORDER — CITALOPRAM HYDROBROMIDE 10 MG/1
TABLET ORAL
Qty: 180 TABLET | Refills: 1 | Status: SHIPPED | OUTPATIENT
Start: 2022-01-19 | End: 2022-08-08

## 2022-01-19 NOTE — TELEPHONE ENCOUNTER
[facility] Call Center    Phone Message    May a detailed message be left on voicemail: yes     Reason for Call: Medication Question or concern regarding medication   Prescription Clarification  Name of Medication: citalopram (CELEXA) 10 MG tablet     Prescribing Provider: Dr. Christiansen   Pharmacy: [facility]   What on the order needs clarification? Pt's spouse Lety called and stated the pharmacy will not fill this script for the pt and she would like to know why          Action Taken: Message routed to:  [facility]

## 2022-01-20 ENCOUNTER — TELEPHONE (OUTPATIENT)
Dept: CARDIOLOGY | Facility: CLINIC | Age: 72
End: 2022-01-20
Payer: COMMERCIAL

## 2022-01-20 NOTE — TELEPHONE ENCOUNTER
Attempted to call pt about getting missed appt today rescheduled. No answer but left VM requesting they call to reschedule.     Duc Mart, RN   Cardiology Nurse Coordinator

## 2022-01-25 ENCOUNTER — TELEPHONE (OUTPATIENT)
Dept: CARDIOLOGY | Facility: CLINIC | Age: 72
End: 2022-01-25

## 2022-01-25 ENCOUNTER — ANTICOAGULATION THERAPY VISIT (OUTPATIENT)
Dept: ANTICOAGULATION | Facility: CLINIC | Age: 72
End: 2022-01-25

## 2022-01-25 ENCOUNTER — LAB (OUTPATIENT)
Dept: LAB | Facility: CLINIC | Age: 72
End: 2022-01-25
Payer: COMMERCIAL

## 2022-01-25 DIAGNOSIS — I48.91 ATRIAL FIBRILLATION, UNSPECIFIED TYPE (H): ICD-10-CM

## 2022-01-25 DIAGNOSIS — I48.0 PAROXYSMAL ATRIAL FIBRILLATION (H): ICD-10-CM

## 2022-01-25 DIAGNOSIS — Z79.01 LONG TERM CURRENT USE OF ANTICOAGULANT THERAPY: Primary | ICD-10-CM

## 2022-01-25 DIAGNOSIS — I48.91 ATRIAL FIBRILLATION (H): ICD-10-CM

## 2022-01-25 LAB — INR PPP: 1.22 (ref 0.85–1.15)

## 2022-01-25 PROCEDURE — 36415 COLL VENOUS BLD VENIPUNCTURE: CPT | Performed by: PATHOLOGY

## 2022-01-25 PROCEDURE — 85610 PROTHROMBIN TIME: CPT | Performed by: PATHOLOGY

## 2022-01-25 NOTE — TELEPHONE ENCOUNTER
----- Message from Nara Tavera RN sent at 1/25/2022  8:51 AM CST -----  Regarding: RE: Question  I'd say lets schedule with Dr. Saucedo. With a device check prior!    Duc Mart RN   Cardiology Nurse Coordinator   ----- Message -----  From: Zoila Godinez  Sent: 1/25/2022   8:27 AM CST  To: Zuleika Donovan, Cardiology Ep   Subject: RE: Question                                     Junior .. I just seen that he saw Dr Saucedo for EP 2 years ago, he tech should see her.. I'm going to forward this to the EP     And see what we should do here?  ----- Message -----  From: Zuleika Donovan  Sent: 1/25/2022   8:06 AM CST  To: Zoila Godinez  Subject: RE: Question                                     Yes, but do I need to remove him from the WQ then since it was scheduled?  ----- Message -----  From: Zoila Godinez  Sent: 1/25/2022   6:20 AM CST  To: Zuleika Donovan  Subject: RE: Question                                     Looks like he got scheduled    ----- Message -----  From: Zuleika Donovan  Sent: 1/24/2022   4:15 PM CST  To: Zoila Godinez  Subject: Question                                         This patient had a no show with Dr. Wing that was rescheduled. Will that fulfill the eval referral request?

## 2022-01-25 NOTE — PROGRESS NOTES
ANTICOAGULATION MANAGEMENT     Ju Edwards 71 year old male is on warfarin with subtherapeutic INR result. (Goal INR 2.0-3.0)    Recent labs: (last 7 days)     01/25/22  1138   INR 1.22*       ASSESSMENT     Source(s): Chart Review     Warfarin doses taken: Reviewed in chart  Diet: Unable to assess and request a call back if there is a change   New illness, injury, or hospitalization: No  Medication/supplement changes: None noted  Signs or symptoms of bleeding or clotting: No  Previous INR: Subtherapeutic  Additional findings: None     PLAN     Recommended plan for no diet, medication or health factor changes affecting INR     Dosing Instructions: Booster dose then Increase your warfarin dose (15% change) with next INR in 1 week       Summary  As of 1/25/2022    Full warfarin instructions:  1/25: 6 mg; Otherwise 4 mg every Wed, Sat; 3 mg all other days   Next INR check:  2/1/2022             Detailed voice message left for  Spouse Lety  with dosing instructions and follow up date.     Contact [phone] to schedule and with any changes, questions or concerns.     Education provided: Please call back if any changes to your diet, medications or how you've been taking warfarin and Contact [phone] with any changes, questions or concerns.     Plan made per [facility] anticoagulation protocol    Yanni Up RN  Anticoagulation Clinic  1/25/2022    _______________________________________________________________________     Anticoagulation Episode Summary     Current INR goal:  2.0-3.0   TTR:  17.3 % (8.1 mo)   Target end date:  Indefinite   Send INR reminders to:  [facility]    Indications    Long term current use of anticoagulant therapy [Z79.01]  Atrial fibrillation (H) [I48.91]  Atrial fibrillation  unspecified type (H) [I48.91]  Paroxysmal atrial fibrillation (H) [I48.0]           Comments:  Speak with Wife Lety [phone]  Pronounced Chance-A-Mout  Pt takes Warfarin dose at 4:30 PM          Anticoagulation Care Providers     Provider Role Specialty Phone number    Rayo Christiansen MD Referring Internal Medicine [phone]

## 2022-01-31 ENCOUNTER — DOCUMENTATION ONLY (OUTPATIENT)
Dept: FAMILY MEDICINE | Facility: CLINIC | Age: 72
End: 2022-01-31

## 2022-01-31 NOTE — PROGRESS NOTES
[facility]rophysiology Clinic: New Referral   2022      HISTORY OF PRESENTING ILLNESS:  ***Ju Edwards is a 71 year old male who presents to clinic today to establish care for atrial fibrillation and device management.    The patient has a PMHx significant for paroxysmal atrial flutter and fibrillation since  (s/p RFA CTI in 2013- Dr Marco Antonio Chacon),  surgical ASD repair in ,  hypertension, MCA stroke 3/2014, history of GI bleeding in , and sick sinus syndrome s/p dual-chamber [employer] pacemaker 2020 with Dr uGsman.    Follows with Dr Saucedo from general cardiology.     Device check 2022 (last reset 3/23/2021): showed atrial pacing 89% of the time, 1% V-pacing,     Device 2020:   .  Tachycardias manifest as paroxysmal atrial fibrillation elevated ventricular response  3.  Bradycardias manifest as sick sinus syndrome; sinus arrest in excess of 5 seconds      ROS:  A complete 10-point ROS was negative except as above.    PAST MEDICAL HISTORY:  ***    PAST SURGICAL HISTORY:  ***    MEDICATIONS:      FAMILY HISTORY:  Family History   Problem Relation Age of Onset     Eye Surgery Mother      Albinism Mother      Cervical Cancer Mother      No Known Problems Father      No Known Problems Sister      No Known Problems Brother      No Known Problems Sister      No Known Problems Sister      No Known Problems Sister      No Known Problems Brother          from trauma     Glaucoma No family hx of      Macular Degeneration No family hx of        SOCIAL HISTORY:  Social History     Socioeconomic History     Marital status:      Spouse name: Not on file     Number of children: Not on file     Years of education: Not on file     Highest education level: Not on file   Occupational History     Not on file   Tobacco Use     Smoking status: Never Smoker     Smokeless tobacco: Never Used   Substance and Sexual Activity     Alcohol use: No     Drug use: No      Sexual activity: Yes     Partners: Female   Other Topics Concern      Service Not Asked     Blood Transfusions Not Asked     Caffeine Concern Not Asked     Occupational Exposure Not Asked     Hobby Hazards Not Asked     Sleep Concern Not Asked     Stress Concern Not Asked     Weight Concern Not Asked     Special Diet No     Back Care Not Asked     Exercise Yes     Comment: rehab     Bike Helmet Not Asked     Seat Belt Not Asked     Self-Exams Not Asked   Social History Narrative     Not on file     Social Determinants of Health     Financial Resource Strain: Not on file   Food Insecurity: Not on file   Transportation Needs: Not on file   Physical Activity: Not on file   Stress: Not on file   Social Connections: Not on file   Intimate Partner Violence: Not on file   Housing Stability: Not on file       MEDICATIONS:  Current Outpatient Medications   Medication     amLODIPine (NORVASC) 5 MG tablet     atenolol (TENORMIN) 50 MG tablet     atorvastatin (LIPITOR) 80 MG tablet     brimonidine (ALPHAGAN-P) 0.15 % ophthalmic solution     Cholecalciferol (VITAMIN D3) 50 MCG (2000 UT) CAPS     citalopram (CELEXA) 10 MG tablet     COMPRESSION STOCKINGS     cyclobenzaprine (FLEXERIL) 5 MG tablet     docusate sodium (STOOL SOFTENER) 100 MG capsule     dorzolamide-timolol (COSOPT) 2-0.5 % ophthalmic solution     emollient (VANICREAM) cream     flecainide (TAMBOCOR) 150 MG tablet     latanoprost (XALATAN) 0.005 % ophthalmic solution     lisinopril (ZESTRIL) 20 MG tablet     neomycin-polymixin-dexamethasone (MAXITROL) 0.1 % ophthalmic suspension     neomycin-polymyxin-dexamethasone (MAXITROL) 3.5-31626-1.1 ophthalmic ointment     nitroGLYcerin (NITROSTAT) 0.4 MG sublingual tablet     pantoprazole (PROTONIX) 40 MG EC tablet     Sennosides (SENNA LAX PO)     sodium chloride ([employer] SALINE NASAL SPRAY) 0.65 % nasal spray     tamsulosin (FLOMAX) 0.4 MG capsule     warfarin ANTICOAGULANT (COUMADIN) 2 MG tablet     No current  "facility-administered medications for this visit.       ALLERGIES:   No Known Allergies    PHYSICAL EXAM:  /64 (BP Location: Left arm, Patient Position: Sitting, Cuff Size: Adult Regular)   Pulse 86   Ht 1.605 m (5' 3.19\")   Wt 68 kg (149 lb 14.4 oz)   SpO2 99%   BMI 26.39 kg/m      Gen: alert, interactive, NAD  HEENT: atraumatic, EOMI, MMM  Neck: supple, no JVP elevation appreciated.  CV: RRR, no murmurs, rubs.  Chest: CTAB, no wheezes or crackles  Abd: soft, NT, ND, +BS  Ext: no LE edema, 2+ peripheral pulses  Skin: warm and dry, no rashes on exposed surfaces  Neuro: alert and oriented, no focal deficits    LABS:    CMP  Last Comprehensive Metabolic Panel:  Sodium   Date Value Ref Range Status   06/18/2021 140 133 - 144 mmol/L Final     Potassium   Date Value Ref Range Status   06/18/2021 4.4 3.4 - 5.3 mmol/L Final     Chloride   Date Value Ref Range Status   06/18/2021 106 94 - 109 mmol/L Final     Carbon Dioxide   Date Value Ref Range Status   06/18/2021 28 20 - 32 mmol/L Final     Anion Gap   Date Value Ref Range Status   06/18/2021 6 3 - 14 mmol/L Final     Glucose   Date Value Ref Range Status   06/18/2021 94 70 - 99 mg/dL Final     Urea Nitrogen   Date Value Ref Range Status   06/18/2021 14 7 - 30 mg/dL Final     Creatinine   Date Value Ref Range Status   06/18/2021 0.98 0.66 - 1.25 mg/dL Final     GFR Estimate   Date Value Ref Range Status   06/18/2021 79 >60 mL/min/[1.73_m2] Final     Comment:     Non  GFR Calc  Starting 12/18/2018, serum creatinine based estimated GFR (eGFR) will be   calculated using the Chronic Kidney Disease Epidemiology Collaboration   (CKD-EPI) equation.       Calcium   Date Value Ref Range Status   06/18/2021 9.3 8.5 - 10.1 mg/dL Final     Bilirubin Total   Date Value Ref Range Status   06/18/2021 0.6 0.2 - 1.3 mg/dL Final     Alkaline Phosphatase   Date Value Ref Range Status   06/18/2021 104 40 - 150 U/L Final     ALT   Date Value Ref Range Status "   06/18/2021 31 0 - 70 U/L Final     AST   Date Value Ref Range Status   06/18/2021 24 0 - 45 U/L Final       CBC  CBC RESULTS:   Recent Labs   Lab Test 06/18/21  1344   WBC 5.8   RBC 4.61   HGB 12.8*   HCT 38.8*   MCV 84   MCH 27.8   MCHC 33.0   RDW 14.4          TROP  No results found for: TROPI, TROPONIN, TROPR, TROPN    LIPIDS  Recent Labs   Lab Test 01/17/22  1743 06/15/20  1119 06/01/16  1316 04/02/15  0818 04/29/14  1052   CHOL 181 176   < > 196 171   HDL 36* 32*   < > 38* 45   LDL 98 74   < > 115 75   TRIG 235* 352*   < > 216* 257*   CHOLHDLRATIO  --   --   --  5.2* 3.8    < > = values in this interval not displayed.       TSH  TSH   Date Value Ref Range Status   09/26/2018 0.59 0.40 - 4.00 mU/L Final       HBA1C  Lab Results   Component Value Date    A1C 6.5 04/30/2019    A1C 6.1 05/29/2018    A1C 5.9 10/10/2016    A1C 5.8 04/02/2015       CARDIAC DATA:  ***  Echo 2/2020:   Normal left ventricular size with mild hypertrophy.    Left ventricle ejection fraction is normal. The calculated left ventricular ejection fraction is 68%.    Normal right ventricular size. The systolic function is mildly reduced. TAPSE is abnormal, which is consistent with abnormal right ventricular systolic function.    No hemodynamically significant valvular heart abnormalities.    No previous study for comparison.    Lexican 9/2016:   Normal pharmacologic stress perfusion imaging study.    No significant ischemia was suggested by this study.    Normal left ventricular size and systolic function with a visually estimated LVEF > 65%.     Coronary angiogram 9/27/2012:       The LMCA is free of significant disease.     The LAD is free of significant disease.     The Circumflex is free of significant disease and is non-dominant.     The RCA is normal.       ASSESSMENT/PLAN:  ***Ju Edwards is a 71 year old male who presents to clinic today to establish care for *** / for ongoing evaluation and management of ***.    1.  ***  2. ***  3. ***    - ***  - ***    Pt seen and discussed with  ***.    Janie Crystal MD,   Cardiovascular Disease Fellow  Pager [phone]

## 2022-02-02 NOTE — PROGRESS NOTES
"COVID-19 Emergency Protocol  Annual health risk assessment completed per telephone. Present on the call were client, [facility] care coordinator, and .    Current situation/living environment  Client lives in an apartment with his wife, Lety. She helps with ADL's & IADL's as needed.    Activities of daily living (ADL)/instrumental activities of daily living (IADL) and functional issues  Client needs help with the following ADL's: Dressing, grooming, bathing,   Client needs help with the following IADL's: Shopping, meal preparation, housekeeping, medications, paperwork, transportation  Client states he is unable to perform the above due to weakness/numbness left side post-CVA, decreased endurance r/t cardiac issues, language barrier.    Health concerns for today  Client states his primary health concerns are \"I have a pacemaker and I had a stroke. Pacemaker check is done at clinic. On Coumadin - INR check q2-3 weeks. Reports chest pain with exertion - has NTG. Reports numbness in extremities, rigoberto. left side, post-CVA (2013).  Has patient fallen 2 or more times in the last year? Yes  Has patient fallen with injury in the last year? No  Client states he has had no falls to the floor, but rather \"slid off the side of the bed\" 3x.    Cognition/mental health  Client states his memory is poor, and that his emotional health is \"Up & down. Sometimes I feel bad, tired, depressed. If I hear something I don't like, it irritates me. I am on medication for that.\" States he receives no professional  care, but [facility] helps.    STARS/Med Adherence  Client is non-compliant with the following quality measures: None  Comments:     Client's Plan of Care consists of:  Client receives adult day services under [facility] @ [facility]    Mellisa Strickland  [facility] Care Coordinator, RN  [phone]    "

## 2022-02-03 ENCOUNTER — OFFICE VISIT (OUTPATIENT)
Dept: CARDIOLOGY | Facility: CLINIC | Age: 72
End: 2022-02-03
Attending: INTERNAL MEDICINE
Payer: COMMERCIAL

## 2022-02-03 VITALS
HEART RATE: 86 BPM | SYSTOLIC BLOOD PRESSURE: 123 MMHG | WEIGHT: 149.9 LBS | OXYGEN SATURATION: 99 % | BODY MASS INDEX: 26.56 KG/M2 | HEIGHT: 63 IN | DIASTOLIC BLOOD PRESSURE: 64 MMHG

## 2022-02-03 DIAGNOSIS — Z98.890 S/P ABLATION OF ATRIAL FLUTTER: ICD-10-CM

## 2022-02-03 DIAGNOSIS — I48.0 PAROXYSMAL ATRIAL FIBRILLATION (H): Primary | ICD-10-CM

## 2022-02-03 DIAGNOSIS — I45.10 RBBB: ICD-10-CM

## 2022-02-03 DIAGNOSIS — Z86.79 S/P ABLATION OF ATRIAL FLUTTER: ICD-10-CM

## 2022-02-03 DIAGNOSIS — Q21.11 OSTIUM SECUNDUM TYPE ATRIAL SEPTAL DEFECT: ICD-10-CM

## 2022-02-03 DIAGNOSIS — I49.5 TACHY-BRADY SYNDROME (H): ICD-10-CM

## 2022-02-03 DIAGNOSIS — I63.311 CEREBROVASCULAR ACCIDENT (CVA) DUE TO THROMBOSIS OF RIGHT MIDDLE CEREBRAL ARTERY (H): ICD-10-CM

## 2022-02-03 PROCEDURE — 99215 OFFICE O/P EST HI 40 MIN: CPT | Performed by: INTERNAL MEDICINE

## 2022-02-03 PROCEDURE — G0463 HOSPITAL OUTPT CLINIC VISIT: HCPCS | Mod: 25

## 2022-02-03 PROCEDURE — 93005 ELECTROCARDIOGRAM TRACING: CPT

## 2022-02-03 ASSESSMENT — MIFFLIN-ST. JEOR: SCORE: 1343.07

## 2022-02-03 ASSESSMENT — PAIN SCALES - GENERAL: PAINLEVEL: NO PAIN (0)

## 2022-02-03 NOTE — LETTER
2/3/2022       RE: Ju Edwards  [address]  [address]  [place] [address]       Dear Colleague,    Thank you for the opportunity to participate in the care of your patient, Ju Edwards, at the [facility] [place] at [facility]. Please see a copy of my visit note below.    I am delighted to see Ju Edwards as a new patient in cardiology clinic for evaluation of atrial fibrillation and pacemaker.    History of Present Illness:  The patient is a 71 year old  Male with paroxysmal AF and tachy-armond syndrome s/pe dual chamber pacemaker in 2020. He last saw Dr. Saucedo in 11/2020. He was referred here for follow up of his pacemaker. The office today is done with the assistance of a Setswana  via video.    The patient reports no palpitations, dizziness, syncope. He has no complaints. He did not bring his medications and but says he is still on flecainide. He follows in the anticoagulation clinic for his INRs. He has no bleeding in his urine or stools. He is active, had no physical limitations, and no falls.     Past Medical History:  Atrial fibrillation, paroxysmal  Atrial flutter s/p CTI ablation 2013 [facility]  Sick sinus syndrome s/p pacemaker Feb 2020, [facility]  ASD repair 2000  MCA stroke 2014  GI bleed 2014  H. Pylori 2013  Hypertension    Medications:   Amlodipine 5 mg every day  Atenolol 50 every day  Atorvastatin 80 every day  Flecainide 150 bid  Lisinopril 20 every day  Warfarin    Tamsulosin  Pantoprazole  Flexeril  Celexa    Allergies:  No Known Allergies    Physical examination  Vitals:  123/64, HR 86  BMI= 26.39 (68 kg)  Constitutional: In general, the patient is a pleasant male in no acute distress.    Cardiovascular: Carotids +2/2 bilaterally without bruits.  No jugular venous distension. Regular rate and rhythm. Normal S1, S2. No murmur, rub, click, or gallop.   Extremities: Pulses are  normal bilaterally throughout. No peripheral edema.  Respiratory: Clear to asculation.  No ronchi, wheezes, rales.  No dullness to percussion.   Chest area: midsternotomy scar; left pacemaker incision well-healed      I have personally and independently reviewed the following:  Labs:   1/25/2022: INR 1.22  6/18/2021: hgb 12.8, plt 226K, K 4.4, cr 0.98    Echo 2/21/2020 [facility]: EF 68%    Angiogram 9/17/2012 at [facility]: no CAD    Stress test nuclear lexiscan 9/29/2016 [facility]:  Normal EF, no ischemia    EKG today 2/3/2022: APVS, RBBB,  ms, no change from 10/27/2020    Ambulatory monitor 2/2020: frequent AF with RVR with pauses up to 3.8 seconds    Device interrogation remote 1/11/2022:  [employer] [id number] implanted 2/26/2020  RA [employer] 7740  RV [employer] 7741  DDDR  bpm, /150; AP 89%,  1%  All parameters stable. No significant AF    Assessment :  1. Tachy-armond syndrome s/p dual chamber pacemaker. Device is in excellent order. He is not pacemaker dependent.  2. Atrial fibrillation, paroxysmal, on flecainide 150 bid and atenolol 50 every day without significant episodes of recurrent AF documented on pacemaker. [id number] score is 4 for age >65, HTN, CVA. He is on warfarin with subtherapeutic INRs, being adjusted by anticoagulation clinic.  3. H/o ASD repair, preserved LV EF. No evidence of CAD. Should be ok to stay on flecainide.  4. Hypertension. Controlled    Plan:  Remote monitor of pacemaker every 3 months  INR goal 2-3  Continue all current meds  Return to see me and device clinic 1 year for EKG on flecainide    *Note: patient isn't sure about his meds, his wife manages them and she's not here. He had a flecainide ordered for 3 months by PCP in 9/2021 - will check to see if he's taking this.    I spent a total of 30 minutes face to face with  Ju Edwards during today's office visit. I have spend an additional 30 minutes today on chart review and  documentation.      The patient is to return as above . The patient understood the treatment plan as outlined above.  There were no barriers to learning.      Beatriz Wing MD

## 2022-02-03 NOTE — PATIENT INSTRUCTIONS
You were seen in the Electrophysiology Clinic today by: Dr Beatriz Wing    Plan:     Medication Changes: none      Labs/Tests Needed: none      Follow up visit: 1 year with me and device clinic      Further Instructions: none      Your Care Team:  EP Cardiology   Telephone Number     Nurse Line  Duc Mart RN  [phone]     For scheduling appts or procedures:    Gricelda Gil   [phone]   For the Device Clinic (Pacemakers, ICDs, Loop Recorders)    During business hours: [phone]  After business hours:   [phone]- select option 4 and ask for job code [id number].     On-call cardiologist for after hours or on weekends: [phone], option #4, and ask to speak to the on-call cardiologist.     Cardiovascular Clinic:   [address] [place] [address]. [place] [address]      As always, Thank you for trusting us with your health care needs!

## 2022-02-03 NOTE — NURSING NOTE
Chief Complaint   Patient presents with     New Patient     Dr. Beatriz Wing: NEW      Vitals were taken, medications reconciled, and EKG performed.    Antonio Combs  1:46 PM

## 2022-02-03 NOTE — PROGRESS NOTES
I am delighted to see Ju Edwards as a new patient in cardiology clinic for evaluation of atrial fibrillation and pacemaker.    History of Present Illness:  The patient is a 71 year old  Male with paroxysmal AF and tachy-armond syndrome s/pe dual chamber pacemaker in 2020. He last saw Dr. Saucedo in 11/2020. He was referred here for follow up of his pacemaker. The office today is done with the assistance of a Bulgarian  via video.    The patient reports no palpitations, dizziness, syncope. He has no complaints. He did not bring his medications and but says he is still on flecainide. He follows in the anticoagulation clinic for his INRs. He has no bleeding in his urine or stools. He is active, had no physical limitations, and no falls.     Past Medical History:  Atrial fibrillation, paroxysmal  Atrial flutter s/p CTI ablation 2013 [facility]  Sick sinus syndrome s/p pacemaker Feb 2020, [facility]  ASD repair 2000  MCA stroke 2014  GI bleed 2014  H. Pylori 2013  Hypertension    Medications:   Amlodipine 5 mg every day  Atenolol 50 every day  Atorvastatin 80 every day  Flecainide 150 bid  Lisinopril 20 every day  Warfarin    Tamsulosin  Pantoprazole  Flexeril  Celexa    Allergies:  No Known Allergies    Physical examination  Vitals:  123/64, HR 86  BMI= 26.39 (68 kg)  Constitutional: In general, the patient is a pleasant male in no acute distress.    Cardiovascular: Carotids +2/2 bilaterally without bruits.  No jugular venous distension. Regular rate and rhythm. Normal S1, S2. No murmur, rub, click, or gallop.   Extremities: Pulses are normal bilaterally throughout. No peripheral edema.  Respiratory: Clear to asculation.  No ronchi, wheezes, rales.  No dullness to percussion.   Chest area: midsternotomy scar; left pacemaker incision well-healed      I have personally and independently reviewed the following:  Labs:   1/25/2022: INR 1.22  6/18/2021: hgb 12.8, plt 226K, K 4.4, cr 0.98    Echo 2/21/2020  [facility]: EF 68%    Angiogram 9/17/2012 at [facility]: no CAD    Stress test nuclear lexiscan 9/29/2016 [facility]:  Normal EF, no ischemia    EKG today 2/3/2022: APVS, RBBB,  ms, no change from 10/27/2020    Ambulatory monitor 2/2020: frequent AF with RVR with pauses up to 3.8 seconds    Device interrogation remote 1/11/2022:  [employer] [id number] implanted 2/26/2020  RA [employer] Sci 7740  RV [employer] 7741  DDDR  bpm, /150; AP 89%,  1%  All parameters stable. No significant AF    Assessment :  1. Tachy-armond syndrome s/p dual chamber pacemaker. Device is in excellent order. He is not pacemaker dependent.  2. Atrial fibrillation, paroxysmal, on flecainide 150 bid and atenolol 50 every day without significant episodes of recurrent AF documented on pacemaker. [id number] score is 4 for age >65, HTN, CVA. He is on warfarin with subtherapeutic INRs, being adjusted by anticoagulation clinic.  3. H/o ASD repair, preserved LV EF. No evidence of CAD. Should be ok to stay on flecainide.  4. Hypertension. Controlled    Plan:  Remote monitor of pacemaker every 3 months  INR goal 2-3  Continue all current meds  Return to see me and device clinic 1 year for EKG on flecainide    *Note: patient isn't sure about his meds, his wife manages them and she's not here. He had a flecainide ordered for 3 months by PCP in 9/2021 - will check to see if he's taking this.    I spent a total of 30 minutes face to face with  Ju Edwards during today's office visit. I have spend an additional 30 minutes today on chart review and documentation.      The patient is to return as above . The patient understood the treatment plan as outlined above.  There were no barriers to learning.      Beatriz Wing MD     Addendum: confirmed with pharmacy that he is on flecainide 150 mg bid and just received a 3-month supply.  AVEL  2/4/2022

## 2022-02-04 ENCOUNTER — TELEPHONE (OUTPATIENT)
Dept: CARDIOLOGY | Facility: CLINIC | Age: 72
End: 2022-02-04
Payer: COMMERCIAL

## 2022-02-04 LAB
ATRIAL RATE - MUSE: 86 BPM
DIASTOLIC BLOOD PRESSURE - MUSE: NORMAL MMHG
INTERPRETATION ECG - MUSE: NORMAL
P AXIS - MUSE: 19 DEGREES
PR INTERVAL - MUSE: 328 MS
QRS DURATION - MUSE: 132 MS
QT - MUSE: 422 MS
QTC - MUSE: 504 MS
R AXIS - MUSE: 126 DEGREES
SYSTOLIC BLOOD PRESSURE - MUSE: NORMAL MMHG
T AXIS - MUSE: 43 DEGREES
VENTRICULAR RATE- MUSE: 86 BPM

## 2022-02-04 NOTE — TELEPHONE ENCOUNTER
Confirmed with pharmacy that medication was last filled on 1/14/22 for a 3 month supply. Usha Epps RN on 2/4/2022 at 9:54 AM    ----- Message from Beatriz Wing MD sent at 2/3/2022 11:23 PM CST -----  Can you check on his flecainide for me? We did visit through  and patient didn't know his meds, says his wife manages it and of course she wasn't here. He is listed as being on flecainide 150 bid, there's a script for 3 months supply ordered by PCP in 9/2021 with no refills, and if he's on it he should be out. Might have to call pharmacy or his wife.

## 2022-02-07 ENCOUNTER — TELEPHONE (OUTPATIENT)
Dept: ANTICOAGULATION | Facility: CLINIC | Age: 72
End: 2022-02-07
Payer: COMMERCIAL

## 2022-02-07 NOTE — TELEPHONE ENCOUNTER
ANTICOAGULATION     Ju Edwards is overdue for INR check.      Left message for patient to call and schedule lab appointment as soon as possible. If returning call, please schedule.   ID [id number].     GRANT CORTEZ RN

## 2022-02-18 ENCOUNTER — TELEPHONE (OUTPATIENT)
Dept: ANTICOAGULATION | Facility: CLINIC | Age: 72
End: 2022-02-18
Payer: COMMERCIAL

## 2022-02-24 DIAGNOSIS — Z00.00 ROUTINE HEALTH MAINTENANCE: ICD-10-CM

## 2022-02-28 NOTE — TELEPHONE ENCOUNTER
flecainide (TAMBOCOR) 150 MG tablet   Last Written Prescription Date:  9/2/21  Last Fill Quantity: 180,   # refills: 0  Last Office Visit :1/17/22  Future Office visit:  None      Routing refill request to provider for review/approval because:needs provider review.

## 2022-03-01 RX ORDER — FLECAINIDE ACETATE 150 MG/1
TABLET ORAL
Qty: 180 TABLET | Refills: 1 | Status: SHIPPED | OUTPATIENT
Start: 2022-03-01 | End: 2022-10-20

## 2022-03-17 DIAGNOSIS — I10 ESSENTIAL HYPERTENSION: ICD-10-CM

## 2022-03-22 RX ORDER — AMLODIPINE BESYLATE 5 MG/1
5 TABLET ORAL DAILY
Qty: 90 TABLET | Refills: 3 | Status: SHIPPED | OUTPATIENT
Start: 2022-03-22 | End: 2023-03-28

## 2022-03-22 NOTE — TELEPHONE ENCOUNTER
AMLODIPINE BESYLATE 5 MG TAB    Last Written Prescription Date:  10/20/2021  Last Fill Quantity: 90,   # refills: 0  Last Office Visit :  1/17/2022  Future Office visit:  None  90 Tabs, 3 Refills sent to pharm 3/22/2022      Geno Adan RN  Central Triage Red Flags/Med Refills

## 2022-03-23 ENCOUNTER — DOCUMENTATION ONLY (OUTPATIENT)
Dept: ANTICOAGULATION | Facility: CLINIC | Age: 72
End: 2022-03-23
Payer: COMMERCIAL

## 2022-03-23 NOTE — LETTER
[facility]  [address] [facility] [address]  [phone]              Ju Edwards  [address]  [address]  [place] [address]    : 1950  MRN:  [id number]      2022      You are currently under the care of [facility] for your warfarin (Coumadin ) therapy.  We are contacting you because our records show you were due for an INR on 22.    There are potentially serious risks when taking warfarin without careful monitoring and we want to make sure you are safely managed.  Routine INR monitoring is required for warfarin refills.     Please call [phone] as soon as possible to schedule an appointment.  If there has been a change in your care or other concerns, please let us know so we can help and or update our records.     Sincerely,       [facility]

## 2022-03-23 NOTE — PROGRESS NOTES
ANTICOAGULATION     Ju Edwards is overdue for INR check.      Reminder letter sent    GRANT CORTEZ RN

## 2022-04-06 ENCOUNTER — OFFICE VISIT (OUTPATIENT)
Dept: INTERNAL MEDICINE | Facility: CLINIC | Age: 72
End: 2022-04-06
Payer: COMMERCIAL

## 2022-04-06 DIAGNOSIS — R39.9 LOWER URINARY TRACT SYMPTOMS (LUTS): ICD-10-CM

## 2022-04-06 DIAGNOSIS — I10 ESSENTIAL HYPERTENSION: Primary | ICD-10-CM

## 2022-04-06 PROCEDURE — 99213 OFFICE O/P EST LOW 20 MIN: CPT | Mod: 25 | Performed by: INTERNAL MEDICINE

## 2022-04-06 PROCEDURE — 0064A COVID-19,PF,MODERNA (18+ YRS BOOSTER .25ML): CPT | Performed by: INTERNAL MEDICINE

## 2022-04-06 PROCEDURE — 91306 COVID-19,PF,MODERNA (18+ YRS BOOSTER .25ML): CPT | Performed by: INTERNAL MEDICINE

## 2022-04-06 NOTE — PROGRESS NOTES
HPI  71-year-old returns today for follow-up regarding his tamsulosin improved.  States that he is had better urine flow and will continue with this.  Is also concerned about his toenails.  He has difficulty trimming them and he has excessive material underneath the toenails which is causing some minor annoyance  Past Medical History:   Diagnosis Date     Allergic rhinitis      Atrial fibrillation (H)     paroxysmal     Atrial fibrillation (H)      CAD (coronary artery disease)      Delirium     associated with hospitalization for stroke     Depression      Gastric erosions 1/2014    associated with gi bleed     GERD (gastroesophageal reflux disease)      HTN (hypertension)      Hypercholesteremia      Hyperlipidemia      Hypertension      Left hemiparesis (H)      Obstructive sleep apnea      PTSD (post-traumatic stress disorder)      Sleep apnea      Stroke (H) 1/2014    ischemic CVA with hemorrhagic transformation     Stroke (H)      Past Surgical History:   Procedure Laterality Date     ASD REPAIR       CARDIAC SURGERY  2000    mitral valve repair     CYCLOPHOTOCOAGULATION TRANSSCLERAL WITH MICROPULSE LASER Left 8/24/2020    Procedure: ENDOCYCLOPHOTOCOAGULATION;  Surgeon: Stanford Welsh MD;  Location: [facility] OR     EP PACEMAKER       EP PACEMAKER INSERT N/A 2/26/2020    Procedure: EP Pacemaker Insertion;  Surgeon: Brandon Gusman MD;  Location: [facility] Cath Lab;  Service: Cardiology     IR MISCELLANEOUS PROCEDURE  1/22/2014     PHACOEMULSIFICATION CLEAR CORNEA WITH STANDARD INTRAOCULAR LENS IMPLANT Left 8/24/2020    Procedure: PHACOEMULSIFICATION, CATARACT, WITH INTRAOCULAR LENS IMPLANT and goniosynechialysis;  Surgeon: Stanford Welsh MD;  Location: [facility] OR     REPAIR ATRIAL SEPTAL DEFECT       Family History   Problem Relation Age of Onset     Eye Surgery Mother      Albinism Mother      Cervical Cancer Mother      No Known Problems Father      No Known Problems Sister      No Known Problems  Brother      No Known Problems Sister      No Known Problems Sister      No Known Problems Sister      No Known Problems Brother          from trauma     Glaucoma No family hx of      Macular Degeneration No family hx of          Exam:  There were no vitals taken for this visit.  0 lbs 0 oz  The patient is alert, oriented with a clear sensorium.   Skin shows no lesions or rashes and good turgor.   Head is normocephalic and atraumatic.    Neck shows no nodes, thyromegaly.     Lungs are clear.   Heart shows normal S1 and S2 without murmur or gallop.    Extremities show no edema.  Bilateral onychomycosis of both great toenails      ASSESSMENT  1 Onychomycosis both great toe nails  2 LUTS related BPH improved on tamsulosin  3 hypertension well controlled  4 hyperlipidemia on atorvastatin  6 history of CVA stable neurologically  7 atrial fibrillation on coumadin anticoagulation     Plan  Discussed treatment of toes and he declines.  We will continue the tamsulosin update his immunizations with a COVID booster today follow-up for physical examination end of the year  This note was completed using Dragon voice recognition software.      Rayo Christiansen MD  General Internal Medicine  [facility]  [phone]

## 2022-04-21 DIAGNOSIS — R04.0 EPISTAXIS: ICD-10-CM

## 2022-04-25 RX ORDER — ECHINACEA PURPUREA EXTRACT 125 MG
TABLET ORAL
Qty: 44 ML | Refills: 11 | Status: SHIPPED | OUTPATIENT
Start: 2022-04-25 | End: 2024-02-24

## 2022-04-25 NOTE — TELEPHONE ENCOUNTER
[facility] SALINE 0.65% NASAL SPRAY      Last Written Prescription Date:  11/3/21  Last Fill Quantity: 44 ml,   # refills: 2  Last Office Visit : 4/6/22  Future Office visit:  10/24/22    Routing refill request to provider for review/approval because:  Drug not on primary care refill protocol

## 2022-05-02 NOTE — PROGRESS NOTES
5/20/21 Addendum:  Leslie called back.  She reports Ju did not miss any doses of warfarin.  He may have eaten more greens than he usually does.  He did take the 3 mg dose of warfarin last night.  He will recheck INR 5/24/21.  She states his finger is healing.  They will monitor the finger, making sure it does not get more painful or swollen--he will need to be seen in does not improve/worsens.  Alice Fish RN     Yanira Preciado (PCP)

## 2022-05-11 ENCOUNTER — TELEPHONE (OUTPATIENT)
Dept: ANTICOAGULATION | Facility: CLINIC | Age: 72
End: 2022-05-11
Payer: COMMERCIAL

## 2022-05-11 NOTE — TELEPHONE ENCOUNTER
Anticoagulation clinic notificiation    Dr. Christiansen,    Your patient, Ju Edwards,  is past due for an INR check  The patient s last INR was 1.22 on 1/25/2022 and was due to come back on 2/1/2022.    Ju Guzmansanamelizabeth received phone calls and letters over the last several weeks in attempt to arrange a follow up appointment.  Toshiaut Jerry will be sent another letter today.     No action is required from you unless you have additional information or if you would like to reach out personally to Ju Edwards.    Please don t hesitate to contact the Anticoagulation Management Program at [phone] if you have any questions or concerns.     Sincerely,     [facility] Anticoagulation Management Program

## 2022-05-11 NOTE — LETTER
[facility] ANTICOAGULATION [facility]  [address] [address]  [phone]              Ju Edwards  [address]  [address]  [place] [address]  : 1950  MRN:  [id number]      May 11, 2022    You are currently under the care of [facility] Anticoagulation Management Program for your warfarin (Coumadin ) therapy.  We are contacting you because our records show you were due for an INR on 22.    There are potentially serious risks when taking warfarin without careful monitoring and we want to make sure you are safely managed.  Routine INR monitoring is required for warfarin refills.     Please call [phone] as soon as possible to schedule an appointment.  If there has been a change in your care or other concerns, please let us know so we can help and or update our records.     Sincerely,       [facility] Anticoagulation Management Program

## 2022-06-08 ENCOUNTER — TELEPHONE (OUTPATIENT)
Dept: ANTICOAGULATION | Facility: CLINIC | Age: 72
End: 2022-06-08
Payer: COMMERCIAL

## 2022-06-08 NOTE — TELEPHONE ENCOUNTER
ANTICOAGULATION MANAGEMENT PROGRAM    Dr. Christiansen,     Our records indicate that Ju Edwards remains past due to check INR. Ju Edwards was contacted multiple times over at least the last 8 weeks to attempt to arrange a follow up appointment.    Ju Edwards last had an INR checked on 1/25/22 and was due for follow up on 2/1/22     Called patient,Reminder letter sent     At this time Ju Edwards will be moved to noncompliant status within the program until their referral expires on 6/20/22. While in noncompliant status the patient would continue to be contacted every 6 weeks by the anticoagulation program to attempt to schedule patient. You will be notified of each contact attempt to make you aware of patient's ongoing noncompliance.        Thank you,     [facility] Anticoagulation Management Program

## 2022-06-08 NOTE — LETTER
[facility] ANTICOAGULATION CLINIC  [address] [facility] [address]  [phone]              Ju Edwards  [address]  [address]  [place] [address]  : 1950  MRN:  [id number]      2022    You are currently under the care of [facility] Anticoagulation Management Program for your warfarin (Coumadin , Jantoven ) therapy.  We are contacting you because our records show you were due for an INR on 22.    There are potentially serious risks when taking warfarin without careful monitoring and we want to make sure you are safely managed.  Routine lab monitoring is required for warfarin refills.     Please call [phone] as soon as possible to schedule an appointment.  If there has been a change in your care or other concerns, please let us know so we can help and or update our records.     Sincerely,       [facility] Anticoagulation Management Program

## 2022-06-11 DIAGNOSIS — K21.00 GASTROESOPHAGEAL REFLUX DISEASE WITH ESOPHAGITIS: ICD-10-CM

## 2022-06-13 ENCOUNTER — ANCILLARY PROCEDURE (OUTPATIENT)
Dept: CARDIOLOGY | Facility: CLINIC | Age: 72
End: 2022-06-13
Attending: INTERNAL MEDICINE
Payer: COMMERCIAL

## 2022-06-13 DIAGNOSIS — I49.5 SICK SINUS SYNDROME (H): ICD-10-CM

## 2022-06-13 DIAGNOSIS — Z95.0 PACEMAKER: Primary | ICD-10-CM

## 2022-06-13 LAB
MDC_IDC_EPISODE_DTM: NORMAL
MDC_IDC_EPISODE_DURATION: 1 S
MDC_IDC_EPISODE_DURATION: 12 S
MDC_IDC_EPISODE_DURATION: 2 S
MDC_IDC_EPISODE_DURATION: 4 S
MDC_IDC_EPISODE_DURATION: 557 S
MDC_IDC_EPISODE_DURATION: 8 S
MDC_IDC_EPISODE_DURATION: NORMAL S
MDC_IDC_EPISODE_ID: NORMAL
MDC_IDC_EPISODE_TYPE: NORMAL
MDC_IDC_LEAD_IMPLANT_DT: NORMAL
MDC_IDC_LEAD_IMPLANT_DT: NORMAL
MDC_IDC_LEAD_LOCATION: NORMAL
MDC_IDC_LEAD_LOCATION: NORMAL
MDC_IDC_LEAD_LOCATION_DETAIL_1: NORMAL
MDC_IDC_LEAD_LOCATION_DETAIL_1: NORMAL
MDC_IDC_LEAD_MFG: NORMAL
MDC_IDC_LEAD_MFG: NORMAL
MDC_IDC_LEAD_MODEL: NORMAL
MDC_IDC_LEAD_MODEL: NORMAL
MDC_IDC_LEAD_POLARITY_TYPE: NORMAL
MDC_IDC_LEAD_POLARITY_TYPE: NORMAL
MDC_IDC_LEAD_SERIAL: NORMAL
MDC_IDC_LEAD_SERIAL: NORMAL
MDC_IDC_MSMT_BATTERY_DTM: NORMAL
MDC_IDC_MSMT_BATTERY_REMAINING_LONGEVITY: 126 MO
MDC_IDC_MSMT_BATTERY_REMAINING_PERCENTAGE: 100 %
MDC_IDC_MSMT_BATTERY_STATUS: NORMAL
MDC_IDC_MSMT_LEADCHNL_RA_IMPEDANCE_VALUE: 572 OHM
MDC_IDC_MSMT_LEADCHNL_RV_IMPEDANCE_VALUE: 594 OHM
MDC_IDC_MSMT_LEADCHNL_RV_PACING_THRESHOLD_AMPLITUDE: 1.1 V
MDC_IDC_MSMT_LEADCHNL_RV_PACING_THRESHOLD_PULSEWIDTH: 0.4 MS
MDC_IDC_PG_IMPLANT_DTM: NORMAL
MDC_IDC_PG_MFG: NORMAL
MDC_IDC_PG_MODEL: NORMAL
MDC_IDC_PG_SERIAL: NORMAL
MDC_IDC_PG_TYPE: NORMAL
MDC_IDC_SESS_CLINIC_NAME: NORMAL
MDC_IDC_SESS_DTM: NORMAL
MDC_IDC_SESS_TYPE: NORMAL
MDC_IDC_SET_BRADY_AT_MODE_SWITCH_MODE: NORMAL
MDC_IDC_SET_BRADY_AT_MODE_SWITCH_RATE: 160 {BEATS}/MIN
MDC_IDC_SET_BRADY_LOWRATE: 60 {BEATS}/MIN
MDC_IDC_SET_BRADY_MAX_SENSOR_RATE: 130 {BEATS}/MIN
MDC_IDC_SET_BRADY_MAX_TRACKING_RATE: 130 {BEATS}/MIN
MDC_IDC_SET_BRADY_MODE: NORMAL
MDC_IDC_SET_BRADY_PAV_DELAY_LOW: 200 MS
MDC_IDC_SET_BRADY_SAV_DELAY_LOW: 150 MS
MDC_IDC_SET_LEADCHNL_RA_PACING_AMPLITUDE: 3.5 V
MDC_IDC_SET_LEADCHNL_RA_PACING_CAPTURE_MODE: NORMAL
MDC_IDC_SET_LEADCHNL_RA_PACING_POLARITY: NORMAL
MDC_IDC_SET_LEADCHNL_RA_PACING_PULSEWIDTH: 0.4 MS
MDC_IDC_SET_LEADCHNL_RA_SENSING_ADAPTATION_MODE: NORMAL
MDC_IDC_SET_LEADCHNL_RA_SENSING_POLARITY: NORMAL
MDC_IDC_SET_LEADCHNL_RA_SENSING_SENSITIVITY: 0.25 MV
MDC_IDC_SET_LEADCHNL_RV_PACING_AMPLITUDE: 3.5 V
MDC_IDC_SET_LEADCHNL_RV_PACING_CAPTURE_MODE: NORMAL
MDC_IDC_SET_LEADCHNL_RV_PACING_POLARITY: NORMAL
MDC_IDC_SET_LEADCHNL_RV_PACING_PULSEWIDTH: 0.4 MS
MDC_IDC_SET_LEADCHNL_RV_SENSING_ADAPTATION_MODE: NORMAL
MDC_IDC_SET_LEADCHNL_RV_SENSING_POLARITY: NORMAL
MDC_IDC_SET_LEADCHNL_RV_SENSING_SENSITIVITY: 1.5 MV
MDC_IDC_SET_ZONE_DETECTION_INTERVAL: 375 MS
MDC_IDC_SET_ZONE_TYPE: NORMAL
MDC_IDC_SET_ZONE_VENDOR_TYPE: NORMAL
MDC_IDC_STAT_AT_BURDEN_PERCENT: 1 %
MDC_IDC_STAT_AT_DTM_END: NORMAL
MDC_IDC_STAT_AT_DTM_START: NORMAL
MDC_IDC_STAT_BRADY_DTM_END: NORMAL
MDC_IDC_STAT_BRADY_DTM_START: NORMAL
MDC_IDC_STAT_BRADY_RA_PERCENT_PACED: 86 %
MDC_IDC_STAT_BRADY_RV_PERCENT_PACED: 1 %
MDC_IDC_STAT_EPISODE_RECENT_COUNT: 0
MDC_IDC_STAT_EPISODE_RECENT_COUNT: 20
MDC_IDC_STAT_EPISODE_RECENT_COUNT_DTM_END: NORMAL
MDC_IDC_STAT_EPISODE_RECENT_COUNT_DTM_START: NORMAL
MDC_IDC_STAT_EPISODE_TYPE: NORMAL
MDC_IDC_STAT_EPISODE_VENDOR_TYPE: NORMAL

## 2022-06-13 PROCEDURE — 93296 REM INTERROG EVL PM/IDS: CPT | Performed by: INTERNAL MEDICINE

## 2022-06-13 PROCEDURE — 93294 REM INTERROG EVL PM/LDLS PM: CPT | Performed by: INTERNAL MEDICINE

## 2022-06-14 RX ORDER — PANTOPRAZOLE SODIUM 40 MG/1
40 TABLET, DELAYED RELEASE ORAL DAILY
Qty: 90 TABLET | Refills: 2 | Status: SHIPPED | OUTPATIENT
Start: 2022-06-14 | End: 2023-07-07

## 2022-06-14 NOTE — TELEPHONE ENCOUNTER
pantoprazole (PROTONIX) 40 MG EC tablet    Take 1 tablet (40 mg) by mouth daily     Last Written Prescription Date:  12/2/21  Last Fill Quantity: 90,   # refills: 1  Last Office Visit : 4/6/22  Future Office visit:  10/24/22    Refill to pharmacy.

## 2022-07-10 DIAGNOSIS — I10 BENIGN ESSENTIAL HYPERTENSION: ICD-10-CM

## 2022-07-10 DIAGNOSIS — I63.039: ICD-10-CM

## 2022-07-13 RX ORDER — ACETAMINOPHEN 160 MG
TABLET,DISINTEGRATING ORAL
Qty: 90 CAPSULE | Refills: 2 | Status: SHIPPED | OUTPATIENT
Start: 2022-07-13 | End: 2023-03-28

## 2022-07-13 RX ORDER — LISINOPRIL 20 MG/1
TABLET ORAL
Qty: 90 TABLET | Refills: 0 | Status: SHIPPED | OUTPATIENT
Start: 2022-07-13 | End: 2022-10-20

## 2022-07-13 NOTE — TELEPHONE ENCOUNTER
LCV:4/6/2022  [facility] Internal Medicine [place]  lisinopril (ZESTRIL) 20 MG tablet  Overdue lab: INOCENTE Broussard  -- CARROLL to clinic  RF 90 day  NCV: 10-24-22

## 2022-07-20 ENCOUNTER — TELEPHONE (OUTPATIENT)
Dept: ANTICOAGULATION | Facility: CLINIC | Age: 72
End: 2022-07-20

## 2022-07-20 DIAGNOSIS — Z79.01 LONG TERM CURRENT USE OF ANTICOAGULANT THERAPY: Primary | ICD-10-CM

## 2022-07-20 DIAGNOSIS — I48.0 PAROXYSMAL ATRIAL FIBRILLATION (H): ICD-10-CM

## 2022-07-20 DIAGNOSIS — I48.91 ATRIAL FIBRILLATION, UNSPECIFIED TYPE (H): ICD-10-CM

## 2022-07-20 DIAGNOSIS — I48.91 ATRIAL FIBRILLATION (H): ICD-10-CM

## 2022-07-20 NOTE — LETTER
July 20, 2022      Ju Edwards  [address]  [address]  [place] [address]        Dear Ju,     You have been under the care of the [facility] Anticoagulation Management Program for monitoring of your warfarin (Coumadin , Jantoven )  for your A-fib.  Our records indicate that you are either past due to have your blood work checked or have not followed clinic staff recommendations. Your last INR was on 1/25/2022.     We regret to inform you that since you have not responded to our phone calls and letters the [facility] Anticoagulation Management Program will no longer be able to manage your warfarin therapy. To ensure safe use of your warfarin and to receive additional warfarin refills you will need to make an office visit with Dr. Christiansen office as soon as possible to discuss your warfarin therapy. Please call [phone] or [phone] to schedule an appointment.       Sincerely,       [facility] Anticoagulation Management Program

## 2022-07-20 NOTE — TELEPHONE ENCOUNTER
ANTICOAGULATION  MANAGEMENT    Ju Edwards is being discharged from the [facility] Anticoagulation Management Program (ACC).    Reason for discharge: non-compliance with Anticoagulation Management Program despite outreach.  Last  INR was done on 1/25/2022. Letter mailed to patient advising them to schedule visit with referring provider.       Anticoagulation episode resolved, ACC referral closed and Standing order discontinued    Patient may be accepted back in ACC program after an office visit to discuss non-compliance and check INR. Patient must be agreeable to follow the monitoring recommendations of the program and will need a new referral to be re-enrolled.    Selene Smyth RN

## 2022-07-22 ENCOUNTER — APPOINTMENT (OUTPATIENT)
Dept: RADIOLOGY | Facility: HOSPITAL | Age: 72
End: 2022-07-22
Attending: EMERGENCY MEDICINE
Payer: COMMERCIAL

## 2022-07-22 ENCOUNTER — HOSPITAL ENCOUNTER (EMERGENCY)
Facility: HOSPITAL | Age: 72
Discharge: HOME | End: 2022-07-22
Attending: EMERGENCY MEDICINE | Admitting: EMERGENCY MEDICINE
Payer: COMMERCIAL

## 2022-07-22 ENCOUNTER — APPOINTMENT (OUTPATIENT)
Dept: CT IMAGING | Facility: HOSPITAL | Age: 72
End: 2022-07-22
Attending: EMERGENCY MEDICINE
Payer: COMMERCIAL

## 2022-07-22 VITALS
OXYGEN SATURATION: 97 % | SYSTOLIC BLOOD PRESSURE: 139 MMHG | HEART RATE: 60 BPM | HEIGHT: 63 IN | BODY MASS INDEX: 26.4 KG/M2 | DIASTOLIC BLOOD PRESSURE: 76 MMHG | RESPIRATION RATE: 16 BRPM | WEIGHT: 149 LBS | TEMPERATURE: 97.4 F

## 2022-07-22 DIAGNOSIS — M25.561 ACUTE PAIN OF RIGHT KNEE: ICD-10-CM

## 2022-07-22 LAB
ANION GAP SERPL CALCULATED.3IONS-SCNC: 7 MMOL/L (ref 5–18)
APTT PPP: 49 SECONDS (ref 22–38)
BASOPHILS # BLD AUTO: 0 10E3/UL (ref 0–0.2)
BASOPHILS NFR BLD AUTO: 1 %
BUN SERPL-MCNC: 13 MG/DL (ref 8–22)
CALCIUM SERPL-MCNC: 9.3 MG/DL (ref 8.5–10.5)
CHLORIDE BLD-SCNC: 107 MMOL/L (ref 98–107)
CO2 SERPL-SCNC: 26 MMOL/L (ref 22–31)
CREAT SERPL-MCNC: 0.99 MG/DL (ref 0.7–1.3)
EOSINOPHIL # BLD AUTO: 0.1 10E3/UL (ref 0–0.7)
EOSINOPHIL NFR BLD AUTO: 1 %
ERYTHROCYTE [DISTWIDTH] IN BLOOD BY AUTOMATED COUNT: 14 % (ref 10–15)
GFR SERPL CREATININE-BSD FRML MDRD: 82 ML/MIN/1.73M2
GLUCOSE BLD-MCNC: 100 MG/DL (ref 70–125)
HCT VFR BLD AUTO: 34 % (ref 40–53)
HGB BLD-MCNC: 11.3 G/DL (ref 13.3–17.7)
HOLD SPECIMEN: NORMAL
IMM GRANULOCYTES # BLD: 0 10E3/UL
IMM GRANULOCYTES NFR BLD: 1 %
INR PPP: 2.94 (ref 0.85–1.15)
LYMPHOCYTES # BLD AUTO: 1.5 10E3/UL (ref 0.8–5.3)
LYMPHOCYTES NFR BLD AUTO: 24 %
MCH RBC QN AUTO: 28.1 PG (ref 26.5–33)
MCHC RBC AUTO-ENTMCNC: 33.2 G/DL (ref 31.5–36.5)
MCV RBC AUTO: 85 FL (ref 78–100)
MONOCYTES # BLD AUTO: 0.8 10E3/UL (ref 0–1.3)
MONOCYTES NFR BLD AUTO: 12 %
NEUTROPHILS # BLD AUTO: 4.1 10E3/UL (ref 1.6–8.3)
NEUTROPHILS NFR BLD AUTO: 61 %
NRBC # BLD AUTO: 0 10E3/UL
NRBC BLD AUTO-RTO: 0 /100
PLATELET # BLD AUTO: 213 10E3/UL (ref 150–450)
POTASSIUM BLD-SCNC: 3.7 MMOL/L (ref 3.5–5)
RBC # BLD AUTO: 4.02 10E6/UL (ref 4.4–5.9)
SODIUM SERPL-SCNC: 140 MMOL/L (ref 136–145)
WBC # BLD AUTO: 6.5 10E3/UL (ref 4–11)

## 2022-07-22 PROCEDURE — 250N000011 HC RX IP 250 OP 636: Performed by: EMERGENCY MEDICINE

## 2022-07-22 PROCEDURE — 85004 AUTOMATED DIFF WBC COUNT: CPT | Performed by: EMERGENCY MEDICINE

## 2022-07-22 PROCEDURE — 85730 THROMBOPLASTIN TIME PARTIAL: CPT | Performed by: EMERGENCY MEDICINE

## 2022-07-22 PROCEDURE — 99285 EMERGENCY DEPT VISIT HI MDM: CPT | Mod: 25

## 2022-07-22 PROCEDURE — 36415 COLL VENOUS BLD VENIPUNCTURE: CPT | Performed by: EMERGENCY MEDICINE

## 2022-07-22 PROCEDURE — 80048 BASIC METABOLIC PNL TOTAL CA: CPT | Performed by: EMERGENCY MEDICINE

## 2022-07-22 PROCEDURE — 90715 TDAP VACCINE 7 YRS/> IM: CPT | Performed by: EMERGENCY MEDICINE

## 2022-07-22 PROCEDURE — 85610 PROTHROMBIN TIME: CPT | Performed by: EMERGENCY MEDICINE

## 2022-07-22 PROCEDURE — 250N000009 HC RX 250: Performed by: EMERGENCY MEDICINE

## 2022-07-22 PROCEDURE — 70450 CT HEAD/BRAIN W/O DYE: CPT

## 2022-07-22 PROCEDURE — 90471 IMMUNIZATION ADMIN: CPT | Performed by: EMERGENCY MEDICINE

## 2022-07-22 PROCEDURE — 73560 X-RAY EXAM OF KNEE 1 OR 2: CPT | Mod: RT

## 2022-07-22 RX ORDER — GINSENG 100 MG
CAPSULE ORAL ONCE
Status: COMPLETED | OUTPATIENT
Start: 2022-07-22 | End: 2022-07-22

## 2022-07-22 RX ADMIN — CLOSTRIDIUM TETANI TOXOID ANTIGEN (FORMALDEHYDE INACTIVATED), CORYNEBACTERIUM DIPHTHERIAE TOXOID ANTIGEN (FORMALDEHYDE INACTIVATED), BORDETELLA PERTUSSIS TOXOID ANTIGEN (GLUTARALDEHYDE INACTIVATED), BORDETELLA PERTUSSIS FILAMENTOUS HEMAGGLUTININ ANTIGEN (FORMALDEHYDE INACTIVATED), BORDETELLA PERTUSSIS PERTACTIN ANTIGEN, AND BORDETELLA PERTUSSIS FIMBRIAE 2/3 ANTIGEN 0.5 ML: 5; 2; 2.5; 5; 3; 5 INJECTION, SUSPENSION INTRAMUSCULAR at 16:38

## 2022-07-22 RX ADMIN — BACITRACIN: 500 OINTMENT TOPICAL at 16:42

## 2022-07-22 NOTE — ED NOTES
Discharge paperwork and follow up care discussed with pt. Paperwork left in pt possession.  Pt has no questions at this time. VSS. Wound cares completed. Leg bandaged.
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
## 2022-07-22 NOTE — ED NOTES
Right knee wound cleaned. Bacitracin placed over would and covered with bandage as directed by MD Oconnor.
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
## 2022-07-22 NOTE — DISCHARGE INSTRUCTIONS
Please follow-up with the [facility] or your Primary Care Provider on Monday for a recheck; call to arrange appointment.    Return to the ER for worsening symptoms, worsening knee pain, increased swelling of the knee, any weakness / numbness / color changes to the leg / foot, fever or other concerns.    Your INR is a little high today (2.94) - I suggest taking only 1/2 your warfarin dose today and have your INR rechecked next week.     Wear the ACE wrap to apply gentle compression to the knee. Apply ice (15 minutes on and then 15 minutes) off several times daily to help minimize pain and swelling. Rest the knee and elevate the leg while at rest.     Keep the abrasion clean and dry. It is ok to shower, but do not scrub the wound or soak it in dirty water (i.e, no swimming, etc) until wound heals. Apply over-the-counter antibacterial ointment, such as Bacitracin or Neosporin twice daily for the next 3-4 days.

## 2022-07-25 ENCOUNTER — OFFICE VISIT (OUTPATIENT)
Dept: INTERNAL MEDICINE | Facility: CLINIC | Age: 72
End: 2022-07-25
Payer: COMMERCIAL

## 2022-07-25 VITALS
BODY MASS INDEX: 27.51 KG/M2 | OXYGEN SATURATION: 97 % | SYSTOLIC BLOOD PRESSURE: 128 MMHG | RESPIRATION RATE: 16 BRPM | DIASTOLIC BLOOD PRESSURE: 64 MMHG | HEART RATE: 60 BPM | WEIGHT: 155.3 LBS

## 2022-07-25 DIAGNOSIS — I48.91 ATRIAL FIBRILLATION, UNSPECIFIED TYPE (H): Primary | ICD-10-CM

## 2022-07-25 PROCEDURE — 99213 OFFICE O/P EST LOW 20 MIN: CPT | Performed by: INTERNAL MEDICINE

## 2022-07-25 NOTE — NURSING NOTE
Ju Edwards is a 72 year old male patient that presents today in clinic for the following:    Chief Complaint   Patient presents with     Hospital F/U     Three day emergency department follow-up     Leg Injury     The patient's allergies and medications were reviewed as noted. A set of vitals were recorded as noted without incident: /64 (BP Location: Right arm, Patient Position: Sitting, Cuff Size: Adult Regular)   Pulse 60   Resp 16   Wt 70.4 kg (155 lb 4.8 oz)   SpO2 97%   BMI 27.51 kg/m  . The patient does not have any other questions for the provider.    Channing Art, BONILLA at 9:22 AM on 7/25/2022
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
## 2022-07-28 DIAGNOSIS — K92.2 GI BLEED: Primary | ICD-10-CM

## 2022-08-04 DIAGNOSIS — F32.A DEPRESSION, UNSPECIFIED DEPRESSION TYPE: ICD-10-CM

## 2022-08-04 DIAGNOSIS — E78.2 MIXED HYPERLIPIDEMIA: ICD-10-CM

## 2022-08-04 DIAGNOSIS — I10 ESSENTIAL HYPERTENSION: ICD-10-CM

## 2022-08-08 RX ORDER — ATENOLOL 50 MG/1
50 TABLET ORAL DAILY
Qty: 90 TABLET | Refills: 3 | Status: SHIPPED | OUTPATIENT
Start: 2022-08-08 | End: 2023-08-10

## 2022-08-08 RX ORDER — CITALOPRAM HYDROBROMIDE 10 MG/1
20 TABLET ORAL DAILY
Qty: 180 TABLET | Refills: 0 | Status: SHIPPED | OUTPATIENT
Start: 2022-08-08 | End: 2022-11-10

## 2022-08-08 RX ORDER — ATORVASTATIN CALCIUM 80 MG/1
80 TABLET, FILM COATED ORAL DAILY
Qty: 90 TABLET | Refills: 3 | Status: SHIPPED | OUTPATIENT
Start: 2022-08-08 | End: 2023-08-10

## 2022-08-08 NOTE — TELEPHONE ENCOUNTER
Last Clinic Visit: 7/25/2022  [facility] [place]  [place] 10/24/22  Overdue for PHQ-9, 90 day citalopram refill per protocol, routed to clinic for follow up

## 2022-08-16 ENCOUNTER — DOCUMENTATION ONLY (OUTPATIENT)
Dept: ANTICOAGULATION | Facility: CLINIC | Age: 72
End: 2022-08-16

## 2022-08-16 DIAGNOSIS — I48.91 ATRIAL FIBRILLATION, UNSPECIFIED TYPE (H): Primary | ICD-10-CM

## 2022-08-16 NOTE — PROGRESS NOTES
Patient was discharged on 7/20/22 for noncompliance, saw Dr. Rayo Christiansen on 7/25/22 who re enrolled patient for [facility].    Patient previous dosing was 4 mg on wed/sat and 3 mg all other days. Appears to have 2 mg tablets.  Will need to confirm this when next INR is taken.    Updated calendar so that patient will appear on reminder list.    Sandra Lou RN

## 2022-09-12 ENCOUNTER — TELEPHONE (OUTPATIENT)
Dept: ANTICOAGULATION | Facility: CLINIC | Age: 72
End: 2022-09-12

## 2022-09-21 NOTE — TELEPHONE ENCOUNTER
9/21/22    Patient spouse LVM on [facility] main line to make INR appt.  Writer attempt to dial back, without call going through.    If Lety calls back, please schedule patient for next INR.    CONCEPCIÓN Saez

## 2022-09-26 ENCOUNTER — TELEPHONE (OUTPATIENT)
Dept: ANTICOAGULATION | Facility: CLINIC | Age: 72
End: 2022-09-26

## 2022-09-28 ENCOUNTER — ANTICOAGULATION THERAPY VISIT (OUTPATIENT)
Dept: ANTICOAGULATION | Facility: CLINIC | Age: 72
End: 2022-09-28

## 2022-09-28 ENCOUNTER — LAB (OUTPATIENT)
Dept: LAB | Facility: CLINIC | Age: 72
End: 2022-09-28
Payer: COMMERCIAL

## 2022-09-28 DIAGNOSIS — I48.91 ATRIAL FIBRILLATION, UNSPECIFIED TYPE (H): Primary | ICD-10-CM

## 2022-09-28 DIAGNOSIS — K92.2 GI BLEED: ICD-10-CM

## 2022-09-28 LAB — INR BLD: 3.3 (ref 0.9–1.1)

## 2022-09-28 PROCEDURE — 36415 COLL VENOUS BLD VENIPUNCTURE: CPT | Performed by: PATHOLOGY

## 2022-09-28 PROCEDURE — 85610 PROTHROMBIN TIME: CPT | Performed by: PATHOLOGY

## 2022-09-28 NOTE — PROGRESS NOTES
ANTICOAGULATION MANAGEMENT     Ju Edwards 72 year old male is on warfarin with supratherapeutic INR result. (Goal INR 2.0-3.0)    Recent labs: (last 7 days)     09/28/22  1011   INR 3.3*       ASSESSMENT     Source(s): Chart Review     Warfarin doses taken: Reviewed in chart  Diet: No new diet changes identified  New illness, injury, or hospitalization: No  Medication/supplement changes: None noted  Signs or symptoms of bleeding or clotting: No  Previous INR: This is patients first INR since being readmitted to [facility] clinic  Additional findings: None       PLAN     Recommended plan for no diet, medication or health factor changes affecting INR     Dosing Instructions: Continue your current warfarin dose with next INR in 2 weeks       Summary  As of 9/28/2022    Full warfarin instructions:  4 mg every Wed, Sat; 3 mg all other days   Next INR check:  10/12/2022             Detailed voice message left for  Lety with dosing instructions and follow up date.     Contact [phone] to schedule and with any changes, questions or concerns.     Education provided: Please call back if any changes to your diet, medications or how you've been taking warfarin and Contact [phone] with any changes, questions or concerns.     Plan made per [facility] anticoagulation protocol    Hien Curry RN  Anticoagulation Clinic  9/28/2022    _______________________________________________________________________     Anticoagulation Episode Summary     Current INR goal:  2.0-3.0   TTR:  --   Target end date:  7/25/2023   Send INR reminders to:  [facility]    Indications    Atrial fibrillation  unspecified type (H) [I48.91]           Comments:           Anticoagulation Care Providers     Provider Role Specialty Phone number    Rayo Christiansen MD Referring Internal Medicine [phone]

## 2022-10-15 DIAGNOSIS — Z00.00 ROUTINE HEALTH MAINTENANCE: ICD-10-CM

## 2022-10-15 DIAGNOSIS — I10 BENIGN ESSENTIAL HYPERTENSION: ICD-10-CM

## 2022-10-20 RX ORDER — LISINOPRIL 20 MG/1
20 TABLET ORAL DAILY
Qty: 90 TABLET | Refills: 3 | Status: SHIPPED | OUTPATIENT
Start: 2022-10-20 | End: 2023-10-17

## 2022-10-20 NOTE — TELEPHONE ENCOUNTER
FLECAINIDE ACETATE 150 MG TAB      Last Written Prescription Date:  3/1/22  Last Fill Quantity: 180,   # refills: 1  Last Office Visit : 7/25/22  Future Office visit:  10/24/22    Routing refill request to provider for review/approval because:  Requires provider review  Thank-you, Danyelle TAI RN Medication Refill Team

## 2022-10-21 RX ORDER — FLECAINIDE ACETATE 150 MG/1
150 TABLET ORAL 2 TIMES DAILY
Qty: 180 TABLET | Refills: 1 | Status: SHIPPED | OUTPATIENT
Start: 2022-10-21 | End: 2023-03-29

## 2022-10-27 ENCOUNTER — TELEPHONE (OUTPATIENT)
Dept: ANTICOAGULATION | Facility: CLINIC | Age: 72
End: 2022-10-27

## 2022-10-27 NOTE — TELEPHONE ENCOUNTER
ANTICOAGULATION     Ju Edwards is overdue for INR check.      Left message for patient to call and schedule lab appointment as soon as possible. If returning call, please schedule.     Hien Curry RN

## 2022-10-28 ENCOUNTER — TELEPHONE (OUTPATIENT)
Dept: CARDIOLOGY | Facility: CLINIC | Age: 72
End: 2022-10-28

## 2022-11-01 ENCOUNTER — TELEPHONE (OUTPATIENT)
Dept: CARDIOLOGY | Facility: CLINIC | Age: 72
End: 2022-11-01

## 2022-11-03 ENCOUNTER — LAB (OUTPATIENT)
Dept: LAB | Facility: CLINIC | Age: 72
End: 2022-11-03
Payer: COMMERCIAL

## 2022-11-03 ENCOUNTER — OFFICE VISIT (OUTPATIENT)
Dept: INTERNAL MEDICINE | Facility: CLINIC | Age: 72
End: 2022-11-03
Payer: COMMERCIAL

## 2022-11-03 ENCOUNTER — ANTICOAGULATION THERAPY VISIT (OUTPATIENT)
Dept: ANTICOAGULATION | Facility: CLINIC | Age: 72
End: 2022-11-03

## 2022-11-03 VITALS — OXYGEN SATURATION: 95 % | HEART RATE: 60 BPM | SYSTOLIC BLOOD PRESSURE: 120 MMHG | DIASTOLIC BLOOD PRESSURE: 63 MMHG

## 2022-11-03 DIAGNOSIS — K62.5 BRBPR (BRIGHT RED BLOOD PER RECTUM): ICD-10-CM

## 2022-11-03 DIAGNOSIS — K62.5 BRBPR (BRIGHT RED BLOOD PER RECTUM): Primary | ICD-10-CM

## 2022-11-03 DIAGNOSIS — K92.2 GI BLEED: ICD-10-CM

## 2022-11-03 DIAGNOSIS — I48.91 ATRIAL FIBRILLATION, UNSPECIFIED TYPE (H): Primary | ICD-10-CM

## 2022-11-03 LAB
ERYTHROCYTE [DISTWIDTH] IN BLOOD BY AUTOMATED COUNT: 13.5 % (ref 10–15)
HCT VFR BLD AUTO: 34.3 % (ref 40–53)
HGB BLD-MCNC: 11.3 G/DL (ref 13.3–17.7)
INR BLD: 5.8 (ref 0.9–1.1)
MCH RBC QN AUTO: 27.4 PG (ref 26.5–33)
MCHC RBC AUTO-ENTMCNC: 32.9 G/DL (ref 31.5–36.5)
MCV RBC AUTO: 83 FL (ref 78–100)
PLATELET # BLD AUTO: 219 10E3/UL (ref 150–450)
RBC # BLD AUTO: 4.12 10E6/UL (ref 4.4–5.9)
WBC # BLD AUTO: 6.4 10E3/UL (ref 4–11)

## 2022-11-03 PROCEDURE — 85027 COMPLETE CBC AUTOMATED: CPT | Performed by: PATHOLOGY

## 2022-11-03 PROCEDURE — 36415 COLL VENOUS BLD VENIPUNCTURE: CPT | Performed by: PATHOLOGY

## 2022-11-03 PROCEDURE — 99214 OFFICE O/P EST MOD 30 MIN: CPT | Mod: GC | Performed by: STUDENT IN AN ORGANIZED HEALTH CARE EDUCATION/TRAINING PROGRAM

## 2022-11-03 PROCEDURE — 85610 PROTHROMBIN TIME: CPT | Performed by: PATHOLOGY

## 2022-11-03 ASSESSMENT — PAIN SCALES - GENERAL: PAINLEVEL: MILD PAIN (3)

## 2022-11-03 NOTE — PROGRESS NOTES
ANTICOAGULATION MANAGEMENT     Ju Guzmannacjeanna 72 year old male is on warfarin with supratherapeutic INR result. (Goal INR 2.0-3.0)    Recent labs: (last 7 days)     11/03/22  1419   INR 5.8*       ASSESSMENT     Source(s): Chart Review and Patient/Caregiver Call     Warfarin doses taken: Warfarin taken as instructed  Diet: No new diet changes identified  New illness, injury, or hospitalization: Yes: Patient has rectal bleeding   Medication/supplement changes: None noted  Signs or symptoms of bleeding or clotting: Yes: Patient has had rectal bleeding for the past 2 weeks.  Previous INR: Supratherapeutic  Additional findings: Writer speaks with patient via .  Patient is instructed to go to the ER.        PLAN     Recommended plan for temporary change(s) affecting INR     Dosing Instructions: Go to the ER.  with next INR in 1 day       Summary  As of 11/3/2022    Full warfarin instructions:  11/3: Hold; 11/4: Hold; Otherwise 4 mg every Wed, Sat; 3 mg all other days; Starting 11/3/2022   Next INR check:  11/7/2022             Telephone call with Joseyehuda who verbalizes understanding and agrees to plan and who agrees to plan and repeated back plan correctly    Patient is going to the ER.     Education provided:   Symptom monitoring: monitoring for bleeding signs and symptoms and when to seek medical attention/emergency care    Plan made per [facility] anticoagulation protocol    Hien Curry RN  Anticoagulation Clinic  11/3/2022    _______________________________________________________________________     Anticoagulation Episode Summary     Current INR goal:  2.0-3.0   TTR:  0.0 % (1.2 mo)   Target end date:  7/25/2023   Send INR reminders to:  [facility]    Indications    Atrial fibrillation  unspecified type (H) [I48.91]           Comments:           Anticoagulation Care Providers     Provider Role Specialty Phone number    Rayo Christiansen MD Referring Internal Medicine  [phone]

## 2022-11-03 NOTE — PROGRESS NOTES
CC: BRBPR    HPI: 72-year-old with past medical history of hypertension, hyperlipidemia, CVA 2014, A. fib on warfarin, a flutter status post ablation 2013, sick sinus syndrome status post PPM 2020, GI bleed 2014, H. pylori 2013 presents with chief complaint of bright red blood per rectum.    Patient's last colonoscopy was in 2020 with poor prep. polyp was identified and excised.     Current presentation is notable for bright red blood per rectum for the past 10 days.  Patient notes he has had a history of hemorrhoids that were excised in the past.  Initially he noticed blood on his stool and toilet paper followed by bright light red blood without stool.  He has 1-2 bowel movements every day.    He denies constitutional signs and symptoms of weight loss, loss of appetite, fatigue, abdominal pain.  He does  note an association with spicy food.  He notices increased bleeding and burning with bowel movements.    He denies any epigastric abdominal discomfort no abdominal pain on palpation.  He denies any GERD symptoms.  Denies nausea or vomiting.    Given patient's age, history of GI bleed and poor prep on prior colonoscopy recommended repeat colonoscopy to patient to determine cause of GI bleed.  Patient's GI bleed is likely lower.  DDx considered polyps, hemorrhoids, diverticulosis, colon cancer.  If no definitive etiology was found on colonoscopy patient may benefit from an EGD.    Counseled patient on red flag symptoms namely fatigue, chest pain, shortness of breath, worsening rectal bleeding and frequent bowel movements.  Instructed patient to go to the ED if symptoms progress.    Past Medical History:   Diagnosis Date     Allergic rhinitis      Atrial fibrillation (H)     paroxysmal     Atrial fibrillation (H)      CAD (coronary artery disease)      Delirium     associated with hospitalization for stroke     Depression      Gastric erosions 1/2014    associated with gi bleed     GERD (gastroesophageal reflux  disease)      HTN (hypertension)      Hypercholesteremia      Hyperlipidemia      Hypertension      Left hemiparesis (H)      Obstructive sleep apnea      PTSD (post-traumatic stress disorder)      Sleep apnea      Stroke (H) 2014    ischemic CVA with hemorrhagic transformation     Stroke (H)      Past Surgical History:   Procedure Laterality Date     ASD REPAIR       CARDIAC SURGERY      mitral valve repair     CYCLOPHOTOCOAGULATION TRANSSCLERAL WITH MICROPULSE LASER Left 2020    Procedure: ENDOCYCLOPHOTOCOAGULATION;  Surgeon: Stanford Welsh MD;  Location: [facility] OR     EP PACEMAKER       EP PACEMAKER INSERT N/A 2020    Procedure: EP Pacemaker Insertion;  Surgeon: Brandon Gusman MD;  Location: [facility] Cath Lab;  Service: Cardiology     IR MISCELLANEOUS PROCEDURE  2014     PHACOEMULSIFICATION CLEAR CORNEA WITH STANDARD INTRAOCULAR LENS IMPLANT Left 2020    Procedure: PHACOEMULSIFICATION, CATARACT, WITH INTRAOCULAR LENS IMPLANT and goniosynechialysis;  Surgeon: Stanford Welsh MD;  Location: [facility] OR     REPAIR ATRIAL SEPTAL DEFECT       Family History   Problem Relation Age of Onset     Eye Surgery Mother      Albinism Mother      Cervical Cancer Mother      No Known Problems Father      No Known Problems Sister      No Known Problems Brother      No Known Problems Sister      No Known Problems Sister      No Known Problems Sister      No Known Problems Brother          from trauma     Glaucoma No family hx of      Macular Degeneration No family hx of      Social History     Tobacco Use     Smoking status: Never     Smokeless tobacco: Never   Substance Use Topics     Alcohol use: No     Drug use: No     Current Outpatient Medications   Medication Sig Dispense Refill     amLODIPine (NORVASC) 5 MG tablet Take 1 tablet (5 mg) by mouth daily 90 tablet 3     atenolol (TENORMIN) 50 MG tablet Take 1 tablet (50 mg) by mouth daily 90 tablet 3     atorvastatin (LIPITOR) 80 MG  tablet Take 1 tablet (80 mg) by mouth daily 90 tablet 3     brimonidine (ALPHAGAN-P) 0.15 % ophthalmic solution PLACE 1 DROP INTO THE LEFT EYE 3 TIMES DAILY 15 mL 1     Cholecalciferol (VITAMIN D3) 50 MCG (2000 UT) CAPS TAKE 1 CAPSULE BY MOUTH EVERY DAY 90 capsule 2     citalopram (CELEXA) 10 MG tablet Take 2 tablets (20 mg) by mouth daily 180 tablet 0     COMPRESSION STOCKINGS 1 each daily 2 each 1     cyclobenzaprine (FLEXERIL) 5 MG tablet Take 0.5 tablets (2.5 mg) by mouth At Bedtime 30 tablet 1     docusate sodium (COLACE) 100 MG capsule Take 100 mg by mouth 2 times daily as needed        dorzolamide-timolol (COSOPT) 2-0.5 % ophthalmic solution PLACE 1 DROP INTO THE LEFT EYE 2 TIMES DAILY 10 mL 1     emollient (VANICREAM) cream Apply topically as needed for other 25 g 3     flecainide (TAMBOCOR) 150 MG tablet Take 1 tablet (150 mg) by mouth 2 times daily 180 tablet 1     latanoprost (XALATAN) 0.005 % ophthalmic solution Place 1 drop into both eyes At Bedtime 2.5 mL 11     lisinopril (ZESTRIL) 20 MG tablet Take 1 tablet (20 mg) by mouth daily 90 tablet 3     neomycin-polymixin-dexamethasone (MAXITROL) 0.1 % ophthalmic suspension Instill 1 drop in to the left eye 3 times daily 5 mL 0     neomycin-polymyxin-dexamethasone (MAXITROL) 3.5-34350-8.1 ophthalmic ointment Place 0.5 inches Into the left eye At Bedtime 1 Tube 0     nitroGLYcerin (NITROSTAT) 0.4 MG sublingual tablet Place 0.4 mg under the tongue every 5 minutes as needed        pantoprazole (PROTONIX) 40 MG EC tablet Take 1 tablet (40 mg) by mouth daily 90 tablet 2     Sennosides (SENNA LAX PO) Take by mouth 2 times daily as needed       sodium chloride ([employer] SALINE NASAL SPRAY) 0.65 % nasal spray USE 1 SPRAY IN EACH NOSTRIL DAILY 44 mL 11     tamsulosin (FLOMAX) 0.4 MG capsule Take 1 capsule (0.4 mg) by mouth daily 90 capsule 3     warfarin ANTICOAGULANT (COUMADIN) 2 MG tablet TAKE 1 TAB ON MON, WED, FRI. TAKE 1 AND ONE-HALF TABS ON ALL OTHER DAYS OR AS  DIRECTED. TAKES AT 4:30 PM. 90 tablet 1      No Known Allergies      /63 (BP Location: Right arm, Patient Position: Sitting, Cuff Size: Adult Regular)   Pulse 60   SpO2 95%     Physical Examination:    Gen: NAD  CV: RRR, Ns1,S2. No JVD   Pulm: Clear B/l. No wheezing   Abd: Soft. Non-distended. Non-tender to palpation   Ext:  No Periferal edema   Neuro: Non-focal.       A&P:    BRBPR (bright red blood per rectum)  -     phenylephrine-cocoa butter (PREPARATION H) 0.25-88.44 % suppository; Place 1 suppository rectally as needed for hemorrhoids or itching  -     CBC with platelets; Future  -     Adult GI  Referral - Procedure Only; Future  -     Adult Colorectal Surgery  Referral; Future    Follow up with PCP after colonoscopy     Ron Garcia MD  Internal Medicine Resident (PGY3)  [id number]    This patient was discussed and seen with  Dr. Bains

## 2022-11-03 NOTE — NURSING NOTE
Ju Edwards is a 72 year old male that presents in clinic today for the following:     Chief Complaint   Patient presents with     Rectal Problem     Pt complains of rectal bleeding; 10 day event. Still noticed this morning. Pt has photo. Pt said that normally when he defecates (during this ten day period) there is blood, however this morning there was only blood and no feces.        The patient's allergies and medications were reviewed. The patient's vitals were obtained without incident. The patient does not have any other questions for the provider.     Qi Fuchs, EMT at 1:15 PM on 11/3/2022.  Primary Care Clinic: [phone]

## 2022-11-04 ENCOUNTER — DOCUMENTATION ONLY (OUTPATIENT)
Dept: ANTICOAGULATION | Facility: CLINIC | Age: 72
End: 2022-11-04

## 2022-11-04 DIAGNOSIS — I48.91 ATRIAL FIBRILLATION, UNSPECIFIED TYPE (H): Primary | ICD-10-CM

## 2022-11-04 NOTE — PROGRESS NOTES
ANTICOAGULATION  MANAGEMENT: Discharge Review    Ju Edwards chart reviewed for anticoagulation continuity of care    Emergency room visit on 11/3 at [facility] Emergency Dept  for Rectal Bleeding.    Discharge disposition: Home    Results:    Recent labs: (last 7 days)     11/03/22  1419   INR 5.8*     Anticoagulation inpatient management:     not applicable     Anticoagulation discharge instructions:     Warfarin dosing: hold warfarin until INR on 11/7   Bridging: No   INR goal change: No      Medication changes affecting anticoagulation: No    Additional factors affecting anticoagulation: Yes: patient has a history of bleeding.    Per Care Everywhere CTA  done that showed no evidence of active bleeding, but some enlargement of prostate. No hemorrhoids seen on exam, but possible suspicion for internal hemorrhoids. No evidence of active bleeding on examination. Patient advised to follow-up with PCP regarding prostate cancer screening.    Hgb 11.1     PLAN     No adjustment to anticoagulation plan needed    Left a detailed message for spouse Leslie    Anticoagulation Calendar updated    Yanni Up RN

## 2022-11-04 NOTE — PROGRESS NOTES
Lety calling back to set up INR appt for Monday 11/7/22.  Confirmed instructions for patient to HOLD Warfarin until INR check.  Gave Lety Sauer office number for follow up appt with PCP. CONCEPCIÓN Saez

## 2022-11-07 ENCOUNTER — ANTICOAGULATION THERAPY VISIT (OUTPATIENT)
Dept: ANTICOAGULATION | Facility: CLINIC | Age: 72
End: 2022-11-07

## 2022-11-07 ENCOUNTER — LAB (OUTPATIENT)
Dept: LAB | Facility: CLINIC | Age: 72
End: 2022-11-07
Payer: COMMERCIAL

## 2022-11-07 DIAGNOSIS — I48.91 ATRIAL FIBRILLATION, UNSPECIFIED TYPE (H): Primary | ICD-10-CM

## 2022-11-07 DIAGNOSIS — K92.2 GI BLEED: ICD-10-CM

## 2022-11-07 LAB — INR BLD: 2.3 (ref 0.9–1.1)

## 2022-11-07 PROCEDURE — 85610 PROTHROMBIN TIME: CPT | Performed by: PATHOLOGY

## 2022-11-07 PROCEDURE — 36415 COLL VENOUS BLD VENIPUNCTURE: CPT | Performed by: PATHOLOGY

## 2022-11-07 NOTE — PROGRESS NOTES
ANTICOAGULATION MANAGEMENT     Ju Edwards 72 year old male is on warfarin with therapeutic INR result. (Goal INR 2.0-3.0)    Recent labs: (last 7 days)     11/07/22  0958   INR 2.3*       ASSESSMENT     Source(s): Chart Review and Patient/Caregiver Call     Warfarin doses taken: Warfarin taken as instructed  Diet: No new diet changes identified  New illness, injury, or hospitalization: Yes: ER visit on 11/3/22 for rectal bleeding  Medication/supplement changes: None noted  Signs or symptoms of bleeding or clotting: No--Lety reports Ju has not told her of any bleeding.  Previous INR: Supratherapeutic  Additional findings: None       PLAN     Recommended plan for ongoing change(s) affecting INR     Dosing Instructions: decrease your warfarin dose (26% change) with next INR in 2 days       Summary  As of 11/7/2022    Full warfarin instructions:  3 mg every Mon, Wed, Fri; 2 mg all other days; Starting 11/7/2022   Next INR check:  11/9/2022             Telephone call with  spouse, Lety who agrees to plan and repeated back plan correctly    Lab visit scheduled--he will check INR on 11/9/22 because he has an appointment with Dr. Christiansen.    Education provided:   Symptom monitoring: monitoring for bleeding signs and symptoms and when to seek medical attention/emergency care    Plan made with [facility] Pharmacist Shyann Fish, RN  Anticoagulation Clinic  11/7/2022    _______________________________________________________________________     Anticoagulation Episode Summary     Current INR goal:  2.0-3.0   TTR:  1.9 % (1.3 mo)   Target end date:  7/25/2023   Send INR reminders to:  [facility]    Indications    Atrial fibrillation  unspecified type (H) [I48.91]           Comments:           Anticoagulation Care Providers     Provider Role Specialty Phone number    Rayo Christiansen MD Referring Internal Medicine [phone]

## 2022-11-08 DIAGNOSIS — F32.A DEPRESSION, UNSPECIFIED DEPRESSION TYPE: ICD-10-CM

## 2022-11-09 ENCOUNTER — LAB (OUTPATIENT)
Dept: LAB | Facility: CLINIC | Age: 72
End: 2022-11-09
Payer: COMMERCIAL

## 2022-11-09 ENCOUNTER — ANTICOAGULATION THERAPY VISIT (OUTPATIENT)
Dept: ANTICOAGULATION | Facility: CLINIC | Age: 72
End: 2022-11-09

## 2022-11-09 ENCOUNTER — OFFICE VISIT (OUTPATIENT)
Dept: INTERNAL MEDICINE | Facility: CLINIC | Age: 72
End: 2022-11-09
Payer: COMMERCIAL

## 2022-11-09 VITALS
BODY MASS INDEX: 24.8 KG/M2 | OXYGEN SATURATION: 95 % | HEIGHT: 63 IN | HEART RATE: 60 BPM | WEIGHT: 140 LBS | SYSTOLIC BLOOD PRESSURE: 147 MMHG | DIASTOLIC BLOOD PRESSURE: 75 MMHG

## 2022-11-09 DIAGNOSIS — K62.5 BRBPR (BRIGHT RED BLOOD PER RECTUM): ICD-10-CM

## 2022-11-09 DIAGNOSIS — K62.5 BRBPR (BRIGHT RED BLOOD PER RECTUM): Primary | ICD-10-CM

## 2022-11-09 DIAGNOSIS — K92.2 GI BLEED: ICD-10-CM

## 2022-11-09 DIAGNOSIS — I48.91 ATRIAL FIBRILLATION, UNSPECIFIED TYPE (H): Primary | ICD-10-CM

## 2022-11-09 LAB
ERYTHROCYTE [DISTWIDTH] IN BLOOD BY AUTOMATED COUNT: 13.5 % (ref 10–15)
HCT VFR BLD AUTO: 34.8 % (ref 40–53)
HGB BLD-MCNC: 11.6 G/DL (ref 13.3–17.7)
INR BLD: 1.1 (ref 0.9–1.1)
MCH RBC QN AUTO: 27.5 PG (ref 26.5–33)
MCHC RBC AUTO-ENTMCNC: 33.3 G/DL (ref 31.5–36.5)
MCV RBC AUTO: 83 FL (ref 78–100)
PLATELET # BLD AUTO: 257 10E3/UL (ref 150–450)
RBC # BLD AUTO: 4.22 10E6/UL (ref 4.4–5.9)
WBC # BLD AUTO: 7.6 10E3/UL (ref 4–11)

## 2022-11-09 PROCEDURE — 36415 COLL VENOUS BLD VENIPUNCTURE: CPT | Performed by: PATHOLOGY

## 2022-11-09 PROCEDURE — 85027 COMPLETE CBC AUTOMATED: CPT | Performed by: PATHOLOGY

## 2022-11-09 PROCEDURE — 99214 OFFICE O/P EST MOD 30 MIN: CPT | Performed by: INTERNAL MEDICINE

## 2022-11-09 PROCEDURE — 85610 PROTHROMBIN TIME: CPT | Performed by: PATHOLOGY

## 2022-11-09 ASSESSMENT — PAIN SCALES - GENERAL: PAINLEVEL: NO PAIN (0)

## 2022-11-09 NOTE — PROGRESS NOTES
ANTICOAGULATION MANAGEMENT     Ju Edwards 72 year old male is on warfarin with subtherapeutic INR result. (Goal INR 2.0-3.0)    Recent labs: (last 7 days)     11/09/22  1733   INR 1.1       ASSESSMENT     Source(s): Chart Review     Warfarin doses taken: Reviewed in chart  Diet: No new diet changes identified  New illness, injury, or hospitalization: Yes: Patient was in the ER on 11/3 for rectal bleeding  Medication/supplement changes: None noted  Signs or symptoms of bleeding or clotting: Yes: Patient had rectal bleeding on 11/3  Previous INR: Supratherapeutic  Additional findings: None       PLAN     Recommended plan for temporary change(s) affecting INR     Writer is waiting to talk to Leslie prior to giving warfarin instructions.      11/10/22 Dr. Sauer note indicates that patient continued to have bright rectal bleeding up until yesterday.  Dr. Christiansen has instructed the patient to continue to hold warfarin for the next week in the hopes of getting a colonoscopy scheduled.  [facility] will follow up next week.     Hien Curry RN  Anticoagulation Clinic  11/9/2022    _______________________________________________________________________     Anticoagulation Episode Summary     Current INR goal:  2.0-3.0   TTR:  3.2 % (1.4 mo)   Target end date:  7/25/2023   Send INR reminders to:  [facility]    Indications    Atrial fibrillation  unspecified type (H) [I48.91]           Comments:           Anticoagulation Care Providers     Provider Role Specialty Phone number    Rayo Christiansen MD Referring Internal Medicine [phone]

## 2022-11-09 NOTE — NURSING NOTE
Chief Complaint   Patient presents with     RECHECK     Follow up from ER visit.       Lisette Moralez CMA, EMT at 5:43 PM on 11/9/2022.

## 2022-11-09 NOTE — PROGRESS NOTES
HPI  9-year-old presents today for follow-up from ER visit.  He is seen in clinic 5 days ago for bright red rectal bleeding.  At that time his INR was high the bleeding continued he presented to the emergency room at [facility].  Abdominal CT angiography scan there showed no evidence of active bleeding but a heterogeneous enhancement of the prostate gland.  His Coumadin was put on hold and he continued to bleed including dripping bright red blood in the toilet up until yesterday.  His INR is now subtherapeutic and the rectal bleeding has apparently stopped.  Said no dizziness lightheadedness or other complaints.  Past Medical History:   Diagnosis Date     Allergic rhinitis      Atrial fibrillation (H)     paroxysmal     Atrial fibrillation (H)      CAD (coronary artery disease)      Delirium     associated with hospitalization for stroke     Depression      Gastric erosions 1/2014    associated with gi bleed     GERD (gastroesophageal reflux disease)      HTN (hypertension)      Hypercholesteremia      Hyperlipidemia      Hypertension      Left hemiparesis (H)      Obstructive sleep apnea      PTSD (post-traumatic stress disorder)      Sleep apnea      Stroke (H) 1/2014    ischemic CVA with hemorrhagic transformation     Stroke (H)      Past Surgical History:   Procedure Laterality Date     ASD REPAIR       CARDIAC SURGERY  2000    mitral valve repair     CYCLOPHOTOCOAGULATION TRANSSCLERAL WITH MICROPULSE LASER Left 8/24/2020    Procedure: ENDOCYCLOPHOTOCOAGULATION;  Surgeon: Stanford Welsh MD;  [facility]: [facility] OR     EP PACEMAKER       EP PACEMAKER INSERT N/A 2/26/2020    Procedure: EP Pacemaker Insertion;  Surgeon: Brandon Gusman MD;  Location: [facility] Cath Lab;  Service: Cardiology     IR MISCELLANEOUS PROCEDURE  1/22/2014     PHACOEMULSIFICATION CLEAR CORNEA WITH STANDARD INTRAOCULAR LENS IMPLANT Left 8/24/2020    Procedure: PHACOEMULSIFICATION, CATARACT, WITH INTRAOCULAR LENS IMPLANT and  "goniosynechialysis;  Surgeon: Stanford Welsh MD;  Location: [facility] OR     REPAIR ATRIAL SEPTAL DEFECT       Family History   Problem Relation Age of Onset     Eye Surgery Mother      Albinism Mother      Cervical Cancer Mother      No Known Problems Father      No Known Problems Sister      No Known Problems Brother      No Known Problems Sister      No Known Problems Sister      No Known Problems Sister      No Known Problems Brother          from trauma     Glaucoma No family hx of      Macular Degeneration No family hx of          Exam:  BP (!) 147/75 (BP Location: Right arm, Patient Position: Sitting, Cuff Size: Adult Regular)   Pulse 60   Ht 1.6 m (5' 3\")   Wt 63.5 kg (140 lb)   SpO2 95%   BMI 24.80 kg/m    140 lbs 0 oz  The patient is alert, oriented with a clear sensorium.   Skin shows no lesions or rashes and good turgor.   Head is normocephalic and atraumatic.    Neck shows no nodes, thyromegaly.     Lungs are clear.   Heart shows regular rhythm normal S1 and S2 without murmur or gallop.    Abdomen soft and non-tender  Rectal shows few small external hemorrhoidal tags, large smooth prostate without nodules or masses.    Labs reviewed:  Results for orders placed or performed in visit on 22   CBC with platelets     Status: Abnormal   Result Value Ref Range    WBC Count 7.6 4.0 - 11.0 10e3/uL    RBC Count 4.22 (L) 4.40 - 5.90 10e6/uL    Hemoglobin 11.6 (L) 13.3 - 17.7 g/dL    Hematocrit 34.8 (L) 40.0 - 53.0 %    MCV 83 78 - 100 fL    MCH 27.5 26.5 - 33.0 pg    MCHC 33.3 31.5 - 36.5 g/dL    RDW 13.5 10.0 - 15.0 %    Platelet Count 257 150 - 450 10e3/uL   Results for orders placed or performed in visit on 22   INR point of care     Status: Normal   Result Value Ref Range    INR 1.1 0.9 - 1.1    Narrative    This test is intended for monitoring Coumadin therapy. Results are not accurate in patients with prolonged INR due to factor deficiency.         ASSESSMENT  1 Rectal bleeding likely " hemorrhoidal  2 LUTS/BPH on tamsulosin  3 hypertension well controlled  4 hyperlipidemia on atorvastatin  6 history of CVA stable neurologically  7 atrial fibrillation off coumadin now    Plan  We will continue to hold the Coumadin and try and arrange for an anoscopy or sigmoidoscopy in the next few days to assess the risk for recurrent bleeding prior to restarting the Coumadin.  We will plan to hold for the next week pending this.      Over 30 minutes spent on the day of service in chart review, patient contact, record completion and review and notification of lab reports    This note was completed using Dragon voice recognition software.      Rayo Christiansen MD  General Internal Medicine  [facility]  [phone]

## 2022-11-10 NOTE — TELEPHONE ENCOUNTER
CITALOPRAM HBR 10 MG TABLET  Last Written Prescription Date:   8/8/2022  Last Fill Quantity: 180,   # refills: 0  Last Office Visit :  11/9/2022  Future Office visit: None    Routing refill request to provider for review/approval because:  Second request  Needing updated PHQ-9  Please call Pt to Review       Geno Adan RN  Central Triage Red Flags/Med Refills

## 2022-11-11 RX ORDER — CITALOPRAM HYDROBROMIDE 10 MG/1
20 TABLET ORAL DAILY
Qty: 180 TABLET | Refills: 0 | Status: SHIPPED | OUTPATIENT
Start: 2022-11-11 | End: 2023-02-20

## 2022-11-14 ENCOUNTER — TELEPHONE (OUTPATIENT)
Dept: SURGERY | Facility: CLINIC | Age: 72
End: 2022-11-14

## 2022-11-14 NOTE — TELEPHONE ENCOUNTER
Called to move up 02/21 EVDM appt. LVM with L pod #. Will also send MyChart.    Offer appt with Carmela Cardenas to be seen sooner - 60min new.

## 2022-11-15 NOTE — TELEPHONE ENCOUNTER
Diagnosis, Referred by & from: Bright Red Blood per Rectum; referred by Dr. Rayo Christiansen   Appt date: 2/21/2023   NOTES STATUS DETAILS   OFFICE NOTE from referring provider [facility]:  11/9/22 - PCC OV with Dr. Christiansen   OFFICE NOTE from other specialist [facility]:  11/3/22 - PCC OV with Dr. Garcia  6/18/21 - PCC OV with Unique Londono NP  3/3/20 - PCC OV with Dr. Chen   DISCHARGE SUMMARY from hospital N/A    DISCHARGE REPORT from the ER [facility]where [facility]:  11/3/22 - ED OV with PASQUALE Tobias   OPERATIVE REPORT N/A    MEDICATION LIST Internal    LABS     BIOPSIES/PATHOLOGY RELATED TO DIAGNOSIS [facility]:  10/22/20 - Colon Biopsy (Case: [id number])   DIAGNOSTIC PROCEDURES     COLONOSCOPY Received / [facility]:  10/22/20 - Colonoscopy    [facility]:  8/25/16 - Colonoscopy  5/13/16 - Colonoscopy   UPPER ENDOSCOPY (EGD) [facility]:  2/2/14 - EGD   IMAGING (DISC & REPORT)      CT Received [facility]:  11/4/22 - CTA Abd/Pelvis   ULTRASOUND  (ENDOANAL/ENDORECTAL) [facility]:  6/18/21 - US Abdomen     Records Requested  11/15/22    Facility  [facility]  Fax: [phone]   Outcome * 11/15/22 10:11 AM Faxed req to [facility] for images to be pushed to [employer] PACs. - Tiarra    * 11/23/22 12:16 PM Images received from [facility] and attached to the patient in PACs. - Tiarra

## 2022-11-18 ENCOUNTER — ANTICOAGULATION THERAPY VISIT (OUTPATIENT)
Dept: ANTICOAGULATION | Facility: CLINIC | Age: 72
End: 2022-11-18

## 2022-11-18 ENCOUNTER — LAB (OUTPATIENT)
Dept: LAB | Facility: CLINIC | Age: 72
End: 2022-11-18
Payer: COMMERCIAL

## 2022-11-18 DIAGNOSIS — K92.2 GI BLEED: ICD-10-CM

## 2022-11-18 DIAGNOSIS — I48.91 ATRIAL FIBRILLATION, UNSPECIFIED TYPE (H): Primary | ICD-10-CM

## 2022-11-18 LAB — INR BLD: 1 (ref 0.9–1.1)

## 2022-11-18 PROCEDURE — 36415 COLL VENOUS BLD VENIPUNCTURE: CPT | Performed by: PATHOLOGY

## 2022-11-18 PROCEDURE — 85610 PROTHROMBIN TIME: CPT | Performed by: PATHOLOGY

## 2022-11-18 NOTE — PROGRESS NOTES
"ANTICOAGULATION MANAGEMENT     Ju Edwards 72 year old male is on warfarin with subtherapeutic INR result. (Goal INR 2.0-2.5)    Recent labs: (last 7 days)     11/18/22  0911   INR 1.0       ASSESSMENT     Source(s): Chart Review  Previous INR was sub-PATIENT has been HOLDING Warfarin  Medication, diet, health changes since last INR chart reviewed;   IMPORTANT! NEED TO ASSESS FOR BLEEDING, PER DR ANGELES IF THERE HAS BEEN NO SIGNS OF BLEEDING PATIENT SHOULD BE INSTRUCTED TO RESTART WARFARIN      IF PATIENT HAS HAD BLEEDING HE IS TO BEEN SEEN FOR AN:  Anoscopy  \"An anoscopy is a simple medical procedure that can help your doctor identify an abnormality in your gastrointestinal tract, notably in your anus and rectum. To perform an anoscopy, your doctor will insert a device called an anoscope into your anus.\"    NOTE:     DR ANGELES REDUCED THE GOAL RANGE TO 2.0-2.5 ON 11/18/22.           PLAN     Unable to reach [facility] today.    No instructions provided. Unable to leave voicemail.    Follow up required to NEED to assess for bleeding, then restart Warfarin.  Routed encounter to Dr. Angeles.    Alexander Wadsworth, RN  Anticoagulation Clinic  11/18/2022    "

## 2022-11-21 ENCOUNTER — ANTICOAGULATION THERAPY VISIT (OUTPATIENT)
Dept: ANTICOAGULATION | Facility: CLINIC | Age: 72
End: 2022-11-21

## 2022-11-21 DIAGNOSIS — I48.91 ATRIAL FIBRILLATION, UNSPECIFIED TYPE (H): Primary | ICD-10-CM

## 2022-11-21 NOTE — PROGRESS NOTES
ANTICOAGULATION MANAGEMENT     Ju Edwards 72 year old male is on warfarin with subtherapeutic INR result. (Goal INR 2.0-2.5)    Recent labs: (last 7 days)     11/18/22  0911   INR 1.0       ASSESSMENT     Source(s): Chart Review and Patient/Caregiver Call     Warfarin doses taken: Held for bleed  recently which may be affecting INR  Diet: No new diet changes identified  New illness, injury, or hospitalization: Yes: 11/3 ER for rectal bleeding.  Medication/supplement changes: None noted  Signs or symptoms of bleeding or clotting: No, Bleeding has resolved, instructed patient to RESTART Warfarin per Dr. Christiansen  Previous INR: Subtherapeutic  Additional findings: New goal range:2.0-2.5       PLAN     Recommended plan for ongoing change(s) affecting INR     Dosing Instructions: Resume warfarin with next INR in 2 days       Summary  As of 11/21/2022    Full warfarin instructions:  3 mg every Mon, Wed, Fri; 2 mg all other days; Starting 11/21/2022   Next INR check:  11/23/2022             Telephone call with  Lety who verbalizes understanding and agrees to plan and who agrees to plan and repeated back plan correctly    Lab visit scheduled    Education provided:   Goal range and lab monitoring: goal range and significance of current result and frequency of lab work when starting warfarin and importance of following up when instructed (extends after stability established)  Symptom monitoring: monitoring for bleeding signs and symptoms and when to seek medical attention/emergency care  Importance of notifying anticoagulation clinic for: bleeding assessment    Plan made per [facility] anticoagulation protocol per Alexander Hawkins RN  Anticoagulation Clinic  11/21/2022    _______________________________________________________________________     Anticoagulation Episode Summary     Current INR goal:  2.0-2.5   TTR:  2.6 % (1.7 mo)   Target end date:  7/25/2023   Send INR reminders to:  [facility]    Indications    Atrial fibrillation  unspecified type (H) [I48.91]           Comments:  11/18/22 Goal range 2.0-2.5 per Dr. Christiansen d/t bleeding.         Anticoagulation Care Providers     Provider Role Specialty Phone number    Rayo Christiansen MD Referring Internal Medicine [phone]

## 2022-11-23 ENCOUNTER — LAB (OUTPATIENT)
Dept: LAB | Facility: CLINIC | Age: 72
End: 2022-11-23
Payer: COMMERCIAL

## 2022-11-23 ENCOUNTER — ANTICOAGULATION THERAPY VISIT (OUTPATIENT)
Dept: ANTICOAGULATION | Facility: CLINIC | Age: 72
End: 2022-11-23

## 2022-11-23 DIAGNOSIS — K92.2 GI BLEED: ICD-10-CM

## 2022-11-23 DIAGNOSIS — I48.91 ATRIAL FIBRILLATION, UNSPECIFIED TYPE (H): Primary | ICD-10-CM

## 2022-11-23 LAB — INR BLD: 1 (ref 0.9–1.1)

## 2022-11-23 PROCEDURE — 85610 PROTHROMBIN TIME: CPT | Performed by: PATHOLOGY

## 2022-11-23 PROCEDURE — 36415 COLL VENOUS BLD VENIPUNCTURE: CPT | Performed by: PATHOLOGY

## 2022-11-23 NOTE — PROGRESS NOTES
ANTICOAGULATION MANAGEMENT     Ju Edwards 72 year old male is on warfarin with subtherapeutic INR result. (Goal INR 2.0-2.5)    Recent labs: (last 7 days)     11/23/22  0946   INR 1.0       ASSESSMENT     Source(s): Chart Review     Warfarin doses taken: Held for recent bleed  recently which may be affecting INR  Diet: No new diet changes identified  New illness, injury, or hospitalization: 11/3 ED for rectal bleeding  Medication/supplement changes: None noted  Signs or symptoms of bleeding or clotting: rectal bleeding resolved- patient instructed to restart Warfarin two days ago   Previous INR: Subtherapeutic  Additional findings: None       PLAN     Recommended plan for temporary change(s) and ongoing change(s) affecting INR     Dosing Instructions: booster dose then continue your current warfarin dose with next INR in 5 days       Summary  As of 11/23/2022    Full warfarin instructions:  11/23: 4 mg; Otherwise 3 mg every Mon, Wed, Fri; 2 mg all other days; Starting 11/23/2022   Next INR check:  11/28/2022             Detailed voice message left for  spouse, Leslie with dosing instructions and follow up date.   Sent [employer] message with dosing and follow up instructions    Lab visit scheduled    Education provided:   Please call back if any changes to your diet, medications or how you've been taking warfarin  Taking warfarin: Importance of taking warfarin as instructed  Contact [phone] with any changes, questions or concerns.      Plan made with [facility] Pharmacist Shyann CORTEZ RN  Anticoagulation Clinic  11/23/2022    _______________________________________________________________________     Anticoagulation Episode Summary     Current INR goal:  2.0-2.5   TTR:  2.4 % (1.8 mo)   Target end date:  7/25/2023   Send INR reminders to:  [facility]    Indications    Atrial fibrillation  unspecified type (H) [I48.91]           Comments:  11/18/22 Goal range 2.0-2.5 per   Kuldip d/t bleeding.         Anticoagulation Care Providers     Provider Role Specialty Phone number    Rayo Christiansen MD Referring Internal Medicine [phone]

## 2022-11-28 ENCOUNTER — LAB (OUTPATIENT)
Dept: LAB | Facility: CLINIC | Age: 72
End: 2022-11-28
Payer: COMMERCIAL

## 2022-11-28 ENCOUNTER — ANTICOAGULATION THERAPY VISIT (OUTPATIENT)
Dept: ANTICOAGULATION | Facility: CLINIC | Age: 72
End: 2022-11-28

## 2022-11-28 DIAGNOSIS — I48.91 ATRIAL FIBRILLATION, UNSPECIFIED TYPE (H): Primary | ICD-10-CM

## 2022-11-28 DIAGNOSIS — K92.2 GI BLEED: ICD-10-CM

## 2022-11-28 LAB — INR BLD: 1.9 (ref 0.9–1.1)

## 2022-11-28 PROCEDURE — 85610 PROTHROMBIN TIME: CPT | Performed by: PATHOLOGY

## 2022-11-28 PROCEDURE — 36415 COLL VENOUS BLD VENIPUNCTURE: CPT | Performed by: PATHOLOGY

## 2022-11-28 NOTE — PROGRESS NOTES
ANTICOAGULATION MANAGEMENT     Ju Edwards 72 year old male is on warfarin with subtherapeutic INR result. (Goal INR 2.0-2.5)    Recent labs: (last 7 days)     11/28/22  1017   INR 1.9*       ASSESSMENT     Source(s): Chart Review     Warfarin doses taken: Missed dose(s) may be affecting INR and Held for rectal bleed  recently which may be affecting INR  Diet: No new diet changes identified  New illness, injury, or hospitalization: No  Medication/supplement changes: None noted  Signs or symptoms of bleeding or clotting: No  Previous INR: Subtherapeutic  Additional findings: None       PLAN     Recommended plan for temporary change(s) and ongoing change(s) affecting INR     Dosing Instructions: booster dose then continue your current warfarin dose with next INR in 1 week       Summary  As of 11/28/2022    Full warfarin instructions:  11/28: 5 mg; Otherwise 3 mg every Mon, Wed, Fri; 2 mg all other days; Starting 11/28/2022   Next INR check:  12/5/2022             Detailed voice message left for  spouse, Leslie with dosing instructions and follow up date.   Sent [employer] message with dosing and follow up instructions    Lab visit scheduled    Education provided:   Please call back if any changes to your diet, medications or how you've been taking warfarin  Contact [phone] with any changes, questions or concerns.     Plan made per [facility] anticoagulation protocol    GRANT CORTEZ RN  Anticoagulation Clinic  11/28/2022    _______________________________________________________________________     Anticoagulation Episode Summary     Current INR goal:  2.0-2.5   TTR:  2.2 % (2 mo)   Target end date:  7/25/2023   Send INR reminders to:  [facility]    Indications    Atrial fibrillation  unspecified type (H) [I48.91]           Comments:  11/18/22 Goal range 2.0-2.5 per Dr. Christiansen d/t bleeding.         Anticoagulation Care Providers     Provider Role Specialty Phone number    Rayo Christiansen,  MD [facility] Internal Medicine [phone]

## 2022-12-04 ENCOUNTER — HEALTH MAINTENANCE LETTER (OUTPATIENT)
Age: 72
End: 2022-12-04

## 2022-12-05 ENCOUNTER — LAB (OUTPATIENT)
Dept: LAB | Facility: CLINIC | Age: 72
End: 2022-12-05
Payer: COMMERCIAL

## 2022-12-05 ENCOUNTER — ANTICOAGULATION THERAPY VISIT (OUTPATIENT)
Dept: ANTICOAGULATION | Facility: CLINIC | Age: 72
End: 2022-12-05

## 2022-12-05 DIAGNOSIS — I48.91 ATRIAL FIBRILLATION, UNSPECIFIED TYPE (H): Primary | ICD-10-CM

## 2022-12-05 DIAGNOSIS — K92.2 GI BLEED: ICD-10-CM

## 2022-12-05 LAB — INR BLD: 1.9 (ref 0.9–1.1)

## 2022-12-05 PROCEDURE — 85610 PROTHROMBIN TIME: CPT | Performed by: PATHOLOGY

## 2022-12-05 PROCEDURE — 36415 COLL VENOUS BLD VENIPUNCTURE: CPT | Performed by: PATHOLOGY

## 2022-12-05 NOTE — PROGRESS NOTES
ANTICOAGULATION MANAGEMENT     Ju Edwards 72 year old male is on warfarin with subtherapeutic INR result. (Goal INR 2.0-2.5)    Recent labs: (last 7 days)     12/05/22  1017   INR 1.9*       ASSESSMENT     Source(s): Chart Review     Warfarin doses taken: Reviewed in chart  Diet: No new diet changes identified  New illness, injury, or hospitalization: No  Medication/supplement changes: None noted  Signs or symptoms of bleeding or clotting: No  Previous INR: Subtherapeutic  Additional findings: INR goal range recently changed to 2-2.5 due to bleed       PLAN     Recommended plan for no diet, medication or health factor changes affecting INR     Dosing Instructions: Increase your warfarin dose (11.8% change) with next INR in 7 - 10 days       Summary  As of 12/5/2022    Full warfarin instructions:  2 mg every Sun, Thu; 3 mg all other days; Starting 12/5/2022   Next INR check:  12/14/2022             Detailed voice message left for  spouse, Leslie with dosing instructions and follow up date.     Contact [phone] to schedule and with any changes, questions or concerns.     Education provided:   Please call back if any changes to your diet, medications or how you've been taking warfarin  Contact [phone] with any changes, questions or concerns.     Plan made with [facility] Pharmacist Shyann Fish, RN  Anticoagulation Clinic  12/5/2022    _______________________________________________________________________     Anticoagulation Episode Summary     Current INR goal:  2.0-2.5   TTR:  2.0 % (2.3 mo)   Target end date:  7/25/2023   Send INR reminders to:  [facility]    Indications    Atrial fibrillation  unspecified type (H) [I48.91]           Comments:  11/18/22 Goal range 2.0-2.5 per Dr. Christiansen d/t bleeding.         Anticoagulation Care Providers     Provider Role Specialty Phone number    Rayo Christiansen MD Referring Internal Medicine [phone]

## 2022-12-15 ENCOUNTER — TELEPHONE (OUTPATIENT)
Dept: ANTICOAGULATION | Facility: CLINIC | Age: 72
End: 2022-12-15

## 2023-01-12 DIAGNOSIS — I48.91 ATRIAL FIBRILLATION, UNSPECIFIED TYPE (H): ICD-10-CM

## 2023-01-12 DIAGNOSIS — Z79.01 LONG TERM CURRENT USE OF ANTICOAGULANT THERAPY: ICD-10-CM

## 2023-01-12 RX ORDER — WARFARIN SODIUM 2 MG/1
TABLET ORAL
Qty: 45 TABLET | Refills: 0 | Status: SHIPPED | OUTPATIENT
Start: 2023-01-12 | End: 2023-02-20

## 2023-01-21 DIAGNOSIS — R39.9 LOWER URINARY TRACT SYMPTOMS (LUTS): ICD-10-CM

## 2023-01-21 DIAGNOSIS — R35.0 URINARY FREQUENCY: ICD-10-CM

## 2023-01-23 NOTE — LETTER
Letter by Zuleika Wang RDCS at      Author: Zuleika Wang RDCS Service: -- Author Type: --    Filed:  Encounter Date: 6/3/2020 Status: (Other)         Ju Edwards  [address]  [place] [address]      Gisselle 3, 2020      Dear Mr. Edwards,    RE: Remote Results    We are writing to you regarding your recent Remote Pacemaker check from home. Your transmission was received successfully. Battery status is satisfactory at this time.     Your results are showing no significant changes.    Your next device appointment will be a remote check on September 2nd, 2020; this will occur automatically.    To schedule or reschedule, please call [phone] and press 1.    NOTE: If you would like to do an extra transmission, please call [phone] and press 3 to speak to a nurse BEFORE transmitting. This ensures that the Device Clinic staff is aware of the reason you are sending a transmission, and can follow-up with you after it has been reviewed.    We will be checking your implanted device from home (remotely) every three months unless otherwise instructed. We will need to see you in the clinic at least once a year. You may need to be seen in the clinic sooner depending on the results of your check.    Please be aware:    The follow-up schedule is like a Physician prescription.    Your remote monitor is paired to your specific implanted device.      Sincerely,    [facility]       
PAST MEDICAL HISTORY:  HLD (hyperlipidemia)     HTN (hypertension)     Vertigo

## 2023-01-25 RX ORDER — TAMSULOSIN HYDROCHLORIDE 0.4 MG/1
0.4 CAPSULE ORAL DAILY
Qty: 90 CAPSULE | Refills: 0 | Status: SHIPPED | OUTPATIENT
Start: 2023-01-25 | End: 2023-03-28

## 2023-01-25 NOTE — TELEPHONE ENCOUNTER
tamsulosin (FLOMAX) 0.4 MG capsule     Last Written Prescription Date:  01-  Last Fill Quantity: 90,   # refills: 3  Last Office Visit : 11-  Future Office visit:  Not on file    Routing refill request to provider for review/approval because:  Alpha Blockers Failed 01/21/2023 08:40 AM   Protocol Details  Blood pressure under 140/90 in past 12 months     11/09/22 (!) 147/75   11/03/22 120/63   07/25/22 128/64

## 2023-01-26 ENCOUNTER — OFFICE VISIT (OUTPATIENT)
Dept: CARDIOLOGY | Facility: CLINIC | Age: 73
End: 2023-01-26
Attending: INTERNAL MEDICINE
Payer: COMMERCIAL

## 2023-01-26 ENCOUNTER — ANCILLARY PROCEDURE (OUTPATIENT)
Dept: CARDIOLOGY | Facility: CLINIC | Age: 73
End: 2023-01-26
Payer: COMMERCIAL

## 2023-01-26 VITALS
WEIGHT: 151.8 LBS | HEIGHT: 63 IN | SYSTOLIC BLOOD PRESSURE: 125 MMHG | BODY MASS INDEX: 26.9 KG/M2 | OXYGEN SATURATION: 97 % | HEART RATE: 68 BPM | DIASTOLIC BLOOD PRESSURE: 70 MMHG

## 2023-01-26 DIAGNOSIS — Z95.0 PACEMAKER: ICD-10-CM

## 2023-01-26 DIAGNOSIS — I48.0 PAROXYSMAL ATRIAL FIBRILLATION (H): Primary | ICD-10-CM

## 2023-01-26 DIAGNOSIS — I10 BENIGN ESSENTIAL HYPERTENSION: ICD-10-CM

## 2023-01-26 DIAGNOSIS — I49.5 SSS (SICK SINUS SYNDROME) (H): ICD-10-CM

## 2023-01-26 PROCEDURE — 93005 ELECTROCARDIOGRAM TRACING: CPT

## 2023-01-26 PROCEDURE — 99214 OFFICE O/P EST MOD 30 MIN: CPT | Performed by: INTERNAL MEDICINE

## 2023-01-26 PROCEDURE — 93280 PM DEVICE PROGR EVAL DUAL: CPT | Performed by: INTERNAL MEDICINE

## 2023-01-26 PROCEDURE — G0463 HOSPITAL OUTPT CLINIC VISIT: HCPCS | Mod: 25

## 2023-01-26 PROCEDURE — G0463 HOSPITAL OUTPT CLINIC VISIT: HCPCS | Mod: 25 | Performed by: INTERNAL MEDICINE

## 2023-01-26 ASSESSMENT — PAIN SCALES - GENERAL: PAINLEVEL: NO PAIN (0)

## 2023-01-26 NOTE — LETTER
1/26/2023      RE: Ju Edwards  [address]  [address]  [place] [address]       Dear Colleague,    Thank you for the opportunity to participate in the care of your patient, Ju Edwards, at the [facility] [place] at [facility]. Please see a copy of my visit note below.    I am delighted to see Ju Edwards for follow up of atrial fibrillation and pacemaker.     The patient is a 72 year old  male with paroxysmal AF and tachy-armond syndrome s/p dual chamber pacemaker in 2020.  The office today is done with the assistance of a Greenlandic  via video. I saw him 2/3/2022.      The patient reports no palpitations, dizziness, syncope. He has no complaints. He did not bring his medications and but now says he is not on flecainide, however he really doesn't know what he's taking. His wife gives him meds.  He follows in the anticoagulation clinic for his INRs. He has no bleeding in his urine or stools. He is active, had no physical limitations, and no falls.       Past Medical History:  Atrial fibrillation, paroxysmal  Atrial flutter s/p CTI ablation 2013 [facility]  Sick sinus syndrome s/p pacemaker Feb 2020, [facility]  ASD repair 2000  [facility] stroke 2014  GI bleed 2014  H. Pylori 2013  Hypertension    Allergies:  No Known Allergies    Medications:   Amlodipine 5 mg every day  Atenolol 50 every day  Atorvastatin 80 every day  Lisinopril 20 every day  Warfarin    Celexa  Vitamin D  Flexeril  Pantoprazole    Flecainide 150 bid ???    Physical examination  Vitals: 125/70, HR 68  BMI= 26.7 (68.9 kg)    Constitutional: In general, the patient is a pleasant male in no acute distress.    Targeted cardiovascular exam:  Regular rate and rhythm. Normal S1, S2. No murmur, rub, click, or gallop.   Extremities:Pulses are normal bilaterally throughout. No peripheral edema.  Respiratory: Clear to asculation.    Chest left ppm  site: well-healed    I have personally and independently reviewed the following:  Labs:  12/5/2022: INR 1.9  11/9/2022: hgb 11.6, plt 257K  11/3/2022: cr 1.04    Echo 2/21/2020 [facility]: EF 68%     Angiogram 9/17/2012 at [facility]: no CAD     Stress test nuclear lexiscan 9/29/2016 [facility]:  Normal EF, no ischemia     EKG today 1/26/2023: APVS, RBBB, prolonged AVD, no change  2/3/2022: APVS, RBBB, 20     Ambulatory monitor 2/2020: frequent AF with RVR with pauses up to 3.8 seconds     Device interrogation 1/26/2023:  [employer] [id number] implanted 2/26/2020  RA [employer] 7740  RV [employer] 7741  DDDR  bpm, /150; AF burden <1%.    Remote interrogation 6/13/2022: AF burden <1%.      Assessment :  1. Tachy-armond syndrome s/p dual chamber pacemaker. Device is in excellent order. He is not pacemaker dependent.  2. Atrial fibrillation, paroxysmal. He had been on flecainide 150 bid and atenolol 50 every day for years. However flecainide is no longer on his med list, he doesn't know what he's taking.  [id number] score is 4 for age >65, HTN, CVA. He is on warfarin with subtherapeutic INRs, being adjusted by anticoagulation clinic.  3. H/o ASD repair, preserved LV EF. No evidence of CAD. Should be ok to stay on flecainide.  4. Hypertension. Controlled    Plan:  Will call his wife (who speaks English) to get his active med list for completion.  Remote pacemaker check every 3 months      I spent a total of 20 minutes face to face with  Ju Edwards during today's office visit. I have spent an additional 10 minutes today on chart review and documentation.      The patient is to return  as above. The patient understood the treatment plan as outlined above.  There were no barriers to learning.            Please do not hesitate to contact me if you have any questions/concerns.     Sincerely,     Beatriz Wing MD

## 2023-01-26 NOTE — PATIENT INSTRUCTIONS
It was a pleasure to see you in clinic today.  Please do not hesitate to call with any questions or concerns.  You are scheduled for remote transmissions on 4/26/23, 7/31/23 and 11/2/23.  We look forward to seeing you in clinic at your next device check in 1 year.    Sapphire Ballard, RN, MS, CCRN  Electrophysiology Nurse Clinician  [facility]    During Business Hours Please Call:  [phone]  After Hours Please Call:  [phone] - select option #4 and ask for job code [phone]

## 2023-01-26 NOTE — NURSING NOTE
Chief Complaint   Patient presents with     Follow Up     Return cardiology          Vitals were taken, medications reconciled and EKG performed.     Placido Whitehead, EMT   2:49 PM

## 2023-01-26 NOTE — PROGRESS NOTES
[facility] and [facility].     The patient is a 72 year old  male with paroxysmal AF and tachy-armond syndrome s/p dual chamber pacemaker in 2020.  The office today is done with the assistance of a [place]  via video. I saw him 2/3/2022.      The patient reports no palpitations, dizziness, syncope. He has no complaints. He did not bring his medications and but now says he is not on flecainide, however he really doesn't know what he's taking. His wife gives him meds.  He follows in the anticoagulation clinic for his INRs. He has no bleeding in his urine or stools. He is active, had no physical limitations, and no falls.       Past Medical History:  Atrial fibrillation, paroxysmal  Atrial flutter s/p CTI ablation 2013 [facility]  Sick sinus syndrome s/p pacemaker Feb 2020, [facility]  ASD repair 2000  MCA stroke 2014  GI bleed 2014  H. Pylori 2013  Hypertension    Allergies:  No Known Allergies    Medications:   Amlodipine 5 mg every day  Atenolol 50 every day  Atorvastatin 80 every day  Lisinopril 20 every day  Warfarin  Flecainide 150 bid      Celexa  Vitamin D  Flexeril  Pantoprazole      Physical examination  Vitals: 125/70, HR 68  BMI= 26.7 (68.9 kg)    Constitutional: In general, the patient is a pleasant male in no acute distress.    Targeted cardiovascular exam:  Regular rate and rhythm. Normal S1, S2. No murmur, rub, click, or gallop.   Extremities:Pulses are normal bilaterally throughout. No peripheral edema.  Respiratory: Clear to asculation.    Chest left ppm site: well-healed    I have personally and independently reviewed the following:  Labs:  12/5/2022: INR 1.9  11/9/2022: hgb 11.6, plt 257K  11/3/2022: cr 1.04    Echo 2/21/2020 [facility]: EF 68%     Angiogram 9/17/2012 at [facility]: no CAD     Stress test nuclear lexiscan 9/29/2016 [facility]:  Normal EF, no ischemia     EKG today 1/26/2023: APVS, RBBB, prolonged AVD,  no change  2/3/2022: APVS, RBBB, 20     Ambulatory monitor 2/2020: frequent AF with RVR with pauses up to 3.8 seconds     Device interrogation 1/26/2023:  [employer] [id number] implanted 2/26/2020  RA [employer] 7740  RV [employer] 7741  DDDR  bpm, /150; AF burden <1%.    Remote interrogation 6/13/2022: AF burden <1%.      Assessment :  1. Tachy-armond syndrome s/p dual chamber pacemaker. Device is in excellent order. He is not pacemaker dependent.  2. Atrial fibrillation, paroxysmal. He had been on flecainide 150 bid and atenolol 50 every day for years. However flecainide is no longer on his med list, he doesn't know what he's taking.  [id number] score is 4 for age >65, HTN, CVA. He is on warfarin with subtherapeutic INRs, being adjusted by anticoagulation clinic.  3. H/o ASD repair, preserved LV EF. No evidence of CAD. Should be ok to stay on flecainide.  4. Hypertension. Controlled    Plan:  Will call his wife (who speaks English) to get his active med list for completion.  Remote pacemaker check every 3 months      I spent a total of 20 minutes face to face with  Ju Edwards during today's office visit. I have spent an additional 10 minutes today on chart review and documentation.      The patient is to return  as above. The patient understood the treatment plan as outlined above.  There were no barriers to learning.      Beatriz Wing MD       Addendum: updated per wife - patient is on flecainide 150 bid.

## 2023-01-30 ENCOUNTER — TELEPHONE (OUTPATIENT)
Dept: ANTICOAGULATION | Facility: CLINIC | Age: 73
End: 2023-01-30
Payer: COMMERCIAL

## 2023-01-30 LAB
ATRIAL RATE - MUSE: 60 BPM
DIASTOLIC BLOOD PRESSURE - MUSE: NORMAL MMHG
INTERPRETATION ECG - MUSE: NORMAL
P AXIS - MUSE: 87 DEGREES
PR INTERVAL - MUSE: 296 MS
QRS DURATION - MUSE: 134 MS
QT - MUSE: 478 MS
QTC - MUSE: 478 MS
R AXIS - MUSE: 101 DEGREES
SYSTOLIC BLOOD PRESSURE - MUSE: NORMAL MMHG
T AXIS - MUSE: 46 DEGREES
VENTRICULAR RATE- MUSE: 60 BPM

## 2023-01-30 NOTE — TELEPHONE ENCOUNTER
ANTICOAGULATION     Ju Edwards is overdue for INR check.      Spoke with spouse, Leslie and scheduled lab appointment on 1/31/23    GRANT CORTEZ RN

## 2023-01-31 ENCOUNTER — ANTICOAGULATION THERAPY VISIT (OUTPATIENT)
Dept: ANTICOAGULATION | Facility: CLINIC | Age: 73
End: 2023-01-31

## 2023-01-31 ENCOUNTER — LAB (OUTPATIENT)
Dept: LAB | Facility: CLINIC | Age: 73
End: 2023-01-31
Payer: COMMERCIAL

## 2023-01-31 DIAGNOSIS — I48.91 ATRIAL FIBRILLATION, UNSPECIFIED TYPE (H): Primary | ICD-10-CM

## 2023-01-31 DIAGNOSIS — K92.2 GI BLEED: ICD-10-CM

## 2023-01-31 LAB — INR BLD: 1.4 (ref 0.9–1.1)

## 2023-01-31 PROCEDURE — 36415 COLL VENOUS BLD VENIPUNCTURE: CPT | Performed by: PATHOLOGY

## 2023-01-31 PROCEDURE — 85610 PROTHROMBIN TIME: CPT | Performed by: PATHOLOGY

## 2023-02-01 LAB
MDC_IDC_LEAD_IMPLANT_DT: NORMAL
MDC_IDC_LEAD_IMPLANT_DT: NORMAL
MDC_IDC_LEAD_LOCATION: NORMAL
MDC_IDC_LEAD_LOCATION: NORMAL
MDC_IDC_LEAD_LOCATION_DETAIL_1: NORMAL
MDC_IDC_LEAD_LOCATION_DETAIL_1: NORMAL
MDC_IDC_LEAD_MFG: NORMAL
MDC_IDC_LEAD_MFG: NORMAL
MDC_IDC_LEAD_MODEL: NORMAL
MDC_IDC_LEAD_MODEL: NORMAL
MDC_IDC_LEAD_POLARITY_TYPE: NORMAL
MDC_IDC_LEAD_POLARITY_TYPE: NORMAL
MDC_IDC_LEAD_SERIAL: NORMAL
MDC_IDC_LEAD_SERIAL: NORMAL
MDC_IDC_MSMT_BATTERY_DTM: NORMAL
MDC_IDC_MSMT_BATTERY_REMAINING_LONGEVITY: 120 MO
MDC_IDC_MSMT_BATTERY_REMAINING_PERCENTAGE: 100 %
MDC_IDC_MSMT_BATTERY_STATUS: NORMAL
MDC_IDC_MSMT_LEADCHNL_RA_IMPEDANCE_VALUE: 602 OHM
MDC_IDC_MSMT_LEADCHNL_RA_PACING_THRESHOLD_AMPLITUDE: 1.1 V
MDC_IDC_MSMT_LEADCHNL_RA_PACING_THRESHOLD_PULSEWIDTH: 0.4 MS
MDC_IDC_MSMT_LEADCHNL_RV_IMPEDANCE_VALUE: 681 OHM
MDC_IDC_MSMT_LEADCHNL_RV_PACING_THRESHOLD_AMPLITUDE: 1.2 V
MDC_IDC_MSMT_LEADCHNL_RV_PACING_THRESHOLD_PULSEWIDTH: 0.4 MS
MDC_IDC_PG_IMPLANT_DTM: NORMAL
MDC_IDC_PG_MFG: NORMAL
MDC_IDC_PG_MODEL: NORMAL
MDC_IDC_PG_SERIAL: NORMAL
MDC_IDC_PG_TYPE: NORMAL
MDC_IDC_SESS_CLINIC_NAME: NORMAL
MDC_IDC_SESS_DTM: NORMAL
MDC_IDC_SESS_TYPE: NORMAL
MDC_IDC_SET_BRADY_AT_MODE_SWITCH_MODE: NORMAL
MDC_IDC_SET_BRADY_AT_MODE_SWITCH_RATE: 160 {BEATS}/MIN
MDC_IDC_SET_BRADY_LOWRATE: 60 {BEATS}/MIN
MDC_IDC_SET_BRADY_MAX_SENSOR_RATE: 130 {BEATS}/MIN
MDC_IDC_SET_BRADY_MAX_TRACKING_RATE: 130 {BEATS}/MIN
MDC_IDC_SET_BRADY_MODE: NORMAL
MDC_IDC_SET_BRADY_PAV_DELAY_LOW: 200 MS
MDC_IDC_SET_BRADY_SAV_DELAY_LOW: 150 MS
MDC_IDC_SET_LEADCHNL_RA_PACING_AMPLITUDE: 3.5 V
MDC_IDC_SET_LEADCHNL_RA_PACING_CAPTURE_MODE: NORMAL
MDC_IDC_SET_LEADCHNL_RA_PACING_POLARITY: NORMAL
MDC_IDC_SET_LEADCHNL_RA_PACING_PULSEWIDTH: 0.4 MS
MDC_IDC_SET_LEADCHNL_RA_SENSING_ADAPTATION_MODE: NORMAL
MDC_IDC_SET_LEADCHNL_RA_SENSING_POLARITY: NORMAL
MDC_IDC_SET_LEADCHNL_RA_SENSING_SENSITIVITY: 0.25 MV
MDC_IDC_SET_LEADCHNL_RV_PACING_AMPLITUDE: 1.7 V
MDC_IDC_SET_LEADCHNL_RV_PACING_CAPTURE_MODE: NORMAL
MDC_IDC_SET_LEADCHNL_RV_PACING_POLARITY: NORMAL
MDC_IDC_SET_LEADCHNL_RV_PACING_PULSEWIDTH: 0.4 MS
MDC_IDC_SET_LEADCHNL_RV_SENSING_ADAPTATION_MODE: NORMAL
MDC_IDC_SET_LEADCHNL_RV_SENSING_POLARITY: NORMAL
MDC_IDC_SET_LEADCHNL_RV_SENSING_SENSITIVITY: 1.5 MV
MDC_IDC_SET_ZONE_DETECTION_INTERVAL: 375 MS
MDC_IDC_SET_ZONE_TYPE: NORMAL
MDC_IDC_SET_ZONE_VENDOR_TYPE: NORMAL
MDC_IDC_STAT_AT_BURDEN_PERCENT: 1 %
MDC_IDC_STAT_AT_DTM_END: NORMAL
MDC_IDC_STAT_AT_DTM_START: NORMAL
MDC_IDC_STAT_BRADY_DTM_END: NORMAL
MDC_IDC_STAT_BRADY_DTM_START: NORMAL
MDC_IDC_STAT_BRADY_RA_PERCENT_PACED: 87 %
MDC_IDC_STAT_BRADY_RV_PERCENT_PACED: 1 %
MDC_IDC_STAT_EPISODE_RECENT_COUNT: 0
MDC_IDC_STAT_EPISODE_RECENT_COUNT: 17
MDC_IDC_STAT_EPISODE_RECENT_COUNT_DTM_END: NORMAL
MDC_IDC_STAT_EPISODE_RECENT_COUNT_DTM_START: NORMAL
MDC_IDC_STAT_EPISODE_TYPE: NORMAL
MDC_IDC_STAT_EPISODE_VENDOR_TYPE: NORMAL

## 2023-02-01 NOTE — PROGRESS NOTES
ANTICOAGULATION MANAGEMENT     Ju Edwards 72 year old male is on warfarin with subtherapeutic INR result. (Goal INR 2.0-2.5)    Recent labs: (last 7 days)     01/31/23  1430   INR 1.4*       ASSESSMENT       Source(s): Chart Review    Previous INR was Subtherapeutic    Medication, diet, health changes since last INR chart reviewed; none identified           PLAN     Unable to reach Lety or Ju today today.    Left message to take a booster dose of warfarin,  5 mg tonight. Request call back for assessment.    Follow up required to confirm warfarin dose taken and assess for changes, confirm warfarin dose taken, assess for changes , discuss out of range result  and discuss dosing instructions and confirm understanding of instructions    Hien Curry RN  Anticoagulation Clinic    Writer attempted to reach patient yesterday and today X 3. Unable to reach Lety and patient was contacted via interpretor today.   2/1/2023

## 2023-02-13 ENCOUNTER — TELEPHONE (OUTPATIENT)
Dept: ANTICOAGULATION | Facility: CLINIC | Age: 73
End: 2023-02-13
Payer: COMMERCIAL

## 2023-02-13 NOTE — TELEPHONE ENCOUNTER
ANTICOAGULATION     Ju Edwards is overdue for INR check.      Left message for patient to call and schedule lab appointment as soon as possible. If returning call, please schedule.     Dai Johnson RN

## 2023-02-16 ENCOUNTER — MYC MEDICAL ADVICE (OUTPATIENT)
Dept: SURGERY | Facility: CLINIC | Age: 73
End: 2023-02-16
Payer: COMMERCIAL

## 2023-02-16 DIAGNOSIS — F32.A DEPRESSION, UNSPECIFIED DEPRESSION TYPE: ICD-10-CM

## 2023-02-16 DIAGNOSIS — I48.91 ATRIAL FIBRILLATION, UNSPECIFIED TYPE (H): ICD-10-CM

## 2023-02-16 DIAGNOSIS — Z79.01 LONG TERM CURRENT USE OF ANTICOAGULANT THERAPY: ICD-10-CM

## 2023-02-16 NOTE — CONFIDENTIAL NOTE
Sent upcoming appointment reminder via [employer].     Appt date/time: 2/21/2023 at 2:30 pm  Provider: TATI Hanna  Appt type: New pt - ALEXANDER Berger CMA

## 2023-02-20 RX ORDER — CITALOPRAM HYDROBROMIDE 10 MG/1
20 TABLET ORAL DAILY
Qty: 180 TABLET | Refills: 0 | Status: SHIPPED | OUTPATIENT
Start: 2023-02-20 | End: 2023-03-29

## 2023-02-20 RX ORDER — WARFARIN SODIUM 2 MG/1
TABLET ORAL
Qty: 45 TABLET | Refills: 0 | Status: SHIPPED | OUTPATIENT
Start: 2023-02-20 | End: 2023-03-29

## 2023-02-20 NOTE — TELEPHONE ENCOUNTER
WARFARIN SODIUM 2 MG TABLET      Last Written Prescription Date:  1/12/23  Last Fill Quantity: 45,   # refills: 0  Last Office Visit : 11/9/22  Future Office visit:  None    DX: depression    Routing refill request to provider for review/approval because:  INR outside of therapeutic range: 1.4 on 1/31/23      CITALOPRAM HBR 10 MG TABLET      Last Written Prescription Date:  11/11/22  Last Fill Quantity: 180,   # refills: 0    Routing refill request to provider for review/approval because:  Overdue for 6mo PHQ    90d refill sent to pharmacy, message to clinic re: overdue PHQ

## 2023-02-21 ENCOUNTER — PRE VISIT (OUTPATIENT)
Dept: SURGERY | Facility: CLINIC | Age: 73
End: 2023-02-21

## 2023-03-06 ENCOUNTER — DOCUMENTATION ONLY (OUTPATIENT)
Dept: ANTICOAGULATION | Facility: CLINIC | Age: 73
End: 2023-03-06
Payer: COMMERCIAL

## 2023-03-06 NOTE — LETTER
[facility] ANTICO[facility]ULATION [facility]  [address] [address]  Phone: [phone]  Fax: [phone]   March 6, 2023        Ju Edwards  [address]  [address]  [place] [address]            Dear Ju,    You are currently under the care of [facility]ticoagulation [facility] for your warfarin (Coumadin , Jantoven ) therapy.  We are contacting you because our records show you were due for an INR on 2/7/23.    There are potentially serious risks when taking warfarin without careful monitoring and we want to make sure you are safely managed.  Routine lab monitoring is required for warfarin refills.     Please call [phone] as soon as possible to schedule an appointment.  If there has been a change in your care or other concerns, please let us know so we can help and or update our records.     Sincerely,       [facility]

## 2023-03-25 DIAGNOSIS — Z79.01 LONG TERM CURRENT USE OF ANTICOAGULANT THERAPY: ICD-10-CM

## 2023-03-25 DIAGNOSIS — Z00.00 ROUTINE HEALTH MAINTENANCE: ICD-10-CM

## 2023-03-25 DIAGNOSIS — R35.0 URINARY FREQUENCY: ICD-10-CM

## 2023-03-25 DIAGNOSIS — I10 ESSENTIAL HYPERTENSION: ICD-10-CM

## 2023-03-25 DIAGNOSIS — R39.9 LOWER URINARY TRACT SYMPTOMS (LUTS): ICD-10-CM

## 2023-03-25 DIAGNOSIS — I63.039: ICD-10-CM

## 2023-03-25 DIAGNOSIS — I48.91 ATRIAL FIBRILLATION, UNSPECIFIED TYPE (H): ICD-10-CM

## 2023-03-25 DIAGNOSIS — F32.A DEPRESSION, UNSPECIFIED DEPRESSION TYPE: ICD-10-CM

## 2023-03-28 ENCOUNTER — TELEPHONE (OUTPATIENT)
Dept: ANTICOAGULATION | Facility: CLINIC | Age: 73
End: 2023-03-28
Payer: COMMERCIAL

## 2023-03-28 RX ORDER — AMLODIPINE BESYLATE 5 MG/1
TABLET ORAL
Qty: 90 TABLET | Refills: 2 | Status: SHIPPED | OUTPATIENT
Start: 2023-03-28 | End: 2024-02-16

## 2023-03-28 RX ORDER — TAMSULOSIN HYDROCHLORIDE 0.4 MG/1
CAPSULE ORAL
Qty: 90 CAPSULE | Refills: 2 | Status: SHIPPED | OUTPATIENT
Start: 2023-03-28 | End: 2024-05-31

## 2023-03-28 RX ORDER — CHOLECALCIFEROL (VITAMIN D3) 50 MCG
CAPSULE ORAL
Qty: 90 CAPSULE | Refills: 2 | Status: SHIPPED | OUTPATIENT
Start: 2023-03-28 | End: 2024-05-31

## 2023-03-28 NOTE — TELEPHONE ENCOUNTER
citalopram (CELEXA) 10 MG   Last Written Prescription Date:  2/20/23  Last Fill Quantity: 180,   # refills: 0    Last Office Visit : 11/9/22  Future Office visit:  NONE  Routing refill request to provider for review/approval because:  30 DAY RF PENDING    OVER DUE PHQ9     flecainide (TAMBOCOR) 150 MG    Last Written Prescription Date: 10/21/22  Last Fill Quantity: 180,   # refills: 1  Routing refill request to provider for review/approval because:  Drug not on the refill protocol      warfarin ANTICOAGULANT (COUMADIN) 2 MG    Last Written Prescription Date:  20/20/23  Last Fill Quantity: 45,   # refills: 0  Routing refill request to provider for review/approval because:  Please see last note   Does PCP want to continue med ? Or hold

## 2023-03-28 NOTE — LETTER
[facility]UL[facility]IN[facility]  [address] [address]  Phone: [phone]  Fax: [phone]   March 28, 2023        Ju Edwards  [address]  [address]  [place] [address]            Dear Ju,    You are currently under the care of [facility]ticoagulation [facility] for your warfarin (Coumadin , Jantoven ) therapy.  We are contacting you because our records show you were due for an INR on 2/7/23.    There are potentially serious risks when taking warfarin without careful monitoring and we want to make sure you are safely managed.  Routine lab monitoring is required for warfarin refills.     Please call [phone] as soon as possible to schedule an appointment.  If there has been a change in your care or other concerns, please let us know so we can help and or update our records.     Sincerely,       [facility]

## 2023-03-28 NOTE — TELEPHONE ENCOUNTER
Anticoagulation clinic notificiation    Dr. Christiansen,    Your patient, Ju Edwards, is past due for an INR. Their last result was 1.4 on 1/31/23 and was due to come back on 2/7/23.    Ruthymoshannan Guzmansanamelizabeth received phone calls and letters over the last several weeks in attempt to arrange a follow up appointment.  Ju Edwards will be sent another letter today.     No action is required from you unless you have additional information or if you would like to reach out personally to Ju Edwards.    Please don t hesitate to contact the Anticoagulation Management Program if you have any questions or concerns.     Sincerely,     [facility]

## 2023-03-29 RX ORDER — WARFARIN SODIUM 2 MG/1
TABLET ORAL
Qty: 45 TABLET | Refills: 0 | Status: SHIPPED | OUTPATIENT
Start: 2023-03-29 | End: 2023-05-02

## 2023-03-29 RX ORDER — FLECAINIDE ACETATE 150 MG/1
TABLET ORAL
Qty: 180 TABLET | Refills: 1 | Status: SHIPPED | OUTPATIENT
Start: 2023-03-29 | End: 2023-10-05

## 2023-03-29 RX ORDER — CITALOPRAM HYDROBROMIDE 10 MG/1
TABLET ORAL
Qty: 180 TABLET | Refills: 0 | Status: SHIPPED | OUTPATIENT
Start: 2023-03-29 | End: 2023-08-01

## 2023-04-01 ENCOUNTER — HEALTH MAINTENANCE LETTER (OUTPATIENT)
Age: 73
End: 2023-04-01

## 2023-04-26 ENCOUNTER — ANCILLARY PROCEDURE (OUTPATIENT)
Dept: CARDIOLOGY | Facility: CLINIC | Age: 73
End: 2023-04-26
Attending: INTERNAL MEDICINE
Payer: COMMERCIAL

## 2023-04-26 DIAGNOSIS — Z95.0 PACEMAKER: ICD-10-CM

## 2023-04-26 PROCEDURE — 93294 REM INTERROG EVL PM/LDLS PM: CPT | Performed by: INTERNAL MEDICINE

## 2023-04-26 PROCEDURE — 93296 REM INTERROG EVL PM/IDS: CPT

## 2023-05-02 DIAGNOSIS — I48.91 ATRIAL FIBRILLATION, UNSPECIFIED TYPE (H): ICD-10-CM

## 2023-05-02 DIAGNOSIS — Z79.01 LONG TERM CURRENT USE OF ANTICOAGULANT THERAPY: ICD-10-CM

## 2023-05-02 RX ORDER — WARFARIN SODIUM 2 MG/1
TABLET ORAL
Qty: 45 TABLET | Refills: 0 | Status: SHIPPED | OUTPATIENT
Start: 2023-05-02 | End: 2023-05-25

## 2023-05-02 NOTE — TELEPHONE ENCOUNTER
ANTICOAGULATION MANAGEMENT:  Medication Refill    Anticoagulation Summary  As of 1/31/2023    Warfarin maintenance plan:  2 mg (2 mg x 1) every Sun, Thu; 3 mg (2 mg x 1.5) all other days   Next INR check:  2/8/2023   Target end date:  7/25/2023    Indications    Atrial fibrillation  unspecified type (H) [I48.91]             Anticoagulation Care Providers     Provider Role Specialty Phone number    Rayo Christiansen MD Referring Internal Medicine [phone]          Visit with referring provider/group within last year: Yes    [facility] referral signed within last year: Yes    Chansamout does NOT meet all criteria for refill: > 2 weeks overdue for lab monitoring . 30 day collin fill approved; patient notified to schedule labs per [facility] protocol    GRANT CORTEZ RN  Anticoagulation Clinic

## 2023-05-06 LAB
MDC_IDC_EPISODE_DTM: NORMAL
MDC_IDC_EPISODE_DURATION: 15 S
MDC_IDC_EPISODE_DURATION: 2 S
MDC_IDC_EPISODE_DURATION: 23 S
MDC_IDC_EPISODE_DURATION: 4 S
MDC_IDC_EPISODE_DURATION: 57 S
MDC_IDC_EPISODE_DURATION: 6 S
MDC_IDC_EPISODE_DURATION: 7 S
MDC_IDC_EPISODE_DURATION: 9 S
MDC_IDC_EPISODE_DURATION: 9 S
MDC_IDC_EPISODE_DURATION: 960 S
MDC_IDC_EPISODE_ID: NORMAL
MDC_IDC_EPISODE_TYPE: NORMAL
MDC_IDC_LEAD_IMPLANT_DT: NORMAL
MDC_IDC_LEAD_IMPLANT_DT: NORMAL
MDC_IDC_LEAD_LOCATION: NORMAL
MDC_IDC_LEAD_LOCATION: NORMAL
MDC_IDC_LEAD_LOCATION_DETAIL_1: NORMAL
MDC_IDC_LEAD_LOCATION_DETAIL_1: NORMAL
MDC_IDC_LEAD_MFG: NORMAL
MDC_IDC_LEAD_MFG: NORMAL
MDC_IDC_LEAD_MODEL: NORMAL
MDC_IDC_LEAD_MODEL: NORMAL
MDC_IDC_LEAD_POLARITY_TYPE: NORMAL
MDC_IDC_LEAD_POLARITY_TYPE: NORMAL
MDC_IDC_LEAD_SERIAL: NORMAL
MDC_IDC_LEAD_SERIAL: NORMAL
MDC_IDC_MSMT_BATTERY_DTM: NORMAL
MDC_IDC_MSMT_BATTERY_REMAINING_LONGEVITY: 114 MO
MDC_IDC_MSMT_BATTERY_REMAINING_PERCENTAGE: 100 %
MDC_IDC_MSMT_BATTERY_STATUS: NORMAL
MDC_IDC_MSMT_LEADCHNL_RA_IMPEDANCE_VALUE: 588 OHM
MDC_IDC_MSMT_LEADCHNL_RV_IMPEDANCE_VALUE: 706 OHM
MDC_IDC_MSMT_LEADCHNL_RV_PACING_THRESHOLD_AMPLITUDE: 1.4 V
MDC_IDC_MSMT_LEADCHNL_RV_PACING_THRESHOLD_PULSEWIDTH: 0.4 MS
MDC_IDC_PG_IMPLANT_DTM: NORMAL
MDC_IDC_PG_MFG: NORMAL
MDC_IDC_PG_MODEL: NORMAL
MDC_IDC_PG_SERIAL: NORMAL
MDC_IDC_PG_TYPE: NORMAL
MDC_IDC_SESS_CLINIC_NAME: NORMAL
MDC_IDC_SESS_DTM: NORMAL
MDC_IDC_SESS_TYPE: NORMAL
MDC_IDC_SET_BRADY_AT_MODE_SWITCH_MODE: NORMAL
MDC_IDC_SET_BRADY_AT_MODE_SWITCH_RATE: 160 {BEATS}/MIN
MDC_IDC_SET_BRADY_LOWRATE: 60 {BEATS}/MIN
MDC_IDC_SET_BRADY_MAX_SENSOR_RATE: 130 {BEATS}/MIN
MDC_IDC_SET_BRADY_MAX_TRACKING_RATE: 130 {BEATS}/MIN
MDC_IDC_SET_BRADY_MODE: NORMAL
MDC_IDC_SET_BRADY_PAV_DELAY_LOW: 200 MS
MDC_IDC_SET_BRADY_SAV_DELAY_LOW: 150 MS
MDC_IDC_SET_LEADCHNL_RA_PACING_AMPLITUDE: 3.5 V
MDC_IDC_SET_LEADCHNL_RA_PACING_CAPTURE_MODE: NORMAL
MDC_IDC_SET_LEADCHNL_RA_PACING_POLARITY: NORMAL
MDC_IDC_SET_LEADCHNL_RA_PACING_PULSEWIDTH: 0.4 MS
MDC_IDC_SET_LEADCHNL_RA_SENSING_ADAPTATION_MODE: NORMAL
MDC_IDC_SET_LEADCHNL_RA_SENSING_POLARITY: NORMAL
MDC_IDC_SET_LEADCHNL_RA_SENSING_SENSITIVITY: 0.25 MV
MDC_IDC_SET_LEADCHNL_RV_PACING_AMPLITUDE: 1.7 V
MDC_IDC_SET_LEADCHNL_RV_PACING_CAPTURE_MODE: NORMAL
MDC_IDC_SET_LEADCHNL_RV_PACING_POLARITY: NORMAL
MDC_IDC_SET_LEADCHNL_RV_PACING_PULSEWIDTH: 0.4 MS
MDC_IDC_SET_LEADCHNL_RV_SENSING_ADAPTATION_MODE: NORMAL
MDC_IDC_SET_LEADCHNL_RV_SENSING_POLARITY: NORMAL
MDC_IDC_SET_LEADCHNL_RV_SENSING_SENSITIVITY: 1.5 MV
MDC_IDC_SET_ZONE_DETECTION_INTERVAL: 375 MS
MDC_IDC_SET_ZONE_TYPE: NORMAL
MDC_IDC_SET_ZONE_VENDOR_TYPE: NORMAL
MDC_IDC_STAT_AT_BURDEN_PERCENT: 1 %
MDC_IDC_STAT_AT_DTM_END: NORMAL
MDC_IDC_STAT_AT_DTM_START: NORMAL
MDC_IDC_STAT_BRADY_DTM_END: NORMAL
MDC_IDC_STAT_BRADY_DTM_START: NORMAL
MDC_IDC_STAT_BRADY_RA_PERCENT_PACED: 88 %
MDC_IDC_STAT_BRADY_RV_PERCENT_PACED: 1 %
MDC_IDC_STAT_EPISODE_RECENT_COUNT: 0
MDC_IDC_STAT_EPISODE_RECENT_COUNT: 11
MDC_IDC_STAT_EPISODE_RECENT_COUNT_DTM_END: NORMAL
MDC_IDC_STAT_EPISODE_RECENT_COUNT_DTM_START: NORMAL
MDC_IDC_STAT_EPISODE_TYPE: NORMAL
MDC_IDC_STAT_EPISODE_VENDOR_TYPE: NORMAL

## 2023-05-15 ENCOUNTER — TELEPHONE (OUTPATIENT)
Dept: ANTICOAGULATION | Facility: CLINIC | Age: 73
End: 2023-05-15
Payer: COMMERCIAL

## 2023-05-15 NOTE — TELEPHONE ENCOUNTER
ANTICOAGULATION MANAGEMENT PROGRAM    Dr. Christiansen,     Our records indicate that Ju Edwards remains past due to check INR. Ju Edwards was contacted multiple times over at least the last 8 weeks to attempt to arrange a follow up appointment.    Ju Edwards last had an INR checked on 1/31/23 and was due for follow up on 2/7/23     Called patient,Left message for patient to call and schedule lab appointment as soon as possible. If returning call, please schedule.      At this time Ju Edwards will be moved to noncompliant status within the program until their referral expires on 11/18/2023. While in noncompliant status the patient would continue to be contacted every 6 weeks by the anticoagulation program to attempt to schedule patient. You will be notified of each contact attempt to make you aware of patient's ongoing noncompliance.        Thank you,     [facility] Anticoagulation Management Program

## 2023-05-16 DIAGNOSIS — H40.2222: Primary | ICD-10-CM

## 2023-05-16 DIAGNOSIS — Z12.11 SPECIAL SCREENING FOR MALIGNANT NEOPLASMS, COLON: Primary | ICD-10-CM

## 2023-05-25 DIAGNOSIS — Z79.01 LONG TERM CURRENT USE OF ANTICOAGULANT THERAPY: ICD-10-CM

## 2023-05-25 DIAGNOSIS — I48.91 ATRIAL FIBRILLATION, UNSPECIFIED TYPE (H): ICD-10-CM

## 2023-05-25 RX ORDER — WARFARIN SODIUM 2 MG/1
TABLET ORAL
Qty: 45 TABLET | Refills: 0 | Status: SHIPPED | OUTPATIENT
Start: 2023-05-25 | End: 2023-06-22

## 2023-05-31 ENCOUNTER — OFFICE VISIT (OUTPATIENT)
Dept: OPHTHALMOLOGY | Facility: CLINIC | Age: 73
End: 2023-05-31
Attending: OPHTHALMOLOGY
Payer: COMMERCIAL

## 2023-05-31 DIAGNOSIS — Z96.1 PSEUDOPHAKIA: ICD-10-CM

## 2023-05-31 DIAGNOSIS — H25.811 MIXED TYPE AGE-RELATED CATARACT, RIGHT EYE: Primary | ICD-10-CM

## 2023-05-31 DIAGNOSIS — E11.9 CONTROLLED TYPE 2 DIABETES MELLITUS WITHOUT COMPLICATION, WITHOUT LONG-TERM CURRENT USE OF INSULIN (H): ICD-10-CM

## 2023-05-31 DIAGNOSIS — H26.492 PCO (POSTERIOR CAPSULAR OPACIFICATION), LEFT: ICD-10-CM

## 2023-05-31 DIAGNOSIS — H40.033 NARROW ANGLE GLAUCOMA SUSPECT OF BOTH EYES: ICD-10-CM

## 2023-05-31 DIAGNOSIS — H04.123 BILATERAL DRY EYES: ICD-10-CM

## 2023-05-31 DIAGNOSIS — H40.2222: ICD-10-CM

## 2023-05-31 PROCEDURE — 99214 OFFICE O/P EST MOD 30 MIN: CPT | Mod: 57 | Performed by: OPHTHALMOLOGY

## 2023-05-31 PROCEDURE — 92015 DETERMINE REFRACTIVE STATE: CPT

## 2023-05-31 PROCEDURE — 92133 CPTRZD OPH DX IMG PST SGM ON: CPT | Performed by: OPHTHALMOLOGY

## 2023-05-31 PROCEDURE — 66821 AFTER CATARACT LASER SURGERY: CPT | Mod: LT | Performed by: OPHTHALMOLOGY

## 2023-05-31 PROCEDURE — G0463 HOSPITAL OUTPT CLINIC VISIT: HCPCS | Mod: 25 | Performed by: OPHTHALMOLOGY

## 2023-05-31 RX ORDER — DORZOLAMIDE HYDROCHLORIDE AND TIMOLOL MALEATE 20; 5 MG/ML; MG/ML
1 SOLUTION/ DROPS OPHTHALMIC 2 TIMES DAILY
Qty: 10 ML | Refills: 1 | Status: SHIPPED | OUTPATIENT
Start: 2023-05-31 | End: 2023-10-26

## 2023-05-31 RX ORDER — ERYTHROMYCIN 5 MG/G
0.5 OINTMENT OPHTHALMIC AT BEDTIME
Qty: 7 G | Refills: 1 | Status: SHIPPED | OUTPATIENT
Start: 2023-05-31

## 2023-05-31 ASSESSMENT — EXTERNAL EXAM - LEFT EYE: OS_EXAM: NORMAL

## 2023-05-31 ASSESSMENT — CUP TO DISC RATIO: OS_RATIO: 0.7

## 2023-05-31 ASSESSMENT — CONF VISUAL FIELD
OD_SUPERIOR_NASAL_RESTRICTION: 3
OS_SUPERIOR_NASAL_RESTRICTION: 3
OD_INFERIOR_TEMPORAL_RESTRICTION: 0
OD_INFERIOR_NASAL_RESTRICTION: 3
OS_INFERIOR_NASAL_RESTRICTION: 3
OD_SUPERIOR_TEMPORAL_RESTRICTION: 0
OS_SUPERIOR_TEMPORAL_RESTRICTION: 0
OS_INFERIOR_TEMPORAL_RESTRICTION: 3

## 2023-05-31 ASSESSMENT — REFRACTION_MANIFEST
OS_SPHERE: -0.25
OD_CYLINDER: +0.50
OS_ADD: +2.50
OS_AXIS: 140
OS_CYLINDER: +0.75
OD_SPHERE: PLANO
OD_AXIS: 025
OD_ADD: +2.50

## 2023-05-31 ASSESSMENT — SLIT LAMP EXAM - LIDS
COMMENTS: BLEPHARITIS
COMMENTS: BLEPHARITIS

## 2023-05-31 ASSESSMENT — VISUAL ACUITY
METHOD: SNELLEN - LINEAR
OD_SC: 20/50

## 2023-05-31 ASSESSMENT — EXTERNAL EXAM - RIGHT EYE: OD_EXAM: NORMAL

## 2023-05-31 ASSESSMENT — TONOMETRY
OD_IOP_MMHG: 19
OS_IOP_MMHG: 23
IOP_METHOD: TONOPEN

## 2023-05-31 NOTE — PROGRESS NOTES
"Chief Complaint(s) and History of Present Illness(es)     Narrow Angle Glaucoma Follow Up            Laterality: both eyes    Associated symptoms: Negative for flashes and floaters    Compliance with Treatment: misses drops frequently    Comments: Follow up for mild right eye, moderate left eye, chronic narrow   angle glaucoma.     Garth  via iPad; ID# [id number].   Per patient's , \"He says he feels like there is something   inside that is bothering and irritating both of his eyes. He says it's off   and on for the past couple years. He says it feels like a needle is   pinching and making his eyes painful. He says sometimes it helps to rub   his eyes. He says there isn't improvement with eye drops. He denies   changes in vision.\"           Comments    Per patient's , \"He says he stopped using the drops because he   ran out, and never got refills. He is currently using Clear Eyes Redness   Relief about 2-3 times a day. He says if he gets really severe pain, then   he uses the eye drops more often.\"     DM2  Last BS:   Lab Results       Component                Value               Date                       A1C                      6.5                 04/30/2019                 A1C                      6.1                 05/29/2018                 A1C                      5.9                 10/10/2016                 A1C                      5.8                 04/02/2015                  Review of systems for the eyes was negative other than the pertinent positives/negatives listed in the HPI.      Assessment & Plan      Ju Edwards is a 72 year old male with the following diagnoses:   1. Mixed type age-related cataract, right eye    2. Narrow angle glaucoma suspect of both eyes    3. Pseudophakia - Left Eye    4. Chronic narrow angle glaucoma, left, moderate stage    5. Controlled type 2 diabetes mellitus without complication, without long-term current use of insulin (H)     "   Lost to follow up since 2021  History obtained with interpretor  Stopped all drops after running out > 1 year ago  Now using clear eyes drops 2-3x per day in both eyes due to foreign body sensation and pain    S/p CE/IOL with goniosynechialysis and ENDOCYLOPHOTOCOAGULATION left eye (8/14/2020)     IOP today 19/23 mmHg       Discussed contributing factors  Recommend lid hygiene and warm compresses  Stop Clear eyes  Start artifical tears four times a day both eyes   Start ESS norberto at bedtime both eyes     VISUALLY SIGNIFICANT cataract right eye   Monitor until surface improves  Strongly recommend cataract extraction  Laser peripheral iridotomy (LPI) patent both eyes   Left eye intraocular pressure too high.  Right eye borderline  Stable OCT Nerve fiber layer right eye.  Poor quality image left eye due to posterior capsular opacity (PCO), but likely progressed    Resume Cosopt twice a day left eye   Discussed importance of consistent use of glaucoma drops to prevent progression of glaucoma  RBA to YAG cap left eye discussed, consent obtained, will proceed today.       Patient disposition:   Return in about 4 weeks (around 6/28/2023) for VT only, Refraction, OCT NFL, iol calcs/taylor.           Attending Physician Attestation:  Complete documentation of historical and exam elements from today's encounter can be found in the full encounter summary report (not reduplicated in this progress note).  I personally obtained the chief complaint(s) and history of present illness.  I confirmed and edited as necessary the review of systems, past medical/surgical history, family history, social history, and examination findings as documented by others; and I examined the patient myself.  I personally reviewed the relevant tests, images, and reports as documented above.  I formulated and edited as necessary the assessment and plan and discussed the findings and management plan with the patient and family. . - Stanford Welsh MD

## 2023-05-31 NOTE — NURSING NOTE
"Chief Complaints and History of Present Illnesses   Patient presents with     Narrow Angle Glaucoma Follow Up     Follow up for mild right eye, moderate left eye, chronic narrow angle glaucoma.     [employer]  via iPad; ID# [id number].     Per patient's , \"He says he feels like there is something inside that is bothering and irritating both of his eyes. He says it's off and on for the past couple years. He says it feels like a needle is pinching and making his eyes painful. He says sometimes it helps to rub his eyes. He says there isn't improvement with eye drops. He denies changes in vision.\"      Chief Complaint(s) and History of Present Illness(es)     Narrow Angle Glaucoma Follow Up            Laterality: both eyes    Associated symptoms: eye pain, itching, foreign body sensation and burning.  Negative for glare, haloes, redness, tearing, flashes and floaters    Compliance with Treatment: misses drops frequently    Comments: Follow up for mild right eye, moderate left eye, chronic narrow angle glaucoma.     [employer]  via iPad; ID# [id number].     Per patient's , \"He says he feels like there is something inside that is bothering and irritating both of his eyes. He says it's off and on for the past couple years. He says it feels like a needle is pinching and making his eyes painful. He says sometimes it helps to rub his eyes. He says there isn't improvement with eye drops. He denies changes in vision.\"           Comments    Per patient's , \"He says he stopped using the drops because he ran out, and never got refills. He is currently using Clear Eyes Redness Relief about 2-3 times a day. He says if he gets really severe pain, then he uses the eye drops more often.\"     DM2  Last BS: hasn't checked in over a year  Lab Results       Component                Value               Date                       A1C                      6.5                 04/30/2019                 A1C      "                 6.1                 05/29/2018                 A1C                      5.9                 10/10/2016                 A1C                      5.8                 04/02/2015             Unknown recent A1C.     ROSALES Tellez 9:13 AM 05/31/2023

## 2023-06-22 DIAGNOSIS — Z79.01 LONG TERM CURRENT USE OF ANTICOAGULANT THERAPY: ICD-10-CM

## 2023-06-22 DIAGNOSIS — I48.91 ATRIAL FIBRILLATION, UNSPECIFIED TYPE (H): ICD-10-CM

## 2023-06-22 RX ORDER — WARFARIN SODIUM 2 MG/1
TABLET ORAL
Qty: 45 TABLET | Refills: 0 | Status: SHIPPED | OUTPATIENT
Start: 2023-06-22 | End: 2023-07-17

## 2023-06-22 NOTE — TELEPHONE ENCOUNTER
ANTICOAGULATION MANAGEMENT:  Medication Refill    Anticoagulation Summary  As of 1/31/2023    Warfarin maintenance plan:  2 mg (2 mg x 1) every Sun, Thu; 3 mg (2 mg x 1.5) all other days   Next INR check:  2/8/2023   Target end date:  7/25/2023    Indications    Atrial fibrillation  unspecified type (H) [I48.91]             Anticoagulation Care Providers     Provider Role Specialty Phone number    Rayo Christiansen MD Referring Internal Medicine [phone]          Visit with referring provider/group within last year: Yes    [facility] referral signed last signed: 11/18/2022; within last year: Yes    Chansamout does NOT meet all criteria for refill: > 2 weeks overdue for lab monitoring . collin fill previously given on 5/25/23; routing to referring provider for further refills  per [facility] protocol    Alice Fish RN  Anticoagulation Clinic

## 2023-07-03 DIAGNOSIS — H25.811 MIXED TYPE AGE-RELATED CATARACT, RIGHT EYE: Primary | ICD-10-CM

## 2023-07-03 DIAGNOSIS — H40.2222: ICD-10-CM

## 2023-07-05 ENCOUNTER — TELEPHONE (OUTPATIENT)
Dept: OPHTHALMOLOGY | Facility: CLINIC | Age: 73
End: 2023-07-05

## 2023-07-05 ENCOUNTER — OFFICE VISIT (OUTPATIENT)
Dept: OPHTHALMOLOGY | Facility: CLINIC | Age: 73
End: 2023-07-05
Attending: OPHTHALMOLOGY
Payer: COMMERCIAL

## 2023-07-05 DIAGNOSIS — H25.811 COMBINED FORM OF AGE-RELATED CATARACT, RIGHT EYE: Primary | ICD-10-CM

## 2023-07-05 DIAGNOSIS — H40.2222: ICD-10-CM

## 2023-07-05 DIAGNOSIS — K21.00 GASTROESOPHAGEAL REFLUX DISEASE WITH ESOPHAGITIS: ICD-10-CM

## 2023-07-05 PROCEDURE — 99214 OFFICE O/P EST MOD 30 MIN: CPT | Mod: 24 | Performed by: OPHTHALMOLOGY

## 2023-07-05 PROCEDURE — G0463 HOSPITAL OUTPT CLINIC VISIT: HCPCS | Performed by: OPHTHALMOLOGY

## 2023-07-05 PROCEDURE — 92133 CPTRZD OPH DX IMG PST SGM ON: CPT | Performed by: OPHTHALMOLOGY

## 2023-07-05 ASSESSMENT — TONOMETRY
IOP_METHOD: TONOPEN
OS_IOP_MMHG: 23
OD_IOP_MMHG: 19

## 2023-07-05 ASSESSMENT — SLIT LAMP EXAM - LIDS
COMMENTS: BLEPHARITIS
COMMENTS: BLEPHARITIS

## 2023-07-05 ASSESSMENT — VISUAL ACUITY
METHOD: SNELLEN - LINEAR
OS_SC: 20/20
METHOD_MR: DX PURPOSES
OD_SC: 20/40

## 2023-07-05 ASSESSMENT — REFRACTION_MANIFEST
OD_ADD: +2.50
OD_CYLINDER: +1.25
OD_SPHERE: +0.75
OS_SPHERE: -0.25
OS_CYLINDER: +1.00
OS_ADD: +2.50
OD_AXIS: 020
OS_AXIS: 100

## 2023-07-05 ASSESSMENT — EXTERNAL EXAM - RIGHT EYE: OD_EXAM: NORMAL

## 2023-07-05 ASSESSMENT — EXTERNAL EXAM - LEFT EYE: OS_EXAM: NORMAL

## 2023-07-05 NOTE — PROGRESS NOTES
Chief Complaint(s) and History of Present Illness(es)     Cataract Evaluation            Laterality: both eyes    Associated symptoms: blurred vision and a need for brighter lights    Onset: gradual          Comments    Here for cataracts evaluation right eye and narrow angle glaucoma suspect   both eyes. Vision is about the same. Some itching. No flashes or floaters.       Minor Guallpa COT 9:18 AM July 5, 2023                Review of systems for the eyes was negative other than the pertinent positives/negatives listed in the HPI.      Assessment & Plan      Ju Edwards is a 72 year old male with the following diagnoses:   1. Chronic narrow angle glaucoma, left, moderate stage         History obtained with interpretor    Lost to follow up since 2021 - 2023 (stopped all drops after running out > 1 year before returning)  S/p CE/IOL with goniosynechialysis and ENDOCYLOPHOTOCOAGULATION left eye (8/14/2020)     IOP today 19/23 mmHg   Did not start Cosopt  Using artificial tears three times a day, ESS norberto at bedtime   Visual acuity improved left eye        VISUALLY SIGNIFICANT cataract right eye   Monitor until surface improves  Strongly recommend cataract extraction  Laser peripheral iridotomy (LPI) patent both eyes   Left eye intraocular pressure remains too high.  Right eye borderline  Repeat OCT Nerve fiber layer today (poor quality last visit)    Start Cosopt twice a day left eye   Discussed importance of consistent use of glaucoma drops to prevent progression of glaucoma  Continue artificial tears and ESS as before    Risks, benefits and alternatives to cataract extraction/IOL implantation discussed; consent obtained.  Will schedule surgery today    Special equipment/needs:    Anesthesia:MAC with block  Dilation:Moderate  Iris expansion:IFIS  Pseudoexfoliation: No pseudoexfoliation  Trypan Blue: unlikely  Goal emmetropia  Dex               Attending Physician Attestation:  Complete documentation of  historical and exam elements from today's encounter can be found in the full encounter summary report (not reduplicated in this progress note).  I personally obtained the chief complaint(s) and history of present illness.  I confirmed and edited as necessary the review of systems, past medical/surgical history, family history, social history, and examination findings as documented by others; and I examined the patient myself.  I personally reviewed the relevant tests, images, and reports as documented above.  I formulated and edited as necessary the assessment and plan and discussed the findings and management plan with the patient and family. .Today with Ju Edwards , I reviewed the indications, risks, benefits, and alternatives of the proposed surgical procedure including, but not limited to, failure obtain the desired result  and need for additional surgery, bleeding, infection, loss of vision, loss of the eye, and the remote possibility of permanent damage to any organ system or death with the use of anesthesia.  I provided multiple opportunities for the questions, answered all questions to the best of my ability, and confirmed that my answers and my discussion were understood.       - Stanford Welsh MD

## 2023-07-05 NOTE — NURSING NOTE
Chief Complaints and History of Present Illnesses   Patient presents with     Cataract Evaluation     Chief Complaint(s) and History of Present Illness(es)     Cataract Evaluation            Laterality: both eyes    Associated symptoms: blurred vision and a need for brighter lights    Onset: gradual          Comments    Here for cataracts evaluation right eye and narrow angle glaucoma suspect both eyes. Vision is about the same. Some itching. No flashes or floaters.     Minor Guallpa COT 9:18 AM July 5, 2023

## 2023-07-06 ENCOUNTER — TELEPHONE (OUTPATIENT)
Dept: OPHTHALMOLOGY | Facility: CLINIC | Age: 73
End: 2023-07-06
Payer: COMMERCIAL

## 2023-07-06 PROBLEM — H25.811 COMBINED FORM OF AGE-RELATED CATARACT, RIGHT EYE: Status: ACTIVE | Noted: 2020-09-18

## 2023-07-07 RX ORDER — PANTOPRAZOLE SODIUM 40 MG/1
TABLET, DELAYED RELEASE ORAL
Qty: 90 TABLET | Refills: 1 | Status: SHIPPED | OUTPATIENT
Start: 2023-07-07 | End: 2024-05-21

## 2023-07-07 NOTE — TELEPHONE ENCOUNTER
11/9/2022  [facility]  Internal Medicine

## 2023-07-10 ENCOUNTER — TELEPHONE (OUTPATIENT)
Dept: OPHTHALMOLOGY | Facility: CLINIC | Age: 73
End: 2023-07-10
Payer: COMMERCIAL

## 2023-07-17 DIAGNOSIS — Z79.01 LONG TERM CURRENT USE OF ANTICOAGULANT THERAPY: ICD-10-CM

## 2023-07-17 DIAGNOSIS — I48.91 ATRIAL FIBRILLATION, UNSPECIFIED TYPE (H): ICD-10-CM

## 2023-07-17 RX ORDER — WARFARIN SODIUM 2 MG/1
TABLET ORAL
Qty: 45 TABLET | Refills: 0 | Status: SHIPPED | OUTPATIENT
Start: 2023-07-17 | End: 2023-08-14

## 2023-07-17 NOTE — TELEPHONE ENCOUNTER
ANTICOAGULATION MANAGEMENT:  Medication Refill    Anticoagulation Summary  As of 1/31/2023    Warfarin maintenance plan:  2 mg (2 mg x 1) every Sun, Thu; 3 mg (2 mg x 1.5) all other days   Next INR check:  2/8/2023   Target end date:  7/25/2023    Indications    Atrial fibrillation  unspecified type (H) [I48.91]             Anticoagulation Care Providers     Provider Role Specialty Phone number    Rayo Christiansen MD Referring Internal Medicine [phone]          Visit with referring provider/group within last year: Yes    [facility] referral signed last signed: 11/18/2022; within last year: Yes    Chansamout does NOT meet all criteria for refill: > 2 weeks overdue for lab monitoring . collin fill previously given on 6/22/23; routing to referring provider for further refills  per [facility] protocol    GRANT CORTEZ RN  Anticoagulation Clinic

## 2023-07-24 ENCOUNTER — TELEPHONE (OUTPATIENT)
Dept: OPHTHALMOLOGY | Facility: CLINIC | Age: 73
End: 2023-07-24
Payer: COMMERCIAL

## 2023-07-24 NOTE — TELEPHONE ENCOUNTER
SHASTAM to schedule surgery with Maulik Sullivan attempts reached       Anna C. Schoenecker on 7/24/2023 at 8:38 AM

## 2023-07-29 DIAGNOSIS — F32.A DEPRESSION, UNSPECIFIED DEPRESSION TYPE: ICD-10-CM

## 2023-07-31 ENCOUNTER — ANCILLARY PROCEDURE (OUTPATIENT)
Dept: CARDIOLOGY | Facility: CLINIC | Age: 73
End: 2023-07-31
Attending: INTERNAL MEDICINE
Payer: COMMERCIAL

## 2023-07-31 DIAGNOSIS — Z95.0 PACEMAKER: ICD-10-CM

## 2023-07-31 PROCEDURE — 93296 REM INTERROG EVL PM/IDS: CPT

## 2023-07-31 PROCEDURE — 93294 REM INTERROG EVL PM/LDLS PM: CPT | Performed by: INTERNAL MEDICINE

## 2023-08-01 NOTE — TELEPHONE ENCOUNTER
CITALOPRAM HBR 10 MG TABLET      Last Written Prescription Date:  3/29/23  Last Fill Quantity: 180,   # refills: 0  Last Office Visit : 11/9/22  Future Office visit:  none    Routing refill request to provider for review/approval because:  Overdue for 6mo office visit  Overdue for PHQ

## 2023-08-02 RX ORDER — CITALOPRAM HYDROBROMIDE 10 MG/1
20 TABLET ORAL DAILY
Qty: 180 TABLET | Refills: 0 | Status: SHIPPED | OUTPATIENT
Start: 2023-08-02 | End: 2023-10-27

## 2023-08-07 DIAGNOSIS — E78.2 MIXED HYPERLIPIDEMIA: ICD-10-CM

## 2023-08-07 DIAGNOSIS — I10 ESSENTIAL HYPERTENSION: ICD-10-CM

## 2023-08-08 LAB
MDC_IDC_EPISODE_DTM: NORMAL
MDC_IDC_EPISODE_DURATION: 10 S
MDC_IDC_EPISODE_DURATION: 13 S
MDC_IDC_EPISODE_DURATION: 5 S
MDC_IDC_EPISODE_ID: NORMAL
MDC_IDC_EPISODE_TYPE: NORMAL
MDC_IDC_LEAD_IMPLANT_DT: NORMAL
MDC_IDC_LEAD_IMPLANT_DT: NORMAL
MDC_IDC_LEAD_LOCATION: NORMAL
MDC_IDC_LEAD_LOCATION: NORMAL
MDC_IDC_LEAD_LOCATION_DETAIL_1: NORMAL
MDC_IDC_LEAD_LOCATION_DETAIL_1: NORMAL
MDC_IDC_LEAD_MFG: NORMAL
MDC_IDC_LEAD_MFG: NORMAL
MDC_IDC_LEAD_MODEL: NORMAL
MDC_IDC_LEAD_MODEL: NORMAL
MDC_IDC_LEAD_POLARITY_TYPE: NORMAL
MDC_IDC_LEAD_POLARITY_TYPE: NORMAL
MDC_IDC_LEAD_SERIAL: NORMAL
MDC_IDC_LEAD_SERIAL: NORMAL
MDC_IDC_MSMT_BATTERY_DTM: NORMAL
MDC_IDC_MSMT_BATTERY_REMAINING_LONGEVITY: 102 MO
MDC_IDC_MSMT_BATTERY_REMAINING_PERCENTAGE: 100 %
MDC_IDC_MSMT_BATTERY_STATUS: NORMAL
MDC_IDC_MSMT_LEADCHNL_RA_IMPEDANCE_VALUE: 614 OHM
MDC_IDC_MSMT_LEADCHNL_RV_IMPEDANCE_VALUE: 622 OHM
MDC_IDC_MSMT_LEADCHNL_RV_PACING_THRESHOLD_AMPLITUDE: 1.2 V
MDC_IDC_MSMT_LEADCHNL_RV_PACING_THRESHOLD_PULSEWIDTH: 0.4 MS
MDC_IDC_PG_IMPLANT_DTM: NORMAL
MDC_IDC_PG_MFG: NORMAL
MDC_IDC_PG_MODEL: NORMAL
MDC_IDC_PG_SERIAL: NORMAL
MDC_IDC_PG_TYPE: NORMAL
MDC_IDC_SESS_CLINIC_NAME: NORMAL
MDC_IDC_SESS_DTM: NORMAL
MDC_IDC_SESS_TYPE: NORMAL
MDC_IDC_SET_BRADY_AT_MODE_SWITCH_MODE: NORMAL
MDC_IDC_SET_BRADY_AT_MODE_SWITCH_RATE: 160 {BEATS}/MIN
MDC_IDC_SET_BRADY_LOWRATE: 60 {BEATS}/MIN
MDC_IDC_SET_BRADY_MAX_SENSOR_RATE: 130 {BEATS}/MIN
MDC_IDC_SET_BRADY_MAX_TRACKING_RATE: 130 {BEATS}/MIN
MDC_IDC_SET_BRADY_MODE: NORMAL
MDC_IDC_SET_BRADY_PAV_DELAY_LOW: 200 MS
MDC_IDC_SET_BRADY_SAV_DELAY_LOW: 150 MS
MDC_IDC_SET_LEADCHNL_RA_PACING_AMPLITUDE: 3.5 V
MDC_IDC_SET_LEADCHNL_RA_PACING_CAPTURE_MODE: NORMAL
MDC_IDC_SET_LEADCHNL_RA_PACING_POLARITY: NORMAL
MDC_IDC_SET_LEADCHNL_RA_PACING_PULSEWIDTH: 0.4 MS
MDC_IDC_SET_LEADCHNL_RA_SENSING_ADAPTATION_MODE: NORMAL
MDC_IDC_SET_LEADCHNL_RA_SENSING_POLARITY: NORMAL
MDC_IDC_SET_LEADCHNL_RA_SENSING_SENSITIVITY: 0.25 MV
MDC_IDC_SET_LEADCHNL_RV_PACING_AMPLITUDE: 1.7 V
MDC_IDC_SET_LEADCHNL_RV_PACING_CAPTURE_MODE: NORMAL
MDC_IDC_SET_LEADCHNL_RV_PACING_POLARITY: NORMAL
MDC_IDC_SET_LEADCHNL_RV_PACING_PULSEWIDTH: 0.4 MS
MDC_IDC_SET_LEADCHNL_RV_SENSING_ADAPTATION_MODE: NORMAL
MDC_IDC_SET_LEADCHNL_RV_SENSING_POLARITY: NORMAL
MDC_IDC_SET_LEADCHNL_RV_SENSING_SENSITIVITY: 1.5 MV
MDC_IDC_SET_ZONE_DETECTION_INTERVAL: 375 MS
MDC_IDC_SET_ZONE_TYPE: NORMAL
MDC_IDC_SET_ZONE_VENDOR_TYPE: NORMAL
MDC_IDC_STAT_AT_BURDEN_PERCENT: 1 %
MDC_IDC_STAT_AT_DTM_END: NORMAL
MDC_IDC_STAT_AT_DTM_START: NORMAL
MDC_IDC_STAT_BRADY_DTM_END: NORMAL
MDC_IDC_STAT_BRADY_DTM_START: NORMAL
MDC_IDC_STAT_BRADY_RA_PERCENT_PACED: 90 %
MDC_IDC_STAT_BRADY_RV_PERCENT_PACED: 2 %
MDC_IDC_STAT_EPISODE_RECENT_COUNT: 0
MDC_IDC_STAT_EPISODE_RECENT_COUNT: 4
MDC_IDC_STAT_EPISODE_RECENT_COUNT_DTM_END: NORMAL
MDC_IDC_STAT_EPISODE_RECENT_COUNT_DTM_START: NORMAL
MDC_IDC_STAT_EPISODE_TYPE: NORMAL
MDC_IDC_STAT_EPISODE_VENDOR_TYPE: NORMAL

## 2023-08-10 ENCOUNTER — TELEPHONE (OUTPATIENT)
Dept: ANTICOAGULATION | Facility: CLINIC | Age: 73
End: 2023-08-10
Payer: COMMERCIAL

## 2023-08-10 RX ORDER — ATORVASTATIN CALCIUM 80 MG/1
80 TABLET, FILM COATED ORAL DAILY
Qty: 90 TABLET | Refills: 0 | Status: SHIPPED | OUTPATIENT
Start: 2023-08-10 | End: 2023-11-08

## 2023-08-10 RX ORDER — ATENOLOL 50 MG/1
50 TABLET ORAL DAILY
Qty: 90 TABLET | Refills: 0 | Status: SHIPPED | OUTPATIENT
Start: 2023-08-10 | End: 2023-11-08

## 2023-08-10 NOTE — TELEPHONE ENCOUNTER
Anticoagulation Management    Ju Edwards is being followed by the anticoagulation clinic while in noncompliant status. Ju Guzmanrachidjeanna is receiving every 6 week reminder calls.     Last INR checked on 1/31/23    Called and Spoke with spouse Lety and scheduled lab appointment on 8/11/23

## 2023-08-10 NOTE — TELEPHONE ENCOUNTER
ATORVASTATIN 80 MG TABLET      Last Written Prescription Date:  8/8/22  Last Fill Quantity: 90,   # refills: 3  Last Office Visit : 11/9/22  Future Office visit:  none    Routing refill request to provider for review/approval because:  Overdue for LDL    90d collin refill sent, routed to clinic staff for follow up     ATENOLOL 50 MG TABLET  Passed protocol

## 2023-08-11 ENCOUNTER — LAB (OUTPATIENT)
Dept: LAB | Facility: CLINIC | Age: 73
End: 2023-08-11
Payer: COMMERCIAL

## 2023-08-11 ENCOUNTER — ANTICOAGULATION THERAPY VISIT (OUTPATIENT)
Dept: ANTICOAGULATION | Facility: CLINIC | Age: 73
End: 2023-08-11

## 2023-08-11 DIAGNOSIS — I48.91 ATRIAL FIBRILLATION, UNSPECIFIED TYPE (H): Primary | ICD-10-CM

## 2023-08-11 DIAGNOSIS — Z79.01 LONG TERM CURRENT USE OF ANTICOAGULANT THERAPY: ICD-10-CM

## 2023-08-11 DIAGNOSIS — Z79.01 LONG TERM CURRENT USE OF ANTICOAGULANT THERAPY: Primary | ICD-10-CM

## 2023-08-11 LAB — INR BLD: 3.9 (ref 0.9–1.1)

## 2023-08-11 PROCEDURE — 36415 COLL VENOUS BLD VENIPUNCTURE: CPT | Performed by: PATHOLOGY

## 2023-08-11 PROCEDURE — 85610 PROTHROMBIN TIME: CPT | Performed by: PATHOLOGY

## 2023-08-11 NOTE — PROGRESS NOTES
INR point of care standing orders .    Place new standing orders.  Pt currently in lab for this.      Clint Barajas CMA ([facility]) at 2:19 PM on 2023

## 2023-08-11 NOTE — PROGRESS NOTES
ANTICOAGULATION MANAGEMENT     Ju Edwards 73 year old male is on warfarin with supratherapeutic INR result. (Goal INR 2.0-2.5)    Recent labs: (last 7 days)     08/11/23  1426   INR 3.9*       ASSESSMENT     Source(s): Patient/Caregiver Call     Warfarin doses taken: Less warfarin taken than planned which may be affecting INR. Patient was taking 2mg Sun, Tue, Thu, Sat and 3mg all other days.  Diet: No new diet changes identified  Medication/supplement changes: None noted  New illness, injury, or hospitalization: No  Signs or symptoms of bleeding or clotting: No  Previous result: Subtherapeutic  Additional findings: None       PLAN     Recommended plan for no diet, medication or health factor changes affecting INR     Dosing Instructions: hold dose then decrease your warfarin dose (15% change) with next INR in 2 weeks       Summary  As of 8/11/2023      Full warfarin instructions:  8/11: Hold; Otherwise 2 mg every day   Next INR check:  8/25/2023               Telephone call with spouse Leslie who verbalizes understanding and agrees to plan and who agrees to plan and repeated back plan correctly    Lab visit scheduled    Education provided:   None required    Plan made per [facility] anticoagulation protocol    Yanni Up RN  Anticoagulation Clinic  8/11/2023    _______________________________________________________________________     Anticoagulation Episode Summary       Current INR goal:  2.0-2.5   TTR:  1.1 % (4.2 mo)   Target end date:  7/25/2023   Send INR reminders to:  [facility]    Indications    Atrial fibrillation  unspecified type (H) [I48.91]             Comments:  11/18/22 Goal range 2.0-2.5 per Dr. Christiansen d/t bleeding.             Anticoagulation Care Providers       Provider Role Specialty Phone number    Rayo Christiansen MD Referring Internal Medicine [phone]

## 2023-08-13 DIAGNOSIS — I48.91 ATRIAL FIBRILLATION, UNSPECIFIED TYPE (H): ICD-10-CM

## 2023-08-13 DIAGNOSIS — Z79.01 LONG TERM CURRENT USE OF ANTICOAGULANT THERAPY: ICD-10-CM

## 2023-08-14 RX ORDER — WARFARIN SODIUM 2 MG/1
TABLET ORAL
Qty: 90 TABLET | Refills: 1 | Status: SHIPPED | OUTPATIENT
Start: 2023-08-14 | End: 2024-03-06 | Stop reason: ALTCHOICE

## 2023-08-14 NOTE — TELEPHONE ENCOUNTER
ANTICOAGULATION MANAGEMENT:  Medication Refill    Anticoagulation Summary  As of 8/11/2023      Warfarin maintenance plan:  2 mg (2 mg x 1) every day   Next INR check:  8/25/2023   Target end date:  7/25/2023    Indications    Atrial fibrillation  unspecified type (H) [I48.91]                 Anticoagulation Care Providers       Provider Role Specialty Phone number    Rayo Christiansen MD Referring Internal Medicine [phone]            Refill Criteria    Visit with referring provider/group: Meets criteria: office visit within referring provider group in the last 1 year on 11/9/22    [facility] referral signed last signed: 11/18/2022; within last year: Yes    Lab monitoring not exceeding 2 weeks overdue: Yes    Ch[place]out meets all criteria for refill. Rx instructions and quantity supplied updated to match patient's current dosing plan. Warfarin 90 day supply with 1 refill granted per [facility] protocol     Alexander Wadsworth, RN  Anticoagulation Clinic

## 2023-08-27 ENCOUNTER — HEALTH MAINTENANCE LETTER (OUTPATIENT)
Age: 73
End: 2023-08-27

## 2023-09-11 ENCOUNTER — MYC MEDICAL ADVICE (OUTPATIENT)
Dept: ANTICOAGULATION | Facility: CLINIC | Age: 73
End: 2023-09-11
Payer: COMMERCIAL

## 2023-09-11 NOTE — TELEPHONE ENCOUNTER
ANTICOAGULATION     Ju Edwards is overdue for INR check.       Mychart reminder sent    GRANT CORTEZ RN

## 2023-09-18 ENCOUNTER — DOCUMENTATION ONLY (OUTPATIENT)
Dept: ANTICOAGULATION | Facility: CLINIC | Age: 73
End: 2023-09-18
Payer: COMMERCIAL

## 2023-09-18 NOTE — LETTER
[facility] ANTICOAGULATION CLINIC  [address] [address]  Phone: [phone]  Fax: [phone]   September 18, 2023        Ju Edwards  [address]  [address]  [place] [address]            Dear Ju,    You are currently under the care of [facility] for your warfarin (Coumadin , Jantoven ) therapy.  We are contacting you because our records show you were due for an INR on 8/25/23.    There are potentially serious risks when taking warfarin without careful monitoring and we want to make sure you are safely managed.  Routine lab monitoring is required for warfarin refills.     Please call [phone] as soon as possible to schedule an appointment.  If there has been a change in your care or other concerns, please let us know so we can help and or update our records.     Sincerely,       [facility]

## 2023-09-22 ENCOUNTER — TELEPHONE (OUTPATIENT)
Dept: ANTICOAGULATION | Facility: CLINIC | Age: 73
End: 2023-09-22
Payer: COMMERCIAL

## 2023-09-22 ENCOUNTER — TELEPHONE (OUTPATIENT)
Dept: INTERNAL MEDICINE | Facility: CLINIC | Age: 73
End: 2023-09-22
Payer: COMMERCIAL

## 2023-09-22 NOTE — TELEPHONE ENCOUNTER
I tried calling patient with a Garth , she tried calling [phone] twice and call would not connect. She then called [phone] and the call connected and the  said Hi twice but no one spoke. If patient calls back Primary care never received any DMV paperwork this year. I will also send patient a [employer] message.    Kandice

## 2023-09-22 NOTE — TELEPHONE ENCOUNTER
Health Call Center    Phone Message    May a detailed message be left on voicemail: yes     Reason for Call: Form or Letter   Type or form/letter needing completion:   Letter needed to be sent to DMV for driving privileges saying that the patient is capable of driving. Paperwork was given to care team at the beginning of the year was not filled out and he will lose his privileges. Please call patient wife to discuss further.   Provider: Dr Christiansen  Date form needed: ASAP before 9/26/2023  Once completed: Fax form to: email to  [email or web address]    Action Taken: Message routed to:  [facility]    Travel Screening: Not Applicable

## 2023-09-30 ENCOUNTER — TELEPHONE (OUTPATIENT)
Dept: INTERNAL MEDICINE | Facility: CLINIC | Age: 73
End: 2023-09-30
Payer: COMMERCIAL

## 2023-09-30 DIAGNOSIS — Z00.00 ROUTINE HEALTH MAINTENANCE: ICD-10-CM

## 2023-10-02 NOTE — TELEPHONE ENCOUNTER
flecainide (TAMBOCOR) 150 MG tablet       Last Written Prescription Date:  3/29/2023   Last Fill Quantity: 180,   # refills: 1  Last Office Visit : 11/9/22  Future Office visit:  none    Routing refill request to provider for review/approval because:  Requires provider authorization. Due for annual labs & follow-up appointment.

## 2023-10-05 DIAGNOSIS — Z00.00 ROUTINE HEALTH MAINTENANCE: ICD-10-CM

## 2023-10-05 RX ORDER — FLECAINIDE ACETATE 150 MG/1
150 TABLET ORAL 2 TIMES DAILY
Qty: 180 TABLET | Refills: 0 | OUTPATIENT
Start: 2023-10-05

## 2023-10-05 RX ORDER — FLECAINIDE ACETATE 150 MG/1
150 TABLET ORAL 2 TIMES DAILY
Qty: 180 TABLET | Refills: 0 | Status: SHIPPED | OUTPATIENT
Start: 2023-10-05 | End: 2024-05-23

## 2023-10-11 ENCOUNTER — DOCUMENTATION ONLY (OUTPATIENT)
Dept: ANTICOAGULATION | Facility: CLINIC | Age: 73
End: 2023-10-11
Payer: COMMERCIAL

## 2023-10-11 NOTE — LETTER
[facility] ANTICOAGULATION CLINIC  [address] [address]  Phone: [phone]  Fax: [phone]   October 11, 2023        Ju Edwards  [address]  [address]  [place] [address]            Dear Ju,    You are currently under the care of [facility] for your warfarin (Coumadin , Jantoven ) therapy.  We are contacting you because our records show you were due for an an INR on 8/25/23.    There are potentially serious risks when taking warfarin without careful monitoring and we want to make sure you are safely managed.  Routine lab monitoring is required for warfarin refills.     Please call [phone] as soon as possible to schedule an appointment.  If there has been a change in your care or other concerns, please let us know so we can help and/or update our records.     Sincerely,       [facility]

## 2023-10-16 DIAGNOSIS — I10 BENIGN ESSENTIAL HYPERTENSION: ICD-10-CM

## 2023-10-17 RX ORDER — LISINOPRIL 20 MG/1
20 TABLET ORAL DAILY
Qty: 90 TABLET | Refills: 0 | Status: SHIPPED | OUTPATIENT
Start: 2023-10-17 | End: 2024-02-16

## 2023-10-17 NOTE — TELEPHONE ENCOUNTER
lisinopril (ZESTRIL) 20 MG tablet 90 tablet 3 10/20/2022     Last Office Visit : 11/9/2022   Future Office visit:  none    Routing refill request to provider for review/approval because:  K+, Cr overdue. Annual appt due soon but no appointment scheduled. 90day RX PENDED.

## 2023-10-19 ENCOUNTER — TELEPHONE (OUTPATIENT)
Dept: INTERNAL MEDICINE | Facility: CLINIC | Age: 73
End: 2023-10-19
Payer: COMMERCIAL

## 2023-10-25 ENCOUNTER — TELEPHONE (OUTPATIENT)
Dept: ANTICOAGULATION | Facility: CLINIC | Age: 73
End: 2023-10-25
Payer: COMMERCIAL

## 2023-10-25 DIAGNOSIS — F32.A DEPRESSION, UNSPECIFIED DEPRESSION TYPE: ICD-10-CM

## 2023-10-25 NOTE — TELEPHONE ENCOUNTER
Anticoagulation clinic notificiation    Dr. Christiansen,    Your patient, Ju Edwards, is past due for an INR. Their last result was 3.9 on 08/11/2023 and was due to come back on 08/25/2023.    Josevickieshannan Guzmansanamelizabeth received phone calls and letters over the last several weeks in attempt to arrange a follow up appointment.  Ju Edwards will be sent another letter today.     No action is required from you unless you have additional information or if you would like to reach out personally to Ju Edwards.    Please don t hesitate to contact the Anticoagulation Management Program if you have any questions or concerns.     Sincerely,     [facility]ticoagulation Management [facility]

## 2023-10-25 NOTE — TELEPHONE ENCOUNTER
ANTICOAGULATION     Ju Edwards is overdue for an INR check.     Tried both mobile and home phone numbers. Both lines state the the call cannot be completed as dialed. Pt hasn't logged into [employer] for one year.    Omar Esposito RN

## 2023-10-25 NOTE — LETTER
[facility] ANTICOAGULATION CLINIC  [address]  [facility] [address]  Phone: [phone]  Fax: [phone]   October 25, 2023        Ju Edwards  [address]  [address]  [place] [address]            Dear Ju,    You are currently under the care of [facility] for your warfarin (Coumadin , Jantoven ) therapy.  We are contacting you because our records show you were due for an INR on 08/25/2023.    There are potentially serious risks when taking warfarin without careful monitoring and we want to make sure you are safely managed.  Routine lab monitoring is required for warfarin refills.     Please call [phone] as soon as possible to schedule an appointment.  If there has been a change in your care or other concerns, please let us know so we can help and/or update our records.     Sincerely,       [facility]

## 2023-10-26 DIAGNOSIS — H40.2222: ICD-10-CM

## 2023-10-26 RX ORDER — DORZOLAMIDE HYDROCHLORIDE AND TIMOLOL MALEATE 20; 5 MG/ML; MG/ML
1 SOLUTION/ DROPS OPHTHALMIC 2 TIMES DAILY
Qty: 10 ML | Refills: 1 | Status: SHIPPED | OUTPATIENT
Start: 2023-10-26 | End: 2024-05-28

## 2023-10-26 NOTE — TELEPHONE ENCOUNTER
citalopram (CELEXA) 10 MG tablet 180 tablet 0 8/2/2023  Last Office Visit : 11/9/2022   Future Office visit:  0     Routing refill request to provider for review/approval because:  90 day collin refill already given, pt need appt. 30 day rx PENDED.

## 2023-10-26 NOTE — TELEPHONE ENCOUNTER
dorzolamide-timolol (COSOPT) 2-0.5 % ophthalmic solution   10 mL 1 5/31/2023 7/5/2023  [facility]    Stanford Welsh MD  Ophthalmology    Start Cosopt twice a day left eye

## 2023-10-27 RX ORDER — CITALOPRAM HYDROBROMIDE 10 MG/1
20 TABLET ORAL DAILY
Qty: 60 TABLET | Refills: 0 | Status: SHIPPED | OUTPATIENT
Start: 2023-10-27 | End: 2024-03-11

## 2023-11-02 ENCOUNTER — ANCILLARY PROCEDURE (OUTPATIENT)
Dept: CARDIOLOGY | Facility: CLINIC | Age: 73
End: 2023-11-02
Attending: INTERNAL MEDICINE
Payer: COMMERCIAL

## 2023-11-02 DIAGNOSIS — Z95.0 PACEMAKER: ICD-10-CM

## 2023-11-02 PROCEDURE — 93296 REM INTERROG EVL PM/IDS: CPT

## 2023-11-02 PROCEDURE — 93294 REM INTERROG EVL PM/LDLS PM: CPT | Performed by: INTERNAL MEDICINE

## 2023-11-05 ENCOUNTER — TELEPHONE (OUTPATIENT)
Dept: CARDIOLOGY | Facility: CLINIC | Age: 73
End: 2023-11-05
Payer: COMMERCIAL

## 2023-11-05 DIAGNOSIS — I10 ESSENTIAL HYPERTENSION: ICD-10-CM

## 2023-11-05 DIAGNOSIS — E78.2 MIXED HYPERLIPIDEMIA: ICD-10-CM

## 2023-11-05 NOTE — TELEPHONE ENCOUNTER
Unable to LVM(2nd Attempt) and Sent Mychart (2nd Attempt) for the patient to call back and schedule the following:    Appointment type: Cardiology- return EP  Provider: [place]  Return date: 1/26/24  Specialty phone number: [phone]  Additional appointment(s) needed: device check  Additonal Notes: none    Cathy Londono, Visit Facilitator/MA.

## 2023-11-07 LAB
MDC_IDC_EPISODE_DTM: NORMAL
MDC_IDC_EPISODE_DURATION: 5 S
MDC_IDC_EPISODE_ID: NORMAL
MDC_IDC_EPISODE_TYPE: NORMAL
MDC_IDC_LEAD_CONNECTION_STATUS: NORMAL
MDC_IDC_LEAD_CONNECTION_STATUS: NORMAL
MDC_IDC_LEAD_IMPLANT_DT: NORMAL
MDC_IDC_LEAD_IMPLANT_DT: NORMAL
MDC_IDC_LEAD_LOCATION: NORMAL
MDC_IDC_LEAD_LOCATION: NORMAL
MDC_IDC_LEAD_LOCATION_DETAIL_1: NORMAL
MDC_IDC_LEAD_LOCATION_DETAIL_1: NORMAL
MDC_IDC_LEAD_MFG: NORMAL
MDC_IDC_LEAD_MFG: NORMAL
MDC_IDC_LEAD_MODEL: NORMAL
MDC_IDC_LEAD_MODEL: NORMAL
MDC_IDC_LEAD_POLARITY_TYPE: NORMAL
MDC_IDC_LEAD_POLARITY_TYPE: NORMAL
MDC_IDC_LEAD_SERIAL: NORMAL
MDC_IDC_LEAD_SERIAL: NORMAL
MDC_IDC_MSMT_BATTERY_DTM: NORMAL
MDC_IDC_MSMT_BATTERY_REMAINING_LONGEVITY: 102 MO
MDC_IDC_MSMT_BATTERY_REMAINING_PERCENTAGE: 100 %
MDC_IDC_MSMT_BATTERY_STATUS: NORMAL
MDC_IDC_MSMT_LEADCHNL_RA_IMPEDANCE_VALUE: 635 OHM
MDC_IDC_MSMT_LEADCHNL_RV_IMPEDANCE_VALUE: 689 OHM
MDC_IDC_MSMT_LEADCHNL_RV_PACING_THRESHOLD_AMPLITUDE: 1.1 V
MDC_IDC_MSMT_LEADCHNL_RV_PACING_THRESHOLD_PULSEWIDTH: 0.4 MS
MDC_IDC_PG_IMPLANT_DTM: NORMAL
MDC_IDC_PG_MFG: NORMAL
MDC_IDC_PG_MODEL: NORMAL
MDC_IDC_PG_SERIAL: NORMAL
MDC_IDC_PG_TYPE: NORMAL
MDC_IDC_SESS_CLINIC_NAME: NORMAL
MDC_IDC_SESS_DTM: NORMAL
MDC_IDC_SESS_TYPE: NORMAL
MDC_IDC_SET_BRADY_AT_MODE_SWITCH_MODE: NORMAL
MDC_IDC_SET_BRADY_AT_MODE_SWITCH_RATE: 160 {BEATS}/MIN
MDC_IDC_SET_BRADY_LOWRATE: 60 {BEATS}/MIN
MDC_IDC_SET_BRADY_MAX_SENSOR_RATE: 130 {BEATS}/MIN
MDC_IDC_SET_BRADY_MAX_TRACKING_RATE: 130 {BEATS}/MIN
MDC_IDC_SET_BRADY_MODE: NORMAL
MDC_IDC_SET_BRADY_PAV_DELAY_LOW: 200 MS
MDC_IDC_SET_BRADY_SAV_DELAY_LOW: 150 MS
MDC_IDC_SET_LEADCHNL_RA_PACING_AMPLITUDE: 3.5 V
MDC_IDC_SET_LEADCHNL_RA_PACING_CAPTURE_MODE: NORMAL
MDC_IDC_SET_LEADCHNL_RA_PACING_POLARITY: NORMAL
MDC_IDC_SET_LEADCHNL_RA_PACING_PULSEWIDTH: 0.4 MS
MDC_IDC_SET_LEADCHNL_RA_SENSING_ADAPTATION_MODE: NORMAL
MDC_IDC_SET_LEADCHNL_RA_SENSING_POLARITY: NORMAL
MDC_IDC_SET_LEADCHNL_RA_SENSING_SENSITIVITY: 0.25 MV
MDC_IDC_SET_LEADCHNL_RV_PACING_AMPLITUDE: 1.5 V
MDC_IDC_SET_LEADCHNL_RV_PACING_CAPTURE_MODE: NORMAL
MDC_IDC_SET_LEADCHNL_RV_PACING_POLARITY: NORMAL
MDC_IDC_SET_LEADCHNL_RV_PACING_PULSEWIDTH: 0.4 MS
MDC_IDC_SET_LEADCHNL_RV_SENSING_ADAPTATION_MODE: NORMAL
MDC_IDC_SET_LEADCHNL_RV_SENSING_POLARITY: NORMAL
MDC_IDC_SET_LEADCHNL_RV_SENSING_SENSITIVITY: 1.5 MV
MDC_IDC_SET_ZONE_DETECTION_INTERVAL: 375 MS
MDC_IDC_SET_ZONE_STATUS: NORMAL
MDC_IDC_SET_ZONE_TYPE: NORMAL
MDC_IDC_SET_ZONE_VENDOR_TYPE: NORMAL
MDC_IDC_STAT_AT_BURDEN_PERCENT: 1 %
MDC_IDC_STAT_AT_DTM_END: NORMAL
MDC_IDC_STAT_AT_DTM_START: NORMAL
MDC_IDC_STAT_BRADY_DTM_END: NORMAL
MDC_IDC_STAT_BRADY_DTM_START: NORMAL
MDC_IDC_STAT_BRADY_RA_PERCENT_PACED: 91 %
MDC_IDC_STAT_BRADY_RV_PERCENT_PACED: 2 %
MDC_IDC_STAT_EPISODE_RECENT_COUNT: 0
MDC_IDC_STAT_EPISODE_RECENT_COUNT: 1
MDC_IDC_STAT_EPISODE_RECENT_COUNT_DTM_END: NORMAL
MDC_IDC_STAT_EPISODE_RECENT_COUNT_DTM_START: NORMAL
MDC_IDC_STAT_EPISODE_TYPE: NORMAL
MDC_IDC_STAT_EPISODE_VENDOR_TYPE: NORMAL

## 2023-11-08 RX ORDER — ATENOLOL 50 MG/1
50 TABLET ORAL DAILY
Qty: 30 TABLET | Refills: 0 | Status: SHIPPED | OUTPATIENT
Start: 2023-11-08 | End: 2023-11-23

## 2023-11-08 RX ORDER — ATORVASTATIN CALCIUM 80 MG/1
80 TABLET, FILM COATED ORAL DAILY
Qty: 30 TABLET | Refills: 0 | Status: SHIPPED | OUTPATIENT
Start: 2023-11-08 | End: 2023-11-29

## 2023-11-08 NOTE — TELEPHONE ENCOUNTER
atorvastatin (LIPITOR) 80 MG tablet 90 tablet 0 8/10/2023    atenolol (TENORMIN) 50 MG tablet 90 tablet 0 8/10/2023    Last Office Visit : 11/9/2022   Future Office visit:  0    Routing refill request to provider for review/approval because:  90 day collin refill already given, pt needs appt. Looks like previous attempts have been made to reach out to patient without success.    Omar Esposito RN   Registered Nurse     Telephone Encounter  Signed     Encounter Date: 10/25/2023       ANTICOAGULATION      Ju dEwards is overdue for an INR check.      Tried both mobile and home phone numbers. Both lines state the the call cannot be completed as dialed. Pt hasn't logged into [employer] for one year.

## 2023-11-08 NOTE — TELEPHONE ENCOUNTER
Patient is due for an appointment, clinic has been unable to reach him by phone or MyChart. 30-day refill provided. Note sent to pharmacy.    Sherry Jason) CONCEPCIÓN Cade

## 2023-11-23 DIAGNOSIS — I10 ESSENTIAL HYPERTENSION: ICD-10-CM

## 2023-11-28 NOTE — TELEPHONE ENCOUNTER
"atenolol (TENORMIN) 50 MG tablet 30 tablet 0 11/8/2023  Last Office Visit : 11/9/2022   Future Office visit:  0    Routing refill request to provider for review/approval because:  Pt due for appointment, clinic no longer has working phone number on file for patient.   The following message was sent to pharmacy with last 30-day refill on 11/8/23: \"Appointment due. Clinic no longer has working phone number on file for pt. Please ask pt to schedule for further refills.\"    Sticky note left for Care Team to obtain updated contact information at next encounter.        "

## 2023-11-29 ENCOUNTER — TELEPHONE (OUTPATIENT)
Dept: ANTICOAGULATION | Facility: CLINIC | Age: 73
End: 2023-11-29
Payer: COMMERCIAL

## 2023-11-29 DIAGNOSIS — E78.2 MIXED HYPERLIPIDEMIA: ICD-10-CM

## 2023-11-29 NOTE — LETTER
November 29, 2023      TO: Toshiashannan Brisenobobbi  [address]  [address]  [place] [address]         Dear Mr. Ju Edwards,    November 29, 2023      Dear Ju,    You are currently under the care of [facility] for your warfarin (Coumadin , Jantoven ) therapy.  We are contacting you because our records show you were due for an INR on 8/25/23.    There are potentially serious risks when taking warfarin without careful monitoring and we want to make sure you are safely managed.  Routine lab monitoring is required for warfarin refills.     Please call [phone] as soon as possible to schedule a lab appointment. If it is difficult for you to get to lab, please call us to discuss options.  If there has been a change in your care or other concerns, please let us know so we can help and/or update our records.         Sincerely,       [facility]

## 2023-11-29 NOTE — TELEPHONE ENCOUNTER
ANTICOAGULATION     Ju Edwards is overdue for an INR check.     Name on VM doesn't match name on patient or family member.  Reminder letter is sent.     Hien Curry RN

## 2023-11-30 RX ORDER — ATENOLOL 50 MG/1
50 TABLET ORAL DAILY
Qty: 30 TABLET | Refills: 0 | Status: SHIPPED | OUTPATIENT
Start: 2023-11-30 | End: 2024-03-19

## 2023-11-30 RX ORDER — ATORVASTATIN CALCIUM 80 MG/1
80 TABLET, FILM COATED ORAL DAILY
Qty: 30 TABLET | Refills: 0 | Status: SHIPPED | OUTPATIENT
Start: 2023-11-30 | End: 2024-05-31

## 2023-12-15 DIAGNOSIS — E78.2 MIXED HYPERLIPIDEMIA: ICD-10-CM

## 2023-12-20 RX ORDER — ATORVASTATIN CALCIUM 80 MG/1
80 TABLET, FILM COATED ORAL DAILY
Qty: 30 TABLET | Refills: 0 | OUTPATIENT
Start: 2023-12-20

## 2023-12-20 NOTE — TELEPHONE ENCOUNTER
atorvastatin (LIPITOR) 80 MG tablet 30 tablet 0 11/30/2023  Last Office Visit : 11/9/2022  [facility], Rayo Escobar MD     Future Office visit:  0    Routing refill request to provider for review/approval because:  -Overdue annual appt/fasting lipids, collin refills given and attempts to reach patient have been made. (See previous encounters)    -Reached out to [facility] to and was informed that they had filled prescriptions for patient that were never picked up on 11/10/23, 11/14/23, and 12/11/23 and subsequently returned to [facility].    -The phone number they have on file for pt is [phone] and pt is also enrolled in their text message service.

## 2023-12-29 ENCOUNTER — TELEPHONE (OUTPATIENT)
Dept: INTERNAL MEDICINE | Facility: CLINIC | Age: 73
End: 2023-12-29
Payer: COMMERCIAL

## 2023-12-29 NOTE — TELEPHONE ENCOUNTER
[facility] Call Center    Phone Message    May a detailed message be left on voicemail: yes     Reason for Call: Other: Patient is requesting a letter for the [place] [employer] which would allow him to continue to drive...Phone [phone], if questions.      Action Taken: Other: pcc    Travel Screening: Not Applicable

## 2023-12-29 NOTE — TELEPHONE ENCOUNTER
Dr. Christiansen last seen patient on 11/09/2022. Appointment needed as provider has not seen patient for over 1 year.         Clint Barajas CMA ([facility]) at 3:54 PM on 12/29/2023

## 2024-01-14 ENCOUNTER — HEALTH MAINTENANCE LETTER (OUTPATIENT)
Age: 74
End: 2024-01-14

## 2024-01-19 ENCOUNTER — DOCUMENTATION ONLY (OUTPATIENT)
Dept: ANTICOAGULATION | Facility: CLINIC | Age: 74
End: 2024-01-19
Payer: COMMERCIAL

## 2024-01-19 DIAGNOSIS — I48.91 ATRIAL FIBRILLATION, UNSPECIFIED TYPE (H): Primary | ICD-10-CM

## 2024-01-19 NOTE — PROGRESS NOTES
ANTICOAGULATION  MANAGEMENT    Ju Edwards is being discharged from the [facility] Anticoagulation Management Program ([facility]).    Reason for discharge: non-compliance with Anticoagulation Management Program despite outreach.  Last  an INR was done on 8/11/23. Letter mailed to patient advising them to schedule visit with referring provider.       Anticoagulation episode resolved, ACC referral closed, and Standing order discontinued    Patient may be accepted back in ACC program after an office visit to discuss non-compliance and check an INR. Patient must be agreeable to follow the monitoring recommendations of the program and will need a new referral to be re-enrolled.    Hien Curry RN

## 2024-01-19 NOTE — LETTER
January 19, 2024      TO: Ju Edwards  [address]  [address]  [place] [address]         January 19, 2024      Dear Ju,    You have been under the care of the [facility] for monitoring of your warfarin (Coumadin , Jantoven )  for your 8/10/23.  Our records indicate that you are either past due to have your blood work checked or have not followed clinic staff recommendations. Your last an INR was on 8/25/23.     We regret to inform you that since you have not responded to our phone calls and letters the [facility] will no longer be able to manage your warfarin therapy. To ensure safe use of your warfarin and to receive additional warfarin refills you will need to make an office visit with Dr. Christiansen office as soon as possible to discuss your warfarin therapy. Please call [phone] or [phone] to schedule an appointment.       Sincerely,       [facility]

## 2024-01-19 NOTE — LETTER
January 19, 2024      TO: Ju Edwards  [address]  [address]  [place] [address]         Dear Mr. Ju Edwards,    January 19, 2024      Dear Ju,    You have been under the care of the [facility] Anticoagulation Management Program for monitoring of your warfarin (Coumadin , Jantoven )  for your atrial fibrilation.  Our records indicate that you are either past due to have your blood work checked or have not followed clinic staff recommendations. Your last an INR was on 8/11/23.     We regret to inform you that since you have not responded to our phone calls and letters the [facility] Anticoagulation Management Program will no longer be able to manage your warfarin therapy. To ensure safe use of your warfarin and to receive additional warfarin refills you will need to make an office visit with Dr. Christiansen office as soon as possible to discuss your warfarin therapy. Please call [phone] or [phone] to schedule an appointment.       Sincerely,       [facility]tico[facility]       Sincerely,

## 2024-02-02 ENCOUNTER — ANCILLARY PROCEDURE (OUTPATIENT)
Dept: CARDIOLOGY | Facility: CLINIC | Age: 74
End: 2024-02-02
Attending: INTERNAL MEDICINE
Payer: COMMERCIAL

## 2024-02-02 DIAGNOSIS — Z95.0 PACEMAKER: ICD-10-CM

## 2024-02-02 PROCEDURE — 93296 REM INTERROG EVL PM/IDS: CPT

## 2024-02-02 PROCEDURE — 93294 REM INTERROG EVL PM/LDLS PM: CPT | Performed by: INTERNAL MEDICINE

## 2024-02-08 NOTE — PROGRESS NOTES
Chief Complaint(s) and History of Present Illness(es)     Post Op (Ophthalmology) Left Eye     Laterality: left eye    Associated symptoms: itching and redness.  Negative for eye pain and   tearing    Pain scale: 0/10    Comments: S/p Cataract extraction with intraocular lens implant,   Endocyclophotocoagulation and Goniosynechialysis, left eye 08/24/2020              Comments     Patient returns to clinic for a one day post operative exam of left eye.     Patch was removed in office.  His left eye feels quite itchy. He states   that his vision is good, he can see well.    Eunice Up, COT 10:09 AM  August 25, 2020               Review of systems for the eyes was negative other than the pertinent positives/negatives listed in the HPI.      Assessment & Plan      Ju Edwards is a 70 year old male with the following diagnoses:   1. Pseudophakia - Left Eye    2. Postoperative eye state - Left Eye         PostOp, left eye, day 1  Cataract extraction c goniosynechialysis and ENDOCYLOPHOTOCOAGULATION left eye     Doing well  Keep patch in place at night for 5 days  Start post-operative drops and taper according to instructions  Post-operative do's and don'ts reviewed, questions answered      Patient disposition:   Return for 2-3+ weeks, refraction, DFE.           Attending Physician Attestation:  Complete documentation of historical and exam elements from today's encounter can be found in the full encounter summary report (not reduplicated in this progress note).  I personally obtained the chief complaint(s) and history of present illness.  I confirmed and edited as necessary the review of systems, past medical/surgical history, family history, social history, and examination findings as documented by others; and I examined the patient myself.  I personally reviewed the relevant tests, images, and reports as documented above.  I formulated and edited as necessary the assessment and plan and discussed the  Interval History: KATERYNA STEELE, says she has passed flatus, unsure if she had bowel movement, has ambulated    /250  Urostomy 100/350  ORESTES 195/30  /500      Objective:     Temp:  [97.4 °F (36.3 °C)-99.4 °F (37.4 °C)] 98.2 °F (36.8 °C)  Pulse:  [70-82] 73  Resp:  [12-20] 18  SpO2:  [96 %-100 %] 98 %  BP: (110-151)/(62-75) 151/75     Body mass index is 26.7 kg/m².           Drains       Drain  Duration                  Suprapubic Catheter 100% silicone 16 Fr. -- days         Nephrostomy 02/02/24 1255 Left 12 Fr. 5 days         Closed/Suction Drain 02/06/24 Tube - 1 Midline Abdomen Bulb 15 Fr. 2 days         NG/OG Tube 02/06/24 0748 nasogastric 1 day         Urostomy 02/06/24 1815 ureterostomy, left 1 day         NG/OG Tube 02/07/24 1036 nasogastric 16 Fr. Left nostril <1 day                     Physical Exam  Constitutional:       General: She is not in acute distress.     Appearance: Normal appearance.   HENT:      Head: Normocephalic and atraumatic.      Nose: Nose normal.      Comments: NGT in place to LIWS, minimal green output  Eyes:      Conjunctiva/sclera: Conjunctivae normal.   Cardiovascular:      Rate and Rhythm: Normal rate.   Pulmonary:      Effort: Pulmonary effort is normal. No respiratory distress.   Abdominal:      General: There is no distension.      Palpations: Abdomen is soft.      Comments: Incision CDI  Abdomen appropriately ttp   Urostomy in place, stent appears appropriately placed, urostomy with CYU output, no surrounding erythema/signs of dehiscence/signs of infection  Prior ileostomy site with packing in place, no significant discharge or purulence  L NT in place with cyu  ORESTES in place w/ss output    Musculoskeletal:         General: No deformity.   Skin:     General: Skin is warm and dry.   Neurological:      General: No focal deficit present.      Mental Status: She is alert.   Psychiatric:         Mood and Affect: Mood normal.         Behavior: Behavior normal.            Significant Labs:    BMP:  Recent Labs   Lab 02/06/24  0551 02/06/24  1836 02/07/24  0242    141 141   K 3.8 4.1 4.1   * 118* 117*   CO2 15* 12* 15*   BUN 13 11 13   CREATININE 1.4 1.4 1.4   CALCIUM 9.8 7.4* 8.3*       CBC:   Recent Labs   Lab 02/06/24  1836 02/07/24  0242   WBC 16.20* 14.87*   HGB 13.0 12.5   HCT 42.0 40.1    297       All pertinent labs results from the past 24 hours have been reviewed.    Significant Imaging:  All pertinent imaging results/findings from the past 24 hours have been reviewed.                 findings and management plan with the patient and family. . - Stanford Welsh MD

## 2024-02-12 DIAGNOSIS — I10 BENIGN ESSENTIAL HYPERTENSION: ICD-10-CM

## 2024-02-12 DIAGNOSIS — I10 ESSENTIAL HYPERTENSION: ICD-10-CM

## 2024-02-14 LAB
MDC_IDC_EPISODE_DTM: NORMAL
MDC_IDC_EPISODE_DTM: NORMAL
MDC_IDC_EPISODE_ID: NORMAL
MDC_IDC_EPISODE_ID: NORMAL
MDC_IDC_EPISODE_TYPE: NORMAL
MDC_IDC_EPISODE_TYPE: NORMAL
MDC_IDC_LEAD_CONNECTION_STATUS: NORMAL
MDC_IDC_LEAD_CONNECTION_STATUS: NORMAL
MDC_IDC_LEAD_IMPLANT_DT: NORMAL
MDC_IDC_LEAD_IMPLANT_DT: NORMAL
MDC_IDC_LEAD_LOCATION: NORMAL
MDC_IDC_LEAD_LOCATION: NORMAL
MDC_IDC_LEAD_LOCATION_DETAIL_1: NORMAL
MDC_IDC_LEAD_LOCATION_DETAIL_1: NORMAL
MDC_IDC_LEAD_MFG: NORMAL
MDC_IDC_LEAD_MFG: NORMAL
MDC_IDC_LEAD_MODEL: NORMAL
MDC_IDC_LEAD_MODEL: NORMAL
MDC_IDC_LEAD_POLARITY_TYPE: NORMAL
MDC_IDC_LEAD_POLARITY_TYPE: NORMAL
MDC_IDC_LEAD_SERIAL: NORMAL
MDC_IDC_LEAD_SERIAL: NORMAL
MDC_IDC_MSMT_BATTERY_DTM: NORMAL
MDC_IDC_MSMT_BATTERY_REMAINING_LONGEVITY: 108 MO
MDC_IDC_MSMT_BATTERY_REMAINING_PERCENTAGE: 100 %
MDC_IDC_MSMT_BATTERY_STATUS: NORMAL
MDC_IDC_MSMT_LEADCHNL_RA_IMPEDANCE_VALUE: 675 OHM
MDC_IDC_MSMT_LEADCHNL_RV_IMPEDANCE_VALUE: 754 OHM
MDC_IDC_MSMT_LEADCHNL_RV_PACING_THRESHOLD_AMPLITUDE: 1.4 V
MDC_IDC_MSMT_LEADCHNL_RV_PACING_THRESHOLD_PULSEWIDTH: 0.4 MS
MDC_IDC_PG_IMPLANT_DTM: NORMAL
MDC_IDC_PG_MFG: NORMAL
MDC_IDC_PG_MODEL: NORMAL
MDC_IDC_PG_SERIAL: NORMAL
MDC_IDC_PG_TYPE: NORMAL
MDC_IDC_SESS_CLINIC_NAME: NORMAL
MDC_IDC_SESS_DTM: NORMAL
MDC_IDC_SESS_TYPE: NORMAL
MDC_IDC_SET_BRADY_AT_MODE_SWITCH_MODE: NORMAL
MDC_IDC_SET_BRADY_AT_MODE_SWITCH_RATE: 160 {BEATS}/MIN
MDC_IDC_SET_BRADY_LOWRATE: 60 {BEATS}/MIN
MDC_IDC_SET_BRADY_MAX_SENSOR_RATE: 130 {BEATS}/MIN
MDC_IDC_SET_BRADY_MAX_TRACKING_RATE: 130 {BEATS}/MIN
MDC_IDC_SET_BRADY_MODE: NORMAL
MDC_IDC_SET_BRADY_PAV_DELAY_LOW: 200 MS
MDC_IDC_SET_BRADY_SAV_DELAY_LOW: 150 MS
MDC_IDC_SET_LEADCHNL_RA_PACING_AMPLITUDE: 3.5 V
MDC_IDC_SET_LEADCHNL_RA_PACING_CAPTURE_MODE: NORMAL
MDC_IDC_SET_LEADCHNL_RA_PACING_POLARITY: NORMAL
MDC_IDC_SET_LEADCHNL_RA_PACING_PULSEWIDTH: 0.4 MS
MDC_IDC_SET_LEADCHNL_RA_SENSING_ADAPTATION_MODE: NORMAL
MDC_IDC_SET_LEADCHNL_RA_SENSING_POLARITY: NORMAL
MDC_IDC_SET_LEADCHNL_RA_SENSING_SENSITIVITY: 0.25 MV
MDC_IDC_SET_LEADCHNL_RV_PACING_AMPLITUDE: 1.9 V
MDC_IDC_SET_LEADCHNL_RV_PACING_CAPTURE_MODE: NORMAL
MDC_IDC_SET_LEADCHNL_RV_PACING_POLARITY: NORMAL
MDC_IDC_SET_LEADCHNL_RV_PACING_PULSEWIDTH: 0.4 MS
MDC_IDC_SET_LEADCHNL_RV_SENSING_ADAPTATION_MODE: NORMAL
MDC_IDC_SET_LEADCHNL_RV_SENSING_POLARITY: NORMAL
MDC_IDC_SET_LEADCHNL_RV_SENSING_SENSITIVITY: 1.5 MV
MDC_IDC_SET_ZONE_DETECTION_INTERVAL: 375 MS
MDC_IDC_SET_ZONE_STATUS: NORMAL
MDC_IDC_SET_ZONE_TYPE: NORMAL
MDC_IDC_SET_ZONE_VENDOR_TYPE: NORMAL
MDC_IDC_STAT_AT_BURDEN_PERCENT: 1 %
MDC_IDC_STAT_AT_DTM_END: NORMAL
MDC_IDC_STAT_AT_DTM_START: NORMAL
MDC_IDC_STAT_BRADY_DTM_END: NORMAL
MDC_IDC_STAT_BRADY_DTM_START: NORMAL
MDC_IDC_STAT_BRADY_RA_PERCENT_PACED: 88 %
MDC_IDC_STAT_BRADY_RV_PERCENT_PACED: 2 %
MDC_IDC_STAT_EPISODE_RECENT_COUNT: 0
MDC_IDC_STAT_EPISODE_RECENT_COUNT_DTM_END: NORMAL
MDC_IDC_STAT_EPISODE_RECENT_COUNT_DTM_START: NORMAL
MDC_IDC_STAT_EPISODE_TYPE: NORMAL
MDC_IDC_STAT_EPISODE_VENDOR_TYPE: NORMAL
MDC_IDC_STAT_EPISODE_VENDOR_TYPE: NORMAL

## 2024-02-16 RX ORDER — AMLODIPINE BESYLATE 5 MG/1
5 TABLET ORAL DAILY
Qty: 90 TABLET | Refills: 0 | Status: SHIPPED | OUTPATIENT
Start: 2024-02-16 | End: 2024-05-14

## 2024-02-16 RX ORDER — LISINOPRIL 20 MG/1
20 TABLET ORAL DAILY
Qty: 90 TABLET | Refills: 0 | Status: SHIPPED | OUTPATIENT
Start: 2024-02-16 | End: 2024-05-20

## 2024-02-16 NOTE — TELEPHONE ENCOUNTER
AMLODIPINE BESYLATE 5 MG TAB       Last Written Prescription Date:  3-28-23  Last Fill Quantity: 90,   # refills: 2   LISINOPRIL 20 MG TABLET       Last Written Prescription Date:  10-17-23  Last Fill Quantity: 90,   # refills: 0  Last Office Visit : 11-9-22  Future Office visit:  2-24-24    Routing refill request to provider for review/approval because:  Overdue  appt, BP documentation and labs: Cr, gfr,k   Previous collin RF

## 2024-02-24 ENCOUNTER — ANCILLARY PROCEDURE (OUTPATIENT)
Dept: GENERAL RADIOLOGY | Facility: CLINIC | Age: 74
End: 2024-02-24
Attending: INTERNAL MEDICINE
Payer: COMMERCIAL

## 2024-02-24 ENCOUNTER — LAB (OUTPATIENT)
Dept: LAB | Facility: CLINIC | Age: 74
End: 2024-02-24
Payer: COMMERCIAL

## 2024-02-24 ENCOUNTER — OFFICE VISIT (OUTPATIENT)
Dept: INTERNAL MEDICINE | Facility: CLINIC | Age: 74
End: 2024-02-24
Payer: COMMERCIAL

## 2024-02-24 VITALS
BODY MASS INDEX: 26.5 KG/M2 | WEIGHT: 150.7 LBS | HEART RATE: 68 BPM | DIASTOLIC BLOOD PRESSURE: 88 MMHG | OXYGEN SATURATION: 98 % | SYSTOLIC BLOOD PRESSURE: 171 MMHG

## 2024-02-24 DIAGNOSIS — J40 BRONCHITIS: ICD-10-CM

## 2024-02-24 DIAGNOSIS — R05.1 ACUTE COUGH: ICD-10-CM

## 2024-02-24 DIAGNOSIS — R05.9 COUGH, UNSPECIFIED TYPE: ICD-10-CM

## 2024-02-24 DIAGNOSIS — Z79.01 LONG TERM CURRENT USE OF ANTICOAGULANT THERAPY: ICD-10-CM

## 2024-02-24 DIAGNOSIS — I63.311 CEREBROVASCULAR ACCIDENT (CVA) DUE TO THROMBOSIS OF RIGHT MIDDLE CEREBRAL ARTERY (H): ICD-10-CM

## 2024-02-24 DIAGNOSIS — G81.94 LEFT HEMIPARESIS (H): ICD-10-CM

## 2024-02-24 DIAGNOSIS — R05.9 COUGH, UNSPECIFIED TYPE: Primary | ICD-10-CM

## 2024-02-24 LAB
ALBUMIN SERPL BCG-MCNC: 4.5 G/DL (ref 3.5–5.2)
ALP SERPL-CCNC: 110 U/L (ref 40–150)
ALT SERPL W P-5'-P-CCNC: 30 U/L (ref 0–70)
ANION GAP SERPL CALCULATED.3IONS-SCNC: 11 MMOL/L (ref 7–15)
AST SERPL W P-5'-P-CCNC: 23 U/L (ref 0–45)
BASOPHILS # BLD AUTO: 0.1 10E3/UL (ref 0–0.2)
BASOPHILS NFR BLD AUTO: 1 %
BILIRUB SERPL-MCNC: 0.4 MG/DL
BUN SERPL-MCNC: 15.3 MG/DL (ref 8–23)
CALCIUM SERPL-MCNC: 9.3 MG/DL (ref 8.8–10.2)
CHLORIDE SERPL-SCNC: 106 MMOL/L (ref 98–107)
CREAT SERPL-MCNC: 0.95 MG/DL (ref 0.67–1.17)
CRP SERPL-MCNC: <3 MG/L
DEPRECATED HCO3 PLAS-SCNC: 25 MMOL/L (ref 22–29)
EGFRCR SERPLBLD CKD-EPI 2021: 86 ML/MIN/1.73M2
EOSINOPHIL # BLD AUTO: 0.5 10E3/UL (ref 0–0.7)
EOSINOPHIL NFR BLD AUTO: 7 %
ERYTHROCYTE [DISTWIDTH] IN BLOOD BY AUTOMATED COUNT: 13.6 % (ref 10–15)
FLUAV RNA SPEC QL NAA+PROBE: NEGATIVE
FLUBV RNA RESP QL NAA+PROBE: NEGATIVE
GLUCOSE SERPL-MCNC: 103 MG/DL (ref 70–99)
HCT VFR BLD AUTO: 40.7 % (ref 40–53)
HGB BLD-MCNC: 13.5 G/DL (ref 13.3–17.7)
IMM GRANULOCYTES # BLD: 0 10E3/UL
IMM GRANULOCYTES NFR BLD: 0 %
INR BLD: 1.4 (ref 0.9–1.1)
LYMPHOCYTES # BLD AUTO: 1.9 10E3/UL (ref 0.8–5.3)
LYMPHOCYTES NFR BLD AUTO: 24 %
MCH RBC QN AUTO: 28.9 PG (ref 26.5–33)
MCHC RBC AUTO-ENTMCNC: 33.2 G/DL (ref 31.5–36.5)
MCV RBC AUTO: 87 FL (ref 78–100)
MONOCYTES # BLD AUTO: 0.6 10E3/UL (ref 0–1.3)
MONOCYTES NFR BLD AUTO: 8 %
NEUTROPHILS # BLD AUTO: 4.6 10E3/UL (ref 1.6–8.3)
NEUTROPHILS NFR BLD AUTO: 60 %
NRBC # BLD AUTO: 0 10E3/UL
NRBC BLD AUTO-RTO: 0 /100
PLATELET # BLD AUTO: 229 10E3/UL (ref 150–450)
POTASSIUM SERPL-SCNC: 4.2 MMOL/L (ref 3.4–5.3)
PROT SERPL-MCNC: 7.7 G/DL (ref 6.4–8.3)
RBC # BLD AUTO: 4.67 10E6/UL (ref 4.4–5.9)
RSV RNA SPEC NAA+PROBE: NEGATIVE
SARS-COV-2 RNA RESP QL NAA+PROBE: NEGATIVE
SODIUM SERPL-SCNC: 142 MMOL/L (ref 135–145)
WBC # BLD AUTO: 7.6 10E3/UL (ref 4–11)

## 2024-02-24 PROCEDURE — 85610 PROTHROMBIN TIME: CPT | Performed by: PATHOLOGY

## 2024-02-24 PROCEDURE — 36415 COLL VENOUS BLD VENIPUNCTURE: CPT | Performed by: PATHOLOGY

## 2024-02-24 PROCEDURE — 99214 OFFICE O/P EST MOD 30 MIN: CPT | Performed by: INTERNAL MEDICINE

## 2024-02-24 PROCEDURE — 86140 C-REACTIVE PROTEIN: CPT | Performed by: PATHOLOGY

## 2024-02-24 PROCEDURE — 71046 X-RAY EXAM CHEST 2 VIEWS: CPT | Performed by: RADIOLOGY

## 2024-02-24 PROCEDURE — 80053 COMPREHEN METABOLIC PANEL: CPT | Performed by: PATHOLOGY

## 2024-02-24 PROCEDURE — 85025 COMPLETE CBC W/AUTO DIFF WBC: CPT | Performed by: PATHOLOGY

## 2024-02-24 PROCEDURE — 99000 SPECIMEN HANDLING OFFICE-LAB: CPT | Performed by: PATHOLOGY

## 2024-02-24 PROCEDURE — 87637 SARSCOV2&INF A&B&RSV AMP PRB: CPT | Performed by: INTERNAL MEDICINE

## 2024-02-24 RX ORDER — MONTELUKAST SODIUM 10 MG/1
10 TABLET ORAL AT BEDTIME
Qty: 30 TABLET | Refills: 3 | Status: SHIPPED | OUTPATIENT
Start: 2024-02-24 | End: 2024-07-08

## 2024-02-24 RX ORDER — FLUTICASONE PROPIONATE 50 MCG
2 SPRAY, SUSPENSION (ML) NASAL DAILY
Qty: 16 G | Refills: 2 | Status: SHIPPED | OUTPATIENT
Start: 2024-02-24 | End: 2024-03-19

## 2024-02-24 RX ORDER — BUDESONIDE AND FORMOTEROL FUMARATE DIHYDRATE 80; 4.5 UG/1; UG/1
2 AEROSOL RESPIRATORY (INHALATION) 2 TIMES DAILY
Qty: 10.2 G | Refills: 2 | Status: SHIPPED | OUTPATIENT
Start: 2024-02-24 | End: 2024-04-26

## 2024-02-24 NOTE — NURSING NOTE
Chief Complaint   Patient presents with    Annual Visit     Here for annual visit. Annual visit, would like labs done       Lisette Moralez CMA, EMT at 10:57 AM on 2/24/2024.

## 2024-02-24 NOTE — PROGRESS NOTES
HPI  73-year-old seen today with the aid of a Laotian .  He reports a 2-month history of intermittent cough rhinorrhea sputum production and chest congestion.  This will flareup for couple weeks and then apparently improve for a couple of weeks and then recur.  He is unaware of any precipitating or alleviating factors.  He has tried a number of things over the months including cough drops menthol acetaminophen and Vicks VapoRub.  Nothing has provided sustained relief.  He does feel congested in the chest and he feels wheezy at times.  Has not been using any inhaled medication.  He is also asking for a note that says he is safe to drive.  Past Medical History:   Diagnosis Date    Allergic rhinitis     Atrial fibrillation (H)     paroxysmal    Atrial fibrillation (H)     CAD (coronary artery disease)     Delirium     associated with hospitalization for stroke    Depression     Gastric erosions 1/2014    associated with gi bleed    GERD (gastroesophageal reflux disease)     HTN (hypertension)     Hypercholesteremia     Hyperlipidemia     Hypertension     Left hemiparesis (H)     Obstructive sleep apnea     PTSD (post-traumatic stress disorder)     Sleep apnea     Stroke (H) 1/2014    ischemic CVA with hemorrhagic transformation    Stroke (H)      Past Surgical History:   Procedure Laterality Date    ASD REPAIR      CARDIAC SURGERY  2000    mitral valve repair    CYCLOPHOTOCOAGULATION TRANSSCLERAL WITH MICROPULSE LASER Left 8/24/2020    Procedure: ENDOCYCLOPHOTOCOAGULATION;  Surgeon: Stanford Welsh MD;  Location: [facility] OR    EP PACEMAKER      EP PACEMAKER INSERT N/A 2/26/2020    Procedure: EP Pacemaker Insertion;  Surgeon: Brandon Gusman MD;  Location: [facility];  Service: Cardiology    IR MISCELLANEOUS PROCEDURE  1/22/2014    PHACOEMULSIFICATION CLEAR CORNEA WITH STANDARD INTRAOCULAR LENS IMPLANT Left 8/24/2020    Procedure: PHACOEMULSIFICATION, CATARACT, WITH INTRAOCULAR LENS IMPLANT and  goniosynechialysis;  Surgeon: Stanford Welsh MD;  Location: [facility] OR    REPAIR ATRIAL SEPTAL DEFECT       Family History   Problem Relation Age of Onset    Eye Surgery Mother     Albinism Mother     Cervical Cancer Mother     No Known Problems Father     No Known Problems Sister     No Known Problems Brother     No Known Problems Sister     No Known Problems Sister     No Known Problems Sister     No Known Problems Brother          from trauma    Glaucoma No family hx of     Macular Degeneration No family hx of          Exam:  BP (!) 171/88 (BP Location: Right arm, Patient Position: Sitting, Cuff Size: Adult Regular)   Pulse 68   Wt 68.4 kg (150 lb 11.2 oz)   SpO2 98%   BMI 26.50 kg/m    150 lbs 11.2 oz  Physical Exam   The patient is alert, oriented with a clear sensorium.   Skin shows no lesions or rashes and good turgor.   Head is normocephalic and atraumatic.   Ears show normal TMs bilaterally.   Mouth shows clear oral mucosa.   Neck shows no nodes, thyromegaly or bruits.   Lungs show bilateral rhonchi and prolonged expiratory phase  Heart shows normal S1 and S2 without murmur or gallop.   Extremities show no edema and no evidence of active synovitis.   Labs reviewed:  Results for orders placed or performed in visit on 24   XR Chest 2 Views     Status: None    Narrative    Exam: XR CHEST 2 VIEWS, 2024 12:30 PM    Indication: Cough, unspecified type    Comparison: 2022 CT, 2018 radiographs    Findings:   Left chest wall pacemaker leads project over the right ventricle and  right atrial appendage. The cardiomediastinal silhouette is stable.  The pulmonary vasculature is prominent centrally, as seen previously.  No pleural effusion or pneumothorax. No focal airspace opacity.  Chronic lateral upper right rib fracture deformities.      Impression    Impression: No acute airspace disease.    LISBET MCDONALD DO         SYSTEM ID:  [id number]   Results for orders placed or performed in  visit on 02/24/24   INR point of care     Status: Abnormal   Result Value Ref Range    INR 1.4 (H) 0.9 - 1.1    Narrative    This test is intended for monitoring Coumadin therapy. Results are not accurate in patients with prolonged INR due to factor deficiency.   CRP inflammation     Status: Normal   Result Value Ref Range    CRP Inflammation <3.00 <5.00 mg/L   Comprehensive metabolic panel     Status: Abnormal   Result Value Ref Range    Sodium 142 135 - 145 mmol/L    Potassium 4.2 3.4 - 5.3 mmol/L    Carbon Dioxide (CO2) 25 22 - 29 mmol/L    Anion Gap 11 7 - 15 mmol/L    Urea Nitrogen 15.3 8.0 - 23.0 mg/dL    Creatinine 0.95 0.67 - 1.17 mg/dL    GFR Estimate 86 >60 mL/min/1.73m2    Calcium 9.3 8.8 - 10.2 mg/dL    Chloride 106 98 - 107 mmol/L    Glucose 103 (H) 70 - 99 mg/dL    Alkaline Phosphatase 110 40 - 150 U/L    AST 23 0 - 45 U/L    ALT 30 0 - 70 U/L    Protein Total 7.7 6.4 - 8.3 g/dL    Albumin 4.5 3.5 - 5.2 g/dL    Bilirubin Total 0.4 <=1.2 mg/dL   CBC with platelets and differential     Status: None   Result Value Ref Range    WBC Count 7.6 4.0 - 11.0 10e3/uL    RBC Count 4.67 4.40 - 5.90 10e6/uL    Hemoglobin 13.5 13.3 - 17.7 g/dL    Hematocrit 40.7 40.0 - 53.0 %    MCV 87 78 - 100 fL    MCH 28.9 26.5 - 33.0 pg    MCHC 33.2 31.5 - 36.5 g/dL    RDW 13.6 10.0 - 15.0 %    Platelet Count 229 150 - 450 10e3/uL    % Neutrophils 60 %    % Lymphocytes 24 %    % Monocytes 8 %    % Eosinophils 7 %    % Basophils 1 %    % Immature Granulocytes 0 %    NRBCs per 100 WBC 0 <1 /100    Absolute Neutrophils 4.6 1.6 - 8.3 10e3/uL    Absolute Lymphocytes 1.9 0.8 - 5.3 10e3/uL    Absolute Monocytes 0.6 0.0 - 1.3 10e3/uL    Absolute Eosinophils 0.5 0.0 - 0.7 10e3/uL    Absolute Basophils 0.1 0.0 - 0.2 10e3/uL    Absolute Immature Granulocytes 0.0 <=0.4 10e3/uL    Absolute NRBCs 0.0 10e3/uL   CBC with Platelets & Differential     Status: None    Narrative    The following orders were created for panel order CBC with Platelets  & Differential.  Procedure                               Abnormality         Status                     ---------                               -----------         ------                     CBC with platelets and d...[[id number]]                      Final result                 Please view results for these tests on the individual orders.   Results for orders placed or performed in visit on 02/24/24   Symptomatic Influenza A/B, RSV, & SARS-CoV2 PCR (COVID-19) Nose     Status: Normal    Specimen: Nose; Swab   Result Value Ref Range    Influenza A PCR Negative Negative    Influenza B PCR Negative Negative    RSV PCR Negative Negative    SARS CoV2 PCR Negative Negative    Narrative    Testing was performed using the Xpert Xpress CoV2/Flu/RSV Assay on the Cepheid GeneXpert Instrument. This test should be ordered for the detection of SARS-CoV-2, influenza, and RSV viruses in individuals who meet clinical and/or epidemiological criteria. Test performance is unknown in asymptomatic patients. This test is for in vitro diagnostic use under the FDA EUA for laboratories certified under CLIA to perform high or moderate complexity testing. This test has not been FDA cleared or approved. A negative result does not rule out the presence of PCR inhibitors in the specimen or target RNA in concentration below the limit of detection for the assay. If only one viral target is positive but coinfection with multiple targets is suspected, the sample should be re-tested with another FDA cleared, approved, or authorized test, if coinfection would change clinical management. This test was validated by the [facility] [employer]. These laboratories are certified under the Clinical Laboratory Improvement Amendments of 1988 (CLIA-88) as qualified to perform high complexity laboratory testing.         ASSESSMENT  1 Bronchitis  2 LUTS/BPH on tamsulosin  3 hypertension aggravated by #1  4 hyperlipidemia on atorvastatin  6 history of CVA  stable neurologically  7 atrial fibrillation     Plan  With bronchitis organ to treat with a combination of montelukast fluticasone nasal spray and Symbicort.  In regards to his driving he does not have a letter asking the specific question so I told him he needs to have a driving evaluation and placed a referral to Occupational Therapy regarding this.  Will have him follow-up for reassessment in a month to check his x-ray and labs today as well.    Over 30 minutes spent on the day of service in chart review, patient contact, record completion and review and notification of lab reports    This note was completed using Dragon voice recognition software.      Rayo Christiansen MD  General Internal Medicine  [facility]  [phone]

## 2024-02-26 ENCOUNTER — TELEPHONE (OUTPATIENT)
Dept: ANTICOAGULATION | Facility: CLINIC | Age: 74
End: 2024-02-26
Payer: COMMERCIAL

## 2024-02-26 DIAGNOSIS — Z79.01 LONG TERM CURRENT USE OF ANTICOAGULANT THERAPY: Primary | ICD-10-CM

## 2024-02-26 DIAGNOSIS — I48.0 PAROXYSMAL ATRIAL FIBRILLATION (H): ICD-10-CM

## 2024-02-27 ENCOUNTER — ANTICOAGULATION THERAPY VISIT (OUTPATIENT)
Dept: ANTICOAGULATION | Facility: CLINIC | Age: 74
End: 2024-02-27
Payer: COMMERCIAL

## 2024-02-27 DIAGNOSIS — I48.0 PAROXYSMAL ATRIAL FIBRILLATION (H): Primary | ICD-10-CM

## 2024-02-27 DIAGNOSIS — Z79.01 LONG TERM CURRENT USE OF ANTICOAGULANT THERAPY: ICD-10-CM

## 2024-02-27 NOTE — PROGRESS NOTES
"ANTICOAGULATION  MANAGEMENT: NEW REFERRAL      SUBJECTIVE/OBJECTIVE     Ju Edwards, a 73 year old male  is newly referred to [facility].    Anticoagulation:    Previously on warfarin:  Yes, discharged from [facility] for non-compliance  Warfarin initiation date (approximate): TBD   Indication(s): Atrial Fibrillation   Goal Range: 2.0-3.0   Anticoagulation Bridge/Overlap: No   Referring provider: from PCP    General Dietary/Social Hx:    Typical vitamin K intake: {Desc; low/moderate/high:224166}; {ACC consistent or variable:560413}     Other dietary considerations: {[facility] dietary considerations:199560::\"None\"}     Social History:   Social History     Tobacco Use    Smoking status: Never    Smokeless tobacco: Never   Substance Use Topics    Alcohol use: No    Drug use: No       In the past 2 weeks, patient estimates taking medications as instructed % of time: ***    Results:        Recent labs: (last 7 days)     02/24/24  1155   INR 1.4*       Wt Readings from Last 2 Encounters:   02/24/24 68.4 kg (150 lb 11.2 oz)   01/26/23 68.9 kg (151 lb 12.8 oz)      Estimated body mass index is 26.5 kg/m  as calculated from the following:    Height as of 1/26/23: 1.606 m (5' 3.23\").    Weight as of 2/24/24: 68.4 kg (150 lb 11.2 oz).  Lab Results   Component Value Date    AST 23 02/24/2024    ALT 30 02/24/2024    ALBUMIN 4.5 02/24/2024     Lab Results   Component Value Date    CR 0.95 02/24/2024     CrCl cannot be calculated (Unknown ideal weight.).    ASSESSMENT     {ACC goal range assessment:671934::\"Goal INR 2-3, standard for indication(s) above\"}  {[facility] factors affecting warfarin Sensitvity:727795}  {warfarin dose assessment:787406}    PLAN     Dosing Instructions: {Warfarin dose instructions:883280::\"Continue your current warfarin dose\"} with INR in {INR REVISIT:181145}       Summary  As of 2/27/2024      Next INR check:                 Education provided: " c/w 2000 units daily      "  {acceducation2:063785}    Education still needed:   {acceducation2:326378}      {Contact type and understandin}    {accappointmentoffer:287424}    Standing orders placed in Epic: {ACC standing Lab orders:123751::\"Point of Care INR (Lab 5000)\"}    Plan made {Anticoag protocol:993368::\"per [facility] anticoagulation protocol\"}    Alexander Wadsworth RN  Anticoagulation Clinic  2024                   "

## 2024-02-27 NOTE — TELEPHONE ENCOUNTER
ANTICOAGULATION CLINIC REFERRAL RENEWAL REQUEST       An annual renewal order is required for all patients referred to [facility].?  Please review and sign the pended referral order for Ju Edwards.       ANTICOAGULATION SUMMARY      Warfarin indication(s)   Atrial Fibrillation    Mechanical heart valve present?  NO       Current goal range   INR: 2.0-3.0     Goal appropriate for indication? Goal INR 2-3, standard for indication(s) above     Time in Therapeutic Range (TTR)  (Goal > 60%) N/A, re-instate to [facility]    Office visit with referring provider's group within last year yes on 2/24/24       Alexander Wadsworth, RN  [facility]

## 2024-02-28 NOTE — PROGRESS NOTES
Anticoagulation Management    Ju Edwards, 73 year old, male re-referred for warfarin management; previously discharged for non-compliance after recent OV and INR check.    Indication for anticoagulation:  Atrial Fibrillation  [id number] = 4 (Hypertension, Age 65-74, and Stroke    Goal INR Range: 2-3    Wt Readings from Last 2 Encounters:   02/24/24 68.4 kg (150 lb 11.2 oz)   01/26/23 68.9 kg (151 lb 12.8 oz)        Lab Results   Component Value Date    INR 1.4 (H) 02/24/2024    INR 3.9 (H) 08/11/2023    INR 1.4 (H) 01/31/2023     Lab Results   Component Value Date    CR 0.95 02/24/2024     CrCL: 68 ml/min using actual weight (for DOAC)    Recommendation     Ju Edwards with poor compliance with lab INR checks (checked twice in 2023; neither result in range).  Hx of embolic stroke.      Recommend to consider transition from Warfarin to DOAC simplify anticoagulation.     DOAC affordability reviewed via pharmacy liaison, Zoila Irvin. Reports 100% coverage for either Apixaban or Xarelto.       Recommend Apixaban 5 mg twice daily  (Xarelto would be a reasonable alternative if once daily administration is preferred, but would need to be administered with meals).     With recent INR 1.4, once DOAC is received. Recommend to stop warfairn and start DOAC at time he usually took warfarin.    Shyann Nguyen, [facility]      Good idea I agree if his insurance agrees  JL

## 2024-02-29 ENCOUNTER — TELEPHONE (OUTPATIENT)
Dept: ANTICOAGULATION | Facility: CLINIC | Age: 74
End: 2024-02-29
Payer: COMMERCIAL

## 2024-03-01 ENCOUNTER — TELEPHONE (OUTPATIENT)
Dept: ANTICOAGULATION | Facility: CLINIC | Age: 74
End: 2024-03-01
Payer: COMMERCIAL

## 2024-03-06 ENCOUNTER — TELEPHONE (OUTPATIENT)
Dept: ANTICOAGULATION | Facility: CLINIC | Age: 74
End: 2024-03-06
Payer: COMMERCIAL

## 2024-03-06 DIAGNOSIS — I48.91 ATRIAL FIBRILLATION, UNSPECIFIED TYPE (H): Primary | ICD-10-CM

## 2024-03-06 DIAGNOSIS — Z79.01 LONG TERM CURRENT USE OF ANTICOAGULANT THERAPY: ICD-10-CM

## 2024-03-06 DIAGNOSIS — I48.0 PAROXYSMAL ATRIAL FIBRILLATION (H): ICD-10-CM

## 2024-03-06 NOTE — TELEPHONE ENCOUNTER
ANTICOAGULATION  MANAGEMENT    Ju Edwards is being discharged from the [facility]tico[facility] ([facility]).    Reason for discharge: warfarin replaced by alternate therapy, rivaroxaban    Anticoagulation episode resolved, [facility] referral closed, and Standing order discontinued    If patient needs warfarin management in the future, please send a new referral    Alexnader Wadsworth RN

## 2024-03-08 DIAGNOSIS — F32.A DEPRESSION, UNSPECIFIED DEPRESSION TYPE: ICD-10-CM

## 2024-03-10 DIAGNOSIS — I10 ESSENTIAL HYPERTENSION: ICD-10-CM

## 2024-03-12 NOTE — TELEPHONE ENCOUNTER
"3/6/24    Spoke to Leslie and reviewed recommendations to start Xarelto and stop warfarin. Per patient family, once a day would be ideal. Patient will be \"excited to no longer need INRs\".    Patient spouse and writer discussed new medication, and  reviewed when to take.  Patient has not been taking Warfarin.  Writer reviewed pharmacy and sent prescription:      Outpatient Medication Detail     Disp Refills Start End SEBASTIAN   rivaroxaban ANTICOAGULANT (XARELTO) 20 MG TABS tablet 60 tablet 1 3/6/2024 -- No   Sig - Route: Take 1 tablet (20 mg) by mouth daily (with dinner) - Oral   Sent to pharmacy as: Rivaroxaban 20 MG Oral Tablet (XARELTO)   Class: E-Prescribe   Notes to Pharmacy: Alexander Wadsworth RN as Warfarin agreement with Dr. Christiansen   Order: [id number]   E-Prescribing Status: Receipt confirmed by pharmacy (3/6/2024  8:34 AM CST)      Dr. Christiansen signed prescription.  Warfarin discontinued from MAR.    Leslie verbalized understanding of instructions.      Alexander Wadsworth RN  "

## 2024-03-15 NOTE — TELEPHONE ENCOUNTER
citalopram (CELEXA) 10 MG tablet      Last Written Prescription Date:  10/27/23  Last Fill Quantity: 60,   # refills: 0  Last Office Visit : 2/24/24  Future Office visit:  3/25/24    Routing refill request to provider for review/approval because:  Gap in refills   Overdue PHQ9  PHQ-9 score:        1/17/2022     4:51 PM   PHQ   PHQ-9 Total Score 0   Q9: Thoughts of better off dead/self-harm past 2 weeks Not at all

## 2024-03-16 NOTE — TELEPHONE ENCOUNTER
ATENOLOL 50 MG TABLET   Last Written Prescription Date:  11/30/2023  Last Fill Quantity: 30,   # refills: 0  Last Office Visit : 2/24/2024  Future Office visit:  3/25/2024    Routing refill request to provider for review/approval because:  Second Request  Abnormal B/P  Change med?  Change dose?  Add med?  Follow up B/P check?  Refer to clinic for review       BP Readings from Last 3 Encounters:   02/24/24 (!) 171/88   01/26/23 125/70   11/09/22 (!) 147/75     Geno Adan RN  Central Triage Red Flags/Med Refills

## 2024-03-17 DIAGNOSIS — J40 BRONCHITIS: ICD-10-CM

## 2024-03-19 RX ORDER — ATENOLOL 50 MG/1
50 TABLET ORAL DAILY
Qty: 90 TABLET | Refills: 1 | Status: SHIPPED | OUTPATIENT
Start: 2024-03-19

## 2024-03-19 RX ORDER — CITALOPRAM HYDROBROMIDE 10 MG/1
20 TABLET ORAL DAILY
Qty: 180 TABLET | Refills: 0 | Status: SHIPPED | OUTPATIENT
Start: 2024-03-19 | End: 2024-07-03

## 2024-03-22 RX ORDER — FLUTICASONE PROPIONATE 50 MCG
2 SPRAY, SUSPENSION (ML) NASAL DAILY
Qty: 16 G | Refills: 2 | Status: SHIPPED | OUTPATIENT
Start: 2024-03-22 | End: 2024-06-21

## 2024-03-22 NOTE — TELEPHONE ENCOUNTER
LVD: 2/24/2024  [facility]  Internal [facility]     Refilled per protocol.

## 2024-04-26 DIAGNOSIS — J40 BRONCHITIS: ICD-10-CM

## 2024-05-02 DIAGNOSIS — I10 ESSENTIAL HYPERTENSION: ICD-10-CM

## 2024-05-02 RX ORDER — BUDESONIDE AND FORMOTEROL FUMARATE DIHYDRATE 80; 4.5 UG/1; UG/1
2 AEROSOL RESPIRATORY (INHALATION) 2 TIMES DAILY
Qty: 10.2 G | Refills: 2 | Status: SHIPPED | OUTPATIENT
Start: 2024-05-02 | End: 2024-08-23

## 2024-05-02 NOTE — TELEPHONE ENCOUNTER
budesonide-formoterol (SYMBICORT) 80-4.5 MCG/ACT Inhaler 10.2 g 2 2/24/2024     Last Office Visit: 2/24/24  Future Office visit:   none    Inhaled Steroids Protocol [id number]/26/2024 10:30 AM   Protocol Details Medication indicated for associated diagnosis        Routing refill request to provider for review/approval because:  Diagnosis does not match medication indication, cannot fill per protocol.     Justa Villegas RN  P Red Flag Triage/MRT

## 2024-05-08 ENCOUNTER — ANCILLARY PROCEDURE (OUTPATIENT)
Dept: CARDIOLOGY | Facility: CLINIC | Age: 74
End: 2024-05-08
Attending: INTERNAL MEDICINE
Payer: COMMERCIAL

## 2024-05-08 DIAGNOSIS — Z95.0 PACEMAKER: ICD-10-CM

## 2024-05-08 PROCEDURE — 93294 REM INTERROG EVL PM/LDLS PM: CPT | Performed by: INTERNAL MEDICINE

## 2024-05-08 PROCEDURE — 93296 REM INTERROG EVL PM/IDS: CPT

## 2024-05-09 DIAGNOSIS — I10 BENIGN ESSENTIAL HYPERTENSION: ICD-10-CM

## 2024-05-09 NOTE — PROGRESS NOTES
Chief Complaint(s) and History of Present Illness(es)     Glaucoma Follow Up     Laterality: both eyes    Associated symptoms: Negative for eye pain, tearing and discharge    Treatment side effects: none    Compliance with Treatment: always    Pain scale: 0/10    Comments: 6 month follow up chronic narrow angle glaucoma, both eyes              Comments     Pt denies any significant vision changes in either eye since last visit.  Denies any flashes, floaters, pain, or irritation.  Ocular meds: Cosopt BID BE, Brimonidine BID BE, & AT's PRN BE    Radha Anderson OT 3:28 PM April 28, 2021      He did not take his eye drops this morning.        Review of systems for the eyes was negative other than the pertinent positives/negatives listed in the HPI.      Assessment & Plan      Ju Edwards is a 70 year old male with the following diagnoses:   1. Chronic narrow angle glaucoma, right, mild stage    2. Chronic narrow angle glaucoma, left, moderate stage    3. Pseudophakia - Left Eye    4. Type 2 diabetes mellitus without complication, unspecified whether long term insulin use (H)    5. Cerebrovascular accident (CVA), unspecified mechanism (H)         History obtained with interpretor  S/p CE/IOL with goniosynechialysis and ENDOCYLOPHOTOCOAGULATION left   eye-Pt (8/14/2020)  - Doing well. [facility] 20/20     IOP today 17/18 mmHg - did not take drops today (04/28/21)  Compliance with drops is ~80% per patient    Discussed importance of consistent use of glaucoma drops to prevent progression of glaucoma  - Continue Alphagan and Cosopt BID in both eyes    VISUALLY SIGNIFICANT cataract right eye   Functioning well  Angle without peripheral anterior synechiae at this time  Laser peripheral iridotomy (LPI) patent  Ok to monitor  Return precautions reviewed       Patient disposition:   Return in about 6 months (around 10/28/2021) for DFE, OCT NFL.      Rayo Morin MD  Ophthalmology PGY-3  [facility]           Attending Physician Attestation:  Complete documentation of historical and exam elements from today's encounter can be found in the full encounter summary report (not reduplicated in this progress note).  I personally obtained the chief complaint(s) and history of present illness.  I confirmed and edited as necessary the review of systems, past medical/surgical history, family history, social history, and examination findings as documented by others; and I examined the patient myself.  I personally reviewed the relevant tests, images, and reports as documented above.  I formulated and edited as necessary the assessment and plan and discussed the findings and management plan with the patient and family. . - Stanford Welsh MD      no

## 2024-05-10 DIAGNOSIS — Z00.00 ROUTINE HEALTH MAINTENANCE: ICD-10-CM

## 2024-05-13 DIAGNOSIS — K21.00 GASTROESOPHAGEAL REFLUX DISEASE WITH ESOPHAGITIS: ICD-10-CM

## 2024-05-14 RX ORDER — AMLODIPINE BESYLATE 5 MG/1
5 TABLET ORAL DAILY
Qty: 90 TABLET | Refills: 0 | Status: SHIPPED | OUTPATIENT
Start: 2024-05-14 | End: 2024-07-03

## 2024-05-14 NOTE — TELEPHONE ENCOUNTER
"  amLODIPine (NORVASC) 5 MG tablet      Last Written Prescription Date:  2/5/24  Last Fill Quantity: 90,   # refills: 0  Last Office Visit : 2/24/24  Future Office visit:  None    Routing refill request to provider for review/approval because:  Blood pressure out of range   BP Readings from Last 3 Encounters:   02/24/24 (!) 171/88   01/26/23 125/70   11/09/22 (!) 147/75      2/24/24 note>  \"1 Bronchitis  2 LUTS/BPH on tamsulosin  3 hypertension aggravated by #1\"  "

## 2024-05-17 NOTE — TELEPHONE ENCOUNTER
lisinopril (ZESTRIL) 20 MG tablet 90 tablet 0 2/16/2024       Last Office Visit: 2/24/24  Future Office visit:   none    ACE Inhibitors (Including Combos) Protocol [id number]/17/2024 01:14 PM   Protocol Details Blood pressure under 140/90 in past 12 months- Clinicial or Patient Reported        BP Readings from Last 3 Encounters:   02/24/24 (!) 171/88   01/26/23 125/70   11/09/22 (!) 147/75         Routing refill request to provider for review/approval because:  Blood pressure out of range     Justa Villegas RN  P Red Flag Triage/MRT

## 2024-05-20 RX ORDER — LISINOPRIL 20 MG/1
20 TABLET ORAL DAILY
Qty: 90 TABLET | Refills: 0 | Status: SHIPPED | OUTPATIENT
Start: 2024-05-20 | End: 2024-07-03

## 2024-05-20 NOTE — TELEPHONE ENCOUNTER
flecainide (TAMBOCOR) 150 MG tablet 180 tablet 0 10/5/2023     Last Office Visit: 1/26/23  Future Office visit:   none    Routing refill request to provider for review/approval because:  Drug not on the [facility], [facility] or [facility] refill protocol or controlled substance  Gap in refills?    Justa Villegas RN  [facility] Red Flag Triage/MRT

## 2024-05-21 LAB
MDC_IDC_EPISODE_DTM: NORMAL
MDC_IDC_EPISODE_DURATION: 1 S
MDC_IDC_EPISODE_DURATION: 1009 S
MDC_IDC_EPISODE_DURATION: 4 S
MDC_IDC_EPISODE_DURATION: 558 S
MDC_IDC_EPISODE_DURATION: 9 S
MDC_IDC_EPISODE_ID: NORMAL
MDC_IDC_EPISODE_TYPE: NORMAL
MDC_IDC_LEAD_CONNECTION_STATUS: NORMAL
MDC_IDC_LEAD_CONNECTION_STATUS: NORMAL
MDC_IDC_LEAD_IMPLANT_DT: NORMAL
MDC_IDC_LEAD_IMPLANT_DT: NORMAL
MDC_IDC_LEAD_LOCATION: NORMAL
MDC_IDC_LEAD_LOCATION: NORMAL
MDC_IDC_LEAD_LOCATION_DETAIL_1: NORMAL
MDC_IDC_LEAD_LOCATION_DETAIL_1: NORMAL
MDC_IDC_LEAD_MFG: NORMAL
MDC_IDC_LEAD_MFG: NORMAL
MDC_IDC_LEAD_MODEL: NORMAL
MDC_IDC_LEAD_MODEL: NORMAL
MDC_IDC_LEAD_POLARITY_TYPE: NORMAL
MDC_IDC_LEAD_POLARITY_TYPE: NORMAL
MDC_IDC_LEAD_SERIAL: NORMAL
MDC_IDC_LEAD_SERIAL: NORMAL
MDC_IDC_MSMT_BATTERY_DTM: NORMAL
MDC_IDC_MSMT_BATTERY_REMAINING_LONGEVITY: 102 MO
MDC_IDC_MSMT_BATTERY_REMAINING_PERCENTAGE: 100 %
MDC_IDC_MSMT_BATTERY_STATUS: NORMAL
MDC_IDC_MSMT_LEADCHNL_RA_IMPEDANCE_VALUE: 611 OHM
MDC_IDC_MSMT_LEADCHNL_RV_IMPEDANCE_VALUE: 625 OHM
MDC_IDC_MSMT_LEADCHNL_RV_PACING_THRESHOLD_AMPLITUDE: 1.2 V
MDC_IDC_MSMT_LEADCHNL_RV_PACING_THRESHOLD_PULSEWIDTH: 0.4 MS
MDC_IDC_PG_IMPLANT_DTM: NORMAL
MDC_IDC_PG_MFG: NORMAL
MDC_IDC_PG_MODEL: NORMAL
MDC_IDC_PG_SERIAL: NORMAL
MDC_IDC_PG_TYPE: NORMAL
MDC_IDC_SESS_CLINIC_NAME: NORMAL
MDC_IDC_SESS_DTM: NORMAL
MDC_IDC_SESS_TYPE: NORMAL
MDC_IDC_SET_BRADY_AT_MODE_SWITCH_MODE: NORMAL
MDC_IDC_SET_BRADY_AT_MODE_SWITCH_RATE: 160 {BEATS}/MIN
MDC_IDC_SET_BRADY_LOWRATE: 60 {BEATS}/MIN
MDC_IDC_SET_BRADY_MAX_SENSOR_RATE: 130 {BEATS}/MIN
MDC_IDC_SET_BRADY_MAX_TRACKING_RATE: 130 {BEATS}/MIN
MDC_IDC_SET_BRADY_MODE: NORMAL
MDC_IDC_SET_BRADY_PAV_DELAY_LOW: 200 MS
MDC_IDC_SET_BRADY_SAV_DELAY_LOW: 150 MS
MDC_IDC_SET_LEADCHNL_RA_PACING_AMPLITUDE: 3.5 V
MDC_IDC_SET_LEADCHNL_RA_PACING_CAPTURE_MODE: NORMAL
MDC_IDC_SET_LEADCHNL_RA_PACING_POLARITY: NORMAL
MDC_IDC_SET_LEADCHNL_RA_PACING_PULSEWIDTH: 0.4 MS
MDC_IDC_SET_LEADCHNL_RA_SENSING_ADAPTATION_MODE: NORMAL
MDC_IDC_SET_LEADCHNL_RA_SENSING_POLARITY: NORMAL
MDC_IDC_SET_LEADCHNL_RA_SENSING_SENSITIVITY: 0.25 MV
MDC_IDC_SET_LEADCHNL_RV_PACING_AMPLITUDE: 1.6 V
MDC_IDC_SET_LEADCHNL_RV_PACING_CAPTURE_MODE: NORMAL
MDC_IDC_SET_LEADCHNL_RV_PACING_POLARITY: NORMAL
MDC_IDC_SET_LEADCHNL_RV_PACING_PULSEWIDTH: 0.4 MS
MDC_IDC_SET_LEADCHNL_RV_SENSING_ADAPTATION_MODE: NORMAL
MDC_IDC_SET_LEADCHNL_RV_SENSING_POLARITY: NORMAL
MDC_IDC_SET_LEADCHNL_RV_SENSING_SENSITIVITY: 1.5 MV
MDC_IDC_SET_ZONE_DETECTION_INTERVAL: 375 MS
MDC_IDC_SET_ZONE_STATUS: NORMAL
MDC_IDC_SET_ZONE_TYPE: NORMAL
MDC_IDC_SET_ZONE_VENDOR_TYPE: NORMAL
MDC_IDC_STAT_AT_BURDEN_PERCENT: 1 %
MDC_IDC_STAT_AT_DTM_END: NORMAL
MDC_IDC_STAT_AT_DTM_START: NORMAL
MDC_IDC_STAT_BRADY_DTM_END: NORMAL
MDC_IDC_STAT_BRADY_DTM_START: NORMAL
MDC_IDC_STAT_BRADY_RA_PERCENT_PACED: 85 %
MDC_IDC_STAT_BRADY_RV_PERCENT_PACED: 2 %
MDC_IDC_STAT_EPISODE_RECENT_COUNT: 0
MDC_IDC_STAT_EPISODE_RECENT_COUNT: 5
MDC_IDC_STAT_EPISODE_RECENT_COUNT_DTM_END: NORMAL
MDC_IDC_STAT_EPISODE_RECENT_COUNT_DTM_START: NORMAL
MDC_IDC_STAT_EPISODE_TYPE: NORMAL
MDC_IDC_STAT_EPISODE_VENDOR_TYPE: NORMAL

## 2024-05-21 RX ORDER — PANTOPRAZOLE SODIUM 40 MG/1
TABLET, DELAYED RELEASE ORAL
Qty: 90 TABLET | Refills: 1 | Status: SHIPPED | OUTPATIENT
Start: 2024-05-21

## 2024-05-21 NOTE — TELEPHONE ENCOUNTER
pantoprazole (PROTONIX)   Last Written Prescription Date:  7/7/2023  Last Fill Quantity: 90,  # refills: 1   Last office visit: 2/24/2024 with prescribing provider:  Rayo Christiansen MD    Future Office Visit: No future visits scheduled     Requested Prescriptions   Pending Prescriptions Disp Refills    pantoprazole (PROTONIX) 40 MG EC tablet [Pharmacy Med Name: PANTOPRAZOLE SOD DR 40 MG TAB] 90 tablet 1     Sig: TAKE 1 TABLET BY MOUTH EVERY DAY       PPI Protocol Passed - 5/13/2024 12:25 AM        Passed - Medication is active on med list        Passed - Medication indicated for associated diagnosis     The medication is prescribed for one or more of the following conditions:     Erosive esophagitis    Gastritis   Gastric hypersecretion   Gastric ulcer   Gastroesophageal reflux disease   Helicobacter pylori gastrointestinal tract infection   Ulcer of duodenum   Drug-induced peptic ulcer   Esophageal stricture   Gastrointestinal hemorrhage   Indigestion   Stress ulcer   Zollinger-Huang syndrome   Potter s esophagus   Laryngopharyngeal reflux          Passed - Recent (12 mo) or future (90 days) visit within the authorizing provider's specialty     The patient must have completed an in-person or virtual visit within the past 12 months or has a future visit scheduled within the next 90 days with the authorizing provider s specialty.  Urgent care and e-visits do not quality as an office visit for this protocol.          Passed - Patient is age 18 or older             Medication refilled per RN protocol.    Yanni Pineda RN on 5/21/2024 at 6:19 PM

## 2024-05-23 RX ORDER — FLECAINIDE ACETATE 150 MG/1
150 TABLET ORAL 2 TIMES DAILY
Qty: 180 TABLET | Refills: 0 | Status: SHIPPED | OUTPATIENT
Start: 2024-05-23 | End: 2024-08-22

## 2024-05-24 ENCOUNTER — TELEPHONE (OUTPATIENT)
Dept: CARDIOLOGY | Facility: CLINIC | Age: 74
End: 2024-05-24
Payer: COMMERCIAL

## 2024-05-24 NOTE — TELEPHONE ENCOUNTER
----- Message from Polina Hall RN sent at 5/23/2024  3:50 PM CDT -----  Pt needs follow up with Dr ZAIRE Wing or EP EDGAR, pt was due in January     Thanks  Polina

## 2024-05-25 DIAGNOSIS — F32.A DEPRESSION, UNSPECIFIED DEPRESSION TYPE: ICD-10-CM

## 2024-05-25 DIAGNOSIS — E78.2 MIXED HYPERLIPIDEMIA: ICD-10-CM

## 2024-05-25 DIAGNOSIS — R35.0 URINARY FREQUENCY: ICD-10-CM

## 2024-05-25 DIAGNOSIS — I48.91 ATRIAL FIBRILLATION, UNSPECIFIED TYPE (H): ICD-10-CM

## 2024-05-25 DIAGNOSIS — R39.9 LOWER URINARY TRACT SYMPTOMS (LUTS): ICD-10-CM

## 2024-05-25 DIAGNOSIS — I63.039: ICD-10-CM

## 2024-05-25 DIAGNOSIS — I10 ESSENTIAL HYPERTENSION: ICD-10-CM

## 2024-05-25 DIAGNOSIS — I10 BENIGN ESSENTIAL HYPERTENSION: ICD-10-CM

## 2024-05-29 ENCOUNTER — TELEPHONE (OUTPATIENT)
Dept: OPHTHALMOLOGY | Facility: CLINIC | Age: 74
End: 2024-05-29
Payer: COMMERCIAL

## 2024-05-29 ENCOUNTER — TELEPHONE (OUTPATIENT)
Dept: CARDIOLOGY | Facility: CLINIC | Age: 74
End: 2024-05-29
Payer: COMMERCIAL

## 2024-05-29 NOTE — TELEPHONE ENCOUNTER
----- Message from Polina Hall RN sent at 5/29/2024  8:14 AM CDT -----  Please call to schedule appointment with EP EDGAR  Pt has not been seen since 1/2023 with ZAIRE Wing   Let them know pt needs to be seen at least yearly in order to get refills on medications     Thanks  Polina

## 2024-05-29 NOTE — TELEPHONE ENCOUNTER
Left Voicemail (1st Attempt) for the patient to call back and schedule the following:    Appointment type: Return EP   Provider: ANY EP EDGAR   Return date: NEXT AVAILABLE  Specialty phone number: [phone]  Additional appointment(s) needed:   Additonal Notes: DEMETRIUS and Melt to schedule

## 2024-05-30 ENCOUNTER — TELEPHONE (OUTPATIENT)
Dept: CARDIOLOGY | Facility: CLINIC | Age: 74
End: 2024-05-30
Payer: COMMERCIAL

## 2024-05-30 NOTE — TELEPHONE ENCOUNTER
6/4/2024 9:55AM Darby Solorio  Left Voicemail (2nd Attempt) and Left Voicemail with Family Member (2nd Attempt) for the patient to call back and schedule the following:    Appointment type: Return EP  Provider: Dr. Wing or EP EDGAR  Return date: 1 year follow-up (1/26/2024)  Specialty phone number: [phone] option 1  Additional appointment(s) needed: device check prior  Additonal Notes: 6/4 MAX ATTEMPTS REACHED- LVM for pt and Lety (pt's wife) for pt to schedule Return EP w/ Dr. Wing or EP EDGAR w/ device check prior. DIONNE Solorio 6/4/2024 9:55AM        6/3/2024 10:45AM Darby Solorio  Left Voicemail with Family Member (1st Attempt) for the patient to call back and schedule the following:    Appointment type: Return EP  Provider: Dr. Wing or EP EDGAR  Return date: 1 year follow-up (1/26/2024)  Specialty phone number: [phone] option 1  Additional appointment(s) needed: device check prior  Additonal Notes: 6/3 LVM for Lety to schedule Return EP w/ Dr. Wing or EP EDGAR w/ device check prior in order to refille prescriptions for pt. DIONNE Solorio 6/3/2024 10:45AM        6/3/2024 10:12AM Darby Solorio  Left Voicemail (2nd Attempt) MAX ATTEMPTS REACHED- LVM and sent letter for the patient to call back and schedule the following:    Appointment type: Return EP  Provider: Dr. Wing or EP EDGAR  Return date: 1 year follow-up (1/26/2024)  Specialty phone number: [phone] option 1  Additional appointment(s) needed: device check prior  Additonal Notes: 6/3 MAX ATTEMPTS REACHED- LVM and sent letter for pt to schedule Return EP w/ Dr. Wing or EP EDGAR w/ device check prior next available in order to refill prescriptions. DIONNE Solorio 6/3/2024 10:12AM        5/30/2024 10:40AM Marti Adams  5/30 lvm with  and mychart to sched follow-up with Dr. Wing's team (any EDGAR) for med refills [facility]  Marti Adams 5/30/2024 10:40AM

## 2024-05-31 RX ORDER — TAMSULOSIN HYDROCHLORIDE 0.4 MG/1
0.4 CAPSULE ORAL DAILY
Qty: 90 CAPSULE | Refills: 2 | Status: SHIPPED | OUTPATIENT
Start: 2024-05-31

## 2024-05-31 RX ORDER — LISINOPRIL 20 MG/1
20 TABLET ORAL DAILY
Qty: 90 TABLET | Refills: 0 | OUTPATIENT
Start: 2024-05-31

## 2024-05-31 RX ORDER — CITALOPRAM HYDROBROMIDE 10 MG/1
20 TABLET ORAL DAILY
Qty: 180 TABLET | Refills: 0 | OUTPATIENT
Start: 2024-05-31

## 2024-05-31 RX ORDER — ACETAMINOPHEN 160 MG
1 TABLET,DISINTEGRATING ORAL DAILY
Qty: 90 CAPSULE | Refills: 2 | Status: SHIPPED | OUTPATIENT
Start: 2024-05-31

## 2024-05-31 RX ORDER — AMLODIPINE BESYLATE 5 MG/1
5 TABLET ORAL DAILY
Qty: 90 TABLET | Refills: 0 | OUTPATIENT
Start: 2024-05-31

## 2024-05-31 RX ORDER — RIVAROXABAN 20 MG/1
20 TABLET, FILM COATED ORAL
Qty: 60 TABLET | Refills: 1 | OUTPATIENT
Start: 2024-05-31

## 2024-05-31 RX ORDER — ATORVASTATIN CALCIUM 80 MG/1
80 TABLET, FILM COATED ORAL DAILY
Qty: 90 TABLET | Refills: 0 | Status: SHIPPED | OUTPATIENT
Start: 2024-05-31 | End: 2024-08-30

## 2024-05-31 NOTE — TELEPHONE ENCOUNTER
"atorvastatin (LIPITOR) 80 MG tablet 30 tablet 0 11/30/2023 -- No   Sig - Route: Take 1 tablet (80 mg) by mouth daily   Last refill: 2/12/2024     tamsulosin (FLOMAX) 0.4 MG capsule 90 capsule 2 3/28/2023 -- No   Sig: TAKE 1 CAPSULE BY MOUTH EVERY DAY   Last refill: 7/19/2023     [facility] D3 50 MCG (2000 UT) CAPS 90 capsule 2 3/28/2023 -- No   Sig: TAKE 1 CAPSULE BY MOUTH EVERY DAY     ----------------------  Last Office Visit : 2/24/24 - [facility]  Future Office visit:  0  ----------------------      Routing refill request to provider for review/approval because:  LDL overdue +2 yrs, already given collin refills, pended.  Flomax, BP parameters not met, gap in refills, pended.  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

## 2024-06-02 ENCOUNTER — HEALTH MAINTENANCE LETTER (OUTPATIENT)
Age: 74
End: 2024-06-02

## 2024-06-13 DIAGNOSIS — J40 BRONCHITIS: Primary | ICD-10-CM

## 2024-06-18 DIAGNOSIS — I48.91 ATRIAL FIBRILLATION, UNSPECIFIED TYPE (H): ICD-10-CM

## 2024-06-21 RX ORDER — FLUTICASONE PROPIONATE 50 MCG
2 SPRAY, SUSPENSION (ML) NASAL DAILY
Qty: 48 ML | Refills: 5 | Status: SHIPPED | OUTPATIENT
Start: 2024-06-21

## 2024-06-21 NOTE — TELEPHONE ENCOUNTER
RECORDS RECEIVED FROM:    DATE RECEIVED:    NOTES STATUS DETAILS   OFFICE NOTE from referring provider  Internal    OFFICE NOTE from other cardiologists  Internal Dr. Wing   RECORDS from hospital/ED N/A    MEDICATION LIST Internal    GENERAL CARDIO RECORDS   (ALL APPOINTMENT TYPES)     LABS (CBC,BMP,CMP, TSH) Internal    EKG (STRIPS & REPORTS) Internal 1-26-23   MONITORS (STRIPS & REPORTS) N/A    ECHOS (IMAGES AND REPORTS) N/A    STRESS TESTS (IMAGES AND REPORTS) N/A    cMRI (IMAGES AND REPORTS) N/A    Cardiac cath (IMAGES AND REPORTS) N/A    CT/CTA (IMAGES AND REPORTS) N/A    NEW EP     ICD/PACEMAKER IMPLANT Yes    CARDIOVERSION N/A    TILT TABLE STUDIES N/A

## 2024-06-23 DIAGNOSIS — F32.A DEPRESSION, UNSPECIFIED DEPRESSION TYPE: ICD-10-CM

## 2024-06-23 DIAGNOSIS — I10 BENIGN ESSENTIAL HYPERTENSION: Primary | ICD-10-CM

## 2024-06-23 DIAGNOSIS — I10 ESSENTIAL HYPERTENSION: ICD-10-CM

## 2024-06-25 ENCOUNTER — LAB (OUTPATIENT)
Dept: LAB | Facility: CLINIC | Age: 74
End: 2024-06-25
Payer: COMMERCIAL

## 2024-06-25 DIAGNOSIS — R05.9 COUGH, UNSPECIFIED TYPE: ICD-10-CM

## 2024-06-25 DIAGNOSIS — E78.2 MIXED HYPERLIPIDEMIA: ICD-10-CM

## 2024-06-25 LAB
CHOLEST SERPL-MCNC: 167 MG/DL
ERYTHROCYTE [SEDIMENTATION RATE] IN BLOOD BY WESTERGREN METHOD: 24 MM/HR (ref 0–20)
FASTING STATUS PATIENT QL REPORTED: YES
HDLC SERPL-MCNC: 36 MG/DL
LDLC SERPL CALC-MCNC: 85 MG/DL
NONHDLC SERPL-MCNC: 131 MG/DL
TRIGL SERPL-MCNC: 229 MG/DL

## 2024-06-25 PROCEDURE — 36415 COLL VENOUS BLD VENIPUNCTURE: CPT | Performed by: PATHOLOGY

## 2024-06-25 PROCEDURE — 85652 RBC SED RATE AUTOMATED: CPT | Performed by: PATHOLOGY

## 2024-06-25 PROCEDURE — 80061 LIPID PANEL: CPT | Performed by: PATHOLOGY

## 2024-06-26 DIAGNOSIS — J40 BRONCHITIS: Primary | ICD-10-CM

## 2024-06-27 NOTE — TELEPHONE ENCOUNTER
rivaroxaban ANTICOAGULANT (XARELTO) 20 MG TABS tablet 60 tablet 1 3/6/2024 -- No   Sig - Route: Take 1 tablet (20 mg) by mouth daily (with dinner) - Oral               ----------------------  Last Office Visit : 2/24/24  Future Office visit:  0  ----------------------      Routing refill request to provider for review/approval because:  Creat Cl. Not able to be calculate d/t Cr not on file in last 3 mo. Last weight done 6/25/24 at [facility] documented as 62kg.     Creatinine clearance cannot be calculated (Unknown ideal weight.)       Creatinine   Date Value Ref Range Status   02/24/2024 0.95 0.67 - 1.17 mg/dL Final   06/18/2021 0.98 0.66 - 1.25 mg/dL Final         Pass/Fail Protocol Criteria:  Anticoagulant Agents [id number]/27/2024 11:21 AM   Protocol Details Creatinine Clearance greater than 50 ml/min on file in past 3 mos    Serum creatinine less than or equal to 1.4 on file in past 3 mos    Recent (6 mo) or future (90 days) visit within the authorizing provider's specialty

## 2024-06-28 ENCOUNTER — OFFICE VISIT (OUTPATIENT)
Dept: CARDIOLOGY | Facility: CLINIC | Age: 74
End: 2024-06-28
Attending: NURSE PRACTITIONER
Payer: COMMERCIAL

## 2024-06-28 ENCOUNTER — PRE VISIT (OUTPATIENT)
Dept: CARDIOLOGY | Facility: CLINIC | Age: 74
End: 2024-06-28

## 2024-06-28 ENCOUNTER — ANCILLARY PROCEDURE (OUTPATIENT)
Dept: CARDIOLOGY | Facility: CLINIC | Age: 74
End: 2024-06-28
Attending: INTERNAL MEDICINE
Payer: COMMERCIAL

## 2024-06-28 VITALS
WEIGHT: 151.8 LBS | HEART RATE: 60 BPM | DIASTOLIC BLOOD PRESSURE: 78 MMHG | SYSTOLIC BLOOD PRESSURE: 137 MMHG | BODY MASS INDEX: 26.7 KG/M2 | OXYGEN SATURATION: 98 %

## 2024-06-28 DIAGNOSIS — I49.5 SSS (SICK SINUS SYNDROME) (H): Primary | ICD-10-CM

## 2024-06-28 DIAGNOSIS — I48.0 PAROXYSMAL ATRIAL FIBRILLATION (H): ICD-10-CM

## 2024-06-28 DIAGNOSIS — Z95.0 CARDIAC PACEMAKER IN SITU: ICD-10-CM

## 2024-06-28 DIAGNOSIS — Z95.0 PACEMAKER: ICD-10-CM

## 2024-06-28 PROCEDURE — 99214 OFFICE O/P EST MOD 30 MIN: CPT | Mod: 25 | Performed by: NURSE PRACTITIONER

## 2024-06-28 PROCEDURE — 93280 PM DEVICE PROGR EVAL DUAL: CPT | Performed by: INTERNAL MEDICINE

## 2024-06-28 PROCEDURE — G0463 HOSPITAL OUTPT CLINIC VISIT: HCPCS | Performed by: NURSE PRACTITIONER

## 2024-06-28 ASSESSMENT — PAIN SCALES - GENERAL: PAINLEVEL: NO PAIN (0)

## 2024-06-28 NOTE — PATIENT INSTRUCTIONS
It was a pleasure to see you in clinic today, please do not hesitate to call us for questions or concerns.  Your next automatic remote pacemaker check from home is scheduled for 10/1/2024.    Sarai Edge RN    Electrophysiology Nurse Clinician  [facility]    During Business Hours Please Call:  [phone]  After Hours Please Call:  [phone] - select option #4 and ask for job code [id number]

## 2024-06-28 NOTE — NURSING NOTE
Chief Complaint   Patient presents with    New Patient     1 yr follow-up paroxysmal atrial fibrillation, PPM. On flecainide? Called in 6/11 reporting swelling above the PPM site.       Vitals were taken, medications reviewed.    Andria Quezada, EMT   4:13 PM
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
## 2024-07-03 RX ORDER — AMLODIPINE BESYLATE 5 MG/1
5 TABLET ORAL DAILY
Qty: 90 TABLET | Refills: 2 | Status: SHIPPED | OUTPATIENT
Start: 2024-07-03

## 2024-07-03 RX ORDER — LISINOPRIL 20 MG/1
20 TABLET ORAL DAILY
Qty: 90 TABLET | Refills: 2 | Status: SHIPPED | OUTPATIENT
Start: 2024-07-03

## 2024-07-03 RX ORDER — CITALOPRAM HYDROBROMIDE 10 MG/1
20 TABLET ORAL DAILY
Qty: 180 TABLET | Refills: 2 | Status: SHIPPED | OUTPATIENT
Start: 2024-07-03

## 2024-07-08 RX ORDER — MONTELUKAST SODIUM 10 MG/1
1 TABLET ORAL AT BEDTIME
Qty: 90 TABLET | Refills: 1 | Status: SHIPPED | OUTPATIENT
Start: 2024-07-08

## 2024-07-18 LAB
MDC_IDC_LEAD_CONNECTION_STATUS: NORMAL
MDC_IDC_LEAD_CONNECTION_STATUS: NORMAL
MDC_IDC_LEAD_IMPLANT_DT: NORMAL
MDC_IDC_LEAD_IMPLANT_DT: NORMAL
MDC_IDC_LEAD_LOCATION: NORMAL
MDC_IDC_LEAD_LOCATION: NORMAL
MDC_IDC_LEAD_LOCATION_DETAIL_1: NORMAL
MDC_IDC_LEAD_LOCATION_DETAIL_1: NORMAL
MDC_IDC_LEAD_MFG: NORMAL
MDC_IDC_LEAD_MFG: NORMAL
MDC_IDC_LEAD_MODEL: NORMAL
MDC_IDC_LEAD_MODEL: NORMAL
MDC_IDC_LEAD_POLARITY_TYPE: NORMAL
MDC_IDC_LEAD_POLARITY_TYPE: NORMAL
MDC_IDC_LEAD_SERIAL: NORMAL
MDC_IDC_LEAD_SERIAL: NORMAL
MDC_IDC_MSMT_BATTERY_DTM: NORMAL
MDC_IDC_MSMT_BATTERY_REMAINING_LONGEVITY: 102 MO
MDC_IDC_MSMT_BATTERY_REMAINING_PERCENTAGE: 100 %
MDC_IDC_MSMT_BATTERY_STATUS: NORMAL
MDC_IDC_MSMT_LEADCHNL_RA_IMPEDANCE_VALUE: 626 OHM
MDC_IDC_MSMT_LEADCHNL_RA_PACING_THRESHOLD_AMPLITUDE: 1.4 V
MDC_IDC_MSMT_LEADCHNL_RA_PACING_THRESHOLD_PULSEWIDTH: 0.4 MS
MDC_IDC_MSMT_LEADCHNL_RV_IMPEDANCE_VALUE: 719 OHM
MDC_IDC_MSMT_LEADCHNL_RV_PACING_THRESHOLD_AMPLITUDE: 1.2 V
MDC_IDC_MSMT_LEADCHNL_RV_PACING_THRESHOLD_PULSEWIDTH: 0.4 MS
MDC_IDC_PG_IMPLANT_DTM: NORMAL
MDC_IDC_PG_MFG: NORMAL
MDC_IDC_PG_MODEL: NORMAL
MDC_IDC_PG_SERIAL: NORMAL
MDC_IDC_PG_TYPE: NORMAL
MDC_IDC_SESS_CLINIC_NAME: NORMAL
MDC_IDC_SESS_DTM: NORMAL
MDC_IDC_SESS_TYPE: NORMAL
MDC_IDC_SET_BRADY_AT_MODE_SWITCH_MODE: NORMAL
MDC_IDC_SET_BRADY_AT_MODE_SWITCH_RATE: 160 {BEATS}/MIN
MDC_IDC_SET_BRADY_LOWRATE: 60 {BEATS}/MIN
MDC_IDC_SET_BRADY_MAX_SENSOR_RATE: 130 {BEATS}/MIN
MDC_IDC_SET_BRADY_MAX_TRACKING_RATE: 130 {BEATS}/MIN
MDC_IDC_SET_BRADY_MODE: NORMAL
MDC_IDC_SET_BRADY_PAV_DELAY_LOW: 200 MS
MDC_IDC_SET_BRADY_SAV_DELAY_LOW: 150 MS
MDC_IDC_SET_LEADCHNL_RA_PACING_AMPLITUDE: 3.5 V
MDC_IDC_SET_LEADCHNL_RA_PACING_CAPTURE_MODE: NORMAL
MDC_IDC_SET_LEADCHNL_RA_PACING_POLARITY: NORMAL
MDC_IDC_SET_LEADCHNL_RA_PACING_PULSEWIDTH: 0.4 MS
MDC_IDC_SET_LEADCHNL_RA_SENSING_ADAPTATION_MODE: NORMAL
MDC_IDC_SET_LEADCHNL_RA_SENSING_POLARITY: NORMAL
MDC_IDC_SET_LEADCHNL_RA_SENSING_SENSITIVITY: 0.25 MV
MDC_IDC_SET_LEADCHNL_RV_PACING_AMPLITUDE: 1.7 V
MDC_IDC_SET_LEADCHNL_RV_PACING_CAPTURE_MODE: NORMAL
MDC_IDC_SET_LEADCHNL_RV_PACING_POLARITY: NORMAL
MDC_IDC_SET_LEADCHNL_RV_PACING_PULSEWIDTH: 0.4 MS
MDC_IDC_SET_LEADCHNL_RV_SENSING_ADAPTATION_MODE: NORMAL
MDC_IDC_SET_LEADCHNL_RV_SENSING_POLARITY: NORMAL
MDC_IDC_SET_LEADCHNL_RV_SENSING_SENSITIVITY: 1.5 MV
MDC_IDC_SET_ZONE_DETECTION_INTERVAL: 375 MS
MDC_IDC_SET_ZONE_STATUS: NORMAL
MDC_IDC_SET_ZONE_TYPE: NORMAL
MDC_IDC_SET_ZONE_VENDOR_TYPE: NORMAL
MDC_IDC_STAT_AT_BURDEN_PERCENT: 1 %
MDC_IDC_STAT_AT_DTM_END: NORMAL
MDC_IDC_STAT_AT_DTM_START: NORMAL
MDC_IDC_STAT_BRADY_DTM_END: NORMAL
MDC_IDC_STAT_BRADY_DTM_START: NORMAL
MDC_IDC_STAT_BRADY_RA_PERCENT_PACED: 83 %
MDC_IDC_STAT_BRADY_RV_PERCENT_PACED: 2 %
MDC_IDC_STAT_EPISODE_RECENT_COUNT: 0
MDC_IDC_STAT_EPISODE_RECENT_COUNT_DTM_END: NORMAL
MDC_IDC_STAT_EPISODE_RECENT_COUNT_DTM_START: NORMAL
MDC_IDC_STAT_EPISODE_TYPE: NORMAL
MDC_IDC_STAT_EPISODE_VENDOR_TYPE: NORMAL
MDC_IDC_STAT_EPISODE_VENDOR_TYPE: NORMAL

## 2024-08-03 DIAGNOSIS — H40.2222: ICD-10-CM

## 2024-08-06 RX ORDER — DORZOLAMIDE HYDROCHLORIDE AND TIMOLOL MALEATE 20; 5 MG/ML; MG/ML
1 SOLUTION/ DROPS OPHTHALMIC 2 TIMES DAILY
Qty: 10 ML | Refills: 0 | Status: SHIPPED | OUTPATIENT
Start: 2024-08-06

## 2024-08-06 NOTE — TELEPHONE ENCOUNTER
dorzolamide-timolol (COSOPT) 2-0.5 % ophthalmic solution   Disp-10 mL, R-0   Start: 05/28/2024 7/5/2023  [facility]    Stanford Welsh MD  Ophthalmology      Start Cosopt twice a day left eye

## 2024-08-16 DIAGNOSIS — Z00.00 ROUTINE HEALTH MAINTENANCE: ICD-10-CM

## 2024-08-20 DIAGNOSIS — J40 BRONCHITIS: ICD-10-CM

## 2024-08-21 NOTE — TELEPHONE ENCOUNTER
FLECAINIDE ACETATE 150 MG TAB       Last Written Prescription Date:  5-23-24  Last Fill Quantity: 180,   # refills: 0  Last Office Visit : 6-28-24  Future Office visit:  none    Routing refill request to provider for review/approval because:  Med not on Cards protocol

## 2024-08-22 RX ORDER — FLECAINIDE ACETATE 150 MG/1
150 TABLET ORAL 2 TIMES DAILY
Qty: 180 TABLET | Refills: 3 | Status: SHIPPED | OUTPATIENT
Start: 2024-08-22

## 2024-08-22 NOTE — TELEPHONE ENCOUNTER
Medication Requested:   Disp Refills Start End SEBASTIAN   budesonide-formoterol (SYMBICORT) 80-4.5 MCG/ACT Inhaler 10.2 g 2 5/2/2024 -- No   Sig - Route: Inhale 2 puffs into the lungs 2 times daily - Inhalation     ----------------------  Last Office Visit :     2/24/2024  [facility] [place]      Future Office visit:  none  ----------------------      Refill decision: Refill pended and routed to the provider for review/determination due to the following criteria not met:     Medication unable to be refilled by RN due to criteria not met as indicated.                 []Supervision - Pt not seen within past 12 months, no future appointment              []Compliance - lapse in therapy/gap in refills              []Verification - order discrepancy, clarification needed              []Controlled medication              []Medication not on [facility], [facility], [facility] refill protocol   [] Abnormal labs/test:  [] Overdue labs/test:    []Nova refill given x1, Pt did not follow-up, pended to care team for decision  [] Medication not active on Pt's med list  [] Drug interaction Warning  [] Medication is Reported/Historical              [x]Other:Medication not indicated for associated diagnosis    Pass/Fail Protocol Criteria:    Inhaled Steroids Protocol [id number]/2024 03:58 AM   Protocol Details Medication indicated for associated diagnosis    Patient is age 12 or older    Medication is active on med list    Recent (12 mo) or future (90 days) visit within the authorizing provider's specialty

## 2024-08-23 RX ORDER — BUDESONIDE AND FORMOTEROL FUMARATE DIHYDRATE 80; 4.5 UG/1; UG/1
2 AEROSOL RESPIRATORY (INHALATION) 2 TIMES DAILY
Qty: 30.6 G | Refills: 0 | Status: SHIPPED | OUTPATIENT
Start: 2024-08-23

## 2024-08-27 DIAGNOSIS — E78.2 MIXED HYPERLIPIDEMIA: ICD-10-CM

## 2024-08-30 RX ORDER — ATORVASTATIN CALCIUM 80 MG/1
80 TABLET, FILM COATED ORAL DAILY
Qty: 90 TABLET | Refills: 1 | Status: SHIPPED | OUTPATIENT
Start: 2024-08-30

## 2024-08-30 NOTE — TELEPHONE ENCOUNTER
atorvastatin (LIPITOR) 80 MG tablet       Last Written Prescription Date:  5/31/24  Last Fill Quantity: 90,   # refills: 0  Last Office Visit : 2/24/24  Future Office visit:    Lipids 6/25/24    Refilled per protocol    Kristan BALDWIN RN  P Central Nursing/Red Flag Triage & Med Refill Team

## 2024-09-04 DIAGNOSIS — K21.00 GASTROESOPHAGEAL REFLUX DISEASE WITH ESOPHAGITIS: ICD-10-CM

## 2024-09-09 RX ORDER — PANTOPRAZOLE SODIUM 40 MG/1
TABLET, DELAYED RELEASE ORAL
Qty: 90 TABLET | Refills: 1 | OUTPATIENT
Start: 2024-09-09

## 2024-10-01 ENCOUNTER — ANCILLARY PROCEDURE (OUTPATIENT)
Dept: CARDIOLOGY | Facility: CLINIC | Age: 74
End: 2024-10-01
Attending: INTERNAL MEDICINE
Payer: COMMERCIAL

## 2024-10-01 DIAGNOSIS — I49.5 SSS (SICK SINUS SYNDROME) (H): ICD-10-CM

## 2024-10-01 PROCEDURE — 93294 REM INTERROG EVL PM/LDLS PM: CPT | Performed by: INTERNAL MEDICINE

## 2024-10-01 PROCEDURE — 93296 REM INTERROG EVL PM/IDS: CPT

## 2024-10-16 LAB
MDC_IDC_EPISODE_DTM: NORMAL
MDC_IDC_EPISODE_DURATION: 1 S
MDC_IDC_EPISODE_DURATION: 14 S
MDC_IDC_EPISODE_DURATION: 2 S
MDC_IDC_EPISODE_DURATION: 2 S
MDC_IDC_EPISODE_DURATION: 24 S
MDC_IDC_EPISODE_DURATION: 4 S
MDC_IDC_EPISODE_DURATION: 4 S
MDC_IDC_EPISODE_DURATION: 6 S
MDC_IDC_EPISODE_DURATION: 9 S
MDC_IDC_EPISODE_DURATION: 9 S
MDC_IDC_EPISODE_ID: NORMAL
MDC_IDC_EPISODE_TYPE: NORMAL
MDC_IDC_LEAD_CONNECTION_STATUS: NORMAL
MDC_IDC_LEAD_CONNECTION_STATUS: NORMAL
MDC_IDC_LEAD_IMPLANT_DT: NORMAL
MDC_IDC_LEAD_IMPLANT_DT: NORMAL
MDC_IDC_LEAD_LOCATION: NORMAL
MDC_IDC_LEAD_LOCATION: NORMAL
MDC_IDC_LEAD_LOCATION_DETAIL_1: NORMAL
MDC_IDC_LEAD_LOCATION_DETAIL_1: NORMAL
MDC_IDC_LEAD_MFG: NORMAL
MDC_IDC_LEAD_MFG: NORMAL
MDC_IDC_LEAD_MODEL: NORMAL
MDC_IDC_LEAD_MODEL: NORMAL
MDC_IDC_LEAD_POLARITY_TYPE: NORMAL
MDC_IDC_LEAD_POLARITY_TYPE: NORMAL
MDC_IDC_LEAD_SERIAL: NORMAL
MDC_IDC_LEAD_SERIAL: NORMAL
MDC_IDC_MSMT_BATTERY_DTM: NORMAL
MDC_IDC_MSMT_BATTERY_REMAINING_LONGEVITY: 96 MO
MDC_IDC_MSMT_BATTERY_REMAINING_PERCENTAGE: 94 %
MDC_IDC_MSMT_BATTERY_STATUS: NORMAL
MDC_IDC_MSMT_LEADCHNL_RA_IMPEDANCE_VALUE: 587 OHM
MDC_IDC_MSMT_LEADCHNL_RV_IMPEDANCE_VALUE: 667 OHM
MDC_IDC_MSMT_LEADCHNL_RV_PACING_THRESHOLD_AMPLITUDE: 1.4 V
MDC_IDC_MSMT_LEADCHNL_RV_PACING_THRESHOLD_PULSEWIDTH: 0.4 MS
MDC_IDC_PG_IMPLANT_DTM: NORMAL
MDC_IDC_PG_MFG: NORMAL
MDC_IDC_PG_MODEL: NORMAL
MDC_IDC_PG_SERIAL: NORMAL
MDC_IDC_PG_TYPE: NORMAL
MDC_IDC_SESS_CLINIC_NAME: NORMAL
MDC_IDC_SESS_DTM: NORMAL
MDC_IDC_SESS_TYPE: NORMAL
MDC_IDC_SET_BRADY_AT_MODE_SWITCH_MODE: NORMAL
MDC_IDC_SET_BRADY_AT_MODE_SWITCH_RATE: 160 {BEATS}/MIN
MDC_IDC_SET_BRADY_LOWRATE: 60 {BEATS}/MIN
MDC_IDC_SET_BRADY_MAX_SENSOR_RATE: 130 {BEATS}/MIN
MDC_IDC_SET_BRADY_MAX_TRACKING_RATE: 130 {BEATS}/MIN
MDC_IDC_SET_BRADY_MODE: NORMAL
MDC_IDC_SET_BRADY_PAV_DELAY_LOW: 200 MS
MDC_IDC_SET_BRADY_SAV_DELAY_LOW: 150 MS
MDC_IDC_SET_LEADCHNL_RA_PACING_AMPLITUDE: 3.5 V
MDC_IDC_SET_LEADCHNL_RA_PACING_CAPTURE_MODE: NORMAL
MDC_IDC_SET_LEADCHNL_RA_PACING_POLARITY: NORMAL
MDC_IDC_SET_LEADCHNL_RA_PACING_PULSEWIDTH: 0.4 MS
MDC_IDC_SET_LEADCHNL_RA_SENSING_ADAPTATION_MODE: NORMAL
MDC_IDC_SET_LEADCHNL_RA_SENSING_POLARITY: NORMAL
MDC_IDC_SET_LEADCHNL_RA_SENSING_SENSITIVITY: 0.25 MV
MDC_IDC_SET_LEADCHNL_RV_PACING_AMPLITUDE: 1.7 V
MDC_IDC_SET_LEADCHNL_RV_PACING_CAPTURE_MODE: NORMAL
MDC_IDC_SET_LEADCHNL_RV_PACING_POLARITY: NORMAL
MDC_IDC_SET_LEADCHNL_RV_PACING_PULSEWIDTH: 0.4 MS
MDC_IDC_SET_LEADCHNL_RV_SENSING_ADAPTATION_MODE: NORMAL
MDC_IDC_SET_LEADCHNL_RV_SENSING_POLARITY: NORMAL
MDC_IDC_SET_LEADCHNL_RV_SENSING_SENSITIVITY: 1.5 MV
MDC_IDC_SET_ZONE_DETECTION_INTERVAL: 375 MS
MDC_IDC_SET_ZONE_STATUS: NORMAL
MDC_IDC_SET_ZONE_TYPE: NORMAL
MDC_IDC_SET_ZONE_VENDOR_TYPE: NORMAL
MDC_IDC_STAT_AT_BURDEN_PERCENT: 5 %
MDC_IDC_STAT_AT_DTM_END: NORMAL
MDC_IDC_STAT_AT_DTM_START: NORMAL
MDC_IDC_STAT_BRADY_DTM_END: NORMAL
MDC_IDC_STAT_BRADY_DTM_START: NORMAL
MDC_IDC_STAT_BRADY_RA_PERCENT_PACED: 47 %
MDC_IDC_STAT_BRADY_RV_PERCENT_PACED: 8 %
MDC_IDC_STAT_EPISODE_RECENT_COUNT: 0
MDC_IDC_STAT_EPISODE_RECENT_COUNT: 2085
MDC_IDC_STAT_EPISODE_RECENT_COUNT_DTM_END: NORMAL
MDC_IDC_STAT_EPISODE_RECENT_COUNT_DTM_START: NORMAL
MDC_IDC_STAT_EPISODE_TYPE: NORMAL
MDC_IDC_STAT_EPISODE_VENDOR_TYPE: NORMAL

## 2024-10-20 ENCOUNTER — HEALTH MAINTENANCE LETTER (OUTPATIENT)
Age: 74
End: 2024-10-20

## 2024-10-23 ENCOUNTER — OFFICE VISIT (OUTPATIENT)
Dept: FAMILY MEDICINE | Facility: CLINIC | Age: 74
End: 2024-10-23
Payer: COMMERCIAL

## 2024-10-23 VITALS
OXYGEN SATURATION: 95 % | DIASTOLIC BLOOD PRESSURE: 77 MMHG | RESPIRATION RATE: 16 BRPM | BODY MASS INDEX: 26.61 KG/M2 | WEIGHT: 144.6 LBS | HEART RATE: 71 BPM | HEIGHT: 62 IN | TEMPERATURE: 97.8 F | SYSTOLIC BLOOD PRESSURE: 162 MMHG

## 2024-10-23 DIAGNOSIS — Z71.84 TRAVEL ADVICE ENCOUNTER: Primary | ICD-10-CM

## 2024-10-23 PROCEDURE — 99402 PREV MED CNSL INDIV APPRX 30: CPT | Mod: 25 | Performed by: NURSE PRACTITIONER

## 2024-10-23 PROCEDURE — G0008 ADMIN INFLUENZA VIRUS VAC: HCPCS | Performed by: NURSE PRACTITIONER

## 2024-10-23 PROCEDURE — 90472 IMMUNIZATION ADMIN EACH ADD: CPT | Performed by: NURSE PRACTITIONER

## 2024-10-23 PROCEDURE — 90480 ADMN SARSCOV2 VAC 1/ONLY CMP: CPT | Performed by: NURSE PRACTITIONER

## 2024-10-23 PROCEDURE — 91320 SARSCV2 VAC 30MCG TRS-SUC IM: CPT | Performed by: NURSE PRACTITIONER

## 2024-10-23 PROCEDURE — 90662 IIV NO PRSV INCREASED AG IM: CPT | Performed by: NURSE PRACTITIONER

## 2024-10-23 PROCEDURE — 90691 TYPHOID VACCINE IM: CPT | Performed by: NURSE PRACTITIONER

## 2024-10-23 RX ORDER — ATOVAQUONE AND PROGUANIL HYDROCHLORIDE 250; 100 MG/1; MG/1
1 TABLET, FILM COATED ORAL DAILY
Qty: 40 TABLET | Refills: 0 | Status: SHIPPED | OUTPATIENT
Start: 2024-10-23

## 2024-10-23 RX ORDER — AZITHROMYCIN 500 MG/1
500 TABLET, FILM COATED ORAL DAILY
Qty: 3 TABLET | Refills: 0 | Status: SHIPPED | OUTPATIENT
Start: 2024-10-23 | End: 2024-10-26

## 2024-10-23 ASSESSMENT — PAIN SCALES - GENERAL: PAINLEVEL_OUTOF10: NO PAIN (0)

## 2024-10-23 NOTE — PROGRESS NOTES
"Nurse Note ( Pre-Travel Consult)    Itinerary: [facility]    Departure Date: 10/30/2024    Return Date: 12/30/2024    Length of Trip: 2 months    Reason for Travel: Tourism         Urban or rural: both    Accommodations: Family home    IMMUNIZATION HISTORY  Have you received any immunizations within the past 4 weeks?  No  Have you ever fainted from having your blood drawn or from an injection?  No  Have you ever had a fever reaction to vaccination?  No  Have you ever had any bad reaction or side effect from any vaccination?  No  Have you ever had hepatitis A or B vaccine?  Yes  Do you live (or work closely) with anyone who has AIDS, an AIDS-like condition, any other immune disorder or who is on chemotherapy for cancer?  No  Do you have a family history of immunodeficiency?  Yes - Pt reports mother had cervical cancer.  Have you received any injection of immune globulin or any blood products during the past 12 months?  No    Patient roomed by CONCEPCIÓN Linares  Ju Edwards is a 74 year old male seen today with phone  for counsultation for international travel.   Patient will be departing in  1 week(s) and  traveling alone.      Patient itinerary :  will be in the urban and rural  region of [facility] including  which risk for Dengue Fever, food borne illnesses, and motor vehicle accidents. exposure.      Patient's activities will include visiting friends and relatives.    Patient's country of birth is [facility]    Special medical concerns: Cardiac history, HTN, Afib   Pre-travel questionnaire was completed by patient and reviewed by provider.     Vitals: BP (!) 162/77 (BP Location: Left arm, Patient Position: Sitting, Cuff Size: Adult Regular)   Pulse 71   Temp 97.8  F (36.6  C) (Temporal)   Resp 16   Ht 1.572 m (5' 1.9\")   Wt 65.6 kg (144 lb 9.6 oz)   SpO2 95%   BMI 26.53 kg/m    BMI= Body mass index is 26.53 kg/m .    EXAM:  General:  Well-nourished, well-developed in no acute distress.  Appears " to be stated age, interacts appropriately and expresses understanding of information given to patient.    Current Outpatient Medications   Medication Sig Dispense Refill    amLODIPine (NORVASC) 5 MG tablet Take 1 tablet (5 mg) by mouth daily Please keep 2-24-24 clinic appt for refills. 90 tablet 2    atenolol (TENORMIN) 50 MG tablet Take 1 tablet (50 mg) by mouth daily 90 tablet 1    atorvastatin (LIPITOR) 80 MG tablet TAKE 1 TABLET (80 MG) BY MOUTH DAILY **LIPID PANEL DUE** LABS NEEDED FOR FURTHER REFILLS. CALL [phone].** 90 tablet 1    budesonide-formoterol (SYMBICORT) 80-4.5 MCG/ACT Inhaler Inhale 2 puffs into the lungs 2 times daily. 30.6 g 0    Cholecalciferol (VITAMIN D3) 50 MCG (2000 UT) CAPS Take 1 capsule by mouth daily 90 capsule 2    citalopram (CELEXA) 10 MG tablet Take 2 tablets (20 mg) by mouth daily 180 tablet 2    cyclobenzaprine (FLEXERIL) 5 MG tablet Take 0.5 tablets (2.5 mg) by mouth At Bedtime 30 tablet 1    dextran 70-hypromellose (TEARS NATURALE FREE PF) 0.1-0.3 % ophthalmic solution Place 1 drop into both eyes 4 times daily 36 each 11    docusate sodium (COLACE) 100 MG capsule Take 100 mg by mouth 2 times daily as needed       dorzolamide-timolol (COSOPT) 2-0.5 % ophthalmic solution Place 1 drop Into the left eye 2 times daily For additional refills please schedule a follow up appointment at [phone]. IMPORTANT . 10 mL 0    emollient (VANICREAM) cream Apply topically as needed for other 25 g 3    erythromycin (ROMYCIN) 5 MG/GM ophthalmic ointment Place 0.5 inches into both eyes At Bedtime 7 g 1    flecainide (TAMBOCOR) 150 MG tablet Take 1 tablet (150 mg) by mouth 2 times daily. 180 tablet 3    fluticasone (FLONASE) 50 MCG/ACT nasal spray INSTILL 2 SPRAYS INTO BOTH NOSTRILS DAILY 48 mL 5    lisinopril (ZESTRIL) 20 MG tablet Take 1 tablet (20 mg) by mouth daily Please keep 2-24-24 clinic appt for refills. 90 tablet 2    montelukast (SINGULAIR) 10 MG tablet Take 1 tablet (10 mg) by  mouth at bedtime 90 tablet 1    nitroGLYcerin (NITROSTAT) 0.4 MG sublingual tablet Place 0.4 mg under the tongue every 5 minutes as needed.      pantoprazole (PROTONIX) 40 MG EC tablet TAKE 1 TABLET BY MOUTH EVERY DAY 90 tablet 1    rivaroxaban ANTICOAGULANT (XARELTO ANTICOAGULANT) 20 MG TABS tablet Take 1 tablet (20 mg) by mouth daily (with dinner) 60 tablet 1    tamsulosin (FLOMAX) 0.4 MG capsule Take 1 capsule (0.4 mg) by mouth daily 90 capsule 2     Patient Active Problem List   Diagnosis    Hypertension    Hyperlipidemia    Stroke (H)    Atrial fibrillation (H)    Long term current use of anticoagulant therapy    Rectal bleeding    Atrial fibrillation, unspecified type (H)    Chronic narrow angle glaucoma, left, moderate stage    Nuclear senile cataract of both eyes    Adjustment reaction    Anemia associated with acute blood loss    Anticoagulation adequate    Arteriosclerosis of coronary artery    Cardiac arrhythmia    Cardiac pacemaker in situ    Cerebrovascular accident (CVA) due to thrombosis of right middle cerebral artery (H)    Chest pain    Coagulopathy (H)    Delirium    Dysphagia    Exertional angina (H)    Gastric erosion with bleeding    GERD (gastroesophageal reflux disease)    GI bleed    Helicobacter pylori ab+    HTN (hypertension), malignant    ICH (intracerebral hemorrhage) (H)    Left hemiparesis (H)    Left-sided neglect    Obstructive sleep apnea    Ostium secundum type atrial septal defect    PAT (paroxysmal atrial tachycardia) (H)    PTSD (post-traumatic stress disorder)    RBBB    S/P ablation of atrial flutter    Sinus arrest    Syncope    Vitamin D deficiency    Chronic narrow angle glaucoma, right, mild stage    Combined form of age-related cataract, right eye    Diabetes mellitus, type 2 (H)    Paroxysmal atrial fibrillation (H)    Abdominal pain, left lower quadrant     No Known Allergies      Immunizations discussed include:   Covid 19: Ordered/given today, risks, benefits and  side effects reviewed  Hepatitis A:  Up to date  Hepatitis B: immune  Influenza: Ordered/given today, risks, benefits and side effects reviewed  Typhoid: Ordered/given today, risks, benefits and side effects reviewed  Rabies: Declined  reviewed managment of a animal bite or scratch (washing wound, seek medical care within 24 hours for post exposure prophylaxis )  Yellow Fever: Not indicated  Japanese Encephalitis: low risk   Meningococcus: Not indicated  Tetanus/Diphtheria: Up to date  Measles/Mumps/Rubella: Immune by disease history per patient report  Cholera: Not needed  Polio: Up to date  Pneumococcal: Up to date  Varicella: Immune by disease history per patient report  Shingrix: deferred  HPV:  Not indicated     TB: low risk     Altitude Exposure on this trip: no  Past tolerance to Altitude: na    ASSESSMENT/PLAN:  Ju was seen today for travel clinic.    Diagnoses and all orders for this visit:    Travel advice encounter  -     atovaquone-proguanil (MALARONE) 250-100 MG tablet; Take 1 tablet by mouth daily. Start 2 days before exposure to Malaria and continue daily till  7 days after exposure.  -     azithromycin (ZITHROMAX) 500 MG tablet; Take 1 tablet (500 mg) by mouth daily for 3 doses. Take 1 tablet a day for up to 3 days for severe diarrhea    Other orders  -     TYPHOID VACCINE, IM  -     INFLUENZA HIGH DOSE, TRIVALENT, PF (FLUZONE)  -     COVID-19 12+ (PFIZER)      I have reviewed general recommendations for safe travel   including: food/water precautions, insect precautions,   roadway safety. Educational materials and [employer] report provided.    Malaraia prophylaxis recommended: Malarone  Symptomatic treatment for traveler's diarrhea: azithromycin    Evacuation insurance advised and resources were provided to patient.    Total visit time 35 minutes  with over 50% of time spent counseling patient and shared decision making as detailed above.    Jo Cortes CNP  Certificate in Travel  Health

## 2024-10-23 NOTE — PATIENT INSTRUCTIONS
Thank you for visiting the [facility] : [phone]  Today October 23, 2024 you received the    Flu Vaccine    Typhoid - injectable. This vaccine is valid for two years.     Covid 19     Follow up vaccine appointments can be made as a NURSE ONLY visit at the Travel Clinic, (BE PREPARED TO WAIT, ) or at designated [facility].    If you are receiving the Rabies vaccines series, it is important that you follow the exact schedule ordered.     Pre-travel     We recommend that you purchase Medical Evacuation Insurance prior to your departure.  Https://wwwnc.cdc.gov/travel/page/insurance    [place]ver[place] your travel plans with the  Department of [employer] through STEP ( Smart Traveler Enrollment Program ) https://step.state.gov.  STEP is a free service to allow [place] citizens and nationals traveling and living abroad to enroll their trip with the nearest U[place]. Embassy or Consulate.    Animal Exposure: Avoid all mammals even if they look healthy.  If there is a bite, scratch or even a lick, wash area immediately with soap and water for 15 minutes and seek medical care within 24 hours for evaluation of Rabies post exposure treatment.  Contact your Medical Evacuation Insurance.    Repiratory illness prevention strategies ( Covid and Influenza ) CDC recommendations:  Consider wearing a mask in crowded or poorly ventilated indoor areas, including on public transportation and in transportation hubs.  Hand washing: frequent, thorough handwashing with soap and water for 20 seconds. Use an alcohol-based hand  with at least 60% alcohol if soap and water are not readily available. Avoid touching face, nose, eyes, mouth unless you have done appropriate hand washing as above.  VACCINES: Staying up to date on COVID-19 vaccines significantly lowers the risk of getting very sick, being hospitalized, or dying from COVID-19. CDC recommends that everyone stay up to date on their COVID-19  vaccines, especially people with weakened immune systems.    Travel Covid 19 Testing:  updated 12/06/2021  International travelers: Pre-travel:  See country specific Embassy websites or airline websites.    Post travel: CDC recommends getting tested 3-5 days after your trip     Post-travel illness:  Contact your provider or [facility] if you develop a fever, rash, cough, diarrhea or other symptoms for up to 1 year after travel.  Inform your healthcare provider when and where you traveled to.    Please call the [facility] with any questions [phone]  Or send your provider a 'My Chart' note.

## 2024-10-23 NOTE — NURSING NOTE
Pt received COVID vaccine, flu vaccine, and typhoid vaccine per provider, AYUSH Yost CNP's, orders. Pt given VIS forms prior to immunization administration.  Prior to immunization administration, verified patients identity using patient s name and date of birth.   Patient instructed to remain in clinic for 15 minutes afterwards, and to report any adverse reactions.      services utilized via clinic provided telephone before, during, and after immunization administration - see visit details.     Performed by Vijay Londono RN on 10/23/2024.

## 2024-11-29 DIAGNOSIS — J40 BRONCHITIS: ICD-10-CM

## 2024-12-04 RX ORDER — BUDESONIDE AND FORMOTEROL FUMARATE DIHYDRATE 80; 4.5 UG/1; UG/1
2 AEROSOL RESPIRATORY (INHALATION) 2 TIMES DAILY
Qty: 10.2 G | Refills: 3 | Status: SHIPPED | OUTPATIENT
Start: 2024-12-04

## 2024-12-04 NOTE — TELEPHONE ENCOUNTER
Inhaled Steroids Protocol Failed      Medication indicated for associated diagnosis    Medication is associated with one or more of the following diagnoses:               Allergies              Asthma              COPD              Nasal Congestion              Nasal Polyps              Rhinitis              Cystic Fibrosis              Bronchiectasis

## 2024-12-04 NOTE — TELEPHONE ENCOUNTER
budesonide-formoterol (SYMBICORT) 80-4.5 MCG/ACT Inhaler       Last Written Prescription Date:  8/23/24  Last Fill Quantity: 30.6gr,   # refills: 0  Last Office Visit : 2/24/24  Future Office visit:  None    Routing refill request to provider for review/approval because:    Inhaled Steroids Protocol [id number]/29/2024 09:50 AM   Protocol Details Medication indicated for associated diagnosis          Allergies              Asthma              COPD              Nasal Congestion              Nasal Polyps              Rhinitis              Cystic Fibrosis              Bronchiectasis    Kristan BALDWIN RN  P Central Nursing/Red Flag Triage & Med Refill Team

## 2025-01-03 ENCOUNTER — ANCILLARY PROCEDURE (OUTPATIENT)
Dept: CARDIOLOGY | Facility: CLINIC | Age: 75
End: 2025-01-03
Attending: INTERNAL MEDICINE
Payer: COMMERCIAL

## 2025-01-03 DIAGNOSIS — I49.5 SSS (SICK SINUS SYNDROME) (H): ICD-10-CM

## 2025-01-03 DIAGNOSIS — J40 BRONCHITIS: ICD-10-CM

## 2025-01-03 PROCEDURE — 93296 REM INTERROG EVL PM/IDS: CPT

## 2025-01-07 RX ORDER — MONTELUKAST SODIUM 10 MG/1
1 TABLET ORAL AT BEDTIME
Qty: 90 TABLET | Refills: 0 | Status: SHIPPED | OUTPATIENT
Start: 2025-01-07

## 2025-01-07 NOTE — TELEPHONE ENCOUNTER
LVD:  2/24/2024  [facility]     Refilled per protocol.

## 2025-01-15 ENCOUNTER — DOCUMENTATION ONLY (OUTPATIENT)
Dept: INTERNAL MEDICINE | Facility: CLINIC | Age: 75
End: 2025-01-15
Payer: COMMERCIAL

## 2025-01-15 NOTE — PROGRESS NOTES
Type of Form Received: [facility] Medical Exam Report    Form Received (Date) 1/15/25   Form Filled out No   Placed in provider folder Yes

## 2025-01-16 DIAGNOSIS — K21.00 GASTROESOPHAGEAL REFLUX DISEASE WITH ESOPHAGITIS: ICD-10-CM

## 2025-01-16 LAB
MDC_IDC_EPISODE_DTM: NORMAL
MDC_IDC_EPISODE_DURATION: 125 S
MDC_IDC_EPISODE_DURATION: 13 S
MDC_IDC_EPISODE_DURATION: 19 S
MDC_IDC_EPISODE_DURATION: 30 S
MDC_IDC_EPISODE_DURATION: 31 S
MDC_IDC_EPISODE_DURATION: 405 S
MDC_IDC_EPISODE_DURATION: 68 S
MDC_IDC_EPISODE_DURATION: 75 S
MDC_IDC_EPISODE_DURATION: 796 S
MDC_IDC_EPISODE_DURATION: 98 S
MDC_IDC_EPISODE_ID: NORMAL
MDC_IDC_EPISODE_TYPE: NORMAL
MDC_IDC_LEAD_CONNECTION_STATUS: NORMAL
MDC_IDC_LEAD_CONNECTION_STATUS: NORMAL
MDC_IDC_LEAD_IMPLANT_DT: NORMAL
MDC_IDC_LEAD_IMPLANT_DT: NORMAL
MDC_IDC_LEAD_LOCATION: NORMAL
MDC_IDC_LEAD_LOCATION: NORMAL
MDC_IDC_LEAD_LOCATION_DETAIL_1: NORMAL
MDC_IDC_LEAD_LOCATION_DETAIL_1: NORMAL
MDC_IDC_LEAD_MFG: NORMAL
MDC_IDC_LEAD_MFG: NORMAL
MDC_IDC_LEAD_MODEL: NORMAL
MDC_IDC_LEAD_MODEL: NORMAL
MDC_IDC_LEAD_POLARITY_TYPE: NORMAL
MDC_IDC_LEAD_POLARITY_TYPE: NORMAL
MDC_IDC_LEAD_SERIAL: NORMAL
MDC_IDC_LEAD_SERIAL: NORMAL
MDC_IDC_MSMT_BATTERY_DTM: NORMAL
MDC_IDC_MSMT_BATTERY_REMAINING_LONGEVITY: 102 MO
MDC_IDC_MSMT_BATTERY_REMAINING_PERCENTAGE: 98 %
MDC_IDC_MSMT_BATTERY_STATUS: NORMAL
MDC_IDC_MSMT_LEADCHNL_RA_IMPEDANCE_VALUE: 611 OHM
MDC_IDC_MSMT_LEADCHNL_RV_IMPEDANCE_VALUE: 698 OHM
MDC_IDC_MSMT_LEADCHNL_RV_PACING_THRESHOLD_AMPLITUDE: 1.3 V
MDC_IDC_MSMT_LEADCHNL_RV_PACING_THRESHOLD_PULSEWIDTH: 0.4 MS
MDC_IDC_PG_IMPLANT_DTM: NORMAL
MDC_IDC_PG_MFG: NORMAL
MDC_IDC_PG_MODEL: NORMAL
MDC_IDC_PG_SERIAL: NORMAL
MDC_IDC_PG_TYPE: NORMAL
MDC_IDC_SESS_CLINIC_NAME: NORMAL
MDC_IDC_SESS_DTM: NORMAL
MDC_IDC_SESS_TYPE: NORMAL
MDC_IDC_SET_BRADY_AT_MODE_SWITCH_MODE: NORMAL
MDC_IDC_SET_BRADY_AT_MODE_SWITCH_RATE: 160 {BEATS}/MIN
MDC_IDC_SET_BRADY_LOWRATE: 60 {BEATS}/MIN
MDC_IDC_SET_BRADY_MAX_SENSOR_RATE: 130 {BEATS}/MIN
MDC_IDC_SET_BRADY_MAX_TRACKING_RATE: 130 {BEATS}/MIN
MDC_IDC_SET_BRADY_MODE: NORMAL
MDC_IDC_SET_BRADY_PAV_DELAY_LOW: 200 MS
MDC_IDC_SET_BRADY_SAV_DELAY_LOW: 150 MS
MDC_IDC_SET_LEADCHNL_RA_PACING_AMPLITUDE: 3.5 V
MDC_IDC_SET_LEADCHNL_RA_PACING_CAPTURE_MODE: NORMAL
MDC_IDC_SET_LEADCHNL_RA_PACING_POLARITY: NORMAL
MDC_IDC_SET_LEADCHNL_RA_PACING_PULSEWIDTH: 0.4 MS
MDC_IDC_SET_LEADCHNL_RA_SENSING_ADAPTATION_MODE: NORMAL
MDC_IDC_SET_LEADCHNL_RA_SENSING_POLARITY: NORMAL
MDC_IDC_SET_LEADCHNL_RA_SENSING_SENSITIVITY: 0.25 MV
MDC_IDC_SET_LEADCHNL_RV_PACING_AMPLITUDE: 1.7 V
MDC_IDC_SET_LEADCHNL_RV_PACING_CAPTURE_MODE: NORMAL
MDC_IDC_SET_LEADCHNL_RV_PACING_POLARITY: NORMAL
MDC_IDC_SET_LEADCHNL_RV_PACING_PULSEWIDTH: 0.4 MS
MDC_IDC_SET_LEADCHNL_RV_SENSING_ADAPTATION_MODE: NORMAL
MDC_IDC_SET_LEADCHNL_RV_SENSING_POLARITY: NORMAL
MDC_IDC_SET_LEADCHNL_RV_SENSING_SENSITIVITY: 1.5 MV
MDC_IDC_SET_ZONE_DETECTION_INTERVAL: 375 MS
MDC_IDC_SET_ZONE_STATUS: NORMAL
MDC_IDC_SET_ZONE_TYPE: NORMAL
MDC_IDC_SET_ZONE_VENDOR_TYPE: NORMAL
MDC_IDC_STAT_AT_BURDEN_PERCENT: 4 %
MDC_IDC_STAT_AT_DTM_END: NORMAL
MDC_IDC_STAT_AT_DTM_START: NORMAL
MDC_IDC_STAT_BRADY_DTM_END: NORMAL
MDC_IDC_STAT_BRADY_DTM_START: NORMAL
MDC_IDC_STAT_BRADY_RA_PERCENT_PACED: 46 %
MDC_IDC_STAT_BRADY_RV_PERCENT_PACED: 6 %
MDC_IDC_STAT_EPISODE_RECENT_COUNT: 0
MDC_IDC_STAT_EPISODE_RECENT_COUNT: 996
MDC_IDC_STAT_EPISODE_RECENT_COUNT_DTM_END: NORMAL
MDC_IDC_STAT_EPISODE_RECENT_COUNT_DTM_START: NORMAL
MDC_IDC_STAT_EPISODE_TYPE: NORMAL
MDC_IDC_STAT_EPISODE_VENDOR_TYPE: NORMAL

## 2025-01-16 RX ORDER — PANTOPRAZOLE SODIUM 40 MG/1
40 TABLET, DELAYED RELEASE ORAL DAILY
Qty: 90 TABLET | Refills: 1 | Status: SHIPPED | OUTPATIENT
Start: 2025-01-16

## 2025-01-16 NOTE — TELEPHONE ENCOUNTER
[facility] Call Center    Phone Message    May a detailed message be left on voicemail: yes     Reason for Call: Medication Refill Request    Has the patient contacted the pharmacy for the refill? Yes   Name of medication being requested: pantoprazole (PROTONIX) 40 MG EC tablet   Provider who prescribed the medication:   Pharmacy:   [id number] [address]     Date medication is needed: ASAP   \  Per spouse was told by pharmacy they reach out to the team 3x about this medication needing to be filled. Writer does not see any request.  At this time per spouse pt is out of this medication and is in need of it. Please and thank you.     Action Taken: Message routed to:  [facility] P[facility]    Travel Screening: Not Applicable     Date of Service:

## 2025-01-16 NOTE — TELEPHONE ENCOUNTER
Centralized Medication Refill Team note: No refill request for Pantoprazole are noted in recent  encounters   montelukast (SINGULAIR) 10 MG tablet > request 1/7/25 refilled  budesonide-formoterol (SYMBICORT) 80-4.5 MCG/ACT Inhaler  request 12/4/24 refilled  PANTOPRAZOLE SOD DR 40 MG denied refill on  9/9/24 as refills at that time were  on file   pantoprazole (PROTONIX) 40 MG EC tablet      Last Written Prescription Date:  5/21/24  Last Fill Quantity: 90,   # refills: 1  Last Office Visit :  2/24/2024  [facility] [place] Office visit:  None     Refill decision: Medication refilled per  Medication Refill in Ambulatory Care  policy.  MyChart notification sent.

## 2025-01-26 ENCOUNTER — HEALTH MAINTENANCE LETTER (OUTPATIENT)
Age: 75
End: 2025-01-26

## 2025-01-28 ENCOUNTER — MEDICAL CORRESPONDENCE (OUTPATIENT)
Dept: HEALTH INFORMATION MANAGEMENT | Facility: CLINIC | Age: 75
End: 2025-01-28
Payer: COMMERCIAL

## 2025-02-07 ENCOUNTER — OFFICE VISIT (OUTPATIENT)
Dept: INTERNAL MEDICINE | Facility: CLINIC | Age: 75
End: 2025-02-07
Payer: COMMERCIAL

## 2025-02-07 ENCOUNTER — LAB (OUTPATIENT)
Dept: LAB | Facility: CLINIC | Age: 75
End: 2025-02-07
Payer: COMMERCIAL

## 2025-02-07 VITALS
DIASTOLIC BLOOD PRESSURE: 74 MMHG | WEIGHT: 150.2 LBS | HEART RATE: 71 BPM | OXYGEN SATURATION: 98 % | TEMPERATURE: 97.9 F | BODY MASS INDEX: 27.56 KG/M2 | SYSTOLIC BLOOD PRESSURE: 150 MMHG

## 2025-02-07 DIAGNOSIS — E78.2 MIXED HYPERLIPIDEMIA: ICD-10-CM

## 2025-02-07 DIAGNOSIS — E11.9 TYPE 2 DIABETES MELLITUS WITHOUT COMPLICATION, UNSPECIFIED WHETHER LONG TERM INSULIN USE (H): ICD-10-CM

## 2025-02-07 DIAGNOSIS — I63.039 CEREBROVASCULAR ACCIDENT (CVA) DUE TO THROMBOSIS OF CAROTID ARTERY, UNSPECIFIED BLOOD VESSEL LATERALITY (H): ICD-10-CM

## 2025-02-07 DIAGNOSIS — R05.3 CHRONIC COUGH: ICD-10-CM

## 2025-02-07 DIAGNOSIS — I63.311 CEREBROVASCULAR ACCIDENT (CVA) DUE TO THROMBOSIS OF RIGHT MIDDLE CEREBRAL ARTERY (H): ICD-10-CM

## 2025-02-07 DIAGNOSIS — J34.89 RHINORRHEA: ICD-10-CM

## 2025-02-07 DIAGNOSIS — R05.3 CHRONIC COUGH: Primary | ICD-10-CM

## 2025-02-07 LAB
ALBUMIN SERPL BCG-MCNC: 4.3 G/DL (ref 3.5–5.2)
ALP SERPL-CCNC: 118 U/L (ref 40–150)
ALT SERPL W P-5'-P-CCNC: 27 U/L (ref 0–70)
ANION GAP SERPL CALCULATED.3IONS-SCNC: 9 MMOL/L (ref 7–15)
AST SERPL W P-5'-P-CCNC: 21 U/L (ref 0–45)
BASOPHILS # BLD AUTO: 0 10E3/UL (ref 0–0.2)
BASOPHILS NFR BLD AUTO: 1 %
BILIRUB SERPL-MCNC: 0.3 MG/DL
BUN SERPL-MCNC: 17.5 MG/DL (ref 8–23)
CALCIUM SERPL-MCNC: 9.1 MG/DL (ref 8.8–10.4)
CHLORIDE SERPL-SCNC: 108 MMOL/L (ref 98–107)
CHOLEST SERPL-MCNC: 161 MG/DL
CREAT SERPL-MCNC: 0.92 MG/DL (ref 0.67–1.17)
CRP SERPL-MCNC: <3 MG/L
EGFRCR SERPLBLD CKD-EPI 2021: 88 ML/MIN/1.73M2
EOSINOPHIL # BLD AUTO: 0.1 10E3/UL (ref 0–0.7)
EOSINOPHIL NFR BLD AUTO: 1 %
ERYTHROCYTE [DISTWIDTH] IN BLOOD BY AUTOMATED COUNT: 12.9 % (ref 10–15)
ERYTHROCYTE [SEDIMENTATION RATE] IN BLOOD BY WESTERGREN METHOD: 19 MM/HR (ref 0–20)
EST. AVERAGE GLUCOSE BLD GHB EST-MCNC: 131 MG/DL
FASTING STATUS PATIENT QL REPORTED: NO
FASTING STATUS PATIENT QL REPORTED: NO
GLUCOSE SERPL-MCNC: 99 MG/DL (ref 70–99)
HBA1C MFR BLD: 6.2 %
HCO3 SERPL-SCNC: 25 MMOL/L (ref 22–29)
HCT VFR BLD AUTO: 38 % (ref 40–53)
HDLC SERPL-MCNC: 31 MG/DL
HGB BLD-MCNC: 12.9 G/DL (ref 13.3–17.7)
IMM GRANULOCYTES # BLD: 0 10E3/UL
IMM GRANULOCYTES NFR BLD: 0 %
LDLC SERPL CALC-MCNC: 60 MG/DL
LYMPHOCYTES # BLD AUTO: 2.2 10E3/UL (ref 0.8–5.3)
LYMPHOCYTES NFR BLD AUTO: 30 %
MCH RBC QN AUTO: 28.8 PG (ref 26.5–33)
MCHC RBC AUTO-ENTMCNC: 33.9 G/DL (ref 31.5–36.5)
MCV RBC AUTO: 85 FL (ref 78–100)
MONOCYTES # BLD AUTO: 0.8 10E3/UL (ref 0–1.3)
MONOCYTES NFR BLD AUTO: 11 %
NEUTROPHILS # BLD AUTO: 4.2 10E3/UL (ref 1.6–8.3)
NEUTROPHILS NFR BLD AUTO: 57 %
NONHDLC SERPL-MCNC: 130 MG/DL
NRBC # BLD AUTO: 0 10E3/UL
NRBC BLD AUTO-RTO: 0 /100
PLATELET # BLD AUTO: 242 10E3/UL (ref 150–450)
POTASSIUM SERPL-SCNC: 4 MMOL/L (ref 3.4–5.3)
PROT SERPL-MCNC: 7.5 G/DL (ref 6.4–8.3)
RBC # BLD AUTO: 4.48 10E6/UL (ref 4.4–5.9)
SODIUM SERPL-SCNC: 142 MMOL/L (ref 135–145)
TRIGL SERPL-MCNC: 349 MG/DL
TSH SERPL DL<=0.005 MIU/L-ACNC: 1.12 UIU/ML (ref 0.3–4.2)
WBC # BLD AUTO: 7.4 10E3/UL (ref 4–11)

## 2025-02-07 PROCEDURE — 99000 SPECIMEN HANDLING OFFICE-LAB: CPT | Performed by: PATHOLOGY

## 2025-02-07 PROCEDURE — 83036 HEMOGLOBIN GLYCOSYLATED A1C: CPT | Performed by: INTERNAL MEDICINE

## 2025-02-07 PROCEDURE — 80053 COMPREHEN METABOLIC PANEL: CPT | Performed by: PATHOLOGY

## 2025-02-07 PROCEDURE — 85025 COMPLETE CBC W/AUTO DIFF WBC: CPT | Performed by: PATHOLOGY

## 2025-02-07 PROCEDURE — 85652 RBC SED RATE AUTOMATED: CPT | Performed by: PATHOLOGY

## 2025-02-07 PROCEDURE — 86140 C-REACTIVE PROTEIN: CPT | Performed by: PATHOLOGY

## 2025-02-07 PROCEDURE — 84443 ASSAY THYROID STIM HORMONE: CPT | Performed by: PATHOLOGY

## 2025-02-07 PROCEDURE — 80061 LIPID PANEL: CPT | Performed by: PATHOLOGY

## 2025-02-07 PROCEDURE — 36415 COLL VENOUS BLD VENIPUNCTURE: CPT | Performed by: PATHOLOGY

## 2025-02-07 NOTE — LETTER
[facility] [place]  [address]  [address] FLO[address]  [place] [address]  [phone]          February 7, 2025    RE:  Ju Edwards                                                                                                                                                       [address]  [address]  [place] [address]            To whom it may concern:    Ju Edwards is under my professional care for    Chronic cough  Rhinorrhea  Mixed hyperlipidemia  Cerebrovascular accident (CVA) due to thrombosis of carotid artery, unspecified blood vessel laterality (H)  Cerebrovascular accident (CVA) due to thrombosis of right middle cerebral artery (H)  Type 2 diabetes mellitus without complication, unspecified whether long term insulin use (H)     He is stable from a medical and neurological standpoint and there are no medical contraindications to safely operating a motor vehicle.      Sincerely,        Rayo Christiansen MD

## 2025-02-07 NOTE — PROGRESS NOTES
Ju Edwards is a 74 year old male that presents in clinic today for the following:     Chief Complaint   Patient presents with    URI     Cold, runny nose    Forms     Driving evaluation: DMV letter approving that he is capable of driving     The patient's allergies and medications were reviewed. The patient's vitals were obtained without incident.     [place], [place]   Primary Care Clinic

## 2025-02-07 NOTE — PROGRESS NOTES
HPI  74-year-old seen today with the assistance of a Laotian .  He reports that over the last year he is continue to have ongoing problems with cough and rhinorrhea.  States that his nose continues to run like a river.  It runs constantly has not responded to any antihistamines he has tried tried over-the-counter allergy medications topical steroids and no significant benefit.  He has cough in association with this in part related to a tickle in his throat.  He has not had any chest congestion wheezing or shortness of breath in association with this.  Otherwise he has been doing well he has been driving without incident and has had no further neurologic symptoms.  He is not aware of any allergies and has not reacted to anything in the past and does not associated his symptoms with any particular exposures.  Past Medical History:   Diagnosis Date    Allergic rhinitis     Atrial fibrillation (H)     paroxysmal    Atrial fibrillation (H)     CAD (coronary artery disease)     Delirium     associated with hospitalization for stroke    Depression     Gastric erosions 1/2014    associated with gi bleed    GERD (gastroesophageal reflux disease)     HTN (hypertension)     Hypercholesteremia     Hyperlipidemia     Hypertension     Left hemiparesis (H)     Obstructive sleep apnea     PTSD (post-traumatic stress disorder)     Sleep apnea     Stroke (H) 1/2014    ischemic CVA with hemorrhagic transformation    Stroke (H)      Past Surgical History:   Procedure Laterality Date    ASD REPAIR      CARDIAC SURGERY  2000    mitral valve repair    CYCLOPHOTOCOAGULATION TRANSSCLERAL WITH MICROPULSE LASER Left 8/24/2020    Procedure: ENDOCYCLOPHOTOCOAGULATION;  Surgeon: Stanford Welsh MD;  Location: [facility] OR    EP PACEMAKER      EP PACEMAKER INSERT N/A 2/26/2020    Procedure: EP Pacemaker Insertion;  Surgeon: Brandon Gusman MD;  Location: [facility];  Service: Cardiology    IR MISCELLANEOUS PROCEDURE   2014    PHACOEMULSIFICATION CLEAR CORNEA WITH STANDARD INTRAOCULAR LENS IMPLANT Left 2020    Procedure: PHACOEMULSIFICATION, CATARACT, WITH INTRAOCULAR LENS IMPLANT and goniosynechialysis;  Surgeon: Stanford Welsh MD;  Location: [facility] OR    REPAIR ATRIAL SEPTAL DEFECT       Family History   Problem Relation Age of Onset    Eye Surgery Mother     Albinism Mother     Cervical Cancer Mother     No Known Problems Father     No Known Problems Sister     No Known Problems Brother     No Known Problems Sister     No Known Problems Sister     No Known Problems Sister     No Known Problems Brother          from trauma    Glaucoma No family hx of     Macular Degeneration No family hx of          Exam:  BP (!) 150/74 (BP Location: Right arm, Patient Position: Sitting, Cuff Size: Adult Regular)   Pulse 71   Temp 97.9  F (36.6  C)   Wt 68.1 kg (150 lb 3.2 oz)   SpO2 98%   BMI 27.56 kg/m    150 lbs 3.2 oz  Physical Exam   The patient is alert, oriented with a clear sensorium.   Skin shows no lesions or rashes and good turgor.   Head is normocephalic and atraumatic.   NOse shows congested nasal mucosa bilateral  Ears show normal TMs bilaterally.   Mouth shows clear oral mucosa.   Neck shows no nodes, thyromegaly or bruits.   Lungs are clear to percussion and auscultation.   Heart shows normal S1 and S2 without murmur or gallop.   Neurologic examination shows cranial nerves II-XII intact. Motor shows 5/5 strength.   Labs reviewed:  Results for orders placed or performed in visit on 25   TSH with free T4 reflex     Status: Normal   Result Value Ref Range    TSH 1.12 0.30 - 4.20 uIU/mL   Lipid Profile     Status: Abnormal   Result Value Ref Range    Cholesterol 161 <200 mg/dL    Triglycerides 349 (H) <150 mg/dL    Direct Measure HDL 31 (L) >=40 mg/dL    LDL Cholesterol Calculated 60 <100 mg/dL    Non HDL Cholesterol 130 (H) <130 mg/dL    Patient Fasting > 8hrs? No     Narrative    Cholesterol  Desirable:  < 200 mg/dL  Borderline High: 200 - 239 mg/dL  High: >= 240 mg/dL    Triglycerides  Normal: < 150 mg/dL  Borderline High: 150 - 199 mg/dL  High: 200-499 mg/dL  Very High: >= 500 mg/dL    Direct Measure HDL  Female: >= 50 mg/dL   Male: >= 40 mg/dL    LDL Cholesterol  Desirable: < 100 mg/dL  Above Desirable: 100 - 129 mg/dL   Borderline High: 130 - 159 mg/dL   High:  160 - 189 mg/dL   Very High: >= 190 mg/dL    Non HDL Cholesterol  Desirable: < 130 mg/dL  Above Desirable: 130 - 159 mg/dL  Borderline High: 160 - 189 mg/dL  High: 190 - 219 mg/dL  Very High: >= 220 mg/dL   Erythrocyte sedimentation rate auto     Status: Normal   Result Value Ref Range    Erythrocyte Sedimentation Rate 19 0 - 20 mm/hr   CRP inflammation     Status: Normal   Result Value Ref Range    CRP Inflammation <3.00 <5.00 mg/L   Comprehensive metabolic panel     Status: Abnormal   Result Value Ref Range    Sodium 142 135 - 145 mmol/L    Potassium 4.0 3.4 - 5.3 mmol/L    Carbon Dioxide (CO2) 25 22 - 29 mmol/L    Anion Gap 9 7 - 15 mmol/L    Urea Nitrogen 17.5 8.0 - 23.0 mg/dL    Creatinine 0.92 0.67 - 1.17 mg/dL    GFR Estimate 88 >60 mL/min/1.73m2    Calcium 9.1 8.8 - 10.4 mg/dL    Chloride 108 (H) 98 - 107 mmol/L    Glucose 99 70 - 99 mg/dL    Alkaline Phosphatase 118 40 - 150 U/L    AST 21 0 - 45 U/L    ALT 27 0 - 70 U/L    Protein Total 7.5 6.4 - 8.3 g/dL    Albumin 4.3 3.5 - 5.2 g/dL    Bilirubin Total 0.3 <=1.2 mg/dL    Patient Fasting > 8hrs? No    Hemoglobin A1c     Status: Abnormal   Result Value Ref Range    Estimated Average Glucose 131 (H) <117 mg/dL    Hemoglobin A1C 6.2 (H) <5.7 %   CBC with platelets and differential     Status: Abnormal   Result Value Ref Range    WBC Count 7.4 4.0 - 11.0 10e3/uL    RBC Count 4.48 4.40 - 5.90 10e6/uL    Hemoglobin 12.9 (L) 13.3 - 17.7 g/dL    Hematocrit 38.0 (L) 40.0 - 53.0 %    MCV 85 78 - 100 fL    MCH 28.8 26.5 - 33.0 pg    MCHC 33.9 31.5 - 36.5 g/dL    RDW 12.9 10.0 - 15.0 %    Platelet Count 242  150 - 450 10e3/uL    % Neutrophils 57 %    % Lymphocytes 30 %    % Monocytes 11 %    % Eosinophils 1 %    % Basophils 1 %    % Immature Granulocytes 0 %    NRBCs per 100 WBC 0 <1 /100    Absolute Neutrophils 4.2 1.6 - 8.3 10e3/uL    Absolute Lymphocytes 2.2 0.8 - 5.3 10e3/uL    Absolute Monocytes 0.8 0.0 - 1.3 10e3/uL    Absolute Eosinophils 0.1 0.0 - 0.7 10e3/uL    Absolute Basophils 0.0 0.0 - 0.2 10e3/uL    Absolute Immature Granulocytes 0.0 <=0.4 10e3/uL    Absolute NRBCs 0.0 10e3/uL   CBC with Platelets & Differential     Status: Abnormal    Narrative    The following orders were created for panel order CBC with Platelets & Differential.  Procedure                               Abnormality         Status                     ---------                               -----------         ------                     CBC with platelets and d...[[id number]]  Abnormal            Final result                 Please view results for these tests on the individual orders.       ASSESSMENT  1 Chronic rhinorrhea  2 LUTS/BPH on tamsulosin  3 hypertension needs F/U  4 hyperlipidemia on atorvastatin  6 history of CVA stable neurologically  7 atrial fibrillation  8 IGT     Plan  See ENT and allergy we will get a sinus CT scan I will recheck his labs have him follow nonpharmacologic blood pressure control measures and reassess this in 3 months    Over 30 minutes spent on the day of service in chart review, patient contact, record completion and review and notification of lab reports    This note was completed using Dragon voice recognition software.      Rayo Christiansen MD  General Internal Medicine  [facility]  [phone]

## 2025-02-25 DIAGNOSIS — R39.9 LOWER URINARY TRACT SYMPTOMS (LUTS): ICD-10-CM

## 2025-02-25 DIAGNOSIS — E78.2 MIXED HYPERLIPIDEMIA: ICD-10-CM

## 2025-02-25 DIAGNOSIS — R35.0 URINARY FREQUENCY: ICD-10-CM

## 2025-02-27 DIAGNOSIS — I10 BENIGN ESSENTIAL HYPERTENSION: ICD-10-CM

## 2025-02-27 DIAGNOSIS — F32.A DEPRESSION, UNSPECIFIED DEPRESSION TYPE: ICD-10-CM

## 2025-03-03 RX ORDER — TAMSULOSIN HYDROCHLORIDE 0.4 MG/1
0.4 CAPSULE ORAL DAILY
Qty: 90 CAPSULE | Refills: 2 | Status: SHIPPED | OUTPATIENT
Start: 2025-03-03

## 2025-03-03 RX ORDER — ATORVASTATIN CALCIUM 80 MG/1
80 TABLET, FILM COATED ORAL DAILY
Qty: 90 TABLET | Refills: 2 | Status: SHIPPED | OUTPATIENT
Start: 2025-03-03

## 2025-03-03 NOTE — TELEPHONE ENCOUNTER
Last Written Prescription:  atorvastatin (LIPITOR) 80 MG tablet  80 mg, DAILY           Summary: TAKE 1 TABLET (80 MG) BY MOUTH DAILY **LIPID PANEL DUE** LABS NEEDED FOR FURTHER REFILLS. CALL [phone].**, Disp-90 tablet, R-1, E-Prescribe  Dose, Route, Frequency: 80 mg, Oral, DAILYStart: 08/30/2024Ord/Sold: 08/30/2024 (O)Ordered On: 08/30/2024Pharmacy: [facility]/pharmacy #[facility] [address] [facility] Associated: Taking: Long-term: Med Note:              Patient Sig: TAKE 1 TABLET (80 MG) BY MOUTH DAILY **LIPID PANEL DUE** LABS NEEDED FOR FURTHER REFILLS. CALL [phone].**  Ordering Department: [facility] [place] [facility]  Authorized By: Rayo Christiansen MD  Dispense: 90 tablet  Refills: 1 ordered     Lipids 2/7/25    REFILLED PER PROTOCOL      tamsulosin (FLOMAX) 0.4 MG capsule  0.4 mg, DAILY           Summary: Take 1 capsule (0.4 mg) by mouth daily, Disp-90 capsule, R-2, E-Prescribe  Dose, Route, Frequency: 0.4 mg, Oral, DAILYStart: 05/31/2024Ord/Sold: 05/31/2024 (O)Ordered On: 05/31/2024Pharmacy: [facility]/pharmacy #[address] [place], [place] [address]ortDx Associated: Taking: Long-term: Med Note:              Patient Sig: Take 1 capsule (0.4 mg) by mouth daily  Ordering Department: [facility] [place] [facility]  Authorized By: Rayo Christiansen MD  Dispense: 90 capsule  Refills: 2 ordered         Alpha Blockers [id number]/25/2025 12:05 AM   Protocol Details Most recent blood pressure under 140/90 in past 12 months    Medication is active on med list and the sig matches. RN to manually verify dose and sig if red X/fail.        BP Readings from Last 3 Encounters:   02/07/25 (!) 150/74   10/23/24 (!) 162/77   06/28/24 137/78     ----------------------  Last Visit Date: 2/7/25  Future Visit Date: None  ----------------------  Refill protocol failed, forwarded to provider.     []  Refill decision: Medication refilled per  Medication Refill in Ambulatory Care  policy.     []   Supervision: no future appointment scheduled. Scheduling has been notified to contact the pt for appointment.      []  Refill decision: Medication unable to be refilled by RN due to:     []    Pt not seen within past 12 months;  No FOV;  or FOV exceeds timeframe per protocol requirements  []    Compliance - lapse in therapy/gap in refills; No Shows; Cancellations  []    Verification - order discrepancy, clarification needed, Sig modification needed  []    Controlled medication  []    Medication not included in refill protocol policy  []    Abnormal labs/test:  []    Overdue labs/test:    []    Medication not active on Pt's med list  []    Drug interaction Warning  []    Medication - Last script is Reported/Historical/Transitional  []    Advanced refill request  []    Review Needed: New med; Med adjusted within <= 30 days; Safety Alert; Lab monitoring required  []    Other:     Request from pharmacy:  Requested Prescriptions   Pending Prescriptions Disp Refills    tamsulosin (FLOMAX) 0.4 MG capsule [Pharmacy Med Name: TAMSULOSIN HCL 0.4 MG CAPSULE] 90 capsule 2     Sig: TAKE 1 CAPSULE BY MOUTH EVERY DAY       Alpha Blockers Failed - 3/3/2025  3:11 PM        Failed - Most recent blood pressure under 140/90 in past 12 months     BP Readings from Last 3 Encounters:   02/07/25 (!) 150/74   10/23/24 (!) 162/77   06/28/24 137/78       No data recorded            Failed - Medication is active on med list and the sig matches. RN to manually verify dose and sig if red X/fail.     If the protocol passes (green check), you do not need to verify med dose and sig.    A prescription matches if they are the same clinical intention.    For Example: once daily and every morning are the same.    For all fails (red x), verify dose and sig.    If the refill does match what is on file, the RN can still proceed to approve the refill request.     If they do not match, route to the appropriate provider.             Passed - Patient does not  have Tadalafil, Vardenafil, or Sildenafil on their medication list        Passed - Recent (12 mo) or future (90 days) visit within the authorizing provider's specialty     The patient must have completed an in-person or virtual visit within the past 12 months or has a future visit scheduled within the next 90 days with the authorizing provider s specialty.  Urgent care and e-visits do not qualify as an office visit for this protocol.          Passed - Medication indicated for associated diagnosis     Medication is associated with one or more of the following diagnoses:  Benign prostatic hyperplasia  Disorder of the urinary system  Erectile dysfunction  Neurogenic bladder  Overactive bladder  Raynaud s  Ureteral stent-related symptoms  Urinary retention  Posttraumatic Stress Disorder  Lower urinary tract symptoms  Hypertensive disorder  Urinary frequency due to benign prostatic hypertrophy          Passed - Patient is 18 years of age or older          atorvastatin (LIPITOR) 80 MG tablet [Pharmacy Med Name: ATORVASTATIN 80 MG TABLET] 90 tablet 1     Sig: TAKE 1 TABLET (80 MG) BY MOUTH DAILY **LIPID PANEL DUE** LABS NEEDED FOR FURTHER REFILLS. CALL [phone].**       Antihyperlipidemic agents Passed - 3/3/2025  3:11 PM        Passed - LDL on file in the past 12 months        Passed - Medication is active on med list and the sig matches. RN to manually verify dose and sig if red X/fail.     If the protocol passes (green check), you do not need to verify med dose and sig.    A prescription matches if they are the same clinical intention.    For Example: once daily and every morning are the same.    For all fails (red x), verify dose and sig.    If the refill does match what is on file, the RN can still proceed to approve the refill request.     If they do not match, route to the appropriate provider.             Passed - Recent (12 mo) or future (90 days) visit within the authorizing provider's specialty     The  patient must have completed an in-person or virtual visit within the past 12 months or has a future visit scheduled within the next 90 days with the authorizing provider s specialty.  Urgent care and e-visits do not qualify as an office visit for this protocol.          Passed - Patient is age 18 years or older

## 2025-03-04 ENCOUNTER — TELEPHONE (OUTPATIENT)
Dept: PHARMACY | Facility: OTHER | Age: 75
End: 2025-03-04
Payer: COMMERCIAL

## 2025-03-04 ENCOUNTER — TELEPHONE (OUTPATIENT)
Dept: CARDIOLOGY | Facility: CLINIC | Age: 75
End: 2025-03-04
Payer: COMMERCIAL

## 2025-03-04 NOTE — TELEPHONE ENCOUNTER
MT Recruitment: [facility] insurance     Referral outreach attempt #1 on March 4, 2025      Outcome: left voicemail- Call back number [phone] via  service. Iconfinder message also sent.     Marti Marcos CPhT  [facility]

## 2025-03-04 NOTE — TELEPHONE ENCOUNTER
Left Voicemail (1st Attempt) and Sent Mychart (1st Attempt) for the patient to call back and schedule the following:    Appointment type: RTN EP  Provider: NIRMAL RIVAS OR MCKNIGHT  Return date: 6/28/2025  Specialty phone number: [phone] OPT 1  Additional appointment(s) needed: DEVICE CHECK  Additonal Notes: any ep willi okay to schedule with. Device check prior.

## 2025-03-05 RX ORDER — LISINOPRIL 20 MG/1
20 TABLET ORAL DAILY
Qty: 90 TABLET | Refills: 3 | Status: SHIPPED | OUTPATIENT
Start: 2025-03-05

## 2025-03-05 RX ORDER — CITALOPRAM HYDROBROMIDE 10 MG/1
20 TABLET ORAL DAILY
Qty: 180 TABLET | Refills: 3 | Status: SHIPPED | OUTPATIENT
Start: 2025-03-05

## 2025-03-05 NOTE — TELEPHONE ENCOUNTER
Last Written Prescription:  lisinopril (ZESTRIL) 20 MG tablet 90 tablet 2 7/3/2024 -- No   Sig - Route: Take 1 tablet (20 mg) by mouth daily Please keep 2-24-24 clinic appt for refills. - Oral     citalopram (CELEXA) 10 MG tablet 180 tablet 2 7/3/2024 -- No   Sig - Route: Take 2 tablets (20 mg) by mouth daily - Oral         Last Visit Date: 2-7-25  Future Visit Date: none     Last clinic note:  Plan  See ENT and allergy we will get a sinus CT scan I will recheck his labs have him follow nonpharmacologic blood pressure control measures and reassess this in 3 months      Last PHQ9: 1-17-22    Refill decision: Medication unable to be refilled by RN due to: Other:  No RTC scheduled  Last PHQ9 2022        Request from pharmacy:  Requested Prescriptions   Pending Prescriptions Disp Refills    lisinopril (ZESTRIL) 20 MG tablet [Pharmacy Med Name: LISINOPRIL 20 MG TABLET] 90 tablet 2     Sig: TAKE 1 TABLET (20 MG) BY MOUTH DAILY PLEASE KEEP 2-24-24 CLINIC APPT FOR REFILLS.       ACE Inhibitors (Including Combos) Protocol Failed - 3/5/2025  7:02 AM        Failed - Most recent blood pressure under 140/90 in past 12 months- Clinicial or Patient Reported     BP Readings from Last 3 Encounters:   02/07/25 (!) 150/74   10/23/24 (!) 162/77   06/28/24 137/78       No data recorded            Passed - Medication is active on med list and the sig matches. RN to manually verify dose and sig if red X/fail.     If the protocol passes (green check), you do not need to verify med dose and sig.    A prescription matches if they are the same clinical intention.    For Example: once daily and every morning are the same.    For all fails (red x), verify dose and sig.    If the refill does match what is on file, the RN can still proceed to approve the refill request.     If they do not match, route to the appropriate provider.             Passed - Medication indicated for associated diagnosis     Medication is associated with one or more of  the following diagnoses:     Chronic Kidney Disease (CKD)   Coronary Artery Disease (CAD)   Diabetes   Heart Failure (HF)   Hypertension (HTN)   Nephropathy   History of myocarditis   Tachycardia induced cardiomyopathy   STEMI (ST elevation myocardial infarction)   Spontaneous dissection of coronary artery   Status post percutaneous transluminal coronary angioplasty            Passed - Recent (12 mo) or future (90 days) visit within the authorizing provider's specialty     The patient must have completed an in-person or virtual visit within the past 12 months or has a future visit scheduled within the next 90 days with the authorizing provider s specialty.  Urgent care and e-visits do not qualify as an office visit for this protocol.          Passed - Most recent GFR on file in the past 12 months >30        Passed - Patient is age 18 or older        Passed - Normal serum potassium on file in past 12 months     Recent Labs   Lab Test 02/07/25  1725   POTASSIUM 4.0               citalopram (CELEXA) 10 MG tablet [Pharmacy Med Name: CITALOPRAM HBR 10 MG TABLET] 180 tablet 2     Sig: TAKE 2 TABLETS BY MOUTH EVERY DAY       SSRIs Protocol Failed - 3/5/2025  7:02 AM        Failed - PHQ-9 score less than 5 in past 6 months     Please review last PHQ-9 score.           Passed - Medication is active on med list and the sig matches. RN to manually verify dose and sig if red X/fail.     If the protocol passes (green check), you do not need to verify med dose and sig.    A prescription matches if they are the same clinical intention.    For Example: once daily and every morning are the same.    For all fails (red x), verify dose and sig.    If the refill does match what is on file, the RN can still proceed to approve the refill request.     If they do not match, route to the appropriate provider.             Passed - Recent (12 mo) or future (90 days) visit within the authorizing provider's specialty     The patient must have  completed an in-person or virtual visit within the past 12 months or has a future visit scheduled within the next 90 days with the authorizing provider s specialty.  Urgent care and e-visits do not qualify as an office visit for this protocol.          Passed - Medication indicated for associated diagnosis     Medication is associated with one or more of the following diagnoses:              Anxiety             Bipolar  Depression  Obsessive-compulsive disorder             Panic disorder  Postmenopausal flushing             Premenstrual dysphoric disorder             Social phobia   Adjustment disorder with depressed mood   Mood disorder   Adjustment disorder with anxious mood          Passed - Patient is age 18 or older

## 2025-03-06 DIAGNOSIS — J40 BRONCHITIS: Primary | ICD-10-CM

## 2025-03-06 NOTE — TELEPHONE ENCOUNTER
Left Voicemail (2nd Attempt) and Left Voicemail with Family Member (2nd Attempt) for the patient to call back and schedule the following:    Appointment type: RTN EP  Provider: NIRMAL RIVAS OR MCKNIGHT  Return date: 6/28/2025  Specialty phone number: [phone] OPT 1  Additional appointment(s) needed: DEVICE CHECK  Additonal Notes: any ep willi okay to schedule with. Device check prior.

## 2025-03-11 ENCOUNTER — MYC MEDICAL ADVICE (OUTPATIENT)
Dept: INTERNAL MEDICINE | Facility: CLINIC | Age: 75
End: 2025-03-11
Payer: COMMERCIAL

## 2025-03-11 RX ORDER — BUDESONIDE AND FORMOTEROL FUMARATE DIHYDRATE 80; 4.5 UG/1; UG/1
2 AEROSOL RESPIRATORY (INHALATION) 2 TIMES DAILY
Qty: 30.6 G | Refills: 1 | Status: SHIPPED | OUTPATIENT
Start: 2025-03-11

## 2025-03-11 NOTE — TELEPHONE ENCOUNTER
Left Voicemail (1st Attempt) and Sent Mychart (1st Attempt) for the patient to call back and schedule the following:    Appointment type: Return   Provider: PCP  Return date: May   Specialty phone number: [phone]  Additonal Notes: per check out - Return in about 3 months (around 5/7/2025)

## 2025-03-13 NOTE — TELEPHONE ENCOUNTER
Left Voicemail (2nd Attempt) for the patient to call back and schedule the following:    Appointment type: Return   Provider: PCP  Return date: May   Specialty phone number: [phone]  Additonal Notes: per check out - Return in about 3 months (around 5/7/2025)

## 2025-03-14 DIAGNOSIS — I10 ESSENTIAL HYPERTENSION: ICD-10-CM

## 2025-03-19 RX ORDER — AMLODIPINE BESYLATE 5 MG/1
5 TABLET ORAL DAILY
Qty: 90 TABLET | Refills: 3 | Status: SHIPPED | OUTPATIENT
Start: 2025-03-19

## 2025-03-19 NOTE — TELEPHONE ENCOUNTER
Calcium Channel Blockers Protocol  Failed  Rerun Protocol (3/19/2025 3:55 PM)    Most recent blood pressure under 140/90 in past 12 months        BP Readings from Last 3 Encounters:   02/07/25 (!) 150/74   10/23/24 (!) 162/77   06/28/24 137/78

## 2025-03-19 NOTE — TELEPHONE ENCOUNTER
amLODIPine (NORVASC) 5 MG tablet   90 tablet 2 7/3/2024     Last Office Visit : 2-7-2025 --reassess this in 3 months   Future Office visit:  none  Refill decision: Medication unable to be refilled by RN due to:  Abnormal labs/test:    Request from pharmacy:  Requested Prescriptions   Pending Prescriptions Disp Refills    amLODIPine (NORVASC) 5 MG tablet [Pharmacy Med Name: AMLODIPINE BESYLATE 5 MG TAB] 90 tablet 2     Sig: TAKE 1 TABLET (5 MG) BY MOUTH DAILY PLEASE KEEP 2-24-24 CLINIC APPT FOR REFILLS.       Calcium Channel Blockers Protocol  Failed - 3/19/2025  3:12 PM        Failed - Most recent blood pressure under 140/90 in past 12 months     BP Readings from Last 3 Encounters:   02/07/25 (!) 150/74   10/23/24 (!) 162/77   06/28/24 137/78           Passed - Medication is active on med list and the sig matches. RN to manually verify dose and sig if red X/fail.     If the protocol passes (green check), you do not need to verify med dose and sig.    A prescription matches if they are the same clinical intention.    For Example: once daily and every morning are the same.    The protocol can not identify upper and lower case letters as matching and will fail.     For Example: Take 1 tablet (50 mg) by mouth daily     TAKE 1 TABLET (50 MG) BY MOUTH DAILY    For all fails (red x), verify dose and sig.    If the refill does match what is on file, the RN can still proceed to approve the refill request.       If they do not match, route to the appropriate provider.             Passed - Medication is indicated for associated diagnosis        Passed - GFR is on file in the past 12 months and most recent GFR is normal  Labs completed on :  2-7-2025     GFR Estimate  >60 mL/min/1.73m2 88        Passed - Recent (12 mo) or future (90 days) visit within the authorizing provider's specialty     The patient must have completed an in-person or virtual visit within the past 12 months or has a future visit scheduled within the next 90  days with the authorizing provider s specialty.  Urgent care and e-visits do not qualify as an office visit for this protocol.          Passed - Patient is age 18 or older

## 2025-04-08 ENCOUNTER — ANCILLARY PROCEDURE (OUTPATIENT)
Dept: CARDIOLOGY | Facility: CLINIC | Age: 75
End: 2025-04-08
Attending: INTERNAL MEDICINE
Payer: COMMERCIAL

## 2025-04-08 DIAGNOSIS — I49.5 SSS (SICK SINUS SYNDROME) (H): ICD-10-CM

## 2025-04-08 LAB
MDC_IDC_EPISODE_DTM: NORMAL
MDC_IDC_EPISODE_DURATION: 1174 S
MDC_IDC_EPISODE_DURATION: 1212 S
MDC_IDC_EPISODE_DURATION: 14 S
MDC_IDC_EPISODE_DURATION: 276 S
MDC_IDC_EPISODE_DURATION: 37 S
MDC_IDC_EPISODE_DURATION: 427 S
MDC_IDC_EPISODE_DURATION: 46 S
MDC_IDC_EPISODE_DURATION: 55 S
MDC_IDC_EPISODE_DURATION: 7524 S
MDC_IDC_EPISODE_DURATION: NORMAL S
MDC_IDC_EPISODE_ID: NORMAL
MDC_IDC_EPISODE_TYPE: NORMAL
MDC_IDC_LEAD_CONNECTION_STATUS: NORMAL
MDC_IDC_LEAD_CONNECTION_STATUS: NORMAL
MDC_IDC_LEAD_IMPLANT_DT: NORMAL
MDC_IDC_LEAD_IMPLANT_DT: NORMAL
MDC_IDC_LEAD_LOCATION: NORMAL
MDC_IDC_LEAD_LOCATION: NORMAL
MDC_IDC_LEAD_LOCATION_DETAIL_1: NORMAL
MDC_IDC_LEAD_LOCATION_DETAIL_1: NORMAL
MDC_IDC_LEAD_MFG: NORMAL
MDC_IDC_LEAD_MFG: NORMAL
MDC_IDC_LEAD_MODEL: NORMAL
MDC_IDC_LEAD_MODEL: NORMAL
MDC_IDC_LEAD_POLARITY_TYPE: NORMAL
MDC_IDC_LEAD_POLARITY_TYPE: NORMAL
MDC_IDC_LEAD_SERIAL: NORMAL
MDC_IDC_LEAD_SERIAL: NORMAL
MDC_IDC_MSMT_BATTERY_DTM: NORMAL
MDC_IDC_MSMT_BATTERY_REMAINING_LONGEVITY: 90 MO
MDC_IDC_MSMT_BATTERY_REMAINING_PERCENTAGE: 91 %
MDC_IDC_MSMT_BATTERY_STATUS: NORMAL
MDC_IDC_MSMT_LEADCHNL_RA_IMPEDANCE_VALUE: 617 OHM
MDC_IDC_MSMT_LEADCHNL_RV_IMPEDANCE_VALUE: 659 OHM
MDC_IDC_MSMT_LEADCHNL_RV_PACING_THRESHOLD_AMPLITUDE: 1.2 V
MDC_IDC_MSMT_LEADCHNL_RV_PACING_THRESHOLD_PULSEWIDTH: 0.4 MS
MDC_IDC_PG_IMPLANT_DTM: NORMAL
MDC_IDC_PG_MFG: NORMAL
MDC_IDC_PG_MODEL: NORMAL
MDC_IDC_PG_SERIAL: NORMAL
MDC_IDC_PG_TYPE: NORMAL
MDC_IDC_SESS_CLINIC_NAME: NORMAL
MDC_IDC_SESS_DTM: NORMAL
MDC_IDC_SESS_TYPE: NORMAL
MDC_IDC_SET_BRADY_AT_MODE_SWITCH_MODE: NORMAL
MDC_IDC_SET_BRADY_AT_MODE_SWITCH_RATE: 160 {BEATS}/MIN
MDC_IDC_SET_BRADY_LOWRATE: 60 {BEATS}/MIN
MDC_IDC_SET_BRADY_MAX_SENSOR_RATE: 130 {BEATS}/MIN
MDC_IDC_SET_BRADY_MAX_TRACKING_RATE: 130 {BEATS}/MIN
MDC_IDC_SET_BRADY_MODE: NORMAL
MDC_IDC_SET_BRADY_PAV_DELAY_LOW: 200 MS
MDC_IDC_SET_BRADY_SAV_DELAY_LOW: 150 MS
MDC_IDC_SET_LEADCHNL_RA_PACING_AMPLITUDE: 3.5 V
MDC_IDC_SET_LEADCHNL_RA_PACING_CAPTURE_MODE: NORMAL
MDC_IDC_SET_LEADCHNL_RA_PACING_POLARITY: NORMAL
MDC_IDC_SET_LEADCHNL_RA_PACING_PULSEWIDTH: 0.4 MS
MDC_IDC_SET_LEADCHNL_RA_SENSING_ADAPTATION_MODE: NORMAL
MDC_IDC_SET_LEADCHNL_RA_SENSING_POLARITY: NORMAL
MDC_IDC_SET_LEADCHNL_RA_SENSING_SENSITIVITY: 0.25 MV
MDC_IDC_SET_LEADCHNL_RV_PACING_AMPLITUDE: 1.7 V
MDC_IDC_SET_LEADCHNL_RV_PACING_CAPTURE_MODE: NORMAL
MDC_IDC_SET_LEADCHNL_RV_PACING_POLARITY: NORMAL
MDC_IDC_SET_LEADCHNL_RV_PACING_PULSEWIDTH: 0.4 MS
MDC_IDC_SET_LEADCHNL_RV_SENSING_ADAPTATION_MODE: NORMAL
MDC_IDC_SET_LEADCHNL_RV_SENSING_POLARITY: NORMAL
MDC_IDC_SET_LEADCHNL_RV_SENSING_SENSITIVITY: 1.5 MV
MDC_IDC_SET_ZONE_DETECTION_INTERVAL: 375 MS
MDC_IDC_SET_ZONE_STATUS: NORMAL
MDC_IDC_SET_ZONE_TYPE: NORMAL
MDC_IDC_SET_ZONE_VENDOR_TYPE: NORMAL
MDC_IDC_STAT_AT_BURDEN_PERCENT: 5 %
MDC_IDC_STAT_AT_DTM_END: NORMAL
MDC_IDC_STAT_AT_DTM_START: NORMAL
MDC_IDC_STAT_BRADY_DTM_END: NORMAL
MDC_IDC_STAT_BRADY_DTM_START: NORMAL
MDC_IDC_STAT_BRADY_RA_PERCENT_PACED: 45 %
MDC_IDC_STAT_BRADY_RV_PERCENT_PACED: 5 %
MDC_IDC_STAT_EPISODE_RECENT_COUNT: 0
MDC_IDC_STAT_EPISODE_RECENT_COUNT: 327
MDC_IDC_STAT_EPISODE_RECENT_COUNT_DTM_END: NORMAL
MDC_IDC_STAT_EPISODE_RECENT_COUNT_DTM_START: NORMAL
MDC_IDC_STAT_EPISODE_TYPE: NORMAL
MDC_IDC_STAT_EPISODE_VENDOR_TYPE: NORMAL

## 2025-04-08 PROCEDURE — 93294 REM INTERROG EVL PM/LDLS PM: CPT | Performed by: INTERNAL MEDICINE

## 2025-04-08 PROCEDURE — 93296 REM INTERROG EVL PM/IDS: CPT

## 2025-04-10 DIAGNOSIS — J40 BRONCHITIS: ICD-10-CM

## 2025-04-15 RX ORDER — MONTELUKAST SODIUM 10 MG/1
1 TABLET ORAL AT BEDTIME
Qty: 90 TABLET | Refills: 0 | Status: SHIPPED | OUTPATIENT
Start: 2025-04-15

## 2025-04-15 NOTE — TELEPHONE ENCOUNTER
Medication Requested: montelukast (SINGULAIR) 10 MG tablet  ---------------------  Last Written Prescription: 1/7/2025 Disp:90 R:0  ---------------------  Last Visit Date: 2/7/2025 [facility] [place]  ----------------------  [x]  Refill decision: Medication unable to be refilled by RN due to: Leukotriene Inhibitors Protocol Failed     []    Pt not seen within past 12 months;  No FOV;  or FOV exceeds timeframe per protocol requirements  []    Compliance - lapse in therapy/gap in refills; No Shows; Cancellations  []    Verification - order discrepancy, clarification needed, Sig modification needed  []    Controlled medication  []    Medication not included in refill protocol policy  []    Abnormal labs/test:  []    Overdue labs/test:    []    Medication not active on Pt's med list  []    Drug interaction Warning  []    Medication - Last script is Reported/Historical/Transitional  []    Advanced refill request  []    Review Needed: New med; Med adjusted within <= 30 days; Safety Alert; Lab monitoring required  [x]    Other: Med not indicated for associated Dx    Request from pharmacy:  Requested Prescriptions   Pending Prescriptions Disp Refills    montelukast (SINGULAIR) 10 MG tablet [Pharmacy Med Name: MONTELUKAST SOD 10 MG TABLET] 90 tablet 0     Sig: TAKE 1 TABLET BY MOUTH EVERYDAY AT BEDTIME       Leukotriene Inhibitors Protocol Failed - 4/15/2025  1:27 PM        Failed - Medication indicated for associated diagnosis     Medication is associated with one or more of the following diagnoses:   Allergies   Asthma   Atopic Dermatitis   Nasal Congestion   Nasal discharge   Rhinitis   Urticaria, chronic   Cystic Fibrosis  Bronchiectasis            Passed - Patient is age 12 or older     If patient is under 16, ok to refill using age based dosing.           Passed - Medication is active on med list and the sig matches. RN to manually verify dose and sig if red X/fail.     If the  protocol passes (green check), you do not need to verify med dose and sig.    A prescription matches if they are the same clinical intention.    For Example: once daily and every morning are the same.    The protocol can not identify upper and lower case letters as matching and will fail.     For Example: Take 1 tablet (50 mg) by mouth daily     TAKE 1 TABLET (50 MG) BY MOUTH DAILY    For all fails (red x), verify dose and sig.    If the refill does match what is on file, the RN can still proceed to approve the refill request.       If they do not match, route to the appropriate provider.             Passed - Recent (12 mo) or future (90 days) visit within the authorizing provider's specialty     The patient must have completed an in-person or virtual visit within the past 12 months or has a future visit scheduled within the next 90 days with the authorizing provider s specialty.  Urgent care and e-visits do not qualify as an office visit for this protocol.

## 2025-04-15 NOTE — TELEPHONE ENCOUNTER
Leukotriene Inhibitors Protocol Failed  Rerun Protocol (4/15/2025 1:31 PM)    Medication indicated for associated diagnosis    Medication is associated with one or more of the following diagnoses:              Allergies              Asthma              Atopic Dermatitis              Nasal Congestion              Nasal discharge              Rhinitis              Urticaria, chronic              Cystic Fibrosis  Bronchiectasis

## 2025-04-30 NOTE — TELEPHONE ENCOUNTER
FUTURE VISIT INFORMATION      FUTURE VISIT INFORMATION:  Date: 7/1/25  Time: 10a  [place] [facility] [place]ERR[place] INFORMATION:  Referring provider:  Rayo Christiansen MD   Referring providers clinic:  [facility]  Reason for visit/diagnosis  R05.3 (ICD-10-CM) - Chronic cough, J34.89 (ICD-10-CM) - Rhinorrhea        RECORDS REQUESTED FROM:       Clinic name Comments Records Status   [facility] 2/7/25 - Kuldip MCQUEEN

## 2025-05-24 ENCOUNTER — HEALTH MAINTENANCE LETTER (OUTPATIENT)
Age: 75
End: 2025-05-24

## 2025-06-08 DIAGNOSIS — Z00.00 ROUTINE HEALTH MAINTENANCE: ICD-10-CM

## 2025-06-09 NOTE — TELEPHONE ENCOUNTER
"Last Written Prescription:   Disp Refills Start End SEBASTIAN   flecainide (TAMBOCOR) 150 MG tablet 180 tablet 3 8/22/2024 -- No   Sig - Route: Take 1 tablet (150 mg) by mouth 2 times daily. - Oral     ----------------------  Last Visit Date:   4/8/2025  [facility]    Future Visit Date:   7/10/2025 12:00 AM (30 min)  [facility]    CARDIAC DEVICE CHECK - REMOTE   Rfl Status: Pending Review   [facility] ([facility])   UC ICD REMOTE     ----------------------      Refill decision: Medication unable to be refilled by RN due to: Verification - order discrepancy, clarification needed, Sig modification needed        Request from pharmacy:  Requested Prescriptions   Pending Prescriptions Disp Refills    flecainide (TAMBOCOR) 150 MG tablet [Pharmacy Med Name: FLECAINIDE ACETATE 150 MG TAB] 180 tablet 3     Sig: TAKE 1 TABLET BY MOUTH 2 TIMES DAILY       Anti Arrhythmic Agents Protocol Failed - 6/9/2025  2:37 PM        Failed - Medication needs approval from authorizing provider     Please forward this request to the authorizing provider for approval.           Failed - Recent (6 month) or future (90 days) visit with the authorizing provider's specialty (provided they have been seen in the past 9 months)     Patient had office visit in the last 6 months or has a visit in the next 30 days with authorizing provider.  See \"Patient Info\" tab in inbasket, or \"Choose Columns\" in Meds & Orders section of the refill encounter.            Passed - Lipid Panel on file in past year     Recent Labs   Lab Test 02/07/25  1725   CHOL 161   TRIG 349*   HDL 31*   LDL 60   NHDL 130*               Passed - CBC on file in past year     Recent Labs   Lab Test 02/07/25  1725   WBC 7.4   RBC 4.48   HGB 12.9*   HCT 38.0*                    Passed - ALT on file in past year     Recent Labs   Lab Test 02/07/25  1725   ALT 27             Passed - Serum Creatinine on file in past year     Recent Labs   Lab Test 02/07/25  1725   CR 0.92 "                 Passed - Serum Sodium on file in past year     Recent Labs   Lab Test 02/07/25  1725                 Passed - Serum Potassium on file in past year     Recent Labs   Lab Test 02/07/25  1725   POTASSIUM 4.0             Passed - Patient is 18 years of age or older

## 2025-06-11 RX ORDER — FLECAINIDE ACETATE 150 MG/1
150 TABLET ORAL 2 TIMES DAILY
Qty: 180 TABLET | Refills: 0 | Status: SHIPPED | OUTPATIENT
Start: 2025-06-11

## 2025-06-21 ENCOUNTER — TRANSFERRED RECORDS (OUTPATIENT)
Dept: HEALTH INFORMATION MANAGEMENT | Facility: CLINIC | Age: 75
End: 2025-06-21

## 2025-06-21 ENCOUNTER — APPOINTMENT (OUTPATIENT)
Dept: RADIOLOGY | Facility: HOSPITAL | Age: 75
End: 2025-06-21
Attending: EMERGENCY MEDICINE
Payer: COMMERCIAL

## 2025-06-21 ENCOUNTER — APPOINTMENT (OUTPATIENT)
Dept: CT IMAGING | Facility: HOSPITAL | Age: 75
End: 2025-06-21
Attending: EMERGENCY MEDICINE
Payer: COMMERCIAL

## 2025-06-21 ENCOUNTER — HOSPITAL ENCOUNTER (EMERGENCY)
Facility: HOSPITAL | Age: 75
Discharge: HOME | End: 2025-06-21
Attending: EMERGENCY MEDICINE | Admitting: EMERGENCY MEDICINE
Payer: COMMERCIAL

## 2025-06-21 VITALS
HEIGHT: 63 IN | WEIGHT: 156 LBS | RESPIRATION RATE: 23 BRPM | BODY MASS INDEX: 27.64 KG/M2 | HEART RATE: 78 BPM | OXYGEN SATURATION: 97 % | TEMPERATURE: 97.8 F | DIASTOLIC BLOOD PRESSURE: 81 MMHG | SYSTOLIC BLOOD PRESSURE: 174 MMHG

## 2025-06-21 DIAGNOSIS — R53.83 FATIGUE, UNSPECIFIED TYPE: ICD-10-CM

## 2025-06-21 DIAGNOSIS — Z95.810 DUAL ICD (IMPLANTABLE CARDIOVERTER-DEFIBRILLATOR) IN PLACE: ICD-10-CM

## 2025-06-21 DIAGNOSIS — Z79.01 ANTICOAGULATED BY ANTICOAGULATION TREATMENT: ICD-10-CM

## 2025-06-21 LAB
ALBUMIN UR-MCNC: NEGATIVE MG/DL
ANION GAP SERPL CALCULATED.3IONS-SCNC: 12 MMOL/L (ref 7–15)
APPEARANCE UR: CLEAR
BASOPHILS # BLD AUTO: 0 10E3/UL (ref 0–0.2)
BASOPHILS NFR BLD AUTO: 1 %
BILIRUB UR QL STRIP: NEGATIVE
BUN SERPL-MCNC: 11.9 MG/DL (ref 8–23)
CALCIUM SERPL-MCNC: 9.3 MG/DL (ref 8.8–10.4)
CHLORIDE SERPL-SCNC: 103 MMOL/L (ref 98–107)
COLOR UR AUTO: ABNORMAL
CREAT SERPL-MCNC: 0.94 MG/DL (ref 0.67–1.17)
D DIMER PPP FEU-MCNC: <0.27 UG/ML FEU (ref 0–0.5)
EGFRCR SERPLBLD CKD-EPI 2021: 86 ML/MIN/1.73M2
EOSINOPHIL # BLD AUTO: 0.1 10E3/UL (ref 0–0.7)
EOSINOPHIL NFR BLD AUTO: 1 %
ERYTHROCYTE [DISTWIDTH] IN BLOOD BY AUTOMATED COUNT: 13.1 % (ref 10–15)
FLUAV RNA SPEC QL NAA+PROBE: NEGATIVE
FLUBV RNA RESP QL NAA+PROBE: NEGATIVE
GLUCOSE SERPL-MCNC: 133 MG/DL (ref 70–99)
GLUCOSE UR STRIP-MCNC: NEGATIVE MG/DL
HCO3 SERPL-SCNC: 22 MMOL/L (ref 22–29)
HCT VFR BLD AUTO: 39.8 % (ref 40–53)
HGB BLD-MCNC: 13.8 G/DL (ref 13.3–17.7)
HGB UR QL STRIP: NEGATIVE
IMM GRANULOCYTES # BLD: 0 10E3/UL
IMM GRANULOCYTES NFR BLD: 0 %
KETONES UR STRIP-MCNC: NEGATIVE MG/DL
LEUKOCYTE ESTERASE UR QL STRIP: ABNORMAL
LYMPHOCYTES # BLD AUTO: 2.3 10E3/UL (ref 0.8–5.3)
LYMPHOCYTES NFR BLD AUTO: 35 %
MAGNESIUM SERPL-MCNC: 2 MG/DL (ref 1.7–2.3)
MCH RBC QN AUTO: 29.3 PG (ref 26.5–33)
MCHC RBC AUTO-ENTMCNC: 34.7 G/DL (ref 31.5–36.5)
MCV RBC AUTO: 85 FL (ref 78–100)
MONOCYTES # BLD AUTO: 0.6 10E3/UL (ref 0–1.3)
MONOCYTES NFR BLD AUTO: 9 %
NEUTROPHILS # BLD AUTO: 3.7 10E3/UL (ref 1.6–8.3)
NEUTROPHILS NFR BLD AUTO: 55 %
NITRATE UR QL: NEGATIVE
NRBC # BLD AUTO: 0 10E3/UL
NRBC BLD AUTO-RTO: 0 /100
NT-PROBNP SERPL-MCNC: <36 PG/ML (ref 0–229)
PH UR STRIP: 6 [PH] (ref 5–7)
PLATELET # BLD AUTO: 235 10E3/UL (ref 150–450)
POTASSIUM SERPL-SCNC: 4.1 MMOL/L (ref 3.4–5.3)
RBC # BLD AUTO: 4.71 10E6/UL (ref 4.4–5.9)
RBC URINE: 1 /HPF
RSV RNA SPEC NAA+PROBE: NEGATIVE
SARS-COV-2 RNA RESP QL NAA+PROBE: NEGATIVE
SODIUM SERPL-SCNC: 137 MMOL/L (ref 135–145)
SP GR UR STRIP: 1.01 (ref 1–1.03)
TROPONIN T SERPL HS-MCNC: 8 NG/L
TROPONIN T SERPL HS-MCNC: 9 NG/L
TSH SERPL DL<=0.005 MIU/L-ACNC: 0.82 UIU/ML (ref 0.3–4.2)
UROBILINOGEN UR STRIP-MCNC: NORMAL MG/DL
WBC # BLD AUTO: 6.6 10E3/UL (ref 4–11)
WBC URINE: 1 /HPF

## 2025-06-21 PROCEDURE — 36415 COLL VENOUS BLD VENIPUNCTURE: CPT | Performed by: EMERGENCY MEDICINE

## 2025-06-21 PROCEDURE — 71046 X-RAY EXAM CHEST 2 VIEWS: CPT

## 2025-06-21 PROCEDURE — 84443 ASSAY THYROID STIM HORMONE: CPT | Performed by: EMERGENCY MEDICINE

## 2025-06-21 PROCEDURE — 80048 BASIC METABOLIC PNL TOTAL CA: CPT | Performed by: EMERGENCY MEDICINE

## 2025-06-21 PROCEDURE — 83880 ASSAY OF NATRIURETIC PEPTIDE: CPT | Performed by: EMERGENCY MEDICINE

## 2025-06-21 PROCEDURE — 96360 HYDRATION IV INFUSION INIT: CPT | Performed by: EMERGENCY MEDICINE

## 2025-06-21 PROCEDURE — 83735 ASSAY OF MAGNESIUM: CPT | Performed by: EMERGENCY MEDICINE

## 2025-06-21 PROCEDURE — 258N000003 HC RX IP 258 OP 636: Performed by: EMERGENCY MEDICINE

## 2025-06-21 PROCEDURE — 70450 CT HEAD/BRAIN W/O DYE: CPT

## 2025-06-21 PROCEDURE — 87637 SARSCOV2&INF A&B&RSV AMP PRB: CPT | Performed by: EMERGENCY MEDICINE

## 2025-06-21 PROCEDURE — 81003 URINALYSIS AUTO W/O SCOPE: CPT | Performed by: EMERGENCY MEDICINE

## 2025-06-21 PROCEDURE — 93005 ELECTROCARDIOGRAM TRACING: CPT | Performed by: STUDENT IN AN ORGANIZED HEALTH CARE EDUCATION/TRAINING PROGRAM

## 2025-06-21 PROCEDURE — 96361 HYDRATE IV INFUSION ADD-ON: CPT | Performed by: EMERGENCY MEDICINE

## 2025-06-21 PROCEDURE — 85025 COMPLETE CBC W/AUTO DIFF WBC: CPT | Performed by: EMERGENCY MEDICINE

## 2025-06-21 PROCEDURE — 99285 EMERGENCY DEPT VISIT HI MDM: CPT | Mod: 25 | Performed by: EMERGENCY MEDICINE

## 2025-06-21 PROCEDURE — 84484 ASSAY OF TROPONIN QUANT: CPT | Performed by: EMERGENCY MEDICINE

## 2025-06-21 PROCEDURE — 85379 FIBRIN DEGRADATION QUANT: CPT | Performed by: EMERGENCY MEDICINE

## 2025-06-21 RX ADMIN — SODIUM CHLORIDE 500 ML: 0.9 INJECTION, SOLUTION INTRAVENOUS at 12:43

## 2025-06-21 ASSESSMENT — ACTIVITIES OF DAILY LIVING (ADL)
ADLS_ACUITY_SCORE: 41

## 2025-06-21 ASSESSMENT — COLUMBIA-SUICIDE SEVERITY RATING SCALE - C-SSRS
6. HAVE YOU EVER DONE ANYTHING, STARTED TO DO ANYTHING, OR PREPARED TO DO ANYTHING TO END YOUR LIFE?: NO
2. HAVE YOU ACTUALLY HAD ANY THOUGHTS OF KILLING YOURSELF IN THE PAST MONTH?: NO
1. IN THE PAST MONTH, HAVE YOU WISHED YOU WERE DEAD OR WISHED YOU COULD GO TO SLEEP AND NOT WAKE UP?: NO

## 2025-06-21 NOTE — PHARMACY-ADMISSION MEDICATION HISTORY
Pharmacy Intern Admission Medication History    Admission medication history is complete. The information provided in this note is only as accurate as the sources available at the time of the update.    Information Source(s): Patient, Clinic records, [facility], and [facility] via in[facility]person    Pertinent Information: No fill Hx in past year of atenolol (last mentioned June 28 2024 clinic visit to be continued); patient couldn't confirm if they still take atenolol. All other chronic medications have good fill Hx. Confirmed patient takes Xarelto.     Changes made to PTA medication list:  Added: None  Deleted: Atenolol, Malarone, Artificial tears, Cosopt, Vanicream, Erythromycin ophthalmic, fluticasone nasal  Changed: None    Allergies reviewed with patient and updates made in EHR: yes    Medication History Completed By: FARZAD MONZON 6/21/2025 1:44 PM    PTA Med List   Medication Sig Last Dose/Taking    amLODIPine (NORVASC) 5 MG tablet Take 1 tablet (5 mg) by mouth daily. 6/21/2025 Morning    atorvastatin (LIPITOR) 80 MG tablet TAKE 1 TABLET (80 MG) BY MOUTH DAILY **LIPID PANEL DUE** LABS NEEDED FOR FURTHER REFILLS. CALL [phone].** 6/21/2025 Morning    budesonide-formoterol (SYMBICORT/BREYNA) 80-4.5 MCG/ACT Inhaler Inhale 2 puffs into the lungs 2 times daily. 6/21/2025 Morning    Cholecalciferol (VITAMIN D3) 50 MCG (2000 UT) CAPS Take 1 capsule by mouth daily Unknown    citalopram (CELEXA) 10 MG tablet TAKE 2 TABLETS BY MOUTH EVERY DAY 6/21/2025 Morning    docusate sodium (COLACE) 100 MG capsule Take 100 mg by mouth 2 times daily as needed for constipation. Unknown    flecainide (TAMBOCOR) 150 MG tablet Take 1 tablet (150 mg) by mouth 2 times daily. Overdue for appointment with cardiology. Please schedule appointment for further refills. 6/21/2025 Morning    lisinopril (ZESTRIL) 20 MG tablet Take 1 tablet (20 mg) by mouth daily. Please  schedule a clinic appt for refills 6/21/2025 Morning     montelukast (SINGULAIR) 10 MG tablet Take 1 tablet (10 mg) by mouth at bedtime. 6/20/2025 Evening    nitroGLYcerin (NITROSTAT) 0.4 MG sublingual tablet Place 0.4 mg under the tongue every 5 minutes as needed. Taking As Needed    pantoprazole (PROTONIX) 40 MG EC tablet Take 1 tablet (40 mg) by mouth daily. For additional refills, please schedule a follow-up annual  appointment due February 2025 at [phone] 6/21/2025 Morning    rivaroxaban ANTICOAGULANT (XARELTO ANTICOAGULANT) 20 MG TABS tablet Take 1 tablet (20 mg) by mouth daily (with dinner). 6/20/2025 Evening    tamsulosin (FLOMAX) 0.4 MG capsule TAKE 1 CAPSULE BY MOUTH EVERY DAY 6/21/2025 Morning

## 2025-06-21 NOTE — DISCHARGE INSTRUCTIONS
Read and follow the discharge instructions.    The CT of your head and your chest x-ray were normal.    Your lab work was normal.  Your pacemaker is working as expected    The cause of your fatigue was not found.    We discussed admitting you for activation but you declined.    Continue current medications.    Call your primary care doctor on Monday to make a follow-up appointment for reevaluation.    Return immediately or call 911 if you have any chest pain, shortness of breath, feeling dizzy, feeling fainting or any other concerns.

## 2025-06-21 NOTE — ED PROVIDER NOTES
EMERGENCY DEPARTMENT ENCOUNTER      NAME: Ju Edwards  AGE: 74 year old male  YOB: 1950  MRN: [id number]  EVALUATION DATE & TIME: No admission date for patient encounter.    PCP: Rayo Christiansen    ED PROVIDER: Nita Calloway M.D.      CHIEF COMPLAINT     Chief Complaint   Patient presents with    Fatigue       FINAL IMPRESSION:     1. Fatigue, unspecified type    2. Dual ICD (implantable cardioverter-defibrillator) in place    3. Anticoagulated by anticoagulation treatment        MEDICAL DECISION MAKING:     ED Course as of 06/21/25 1628   Sat Jun 21, 2025   1112 Mr. Lyon is a 73 yo male with PMH significant for sick sinus syndrome, AICD, hypertension hyperlipidemia, anticoagulated    Patient presents with family for feeling tired.  States for the last 3 days has been tired comes and goes.  Denies pain denies shortness of breath denies abdominal pain denies blood in the stool.    Vitals  Hypertensive afebrile sat 96% on room air    Exam  Cooperative and pleasant midline chest scar.  Left anterior pacemaker.  Dry mucous membranes.  Abdomen is soft.  Extremities without edema.    I considered the following diagnosis based on the patient's symptoms included but no limited to electrolyte abnormalities, anemia, acute coronary syndrome, infectious pathology, dehydration, among others.      1131 IV established patient placed in postop cardiac blood pressure monitors.   1131 EKG reveals sinus rhythm right bundle branch block right axis deviation   1159 Patient reevaluated with .  He complains of weakness in the legs feel weak and his heart feels weak.  Denies pain he denies any swelling he denies any back pain denies any numbness just that he feels weak.  No headache no dizziness.   1229 Labs normal white blood cell count normal hemoglobin normal platelets normal renal function.  Normal magnesium.  Normal troponin.   1230 Normal BNP normal thyroid normal magnesium.    1232 With  patient tells me again that he is weak and fatigued.  He went shopping and felt weak.  There is no pain.  He denies near syncope.  He denies any headache.  Denies any vertigo.  When he points to the legs he points to both knees that feel weak.  They are not in pain.  There is no swelling.   1233 Dry mucous membranes no evidence of volume overload normal BNP was given 500 cc of normal saline.   1233 There is no focal neurological deficits.  He strength is 5 out of 5 in the upper extremities 4 out of 5 to lower extremities no trauma but he is anticoagulated.  Will do a head CT to evaluate for any spontaneous bleed   1308 Spoke with Isadora at [facility].  Rhythm is on his own no arrhythmias.  Working as expected   1338 CT head independently interpreted by me previously seen encephalomalacia no intracranial hemorrhage formal read by radiologist   1339 Chest x-ray independently interpreted by me reveals pacemaker sternotomy scar.  Formal read by radiologist   1417 Updated patient.  Urine 25 leukocyte esterase no bacteria.   1617 Patient updated.  He wanted to know what kind of medications he received because he felt much better.  He was informed that he was just received normal saline.   1617 Discussed test results.  Discussed with him admission for observation.  He stated he felt comfortable going home and will come back if the symptoms return.   1617 Clinical impression and decision making  74-year-old male with known history of coronary disease has AICD previous history of CVA he is anticoagulated presents here stating that he feels fatigued and feels weak.  With  plan he denies chest pain shortness of breath near syncope vertiginous symptoms diaphoresis nausea or localized extremity weakness.  Spoke with [facility] ICD is working as expected.  BNP troponin D-dimer thyroid magnesium calcium within normal limits.  Given that he is anticoagulated and he felt fatigue CT head  was done there was no intracranial hemorrhage.  Chest x-ray sternotomy.  No evidence urinary infection.  Spoke at length with patient.  He is feeling better.  Declines admission for observation.  He drove himself to the emergency department.  Was able to eat and walk without any symptomatology.  Recommend he follows a primary care doctor and return for any concerns.  Discharged ambulatory stable condition.  Declined admission.   1621 Only abnormality on his exam was that he had dry mucous membranes.  I notified and asked why he was given normal saline.  He states his mouth is always dry and he usually carries a water bottle but did not have it with him.  Possible that he was slightly dehydrated and that is why he is felt fatigued.       Medical Decision Making    History   Supplemental history obtained from na   External EMR Record(s) reviewed: I reviewed etiology 4/8/2025 sick sinus syndrome hypertension BP a hyperlipidemia history of CVA A-fib    Complicating Factors   Care Impacted by Chronic illnesses pretension, sick sinus syndrome, hyperlipidemia, history of CVA, atrial fibrillation    Work Up   I considered additional work up, including MRI but deferred patient denies any weakness any numbness or any tingling neurological exam is normal.  He feels generalized fatigue.   I independently reviewed and interpreted chest x-ray and note.    Pacemaker.See full radiology report for final interpretation.   External Discussion   I discussed the care with another health care provider na    Disposition Considerations   I considered admission, consider admission.  Discussed with patient.  He feels comfortable being discharged and prefers to be discharged   Prescriptions recommended/prescribed I recommend the patient continue taking his Norvasc, atenolol, Xarelto       MIPS (CTPE, Dental pain, Blanco, Sinusitis, Asthma/COPD, Head Trauma): Not Applicable    SEPSIS: None            ED COURSE     11:27 AM Met with patient for  "initial interview.    12:42 PM I updated patient bedside on plan of care.    12:49 PM Paged [employer]    1:06 PM  Spoke with Isadora with [employer].     2:10 PM I rechecked patient bedside and updated patient with lab results.    3:14 PM rechecked patient bedside.    At the conclusion of the encounter I discussed the results of all of the tests and the disposition. The questions were answered. The patient acknowledged understanding and was agreeable with the care plan.         MEDICATIONS GIVEN IN THE EMERGENCY:     Medications   sodium chloride 0.9% BOLUS 500 mL (0 mLs Intravenous Stopped 6/21/25 1415)       NEW PRESCRIPTIONS STARTED AT TODAY'S ER VISIT     Discharge Medication List as of 6/21/2025  3:48 PM             =================================================================    HPI       PCP: Rayo Christiansen MD, [facility] internal medicine [place].      Patient information was obtained from: The patient    Use of :  Yes (MARTII) - Language: Garth Edwards is a 74 year old male with pertinent medical history of pacemaker, hypertension, A-fib, hyperlipidemia, GERD who presents by walking for evaluation of fatigue.     Patient states he's been feeling fatigued for the past three days. Patient explains his legs and heart feel \"weak\" but not painful. He expresses feeling \"very tired\" He denies any fever, vomiting, diarrhea, shortness of breath, fainting. He denies any blood in stool. He denies any change in baseline urination. He denies any current pain.     Denies dizziness, denies feeling fainting, denies sweating, denies palpitations.    Per chart review, patient was seen from 2/25/2020-2/27/2020 for EP pacemaker insertion. Chest XR showed median sternotomy. Cardiac enlargement. Dual cardiac leads. Enlarged pulmonary arteries suggesting pulmonary hypertension. Mild basilar atelectasis. No vascular congestion or significant effusion. Remote right rib " fracture. Patient underwent dual-chamber pacemaker placement on 2/26/2020. Procedure was unremarkable. Patient underwent chest x-ray and device interrogation this procedure. He was  discharged home in stable condition on 2/27/2020.            REVIEW OF SYSTEMS     Review of Systems   All other systems reviewed and are negative.     SEE HPI    PAST MEDICAL HISTORY:     Past Medical History:   Diagnosis Date    Allergic rhinitis     Atrial fibrillation (H)     paroxysmal    Atrial fibrillation (H)     CAD (coronary artery disease)     Delirium     associated with hospitalization for stroke    Depression     Gastric erosions 1/2014    associated with gi bleed    GERD (gastroesophageal reflux disease)     HTN (hypertension)     Hypercholesteremia     Hyperlipidemia     Hypertension     Left hemiparesis (H)     Obstructive sleep apnea     PTSD (post-traumatic stress disorder)     Sleep apnea     Stroke (H) 1/2014    ischemic CVA with hemorrhagic transformation    Stroke (H)        PAST SURGICAL HISTORY:     Past Surgical History:   Procedure Laterality Date    ASD REPAIR      CARDIAC SURGERY  2000    mitral valve repair    CYCLOPHOTOCOAGULATION TRANSSCLERAL WITH MICROPULSE LASER Left 8/24/2020    Procedure: ENDOCYCLOPHOTOCOAGULATION;  Surgeon: Stanford Welsh MD;  Location: [facility] OR    EP PACEMAKER      EP PACEMAKER INSERT N/A 2/26/2020    Procedure: EP Pacemaker Insertion;  Surgeon: Brandon Gusman MD;  Location: [facility] Cath Lab;  Service: Cardiology    IR MISCELLANEOUS PROCEDURE  1/22/2014    PHACOEMULSIFICATION CLEAR CORNEA WITH STANDARD INTRAOCULAR LENS IMPLANT Left 8/24/2020    Procedure: PHACOEMULSIFICATION, CATARACT, WITH INTRAOCULAR LENS IMPLANT and goniosynechialysis;  Surgeon: Stanford Welsh MD;  Location: [facility] OR    REPAIR ATRIAL SEPTAL DEFECT           CURRENT MEDICATIONS:     amLODIPine (NORVASC) 5 MG tablet  atorvastatin (LIPITOR) 80 MG tablet  budesonide-formoterol (SYMBICORT/BREYNA)  "80-4.5 MCG/ACT Inhaler  Cholecalciferol (VITAMIN D3) 50 MCG ( UT) CAPS  citalopram (CELEXA) 10 MG tablet  docusate sodium (COLACE) 100 MG capsule  flecainide (TAMBOCOR) 150 MG tablet  lisinopril (ZESTRIL) 20 MG tablet  montelukast (SINGULAIR) 10 MG tablet  nitroGLYcerin (NITROSTAT) 0.4 MG sublingual tablet  pantoprazole (PROTONIX) 40 MG EC tablet  rivaroxaban ANTICOAGULANT (XARELTO ANTICOAGULANT) 20 MG TABS tablet  tamsulosin (FLOMAX) 0.4 MG capsule  atenolol (TENORMIN) 50 MG tablet         ALLERGIES:     No Known Allergies    FAMILY HISTORY:     Family History   Problem Relation Age of Onset    Eye Surgery Mother     Albinism Mother     Cervical Cancer Mother     No Known Problems Father     No Known Problems Sister     No Known Problems Brother     No Known Problems Sister     No Known Problems Sister     No Known Problems Sister     No Known Problems Brother          from trauma    Glaucoma No family hx of     Macular Degeneration No family hx of        SOCIAL HISTORY:     Social History     Socioeconomic History    Marital status:    Tobacco Use    Smoking status: Never    Smokeless tobacco: Never   Vaping Use    Vaping status: Never Used   Substance and Sexual Activity    Alcohol use: No    Drug use: No    Sexual activity: Yes     Partners: Female   Other Topics Concern    Special Diet No    Exercise Yes     Comment: rehab     Social Drivers of Health     Interpersonal Safety: Low Risk  (2025)    Interpersonal Safety     Do you feel physically and emotionally safe where you currently live?: Yes     Within the past 12 months, have you been hit, slapped, kicked or otherwise physically hurt by someone?: No     Within the past 12 months, have you been humiliated or emotionally abused in other ways by your partner or ex-partner?: No       VITALS:     BP (!) 174/81   Pulse 78   Temp 97.8  F (36.6  C) (Tympanic)   Resp 23   Ht 1.6 m (5' 3\")   Wt 70.8 kg (156 lb)   SpO2 97%   BMI 27.63 kg/m  "     PHYSICAL EXAM     Physical Exam  Vitals and nursing note reviewed. Exam conducted with a chaperone present.   Constitutional:       General: He is not in acute distress.     Appearance: Normal appearance. He is normal weight. He is not ill-appearing, toxic-appearing or diaphoretic.   Chest:          Comments: midline chest scar left anterior pacemaker  Neurological:      Mental Status: He is alert.      Cranial Nerves: Cranial nerves 2-12 are intact.      Sensory: Sensation is intact.      Motor: Motor function is intact.      Coordination: Coordination is intact.         Physical Exam   Constitutional: Alert.  Cooperative and pleasant    Head: Atraumatic.     Nose: Nose normal.     Mouth/Throat: Dry mucous membranes.     Eyes: EOM are normal. Pupils are equal, round, and reactive to light.     Ears: Bilateral pearly white tympanic membranes.    Neck: Normal range of motion. Neck supple.     Cardiovascular: Normal rate, regular rhythm and normal heart sounds.  2+ femoral pulses/radial/DP pulses B    Pulmonary/Chest: Normal effort  and breath sounds normal. Midline chest scar. Pacemaker left anterior chest wall.     Abdominal: soft nontender.    Musculoskeletal: Normal range of motion.     Neurological: Moves upper and lower extremities equally.    Lymphatics: no edema, no calves pain, no palpable cords.    : NA    Skin: Skin is warm and dry.  Petechia no purpura    Psychiatric: Normal mood and affect. Behavior is normal.       LAB:     All pertinent labs reviewed and interpreted.  Labs Ordered and Resulted from Time of ED Arrival to Time of ED Departure   BASIC METABOLIC PANEL - Abnormal       Result Value    Sodium 137      Potassium 4.1      Chloride 103      Carbon Dioxide (CO2) 22      Anion Gap 12      Urea Nitrogen 11.9      Creatinine 0.94      GFR Estimate 86      Calcium 9.3      Glucose 133 (*)    CBC WITH PLATELETS AND DIFFERENTIAL - Abnormal    WBC Count 6.6      RBC Count 4.71      Hemoglobin 13.8       Hematocrit 39.8 (*)     MCV 85      MCH 29.3      MCHC 34.7      RDW 13.1      Platelet Count 235      % Neutrophils 55      % Lymphocytes 35      % Monocytes 9      % Eosinophils 1      % Basophils 1      % Immature Granulocytes 0      NRBCs per 100 WBC 0      Absolute Neutrophils 3.7      Absolute Lymphocytes 2.3      Absolute Monocytes 0.6      Absolute Eosinophils 0.1      Absolute Basophils 0.0      Absolute Immature Granulocytes 0.0      Absolute NRBCs 0.0     ROUTINE UA WITH MICROSCOPIC REFLEX TO CULTURE - Abnormal    Color Urine Straw      Appearance Urine Clear      Glucose Urine Negative      Bilirubin Urine Negative      Ketones Urine Negative      Specific Gravity Urine 1.006      Blood Urine Negative      pH Urine 6.0      Protein Albumin Urine Negative      Urobilinogen Urine Normal      Nitrite Urine Negative      Leukocyte Esterase Urine 25 Christine/uL (*)     RBC Urine 1      WBC Urine 1     MAGNESIUM - Normal    Magnesium 2.0     TROPONIN T, HIGH SENSITIVITY - Normal    Troponin T, High Sensitivity 8     NT-PROBNP - Normal    NT-proBNP <36     TSH WITH FREE T4 REFLEX - Normal    TSH 0.82     INFLUENZA A/B, RSV AND SARS-COV2 PCR - Normal    Influenza A PCR Negative      Influenza B PCR Negative      RSV PCR Negative      SARS CoV2 PCR Negative     TROPONIN T, HIGH SENSITIVITY - Normal    Troponin T, High Sensitivity 9     D DIMER QUANTITATIVE - Normal    D-Dimer Quantitative <0.27          RADIOLOGY:     Reviewed all pertinent imaging. Please see official radiology report.  CT Head w/o Contrast   Final Result   IMPRESSION:   1.  No acute intracranial process.   2.  Chronic small vessel ischemic disease and chronic infarcts in the right cerebral hemisphere.         Chest XR,  PA & LAT   Final Result   IMPRESSION: Lungs are clear. No pleural effusion. Mild cardiac enlargement. Probable enlargement central pulmonary arteries. Sternotomy. Pacer anterior left chest wall with the tips in the RA and RV.  Healed bilateral rib fractures.      Cardiac Device Check - Remote    (Results Pending)        EKG:   EKG #1  Sinus rhythm right bundle branch block right axis deviation  T waves in V2 V3    Time:897998    Ventricular rate 90 bmp  Axis RAD  NV interval 160 ms  QRS duration 132 ms  QT//504 ms    Compared to previous EKG on January 26, 2023 atrial paced rhythm.  Inverted T waves anteriorly.    I have independently reviewed and interpreted the EKG(s) documented above.      PROCEDURES:     Procedures      I, Marcos Pichardo, am serving as a scribe to document services personally performed by Dr. Calloway based on my observation and the provider's statements to me. I, Nita Calloway MD attest that Marcos Pichardo is acting in a scribe capacity, has observed my performance of the services and has documented them in accordance with my direction.    Nita Calloway M.D.  Emergency Medicine  [facility] EMERGENCY DEPARTMENT  [facility] [facility] [id number] [phone]  Dept: [phone]       Nita Calloway MD  06/21/25 1083

## 2025-06-21 NOTE — ED TRIAGE NOTES
Pt states he has felt very tired for 3 days. Has no pain at all. Eating ok.  Does have a hx of open heart surgery and pacemaker placement 10 yrs ago.  Appetite ok.  Needs mena interpretor.

## 2025-06-22 LAB
ATRIAL RATE - MUSE: 90 BPM
DIASTOLIC BLOOD PRESSURE - MUSE: 98 MMHG
INTERPRETATION ECG - MUSE: NORMAL
P AXIS - MUSE: -6 DEGREES
PR INTERVAL - MUSE: 160 MS
QRS DURATION - MUSE: 132 MS
QT - MUSE: 412 MS
QTC - MUSE: 504 MS
R AXIS - MUSE: 181 DEGREES
SYSTOLIC BLOOD PRESSURE - MUSE: 198 MMHG
T AXIS - MUSE: 41 DEGREES
VENTRICULAR RATE- MUSE: 90 BPM

## 2025-07-01 ENCOUNTER — PRE VISIT (OUTPATIENT)
Dept: ALLERGY | Facility: CLINIC | Age: 75
End: 2025-07-01

## 2025-07-10 ENCOUNTER — ANCILLARY PROCEDURE (OUTPATIENT)
Dept: CARDIOLOGY | Facility: CLINIC | Age: 75
End: 2025-07-10
Attending: INTERNAL MEDICINE
Payer: COMMERCIAL

## 2025-07-10 DIAGNOSIS — I49.5 SSS (SICK SINUS SYNDROME) (H): ICD-10-CM

## 2025-07-10 PROCEDURE — 93294 REM INTERROG EVL PM/LDLS PM: CPT | Performed by: INTERNAL MEDICINE

## 2025-07-10 PROCEDURE — 93296 REM INTERROG EVL PM/IDS: CPT

## 2025-07-11 LAB
MDC_IDC_EPISODE_DTM: NORMAL
MDC_IDC_EPISODE_DTM: NORMAL
MDC_IDC_EPISODE_ID: NORMAL
MDC_IDC_EPISODE_ID: NORMAL
MDC_IDC_EPISODE_TYPE: NORMAL
MDC_IDC_EPISODE_TYPE: NORMAL
MDC_IDC_LEAD_CONNECTION_STATUS: NORMAL
MDC_IDC_LEAD_CONNECTION_STATUS: NORMAL
MDC_IDC_LEAD_IMPLANT_DT: NORMAL
MDC_IDC_LEAD_IMPLANT_DT: NORMAL
MDC_IDC_LEAD_LOCATION: NORMAL
MDC_IDC_LEAD_LOCATION: NORMAL
MDC_IDC_LEAD_LOCATION_DETAIL_1: NORMAL
MDC_IDC_LEAD_LOCATION_DETAIL_1: NORMAL
MDC_IDC_LEAD_MFG: NORMAL
MDC_IDC_LEAD_MFG: NORMAL
MDC_IDC_LEAD_MODEL: NORMAL
MDC_IDC_LEAD_MODEL: NORMAL
MDC_IDC_LEAD_POLARITY_TYPE: NORMAL
MDC_IDC_LEAD_POLARITY_TYPE: NORMAL
MDC_IDC_LEAD_SERIAL: NORMAL
MDC_IDC_LEAD_SERIAL: NORMAL
MDC_IDC_MSMT_BATTERY_DTM: NORMAL
MDC_IDC_MSMT_BATTERY_REMAINING_LONGEVITY: 90 MO
MDC_IDC_MSMT_BATTERY_REMAINING_PERCENTAGE: 87 %
MDC_IDC_MSMT_BATTERY_STATUS: NORMAL
MDC_IDC_MSMT_LEADCHNL_RA_IMPEDANCE_VALUE: 633 OHM
MDC_IDC_MSMT_LEADCHNL_RV_IMPEDANCE_VALUE: 656 OHM
MDC_IDC_MSMT_LEADCHNL_RV_PACING_THRESHOLD_AMPLITUDE: 1.2 V
MDC_IDC_MSMT_LEADCHNL_RV_PACING_THRESHOLD_PULSEWIDTH: 0.4 MS
MDC_IDC_PG_IMPLANT_DTM: NORMAL
MDC_IDC_PG_MFG: NORMAL
MDC_IDC_PG_MODEL: NORMAL
MDC_IDC_PG_SERIAL: NORMAL
MDC_IDC_PG_TYPE: NORMAL
MDC_IDC_SESS_CLINIC_NAME: NORMAL
MDC_IDC_SESS_DTM: NORMAL
MDC_IDC_SESS_TYPE: NORMAL
MDC_IDC_SET_BRADY_AT_MODE_SWITCH_MODE: NORMAL
MDC_IDC_SET_BRADY_AT_MODE_SWITCH_RATE: 160 {BEATS}/MIN
MDC_IDC_SET_BRADY_LOWRATE: 60 {BEATS}/MIN
MDC_IDC_SET_BRADY_MAX_SENSOR_RATE: 130 {BEATS}/MIN
MDC_IDC_SET_BRADY_MAX_TRACKING_RATE: 130 {BEATS}/MIN
MDC_IDC_SET_BRADY_MODE: NORMAL
MDC_IDC_SET_BRADY_PAV_DELAY_LOW: 200 MS
MDC_IDC_SET_BRADY_SAV_DELAY_LOW: 150 MS
MDC_IDC_SET_LEADCHNL_RA_PACING_AMPLITUDE: 3.5 V
MDC_IDC_SET_LEADCHNL_RA_PACING_CAPTURE_MODE: NORMAL
MDC_IDC_SET_LEADCHNL_RA_PACING_POLARITY: NORMAL
MDC_IDC_SET_LEADCHNL_RA_PACING_PULSEWIDTH: 0.4 MS
MDC_IDC_SET_LEADCHNL_RA_SENSING_ADAPTATION_MODE: NORMAL
MDC_IDC_SET_LEADCHNL_RA_SENSING_POLARITY: NORMAL
MDC_IDC_SET_LEADCHNL_RA_SENSING_SENSITIVITY: 0.25 MV
MDC_IDC_SET_LEADCHNL_RV_PACING_AMPLITUDE: 1.6 V
MDC_IDC_SET_LEADCHNL_RV_PACING_CAPTURE_MODE: NORMAL
MDC_IDC_SET_LEADCHNL_RV_PACING_POLARITY: NORMAL
MDC_IDC_SET_LEADCHNL_RV_PACING_PULSEWIDTH: 0.4 MS
MDC_IDC_SET_LEADCHNL_RV_SENSING_ADAPTATION_MODE: NORMAL
MDC_IDC_SET_LEADCHNL_RV_SENSING_POLARITY: NORMAL
MDC_IDC_SET_LEADCHNL_RV_SENSING_SENSITIVITY: 1.5 MV
MDC_IDC_SET_ZONE_DETECTION_INTERVAL: 375 MS
MDC_IDC_SET_ZONE_STATUS: NORMAL
MDC_IDC_SET_ZONE_TYPE: NORMAL
MDC_IDC_SET_ZONE_VENDOR_TYPE: NORMAL
MDC_IDC_STAT_AT_BURDEN_PERCENT: 0 %
MDC_IDC_STAT_AT_DTM_END: NORMAL
MDC_IDC_STAT_AT_DTM_START: NORMAL
MDC_IDC_STAT_BRADY_DTM_END: NORMAL
MDC_IDC_STAT_BRADY_DTM_START: NORMAL
MDC_IDC_STAT_BRADY_RA_PERCENT_PACED: 45 %
MDC_IDC_STAT_BRADY_RV_PERCENT_PACED: 4 %
MDC_IDC_STAT_EPISODE_RECENT_COUNT: 0
MDC_IDC_STAT_EPISODE_RECENT_COUNT_DTM_END: NORMAL
MDC_IDC_STAT_EPISODE_RECENT_COUNT_DTM_START: NORMAL
MDC_IDC_STAT_EPISODE_TYPE: NORMAL
MDC_IDC_STAT_EPISODE_VENDOR_TYPE: NORMAL

## 2025-07-31 ENCOUNTER — OFFICE VISIT (OUTPATIENT)
Dept: INTERNAL MEDICINE | Facility: CLINIC | Age: 75
End: 2025-07-31
Payer: COMMERCIAL

## 2025-07-31 ENCOUNTER — LAB (OUTPATIENT)
Dept: LAB | Facility: CLINIC | Age: 75
End: 2025-07-31
Payer: COMMERCIAL

## 2025-07-31 VITALS
SYSTOLIC BLOOD PRESSURE: 139 MMHG | DIASTOLIC BLOOD PRESSURE: 78 MMHG | HEART RATE: 71 BPM | WEIGHT: 149 LBS | HEIGHT: 63 IN | BODY MASS INDEX: 26.4 KG/M2 | OXYGEN SATURATION: 97 % | TEMPERATURE: 98.1 F | RESPIRATION RATE: 20 BRPM

## 2025-07-31 DIAGNOSIS — Z12.11 SCREEN FOR COLON CANCER: Primary | ICD-10-CM

## 2025-07-31 DIAGNOSIS — E11.9 DIABETES MELLITUS, TYPE 2 (H): ICD-10-CM

## 2025-07-31 DIAGNOSIS — R39.9 LOWER URINARY TRACT SYMPTOMS (LUTS): ICD-10-CM

## 2025-07-31 DIAGNOSIS — B35.1 ONYCHOMYCOSIS: ICD-10-CM

## 2025-07-31 DIAGNOSIS — Z23 NEED FOR VACCINATION: ICD-10-CM

## 2025-07-31 LAB
CREAT UR-MCNC: 44.9 MG/DL
EST. AVERAGE GLUCOSE BLD GHB EST-MCNC: 131 MG/DL
HBA1C MFR BLD: 6.2 %
MICROALBUMIN UR-MCNC: <12 MG/L
MICROALBUMIN/CREAT UR: NORMAL MG/G{CREAT}

## 2025-07-31 PROCEDURE — 99214 OFFICE O/P EST MOD 30 MIN: CPT | Mod: 25

## 2025-07-31 PROCEDURE — 99000 SPECIMEN HANDLING OFFICE-LAB: CPT | Performed by: PATHOLOGY

## 2025-07-31 PROCEDURE — 83036 HEMOGLOBIN GLYCOSYLATED A1C: CPT | Performed by: INTERNAL MEDICINE

## 2025-07-31 PROCEDURE — 3075F SYST BP GE 130 - 139MM HG: CPT

## 2025-07-31 PROCEDURE — 82570 ASSAY OF URINE CREATININE: CPT | Performed by: INTERNAL MEDICINE

## 2025-07-31 PROCEDURE — 3078F DIAST BP <80 MM HG: CPT

## 2025-07-31 PROCEDURE — 90480 ADMN SARSCOV2 VAC 1/ONLY CMP: CPT

## 2025-07-31 PROCEDURE — 91320 SARSCV2 VAC 30MCG TRS-SUC IM: CPT

## 2025-07-31 RX ORDER — TERBINAFINE HYDROCHLORIDE 250 MG/1
250 TABLET ORAL DAILY
Qty: 90 TABLET | Refills: 0 | Status: SHIPPED | OUTPATIENT
Start: 2025-07-31 | End: 2025-10-29

## 2025-07-31 RX ORDER — DOXAZOSIN 1 MG/1
TABLET ORAL
Qty: 503 TABLET | Refills: 0 | Status: SHIPPED | OUTPATIENT
Start: 2025-07-31 | End: 2025-12-11

## 2025-07-31 NOTE — PROGRESS NOTES
"  Assessment & Plan     Need for vaccination  -COVID vaccine today  -due for RSV     Screen for colon cancer  Had colonoscopy in 2022 but poor visualization necessitating repeat colonoscopy  - Colonoscopy Screening  Referral; Future    Diabetes mellitus, type 2 (H)  A1C 2/25 6.2  - Adult Eye  Referral; Future  - HEMOGLOBIN A1C; Future  - Albumin Random Urine Quantitative with Creat Ratio; Future    Lower urinary tract symptoms (LUTS)  Trialing doxasozin  - doxazosin (CARDURA) 1 MG tablet; Take 1 tablet (1 mg) by mouth at bedtime for 3 days, THEN 2 tablets (2 mg) at bedtime for 10 days, THEN 4 tablets (4 mg) at bedtime.    Onychomycosis  - terbinafine (LAMISIL) 250 MG tablet; Take 1 tablet (250 mg) by mouth daily.    BMI  Estimated body mass index is 26.39 kg/m  as calculated from the following:    Height as of this encounter: 1.6 m (5' 3\").    Weight as of this encounter: 67.6 kg (149 lb).   {Weight Management Plan needed for ACO:346558}    {Follow-up (Optional):167260}    Tanner Dodd is a 75 year old, presenting for the following health issues:  Infection (Possible infection of the left thumb per pt - nail of left thumb is discolored and painful per pt onset about 7 months ago per pt )      7/31/2025    11:41 AM   Additional Questions   Roomed by MR CAMILLE Dodd presents to clinic today with complaints of R thumb pain. The pain has been present for at least 7 months with significant discoloration of the lateral nail. It has not resolved with antibiotic ointment and hydrogen peroxide.         Review of Systems  Constitutional, HEENT, cardiovascular, pulmonary, GI, , musculoskeletal, neuro, skin, endocrine and psych systems are negative, except as otherwise noted.      Objective    /78 (BP Location: Right arm, Patient Position: Sitting, Cuff Size: Adult Regular)   Pulse 71   Temp 98.1  F (36.7  C) (Oral)   Resp 20   Ht 1.6 m (5' 3\")   Wt 67.6 kg (149 lb)   SpO2 " 97%   BMI 26.39 kg/m    Body mass index is 26.39 kg/m .  Physical Exam   GENERAL: alert and no distress  NECK: no adenopathy, no asymmetry, masses, or scars  RESP: lungs clear to auscultation - no rales, rhonchi or wheezes  CV: regular rate and rhythm, normal S1 S2, no S3 or S4, no murmur, click or rub, no peripheral edema  ABDOMEN: soft, nontender, no hepatosplenomegaly, no masses and bowel sounds normal  MS: no gross musculoskeletal defects noted, no edema  SKIN: R thumbnail lateral discoloration and keratosis        Signed Electronically by: Adam Godinez MD  {Email feedback regarding this note to [email or web address]   :[id number]}

## 2025-08-04 ENCOUNTER — PATIENT OUTREACH (OUTPATIENT)
Dept: CARE COORDINATION | Facility: CLINIC | Age: 75
End: 2025-08-04
Payer: COMMERCIAL

## (undated) DEVICE — EYE KNIFE STILETTO VISITEC 1.1MM ANG 45DEG SIDEPORT 376620

## (undated) DEVICE — EYE SHIELD PLASTIC

## (undated) DEVICE — NDL 30GA 0.5" 305106

## (undated) DEVICE — EYE DRSG PAD OVAL

## (undated) DEVICE — EYE CANN IRR 25GA CYSTOTOME 581610

## (undated) DEVICE — LINEN TOWEL PACK X5 5464

## (undated) DEVICE — SOL WATER IRRIG 500ML BOTTLE 2F7113

## (undated) DEVICE — EYE CANN IRR 27GA ANTERIOR CHAMBER 581280

## (undated) DEVICE — DRSG GAUZE 4X4" 2187

## (undated) DEVICE — EYE TIP IRRIGATION & ASPIRATION POLYMER CVD 0.3MM 8065751512

## (undated) DEVICE — SYR 01ML TBC SLIP TIP W/O NDL

## (undated) DEVICE — EYE KNIFE SLIT XSTAR VISITEC 2.6MM 45DEG 373726

## (undated) DEVICE — PACK CATARACT CUSTOM ASC SEY15CPUMC

## (undated) DEVICE — GLOVE PROTEXIS MICRO 7.5  2D73PM75

## (undated) DEVICE — EYE PACK CUSTOM ANTERIOR 30DEG TIP CENTURION PPK6682-04

## (undated) DEVICE — APPLICATOR COTTON TIP 3" PKG OF 10 34831010

## (undated) DEVICE — BOWL 32OZ STERILE 01232

## (undated) RX ORDER — FENTANYL CITRATE 50 UG/ML
INJECTION, SOLUTION INTRAMUSCULAR; INTRAVENOUS
Status: DISPENSED
Start: 2020-10-22

## (undated) RX ORDER — FENTANYL CITRATE 50 UG/ML
INJECTION, SOLUTION INTRAMUSCULAR; INTRAVENOUS
Status: DISPENSED
Start: 2020-08-24

## (undated) RX ORDER — ONDANSETRON 2 MG/ML
INJECTION INTRAMUSCULAR; INTRAVENOUS
Status: DISPENSED
Start: 2020-08-24

## (undated) RX ORDER — SIMETHICONE 40MG/0.6ML
SUSPENSION, DROPS(FINAL DOSAGE FORM)(ML) ORAL
Status: DISPENSED
Start: 2020-10-22